# Patient Record
Sex: MALE | Race: OTHER | HISPANIC OR LATINO
[De-identification: names, ages, dates, MRNs, and addresses within clinical notes are randomized per-mention and may not be internally consistent; named-entity substitution may affect disease eponyms.]

---

## 2022-01-01 ENCOUNTER — TRANSCRIPTION ENCOUNTER (OUTPATIENT)
Age: 77
End: 2022-01-01

## 2022-01-01 ENCOUNTER — INPATIENT (INPATIENT)
Facility: HOSPITAL | Age: 77
LOS: 28 days | End: 2022-02-10
Attending: INTERNAL MEDICINE | Admitting: INTERNAL MEDICINE
Payer: COMMERCIAL

## 2022-01-01 VITALS — HEART RATE: 97 BPM | OXYGEN SATURATION: 100 %

## 2022-01-01 VITALS
OXYGEN SATURATION: 100 % | RESPIRATION RATE: 22 BRPM | DIASTOLIC BLOOD PRESSURE: 55 MMHG | HEART RATE: 132 BPM | SYSTOLIC BLOOD PRESSURE: 104 MMHG

## 2022-01-01 DIAGNOSIS — I48.91 UNSPECIFIED ATRIAL FIBRILLATION: ICD-10-CM

## 2022-01-01 DIAGNOSIS — I70.222 ATHEROSCLEROSIS OF NATIVE ARTERIES OF EXTREMITIES WITH REST PAIN, LEFT LEG: ICD-10-CM

## 2022-01-01 DIAGNOSIS — F03.90 UNSPECIFIED DEMENTIA, UNSPECIFIED SEVERITY, WITHOUT BEHAVIORAL DISTURBANCE, PSYCHOTIC DISTURBANCE, MOOD DISTURBANCE, AND ANXIETY: ICD-10-CM

## 2022-01-01 DIAGNOSIS — I61.9 NONTRAUMATIC INTRACEREBRAL HEMORRHAGE, UNSPECIFIED: ICD-10-CM

## 2022-01-01 DIAGNOSIS — A41.9 SEPSIS, UNSPECIFIED ORGANISM: ICD-10-CM

## 2022-01-01 DIAGNOSIS — M62.82 RHABDOMYOLYSIS: ICD-10-CM

## 2022-01-01 DIAGNOSIS — L89.90 PRESSURE ULCER OF UNSPECIFIED SITE, UNSPECIFIED STAGE: ICD-10-CM

## 2022-01-01 DIAGNOSIS — Z00.00 ENCOUNTER FOR GENERAL ADULT MEDICAL EXAMINATION WITHOUT ABNORMAL FINDINGS: ICD-10-CM

## 2022-01-01 DIAGNOSIS — E86.0 DEHYDRATION: ICD-10-CM

## 2022-01-01 DIAGNOSIS — R41.82 ALTERED MENTAL STATUS, UNSPECIFIED: ICD-10-CM

## 2022-01-01 DIAGNOSIS — I10 ESSENTIAL (PRIMARY) HYPERTENSION: ICD-10-CM

## 2022-01-01 DIAGNOSIS — N17.9 ACUTE KIDNEY FAILURE, UNSPECIFIED: ICD-10-CM

## 2022-01-01 DIAGNOSIS — K92.2 GASTROINTESTINAL HEMORRHAGE, UNSPECIFIED: ICD-10-CM

## 2022-01-01 DIAGNOSIS — R09.02 HYPOXEMIA: ICD-10-CM

## 2022-01-01 DIAGNOSIS — E11.9 TYPE 2 DIABETES MELLITUS WITHOUT COMPLICATIONS: ICD-10-CM

## 2022-01-01 LAB
-  AMIKACIN: SIGNIFICANT CHANGE UP
-  AMOXICILLIN/CLAVULANIC ACID: SIGNIFICANT CHANGE UP
-  AMPICILLIN/SULBACTAM: SIGNIFICANT CHANGE UP
-  AMPICILLIN: SIGNIFICANT CHANGE UP
-  AZTREONAM: SIGNIFICANT CHANGE UP
-  CEFAZOLIN: SIGNIFICANT CHANGE UP
-  CEFEPIME: SIGNIFICANT CHANGE UP
-  CEFOXITIN: SIGNIFICANT CHANGE UP
-  CEFTAZIDIME: SIGNIFICANT CHANGE UP
-  CEFTAZIDIME: SIGNIFICANT CHANGE UP
-  CEFTRIAXONE: SIGNIFICANT CHANGE UP
-  CIPROFLOXACIN: SIGNIFICANT CHANGE UP
-  ERTAPENEM: SIGNIFICANT CHANGE UP
-  GENTAMICIN: SIGNIFICANT CHANGE UP
-  IMIPENEM: SIGNIFICANT CHANGE UP
-  LEVOFLOXACIN: SIGNIFICANT CHANGE UP
-  MEROPENEM: SIGNIFICANT CHANGE UP
-  PIPERACILLIN/TAZOBACTAM: SIGNIFICANT CHANGE UP
-  TOBRAMYCIN: SIGNIFICANT CHANGE UP
-  TRIMETHOPRIM/SULFAMETHOXAZOLE: SIGNIFICANT CHANGE UP
A1C WITH ESTIMATED AVERAGE GLUCOSE RESULT: 6.1 % — HIGH (ref 4–5.6)
ALBUMIN SERPL ELPH-MCNC: 1.9 G/DL — LOW (ref 3.3–5)
ALBUMIN SERPL ELPH-MCNC: 2 G/DL — LOW (ref 3.3–5)
ALBUMIN SERPL ELPH-MCNC: 2.1 G/DL — LOW (ref 3.3–5)
ALBUMIN SERPL ELPH-MCNC: 2.2 G/DL — LOW (ref 3.3–5)
ALBUMIN SERPL ELPH-MCNC: 2.4 G/DL — LOW (ref 3.3–5)
ALBUMIN SERPL ELPH-MCNC: 3 G/DL — LOW (ref 3.3–5)
ALBUMIN SERPL ELPH-MCNC: 3.3 G/DL — SIGNIFICANT CHANGE UP (ref 3.3–5)
ALBUMIN SERPL ELPH-MCNC: 3.8 G/DL — SIGNIFICANT CHANGE UP (ref 3.3–5)
ALBUMIN SERPL ELPH-MCNC: 4.5 G/DL — SIGNIFICANT CHANGE UP (ref 3.3–5)
ALP SERPL-CCNC: 123 U/L — HIGH (ref 40–120)
ALP SERPL-CCNC: 139 U/L — HIGH (ref 40–120)
ALP SERPL-CCNC: 146 U/L — HIGH (ref 40–120)
ALP SERPL-CCNC: 224 U/L — HIGH (ref 40–120)
ALP SERPL-CCNC: 75 U/L — SIGNIFICANT CHANGE UP (ref 40–120)
ALP SERPL-CCNC: 77 U/L — SIGNIFICANT CHANGE UP (ref 40–120)
ALP SERPL-CCNC: 79 U/L — SIGNIFICANT CHANGE UP (ref 40–120)
ALP SERPL-CCNC: 82 U/L — SIGNIFICANT CHANGE UP (ref 40–120)
ALP SERPL-CCNC: 82 U/L — SIGNIFICANT CHANGE UP (ref 40–120)
ALP SERPL-CCNC: 86 U/L — SIGNIFICANT CHANGE UP (ref 40–120)
ALP SERPL-CCNC: 89 U/L — SIGNIFICANT CHANGE UP (ref 40–120)
ALP SERPL-CCNC: 98 U/L — SIGNIFICANT CHANGE UP (ref 40–120)
ALP SERPL-CCNC: 98 U/L — SIGNIFICANT CHANGE UP (ref 40–120)
ALT FLD-CCNC: 174 U/L — HIGH (ref 4–41)
ALT FLD-CCNC: 30 U/L — SIGNIFICANT CHANGE UP (ref 4–41)
ALT FLD-CCNC: 32 U/L — SIGNIFICANT CHANGE UP (ref 4–41)
ALT FLD-CCNC: 35 U/L — SIGNIFICANT CHANGE UP (ref 4–41)
ALT FLD-CCNC: 35 U/L — SIGNIFICANT CHANGE UP (ref 4–41)
ALT FLD-CCNC: 36 U/L — SIGNIFICANT CHANGE UP (ref 4–41)
ALT FLD-CCNC: 39 U/L — SIGNIFICANT CHANGE UP (ref 4–41)
ALT FLD-CCNC: 41 U/L — SIGNIFICANT CHANGE UP (ref 4–41)
ALT FLD-CCNC: 44 U/L — HIGH (ref 4–41)
ALT FLD-CCNC: 44 U/L — HIGH (ref 4–41)
ALT FLD-CCNC: 65 U/L — HIGH (ref 4–41)
ALT FLD-CCNC: 72 U/L — HIGH (ref 4–41)
ALT FLD-CCNC: 75 U/L — HIGH (ref 4–41)
ANION GAP SERPL CALC-SCNC: 10 MMOL/L — SIGNIFICANT CHANGE UP (ref 7–14)
ANION GAP SERPL CALC-SCNC: 11 MMOL/L — SIGNIFICANT CHANGE UP (ref 7–14)
ANION GAP SERPL CALC-SCNC: 12 MMOL/L — SIGNIFICANT CHANGE UP (ref 7–14)
ANION GAP SERPL CALC-SCNC: 13 MMOL/L — SIGNIFICANT CHANGE UP (ref 7–14)
ANION GAP SERPL CALC-SCNC: 14 MMOL/L — SIGNIFICANT CHANGE UP (ref 7–14)
ANION GAP SERPL CALC-SCNC: 19 MMOL/L — HIGH (ref 7–14)
ANION GAP SERPL CALC-SCNC: 5 MMOL/L — LOW (ref 7–14)
ANION GAP SERPL CALC-SCNC: 6 MMOL/L — LOW (ref 7–14)
ANION GAP SERPL CALC-SCNC: 7 MMOL/L — SIGNIFICANT CHANGE UP (ref 7–14)
ANION GAP SERPL CALC-SCNC: 8 MMOL/L — SIGNIFICANT CHANGE UP (ref 7–14)
ANION GAP SERPL CALC-SCNC: 9 MMOL/L — SIGNIFICANT CHANGE UP (ref 7–14)
APPEARANCE UR: ABNORMAL
APPEARANCE UR: ABNORMAL
APPEARANCE UR: CLEAR — SIGNIFICANT CHANGE UP
APTT BLD: 22.8 SEC — LOW (ref 27–36.3)
APTT BLD: 25.9 SEC — LOW (ref 27–36.3)
APTT BLD: 26.9 SEC — LOW (ref 27–36.3)
APTT BLD: 27 SEC — SIGNIFICANT CHANGE UP (ref 27–36.3)
APTT BLD: 27.8 SEC — SIGNIFICANT CHANGE UP (ref 27–36.3)
APTT BLD: 27.9 SEC — SIGNIFICANT CHANGE UP (ref 27–36.3)
APTT BLD: 28.3 SEC — SIGNIFICANT CHANGE UP (ref 27–36.3)
APTT BLD: 29 SEC — SIGNIFICANT CHANGE UP (ref 27–36.3)
APTT BLD: 31.3 SEC — SIGNIFICANT CHANGE UP (ref 27–36.3)
APTT BLD: 31.8 SEC — SIGNIFICANT CHANGE UP (ref 27–36.3)
AST SERPL-CCNC: 215 U/L — HIGH (ref 4–40)
AST SERPL-CCNC: 35 U/L — SIGNIFICANT CHANGE UP (ref 4–40)
AST SERPL-CCNC: 35 U/L — SIGNIFICANT CHANGE UP (ref 4–40)
AST SERPL-CCNC: 45 U/L — HIGH (ref 4–40)
AST SERPL-CCNC: 47 U/L — HIGH (ref 4–40)
AST SERPL-CCNC: 48 U/L — HIGH (ref 4–40)
AST SERPL-CCNC: 50 U/L — HIGH (ref 4–40)
AST SERPL-CCNC: 65 U/L — HIGH (ref 4–40)
AST SERPL-CCNC: 66 U/L — HIGH (ref 4–40)
AST SERPL-CCNC: 67 U/L — HIGH (ref 4–40)
AST SERPL-CCNC: 72 U/L — HIGH (ref 4–40)
AST SERPL-CCNC: 73 U/L — HIGH (ref 4–40)
AST SERPL-CCNC: 73 U/L — HIGH (ref 4–40)
BACTERIA # UR AUTO: ABNORMAL
BASE EXCESS BLDA CALC-SCNC: 0.9 MMOL/L — SIGNIFICANT CHANGE UP (ref -2–3)
BASE EXCESS BLDA CALC-SCNC: 3 MMOL/L — SIGNIFICANT CHANGE UP (ref -2–3)
BASE EXCESS BLDV CALC-SCNC: 1.3 MMOL/L — SIGNIFICANT CHANGE UP (ref -2–3)
BASE EXCESS BLDV CALC-SCNC: 1.4 MMOL/L — SIGNIFICANT CHANGE UP (ref -2–3)
BASOPHILS # BLD AUTO: 0.03 K/UL — SIGNIFICANT CHANGE UP (ref 0–0.2)
BASOPHILS # BLD AUTO: 0.05 K/UL — SIGNIFICANT CHANGE UP (ref 0–0.2)
BASOPHILS # BLD AUTO: 0.06 K/UL — SIGNIFICANT CHANGE UP (ref 0–0.2)
BASOPHILS # BLD AUTO: 0.06 K/UL — SIGNIFICANT CHANGE UP (ref 0–0.2)
BASOPHILS NFR BLD AUTO: 0.2 % — SIGNIFICANT CHANGE UP (ref 0–2)
BASOPHILS NFR BLD AUTO: 0.2 % — SIGNIFICANT CHANGE UP (ref 0–2)
BASOPHILS NFR BLD AUTO: 0.3 % — SIGNIFICANT CHANGE UP (ref 0–2)
BASOPHILS NFR BLD AUTO: 0.3 % — SIGNIFICANT CHANGE UP (ref 0–2)
BILIRUB DIRECT SERPL-MCNC: 0.2 MG/DL — SIGNIFICANT CHANGE UP (ref 0–0.3)
BILIRUB DIRECT SERPL-MCNC: <0.2 MG/DL — SIGNIFICANT CHANGE UP (ref 0–0.3)
BILIRUB DIRECT SERPL-MCNC: <0.2 MG/DL — SIGNIFICANT CHANGE UP (ref 0–0.3)
BILIRUB INDIRECT FLD-MCNC: 0.2 MG/DL — SIGNIFICANT CHANGE UP (ref 0–1)
BILIRUB INDIRECT FLD-MCNC: >0.1 MG/DL — SIGNIFICANT CHANGE UP (ref 0–1)
BILIRUB INDIRECT FLD-MCNC: SIGNIFICANT CHANGE UP MG/DL (ref 0–1)
BILIRUB SERPL-MCNC: 0.2 MG/DL — SIGNIFICANT CHANGE UP (ref 0.2–1.2)
BILIRUB SERPL-MCNC: 0.3 MG/DL — SIGNIFICANT CHANGE UP (ref 0.2–1.2)
BILIRUB SERPL-MCNC: 0.4 MG/DL — SIGNIFICANT CHANGE UP (ref 0.2–1.2)
BILIRUB SERPL-MCNC: 0.9 MG/DL — SIGNIFICANT CHANGE UP (ref 0.2–1.2)
BILIRUB SERPL-MCNC: 0.9 MG/DL — SIGNIFICANT CHANGE UP (ref 0.2–1.2)
BILIRUB SERPL-MCNC: 1.1 MG/DL — SIGNIFICANT CHANGE UP (ref 0.2–1.2)
BILIRUB SERPL-MCNC: 1.3 MG/DL — HIGH (ref 0.2–1.2)
BILIRUB SERPL-MCNC: <0.2 MG/DL — SIGNIFICANT CHANGE UP (ref 0.2–1.2)
BILIRUB UR-MCNC: NEGATIVE — SIGNIFICANT CHANGE UP
BLD GP AB SCN SERPL QL: NEGATIVE — SIGNIFICANT CHANGE UP
BLOOD GAS ARTERIAL - LYTES,HGB,ICA,LACT RESULT: SIGNIFICANT CHANGE UP
BLOOD GAS ARTERIAL COMPREHENSIVE RESULT: SIGNIFICANT CHANGE UP
BLOOD GAS VENOUS COMPREHENSIVE RESULT: SIGNIFICANT CHANGE UP
BUN SERPL-MCNC: 21 MG/DL — SIGNIFICANT CHANGE UP (ref 7–23)
BUN SERPL-MCNC: 21 MG/DL — SIGNIFICANT CHANGE UP (ref 7–23)
BUN SERPL-MCNC: 22 MG/DL — SIGNIFICANT CHANGE UP (ref 7–23)
BUN SERPL-MCNC: 23 MG/DL — SIGNIFICANT CHANGE UP (ref 7–23)
BUN SERPL-MCNC: 23 MG/DL — SIGNIFICANT CHANGE UP (ref 7–23)
BUN SERPL-MCNC: 24 MG/DL — HIGH (ref 7–23)
BUN SERPL-MCNC: 24 MG/DL — HIGH (ref 7–23)
BUN SERPL-MCNC: 25 MG/DL — HIGH (ref 7–23)
BUN SERPL-MCNC: 26 MG/DL — HIGH (ref 7–23)
BUN SERPL-MCNC: 26 MG/DL — HIGH (ref 7–23)
BUN SERPL-MCNC: 27 MG/DL — HIGH (ref 7–23)
BUN SERPL-MCNC: 28 MG/DL — HIGH (ref 7–23)
BUN SERPL-MCNC: 28 MG/DL — HIGH (ref 7–23)
BUN SERPL-MCNC: 30 MG/DL — HIGH (ref 7–23)
BUN SERPL-MCNC: 31 MG/DL — HIGH (ref 7–23)
BUN SERPL-MCNC: 31 MG/DL — HIGH (ref 7–23)
BUN SERPL-MCNC: 32 MG/DL — HIGH (ref 7–23)
BUN SERPL-MCNC: 33 MG/DL — HIGH (ref 7–23)
BUN SERPL-MCNC: 34 MG/DL — HIGH (ref 7–23)
BUN SERPL-MCNC: 35 MG/DL — HIGH (ref 7–23)
BUN SERPL-MCNC: 38 MG/DL — HIGH (ref 7–23)
BUN SERPL-MCNC: 40 MG/DL — HIGH (ref 7–23)
BUN SERPL-MCNC: 43 MG/DL — HIGH (ref 7–23)
BUN SERPL-MCNC: 44 MG/DL — HIGH (ref 7–23)
BUN SERPL-MCNC: 62 MG/DL — HIGH (ref 7–23)
C DIFF BY PCR RESULT: DETECTED
C DIFF TOX GENS STL QL NAA+PROBE: SIGNIFICANT CHANGE UP
CA-I BLD-SCNC: 0.98 MMOL/L — LOW (ref 1.15–1.29)
CA-I BLD-SCNC: 1.03 MMOL/L — LOW (ref 1.15–1.29)
CALCIUM SERPL-MCNC: 10 MG/DL — SIGNIFICANT CHANGE UP (ref 8.4–10.5)
CALCIUM SERPL-MCNC: 7.1 MG/DL — LOW (ref 8.4–10.5)
CALCIUM SERPL-MCNC: 7.2 MG/DL — LOW (ref 8.4–10.5)
CALCIUM SERPL-MCNC: 7.2 MG/DL — LOW (ref 8.4–10.5)
CALCIUM SERPL-MCNC: 7.3 MG/DL — LOW (ref 8.4–10.5)
CALCIUM SERPL-MCNC: 7.4 MG/DL — LOW (ref 8.4–10.5)
CALCIUM SERPL-MCNC: 7.5 MG/DL — LOW (ref 8.4–10.5)
CALCIUM SERPL-MCNC: 7.5 MG/DL — LOW (ref 8.4–10.5)
CALCIUM SERPL-MCNC: 7.6 MG/DL — LOW (ref 8.4–10.5)
CALCIUM SERPL-MCNC: 7.8 MG/DL — LOW (ref 8.4–10.5)
CALCIUM SERPL-MCNC: 7.9 MG/DL — LOW (ref 8.4–10.5)
CALCIUM SERPL-MCNC: 8.1 MG/DL — LOW (ref 8.4–10.5)
CALCIUM SERPL-MCNC: 8.3 MG/DL — LOW (ref 8.4–10.5)
CALCIUM SERPL-MCNC: 8.6 MG/DL — SIGNIFICANT CHANGE UP (ref 8.4–10.5)
CALCIUM SERPL-MCNC: 8.6 MG/DL — SIGNIFICANT CHANGE UP (ref 8.4–10.5)
CALCIUM SERPL-MCNC: 8.7 MG/DL — SIGNIFICANT CHANGE UP (ref 8.4–10.5)
CALCIUM SERPL-MCNC: 9 MG/DL — SIGNIFICANT CHANGE UP (ref 8.4–10.5)
CHLORIDE BLDV-SCNC: 104 MMOL/L — SIGNIFICANT CHANGE UP (ref 96–108)
CHLORIDE BLDV-SCNC: 113 MMOL/L — HIGH (ref 96–108)
CHLORIDE SERPL-SCNC: 102 MMOL/L — SIGNIFICANT CHANGE UP (ref 98–107)
CHLORIDE SERPL-SCNC: 102 MMOL/L — SIGNIFICANT CHANGE UP (ref 98–107)
CHLORIDE SERPL-SCNC: 104 MMOL/L — SIGNIFICANT CHANGE UP (ref 98–107)
CHLORIDE SERPL-SCNC: 105 MMOL/L — SIGNIFICANT CHANGE UP (ref 98–107)
CHLORIDE SERPL-SCNC: 108 MMOL/L — HIGH (ref 98–107)
CHLORIDE SERPL-SCNC: 110 MMOL/L — HIGH (ref 98–107)
CHLORIDE SERPL-SCNC: 110 MMOL/L — HIGH (ref 98–107)
CHLORIDE SERPL-SCNC: 111 MMOL/L — HIGH (ref 98–107)
CHLORIDE SERPL-SCNC: 112 MMOL/L — HIGH (ref 98–107)
CHLORIDE SERPL-SCNC: 113 MMOL/L — HIGH (ref 98–107)
CHLORIDE SERPL-SCNC: 114 MMOL/L — HIGH (ref 98–107)
CHLORIDE SERPL-SCNC: 115 MMOL/L — HIGH (ref 98–107)
CHLORIDE SERPL-SCNC: 115 MMOL/L — HIGH (ref 98–107)
CHLORIDE SERPL-SCNC: 116 MMOL/L — HIGH (ref 98–107)
CHLORIDE SERPL-SCNC: 117 MMOL/L — HIGH (ref 98–107)
CHLORIDE SERPL-SCNC: 117 MMOL/L — HIGH (ref 98–107)
CHLORIDE SERPL-SCNC: 118 MMOL/L — HIGH (ref 98–107)
CHLORIDE SERPL-SCNC: 118 MMOL/L — HIGH (ref 98–107)
CHLORIDE SERPL-SCNC: 119 MMOL/L — HIGH (ref 98–107)
CHLORIDE SERPL-SCNC: 120 MMOL/L — HIGH (ref 98–107)
CK MB BLD-MCNC: 0.7 % — SIGNIFICANT CHANGE UP (ref 0–2.5)
CK MB CFR SERPL CALC: 5.4 NG/ML — SIGNIFICANT CHANGE UP
CK SERPL-CCNC: 1178 U/L — HIGH (ref 30–200)
CK SERPL-CCNC: 1320 U/L — HIGH (ref 30–200)
CK SERPL-CCNC: 829 U/L — HIGH (ref 30–200)
CO2 BLDA-SCNC: 25 MMOL/L — HIGH (ref 19–24)
CO2 BLDA-SCNC: 26 MMOL/L — HIGH (ref 19–24)
CO2 BLDV-SCNC: 27.9 MMOL/L — HIGH (ref 22–26)
CO2 BLDV-SCNC: 29.2 MMOL/L — HIGH (ref 22–26)
CO2 SERPL-SCNC: 20 MMOL/L — LOW (ref 22–31)
CO2 SERPL-SCNC: 21 MMOL/L — LOW (ref 22–31)
CO2 SERPL-SCNC: 21 MMOL/L — LOW (ref 22–31)
CO2 SERPL-SCNC: 22 MMOL/L — SIGNIFICANT CHANGE UP (ref 22–31)
CO2 SERPL-SCNC: 23 MMOL/L — SIGNIFICANT CHANGE UP (ref 22–31)
CO2 SERPL-SCNC: 24 MMOL/L — SIGNIFICANT CHANGE UP (ref 22–31)
CO2 SERPL-SCNC: 25 MMOL/L — SIGNIFICANT CHANGE UP (ref 22–31)
CO2 SERPL-SCNC: 26 MMOL/L — SIGNIFICANT CHANGE UP (ref 22–31)
CO2 SERPL-SCNC: 28 MMOL/L — SIGNIFICANT CHANGE UP (ref 22–31)
CO2 SERPL-SCNC: 30 MMOL/L — SIGNIFICANT CHANGE UP (ref 22–31)
CO2 SERPL-SCNC: 30 MMOL/L — SIGNIFICANT CHANGE UP (ref 22–31)
CO2 SERPL-SCNC: 32 MMOL/L — HIGH (ref 22–31)
COLOR SPEC: ABNORMAL
COLOR SPEC: YELLOW — SIGNIFICANT CHANGE UP
COLOR SPEC: YELLOW — SIGNIFICANT CHANGE UP
CREAT SERPL-MCNC: 0.64 MG/DL — SIGNIFICANT CHANGE UP (ref 0.5–1.3)
CREAT SERPL-MCNC: 0.67 MG/DL — SIGNIFICANT CHANGE UP (ref 0.5–1.3)
CREAT SERPL-MCNC: 0.69 MG/DL — SIGNIFICANT CHANGE UP (ref 0.5–1.3)
CREAT SERPL-MCNC: 0.73 MG/DL — SIGNIFICANT CHANGE UP (ref 0.5–1.3)
CREAT SERPL-MCNC: 0.74 MG/DL — SIGNIFICANT CHANGE UP (ref 0.5–1.3)
CREAT SERPL-MCNC: 0.74 MG/DL — SIGNIFICANT CHANGE UP (ref 0.5–1.3)
CREAT SERPL-MCNC: 0.77 MG/DL — SIGNIFICANT CHANGE UP (ref 0.5–1.3)
CREAT SERPL-MCNC: 0.77 MG/DL — SIGNIFICANT CHANGE UP (ref 0.5–1.3)
CREAT SERPL-MCNC: 0.78 MG/DL — SIGNIFICANT CHANGE UP (ref 0.5–1.3)
CREAT SERPL-MCNC: 0.79 MG/DL — SIGNIFICANT CHANGE UP (ref 0.5–1.3)
CREAT SERPL-MCNC: 0.79 MG/DL — SIGNIFICANT CHANGE UP (ref 0.5–1.3)
CREAT SERPL-MCNC: 0.8 MG/DL — SIGNIFICANT CHANGE UP (ref 0.5–1.3)
CREAT SERPL-MCNC: 0.83 MG/DL — SIGNIFICANT CHANGE UP (ref 0.5–1.3)
CREAT SERPL-MCNC: 0.83 MG/DL — SIGNIFICANT CHANGE UP (ref 0.5–1.3)
CREAT SERPL-MCNC: 0.84 MG/DL — SIGNIFICANT CHANGE UP (ref 0.5–1.3)
CREAT SERPL-MCNC: 0.89 MG/DL — SIGNIFICANT CHANGE UP (ref 0.5–1.3)
CREAT SERPL-MCNC: 0.91 MG/DL — SIGNIFICANT CHANGE UP (ref 0.5–1.3)
CREAT SERPL-MCNC: 0.94 MG/DL — SIGNIFICANT CHANGE UP (ref 0.5–1.3)
CREAT SERPL-MCNC: 0.96 MG/DL — SIGNIFICANT CHANGE UP (ref 0.5–1.3)
CREAT SERPL-MCNC: 0.96 MG/DL — SIGNIFICANT CHANGE UP (ref 0.5–1.3)
CREAT SERPL-MCNC: 1.02 MG/DL — SIGNIFICANT CHANGE UP (ref 0.5–1.3)
CREAT SERPL-MCNC: 1.02 MG/DL — SIGNIFICANT CHANGE UP (ref 0.5–1.3)
CREAT SERPL-MCNC: 1.04 MG/DL — SIGNIFICANT CHANGE UP (ref 0.5–1.3)
CREAT SERPL-MCNC: 1.05 MG/DL — SIGNIFICANT CHANGE UP (ref 0.5–1.3)
CREAT SERPL-MCNC: 1.05 MG/DL — SIGNIFICANT CHANGE UP (ref 0.5–1.3)
CREAT SERPL-MCNC: 1.09 MG/DL — SIGNIFICANT CHANGE UP (ref 0.5–1.3)
CREAT SERPL-MCNC: 1.09 MG/DL — SIGNIFICANT CHANGE UP (ref 0.5–1.3)
CREAT SERPL-MCNC: 1.15 MG/DL — SIGNIFICANT CHANGE UP (ref 0.5–1.3)
CREAT SERPL-MCNC: 1.19 MG/DL — SIGNIFICANT CHANGE UP (ref 0.5–1.3)
CREAT SERPL-MCNC: 1.19 MG/DL — SIGNIFICANT CHANGE UP (ref 0.5–1.3)
CREAT SERPL-MCNC: 1.23 MG/DL — SIGNIFICANT CHANGE UP (ref 0.5–1.3)
CREAT SERPL-MCNC: 1.34 MG/DL — HIGH (ref 0.5–1.3)
CREAT SERPL-MCNC: 1.38 MG/DL — HIGH (ref 0.5–1.3)
CULTURE RESULTS: NO GROWTH — SIGNIFICANT CHANGE UP
CULTURE RESULTS: NO GROWTH — SIGNIFICANT CHANGE UP
CULTURE RESULTS: SIGNIFICANT CHANGE UP
DIFF PNL FLD: ABNORMAL
DIGOXIN SERPL-MCNC: 0.5 NG/ML — LOW (ref 0.8–2)
DIGOXIN SERPL-MCNC: 0.7 NG/ML — LOW (ref 0.8–2)
DIGOXIN SERPL-MCNC: 0.8 NG/ML — SIGNIFICANT CHANGE UP (ref 0.8–2)
DIGOXIN SERPL-MCNC: 0.8 NG/ML — SIGNIFICANT CHANGE UP (ref 0.8–2)
DIGOXIN SERPL-MCNC: 1.6 NG/ML — SIGNIFICANT CHANGE UP (ref 0.8–2)
DIGOXIN SERPL-MCNC: 2.6 NG/ML — CRITICAL HIGH (ref 0.8–2)
E CLOAC COMP DNA BLD POS QL NAA+PROBE: SIGNIFICANT CHANGE UP
EOSINOPHIL # BLD AUTO: 0 K/UL — SIGNIFICANT CHANGE UP (ref 0–0.5)
EOSINOPHIL # BLD AUTO: 0.01 K/UL — SIGNIFICANT CHANGE UP (ref 0–0.5)
EOSINOPHIL # BLD AUTO: 0.05 K/UL — SIGNIFICANT CHANGE UP (ref 0–0.5)
EOSINOPHIL # BLD AUTO: 1.01 K/UL — HIGH (ref 0–0.5)
EOSINOPHIL NFR BLD AUTO: 0 % — SIGNIFICANT CHANGE UP (ref 0–6)
EOSINOPHIL NFR BLD AUTO: 0.1 % — SIGNIFICANT CHANGE UP (ref 0–6)
EOSINOPHIL NFR BLD AUTO: 0.2 % — SIGNIFICANT CHANGE UP (ref 0–6)
EOSINOPHIL NFR BLD AUTO: 5.1 % — SIGNIFICANT CHANGE UP (ref 0–6)
EPI CELLS # UR: 2 /HPF — SIGNIFICANT CHANGE UP (ref 0–5)
ESTIMATED AVERAGE GLUCOSE: 128 — SIGNIFICANT CHANGE UP
FUNGITELL: 174 PG/ML — HIGH
GAS PNL BLDA: SIGNIFICANT CHANGE UP
GAS PNL BLDA: SIGNIFICANT CHANGE UP
GAS PNL BLDV: 141 MMOL/L — SIGNIFICANT CHANGE UP (ref 136–145)
GAS PNL BLDV: 147 MMOL/L — HIGH (ref 136–145)
GLUCOSE BLDC GLUCOMTR-MCNC: 103 MG/DL — HIGH (ref 70–99)
GLUCOSE BLDC GLUCOMTR-MCNC: 109 MG/DL — HIGH (ref 70–99)
GLUCOSE BLDC GLUCOMTR-MCNC: 109 MG/DL — HIGH (ref 70–99)
GLUCOSE BLDC GLUCOMTR-MCNC: 111 MG/DL — HIGH (ref 70–99)
GLUCOSE BLDC GLUCOMTR-MCNC: 111 MG/DL — HIGH (ref 70–99)
GLUCOSE BLDC GLUCOMTR-MCNC: 112 MG/DL — HIGH (ref 70–99)
GLUCOSE BLDC GLUCOMTR-MCNC: 112 MG/DL — HIGH (ref 70–99)
GLUCOSE BLDC GLUCOMTR-MCNC: 115 MG/DL — HIGH (ref 70–99)
GLUCOSE BLDC GLUCOMTR-MCNC: 117 MG/DL — HIGH (ref 70–99)
GLUCOSE BLDC GLUCOMTR-MCNC: 117 MG/DL — HIGH (ref 70–99)
GLUCOSE BLDC GLUCOMTR-MCNC: 119 MG/DL — HIGH (ref 70–99)
GLUCOSE BLDC GLUCOMTR-MCNC: 120 MG/DL — HIGH (ref 70–99)
GLUCOSE BLDC GLUCOMTR-MCNC: 120 MG/DL — HIGH (ref 70–99)
GLUCOSE BLDC GLUCOMTR-MCNC: 122 MG/DL — HIGH (ref 70–99)
GLUCOSE BLDC GLUCOMTR-MCNC: 123 MG/DL — HIGH (ref 70–99)
GLUCOSE BLDC GLUCOMTR-MCNC: 123 MG/DL — HIGH (ref 70–99)
GLUCOSE BLDC GLUCOMTR-MCNC: 124 MG/DL — HIGH (ref 70–99)
GLUCOSE BLDC GLUCOMTR-MCNC: 126 MG/DL — HIGH (ref 70–99)
GLUCOSE BLDC GLUCOMTR-MCNC: 126 MG/DL — HIGH (ref 70–99)
GLUCOSE BLDC GLUCOMTR-MCNC: 127 MG/DL — HIGH (ref 70–99)
GLUCOSE BLDC GLUCOMTR-MCNC: 127 MG/DL — HIGH (ref 70–99)
GLUCOSE BLDC GLUCOMTR-MCNC: 128 MG/DL — HIGH (ref 70–99)
GLUCOSE BLDC GLUCOMTR-MCNC: 129 MG/DL — HIGH (ref 70–99)
GLUCOSE BLDC GLUCOMTR-MCNC: 130 MG/DL — HIGH (ref 70–99)
GLUCOSE BLDC GLUCOMTR-MCNC: 131 MG/DL — HIGH (ref 70–99)
GLUCOSE BLDC GLUCOMTR-MCNC: 131 MG/DL — HIGH (ref 70–99)
GLUCOSE BLDC GLUCOMTR-MCNC: 132 MG/DL — HIGH (ref 70–99)
GLUCOSE BLDC GLUCOMTR-MCNC: 133 MG/DL — HIGH (ref 70–99)
GLUCOSE BLDC GLUCOMTR-MCNC: 133 MG/DL — HIGH (ref 70–99)
GLUCOSE BLDC GLUCOMTR-MCNC: 134 MG/DL — HIGH (ref 70–99)
GLUCOSE BLDC GLUCOMTR-MCNC: 134 MG/DL — HIGH (ref 70–99)
GLUCOSE BLDC GLUCOMTR-MCNC: 135 MG/DL — HIGH (ref 70–99)
GLUCOSE BLDC GLUCOMTR-MCNC: 136 MG/DL — HIGH (ref 70–99)
GLUCOSE BLDC GLUCOMTR-MCNC: 136 MG/DL — HIGH (ref 70–99)
GLUCOSE BLDC GLUCOMTR-MCNC: 137 MG/DL — HIGH (ref 70–99)
GLUCOSE BLDC GLUCOMTR-MCNC: 138 MG/DL — HIGH (ref 70–99)
GLUCOSE BLDC GLUCOMTR-MCNC: 138 MG/DL — HIGH (ref 70–99)
GLUCOSE BLDC GLUCOMTR-MCNC: 139 MG/DL — HIGH (ref 70–99)
GLUCOSE BLDC GLUCOMTR-MCNC: 141 MG/DL — HIGH (ref 70–99)
GLUCOSE BLDC GLUCOMTR-MCNC: 142 MG/DL — HIGH (ref 70–99)
GLUCOSE BLDC GLUCOMTR-MCNC: 143 MG/DL — HIGH (ref 70–99)
GLUCOSE BLDC GLUCOMTR-MCNC: 144 MG/DL — HIGH (ref 70–99)
GLUCOSE BLDC GLUCOMTR-MCNC: 145 MG/DL — HIGH (ref 70–99)
GLUCOSE BLDC GLUCOMTR-MCNC: 146 MG/DL — HIGH (ref 70–99)
GLUCOSE BLDC GLUCOMTR-MCNC: 147 MG/DL — HIGH (ref 70–99)
GLUCOSE BLDC GLUCOMTR-MCNC: 148 MG/DL — HIGH (ref 70–99)
GLUCOSE BLDC GLUCOMTR-MCNC: 149 MG/DL — HIGH (ref 70–99)
GLUCOSE BLDC GLUCOMTR-MCNC: 150 MG/DL — HIGH (ref 70–99)
GLUCOSE BLDC GLUCOMTR-MCNC: 151 MG/DL — HIGH (ref 70–99)
GLUCOSE BLDC GLUCOMTR-MCNC: 152 MG/DL — HIGH (ref 70–99)
GLUCOSE BLDC GLUCOMTR-MCNC: 153 MG/DL — HIGH (ref 70–99)
GLUCOSE BLDC GLUCOMTR-MCNC: 154 MG/DL — HIGH (ref 70–99)
GLUCOSE BLDC GLUCOMTR-MCNC: 154 MG/DL — HIGH (ref 70–99)
GLUCOSE BLDC GLUCOMTR-MCNC: 155 MG/DL — HIGH (ref 70–99)
GLUCOSE BLDC GLUCOMTR-MCNC: 155 MG/DL — HIGH (ref 70–99)
GLUCOSE BLDC GLUCOMTR-MCNC: 156 MG/DL — HIGH (ref 70–99)
GLUCOSE BLDC GLUCOMTR-MCNC: 158 MG/DL — HIGH (ref 70–99)
GLUCOSE BLDC GLUCOMTR-MCNC: 158 MG/DL — HIGH (ref 70–99)
GLUCOSE BLDC GLUCOMTR-MCNC: 160 MG/DL — HIGH (ref 70–99)
GLUCOSE BLDC GLUCOMTR-MCNC: 160 MG/DL — HIGH (ref 70–99)
GLUCOSE BLDC GLUCOMTR-MCNC: 161 MG/DL — HIGH (ref 70–99)
GLUCOSE BLDC GLUCOMTR-MCNC: 161 MG/DL — HIGH (ref 70–99)
GLUCOSE BLDC GLUCOMTR-MCNC: 163 MG/DL — HIGH (ref 70–99)
GLUCOSE BLDC GLUCOMTR-MCNC: 164 MG/DL — HIGH (ref 70–99)
GLUCOSE BLDC GLUCOMTR-MCNC: 164 MG/DL — HIGH (ref 70–99)
GLUCOSE BLDC GLUCOMTR-MCNC: 165 MG/DL — HIGH (ref 70–99)
GLUCOSE BLDC GLUCOMTR-MCNC: 165 MG/DL — HIGH (ref 70–99)
GLUCOSE BLDC GLUCOMTR-MCNC: 166 MG/DL — HIGH (ref 70–99)
GLUCOSE BLDC GLUCOMTR-MCNC: 167 MG/DL — HIGH (ref 70–99)
GLUCOSE BLDC GLUCOMTR-MCNC: 168 MG/DL — HIGH (ref 70–99)
GLUCOSE BLDC GLUCOMTR-MCNC: 169 MG/DL — HIGH (ref 70–99)
GLUCOSE BLDC GLUCOMTR-MCNC: 169 MG/DL — HIGH (ref 70–99)
GLUCOSE BLDC GLUCOMTR-MCNC: 171 MG/DL — HIGH (ref 70–99)
GLUCOSE BLDC GLUCOMTR-MCNC: 172 MG/DL — HIGH (ref 70–99)
GLUCOSE BLDC GLUCOMTR-MCNC: 175 MG/DL — HIGH (ref 70–99)
GLUCOSE BLDC GLUCOMTR-MCNC: 176 MG/DL — HIGH (ref 70–99)
GLUCOSE BLDC GLUCOMTR-MCNC: 181 MG/DL — HIGH (ref 70–99)
GLUCOSE BLDC GLUCOMTR-MCNC: 181 MG/DL — HIGH (ref 70–99)
GLUCOSE BLDC GLUCOMTR-MCNC: 182 MG/DL — HIGH (ref 70–99)
GLUCOSE BLDC GLUCOMTR-MCNC: 184 MG/DL — HIGH (ref 70–99)
GLUCOSE BLDC GLUCOMTR-MCNC: 184 MG/DL — HIGH (ref 70–99)
GLUCOSE BLDC GLUCOMTR-MCNC: 185 MG/DL — HIGH (ref 70–99)
GLUCOSE BLDC GLUCOMTR-MCNC: 187 MG/DL — HIGH (ref 70–99)
GLUCOSE BLDC GLUCOMTR-MCNC: 188 MG/DL — HIGH (ref 70–99)
GLUCOSE BLDC GLUCOMTR-MCNC: 189 MG/DL — HIGH (ref 70–99)
GLUCOSE BLDC GLUCOMTR-MCNC: 189 MG/DL — HIGH (ref 70–99)
GLUCOSE BLDC GLUCOMTR-MCNC: 192 MG/DL — HIGH (ref 70–99)
GLUCOSE BLDC GLUCOMTR-MCNC: 193 MG/DL — HIGH (ref 70–99)
GLUCOSE BLDC GLUCOMTR-MCNC: 196 MG/DL — HIGH (ref 70–99)
GLUCOSE BLDC GLUCOMTR-MCNC: 197 MG/DL — HIGH (ref 70–99)
GLUCOSE BLDC GLUCOMTR-MCNC: 197 MG/DL — HIGH (ref 70–99)
GLUCOSE BLDC GLUCOMTR-MCNC: 199 MG/DL — HIGH (ref 70–99)
GLUCOSE BLDC GLUCOMTR-MCNC: 208 MG/DL — HIGH (ref 70–99)
GLUCOSE BLDC GLUCOMTR-MCNC: 211 MG/DL — HIGH (ref 70–99)
GLUCOSE BLDC GLUCOMTR-MCNC: 212 MG/DL — HIGH (ref 70–99)
GLUCOSE BLDC GLUCOMTR-MCNC: 215 MG/DL — HIGH (ref 70–99)
GLUCOSE BLDC GLUCOMTR-MCNC: 215 MG/DL — HIGH (ref 70–99)
GLUCOSE BLDC GLUCOMTR-MCNC: 216 MG/DL — HIGH (ref 70–99)
GLUCOSE BLDC GLUCOMTR-MCNC: 219 MG/DL — HIGH (ref 70–99)
GLUCOSE BLDC GLUCOMTR-MCNC: 223 MG/DL — HIGH (ref 70–99)
GLUCOSE BLDC GLUCOMTR-MCNC: 228 MG/DL — HIGH (ref 70–99)
GLUCOSE BLDC GLUCOMTR-MCNC: 244 MG/DL — HIGH (ref 70–99)
GLUCOSE BLDC GLUCOMTR-MCNC: 260 MG/DL — HIGH (ref 70–99)
GLUCOSE BLDC GLUCOMTR-MCNC: 265 MG/DL — HIGH (ref 70–99)
GLUCOSE BLDC GLUCOMTR-MCNC: 67 MG/DL — LOW (ref 70–99)
GLUCOSE BLDC GLUCOMTR-MCNC: 73 MG/DL — SIGNIFICANT CHANGE UP (ref 70–99)
GLUCOSE BLDC GLUCOMTR-MCNC: 83 MG/DL — SIGNIFICANT CHANGE UP (ref 70–99)
GLUCOSE BLDC GLUCOMTR-MCNC: 83 MG/DL — SIGNIFICANT CHANGE UP (ref 70–99)
GLUCOSE BLDC GLUCOMTR-MCNC: 87 MG/DL — SIGNIFICANT CHANGE UP (ref 70–99)
GLUCOSE BLDC GLUCOMTR-MCNC: 88 MG/DL — SIGNIFICANT CHANGE UP (ref 70–99)
GLUCOSE BLDC GLUCOMTR-MCNC: 90 MG/DL — SIGNIFICANT CHANGE UP (ref 70–99)
GLUCOSE BLDC GLUCOMTR-MCNC: 93 MG/DL — SIGNIFICANT CHANGE UP (ref 70–99)
GLUCOSE BLDC GLUCOMTR-MCNC: 95 MG/DL — SIGNIFICANT CHANGE UP (ref 70–99)
GLUCOSE BLDV-MCNC: 185 MG/DL — HIGH (ref 70–99)
GLUCOSE BLDV-MCNC: 240 MG/DL — HIGH (ref 70–99)
GLUCOSE SERPL-MCNC: 107 MG/DL — HIGH (ref 70–99)
GLUCOSE SERPL-MCNC: 111 MG/DL — HIGH (ref 70–99)
GLUCOSE SERPL-MCNC: 119 MG/DL — HIGH (ref 70–99)
GLUCOSE SERPL-MCNC: 134 MG/DL — HIGH (ref 70–99)
GLUCOSE SERPL-MCNC: 136 MG/DL — HIGH (ref 70–99)
GLUCOSE SERPL-MCNC: 138 MG/DL — HIGH (ref 70–99)
GLUCOSE SERPL-MCNC: 141 MG/DL — HIGH (ref 70–99)
GLUCOSE SERPL-MCNC: 144 MG/DL — HIGH (ref 70–99)
GLUCOSE SERPL-MCNC: 149 MG/DL — HIGH (ref 70–99)
GLUCOSE SERPL-MCNC: 152 MG/DL — HIGH (ref 70–99)
GLUCOSE SERPL-MCNC: 153 MG/DL — HIGH (ref 70–99)
GLUCOSE SERPL-MCNC: 159 MG/DL — HIGH (ref 70–99)
GLUCOSE SERPL-MCNC: 160 MG/DL — HIGH (ref 70–99)
GLUCOSE SERPL-MCNC: 162 MG/DL — HIGH (ref 70–99)
GLUCOSE SERPL-MCNC: 163 MG/DL — HIGH (ref 70–99)
GLUCOSE SERPL-MCNC: 165 MG/DL — HIGH (ref 70–99)
GLUCOSE SERPL-MCNC: 169 MG/DL — HIGH (ref 70–99)
GLUCOSE SERPL-MCNC: 175 MG/DL — HIGH (ref 70–99)
GLUCOSE SERPL-MCNC: 178 MG/DL — HIGH (ref 70–99)
GLUCOSE SERPL-MCNC: 179 MG/DL — HIGH (ref 70–99)
GLUCOSE SERPL-MCNC: 180 MG/DL — HIGH (ref 70–99)
GLUCOSE SERPL-MCNC: 181 MG/DL — HIGH (ref 70–99)
GLUCOSE SERPL-MCNC: 190 MG/DL — HIGH (ref 70–99)
GLUCOSE SERPL-MCNC: 190 MG/DL — HIGH (ref 70–99)
GLUCOSE SERPL-MCNC: 192 MG/DL — HIGH (ref 70–99)
GLUCOSE SERPL-MCNC: 194 MG/DL — HIGH (ref 70–99)
GLUCOSE SERPL-MCNC: 197 MG/DL — HIGH (ref 70–99)
GLUCOSE SERPL-MCNC: 207 MG/DL — HIGH (ref 70–99)
GLUCOSE SERPL-MCNC: 212 MG/DL — HIGH (ref 70–99)
GLUCOSE SERPL-MCNC: 231 MG/DL — HIGH (ref 70–99)
GLUCOSE SERPL-MCNC: 233 MG/DL — HIGH (ref 70–99)
GLUCOSE SERPL-MCNC: 235 MG/DL — HIGH (ref 70–99)
GLUCOSE SERPL-MCNC: 235 MG/DL — HIGH (ref 70–99)
GLUCOSE SERPL-MCNC: 236 MG/DL — HIGH (ref 70–99)
GLUCOSE SERPL-MCNC: 98 MG/DL — SIGNIFICANT CHANGE UP (ref 70–99)
GLUCOSE UR QL: NEGATIVE — SIGNIFICANT CHANGE UP
GRAM STN FLD: SIGNIFICANT CHANGE UP
H PYLORI AB SER-ACNC: <5 UNITS — SIGNIFICANT CHANGE UP
HCO3 BLDA-SCNC: 24 MMOL/L — SIGNIFICANT CHANGE UP (ref 21–28)
HCO3 BLDA-SCNC: 26 MMOL/L — SIGNIFICANT CHANGE UP (ref 21–28)
HCO3 BLDV-SCNC: 27 MMOL/L — SIGNIFICANT CHANGE UP (ref 22–29)
HCO3 BLDV-SCNC: 28 MMOL/L — SIGNIFICANT CHANGE UP (ref 22–29)
HCT VFR BLD CALC: 22.1 % — LOW (ref 39–50)
HCT VFR BLD CALC: 22.1 % — LOW (ref 39–50)
HCT VFR BLD CALC: 22.2 % — LOW (ref 39–50)
HCT VFR BLD CALC: 22.6 % — LOW (ref 39–50)
HCT VFR BLD CALC: 22.7 % — LOW (ref 39–50)
HCT VFR BLD CALC: 23 % — LOW (ref 39–50)
HCT VFR BLD CALC: 23.3 % — LOW (ref 39–50)
HCT VFR BLD CALC: 23.3 % — LOW (ref 39–50)
HCT VFR BLD CALC: 23.6 % — LOW (ref 39–50)
HCT VFR BLD CALC: 23.6 % — LOW (ref 39–50)
HCT VFR BLD CALC: 23.7 % — LOW (ref 39–50)
HCT VFR BLD CALC: 23.8 % — LOW (ref 39–50)
HCT VFR BLD CALC: 24.1 % — LOW (ref 39–50)
HCT VFR BLD CALC: 24.3 % — LOW (ref 39–50)
HCT VFR BLD CALC: 24.5 % — LOW (ref 39–50)
HCT VFR BLD CALC: 25 % — LOW (ref 39–50)
HCT VFR BLD CALC: 25.2 % — LOW (ref 39–50)
HCT VFR BLD CALC: 25.6 % — LOW (ref 39–50)
HCT VFR BLD CALC: 27.6 % — LOW (ref 39–50)
HCT VFR BLD CALC: 31.2 % — LOW (ref 39–50)
HCT VFR BLD CALC: 32.5 % — LOW (ref 39–50)
HCT VFR BLD CALC: 33.5 % — LOW (ref 39–50)
HCT VFR BLD CALC: 34 % — LOW (ref 39–50)
HCT VFR BLD CALC: 34.4 % — LOW (ref 39–50)
HCT VFR BLD CALC: 34.5 % — LOW (ref 39–50)
HCT VFR BLD CALC: 39 % — SIGNIFICANT CHANGE UP (ref 39–50)
HCT VFR BLD CALC: 40.1 % — SIGNIFICANT CHANGE UP (ref 39–50)
HCT VFR BLD CALC: 43.3 % — SIGNIFICANT CHANGE UP (ref 39–50)
HCT VFR BLD CALC: 44.1 % — SIGNIFICANT CHANGE UP (ref 39–50)
HCT VFR BLD CALC: 46.6 % — SIGNIFICANT CHANGE UP (ref 39–50)
HCT VFR BLD CALC: 46.8 % — SIGNIFICANT CHANGE UP (ref 39–50)
HCT VFR BLD CALC: 48.9 % — SIGNIFICANT CHANGE UP (ref 39–50)
HCT VFR BLD CALC: 51.3 % — HIGH (ref 39–50)
HCT VFR BLD CALC: 52.3 % — HIGH (ref 39–50)
HCT VFR BLD CALC: 53 % — HIGH (ref 39–50)
HCT VFR BLD CALC: 54.3 % — HIGH (ref 39–50)
HCT VFR BLD CALC: 55.4 % — HIGH (ref 39–50)
HCT VFR BLD CALC: 61.6 % — CRITICAL HIGH (ref 39–50)
HCT VFR BLDA CALC: 51 % — SIGNIFICANT CHANGE UP (ref 39–51)
HCT VFR BLDA CALC: 62 % — CRITICAL HIGH (ref 39–51)
HCV AB S/CO SERPL IA: 0.07 S/CO — SIGNIFICANT CHANGE UP (ref 0–0.99)
HCV AB SERPL-IMP: SIGNIFICANT CHANGE UP
HGB BLD CALC-MCNC: 16.9 G/DL — SIGNIFICANT CHANGE UP (ref 13–17)
HGB BLD CALC-MCNC: 20.5 G/DL — CRITICAL HIGH (ref 13–17)
HGB BLD-MCNC: 10.7 G/DL — LOW (ref 13–17)
HGB BLD-MCNC: 10.8 G/DL — LOW (ref 13–17)
HGB BLD-MCNC: 10.8 G/DL — LOW (ref 13–17)
HGB BLD-MCNC: 11 G/DL — LOW (ref 13–17)
HGB BLD-MCNC: 11.2 G/DL — LOW (ref 13–17)
HGB BLD-MCNC: 12.4 G/DL — LOW (ref 13–17)
HGB BLD-MCNC: 12.6 G/DL — LOW (ref 13–17)
HGB BLD-MCNC: 13.8 G/DL — SIGNIFICANT CHANGE UP (ref 13–17)
HGB BLD-MCNC: 13.9 G/DL — SIGNIFICANT CHANGE UP (ref 13–17)
HGB BLD-MCNC: 14.1 G/DL — SIGNIFICANT CHANGE UP (ref 13–17)
HGB BLD-MCNC: 14.5 G/DL — SIGNIFICANT CHANGE UP (ref 13–17)
HGB BLD-MCNC: 15.8 G/DL — SIGNIFICANT CHANGE UP (ref 13–17)
HGB BLD-MCNC: 16.3 G/DL — SIGNIFICANT CHANGE UP (ref 13–17)
HGB BLD-MCNC: 16.4 G/DL — SIGNIFICANT CHANGE UP (ref 13–17)
HGB BLD-MCNC: 17.2 G/DL — HIGH (ref 13–17)
HGB BLD-MCNC: 17.4 G/DL — HIGH (ref 13–17)
HGB BLD-MCNC: 17.6 G/DL — HIGH (ref 13–17)
HGB BLD-MCNC: 20.3 G/DL — CRITICAL HIGH (ref 13–17)
HGB BLD-MCNC: 6.6 G/DL — CRITICAL LOW (ref 13–17)
HGB BLD-MCNC: 6.7 G/DL — CRITICAL LOW (ref 13–17)
HGB BLD-MCNC: 7.1 G/DL — LOW (ref 13–17)
HGB BLD-MCNC: 7.2 G/DL — LOW (ref 13–17)
HGB BLD-MCNC: 7.3 G/DL — LOW (ref 13–17)
HGB BLD-MCNC: 7.4 G/DL — LOW (ref 13–17)
HGB BLD-MCNC: 7.6 G/DL — LOW (ref 13–17)
HGB BLD-MCNC: 7.6 G/DL — LOW (ref 13–17)
HGB BLD-MCNC: 7.7 G/DL — LOW (ref 13–17)
HGB BLD-MCNC: 7.8 G/DL — LOW (ref 13–17)
HGB BLD-MCNC: 8.1 G/DL — LOW (ref 13–17)
HGB BLD-MCNC: 8.5 G/DL — LOW (ref 13–17)
HGB BLD-MCNC: 9.8 G/DL — LOW (ref 13–17)
HYALINE CASTS # UR AUTO: 1 /LPF — SIGNIFICANT CHANGE UP (ref 0–7)
IANC: 15.47 K/UL — HIGH (ref 1.5–8.5)
IANC: 16.04 K/UL — HIGH (ref 1.5–8.5)
IANC: 22.29 K/UL — HIGH (ref 1.5–8.5)
IANC: 25.37 K/UL — HIGH (ref 1.5–8.5)
IMM GRANULOCYTES NFR BLD AUTO: 0.3 % — SIGNIFICANT CHANGE UP (ref 0–1.5)
IMM GRANULOCYTES NFR BLD AUTO: 0.5 % — SIGNIFICANT CHANGE UP (ref 0–1.5)
IMM GRANULOCYTES NFR BLD AUTO: 0.9 % — SIGNIFICANT CHANGE UP (ref 0–1.5)
IMM GRANULOCYTES NFR BLD AUTO: 1.1 % — SIGNIFICANT CHANGE UP (ref 0–1.5)
INR BLD: 1.1 RATIO — SIGNIFICANT CHANGE UP (ref 0.88–1.16)
INR BLD: 1.15 RATIO — SIGNIFICANT CHANGE UP (ref 0.88–1.16)
INR BLD: 1.17 RATIO — HIGH (ref 0.88–1.16)
INR BLD: 1.17 RATIO — HIGH (ref 0.88–1.16)
INR BLD: 1.2 RATIO — HIGH (ref 0.88–1.16)
INR BLD: 1.25 RATIO — HIGH (ref 0.88–1.16)
INR BLD: 1.26 RATIO — HIGH (ref 0.88–1.16)
INR BLD: 1.57 RATIO — HIGH (ref 0.88–1.16)
KETONES UR-MCNC: ABNORMAL
KETONES UR-MCNC: NEGATIVE — SIGNIFICANT CHANGE UP
KETONES UR-MCNC: NEGATIVE — SIGNIFICANT CHANGE UP
LACTATE BLDV-MCNC: 3.3 MMOL/L — HIGH (ref 0.5–2)
LACTATE BLDV-MCNC: 4 MMOL/L — CRITICAL HIGH (ref 0.5–2)
LEUKOCYTE ESTERASE UR-ACNC: ABNORMAL
LEUKOCYTE ESTERASE UR-ACNC: ABNORMAL
LEUKOCYTE ESTERASE UR-ACNC: NEGATIVE — SIGNIFICANT CHANGE UP
LYMPHOCYTES # BLD AUTO: 0.53 K/UL — LOW (ref 1–3.3)
LYMPHOCYTES # BLD AUTO: 0.75 K/UL — LOW (ref 1–3.3)
LYMPHOCYTES # BLD AUTO: 0.81 K/UL — LOW (ref 1–3.3)
LYMPHOCYTES # BLD AUTO: 1.07 K/UL — SIGNIFICANT CHANGE UP (ref 1–3.3)
LYMPHOCYTES # BLD AUTO: 2.9 % — LOW (ref 13–44)
LYMPHOCYTES # BLD AUTO: 3.1 % — LOW (ref 13–44)
LYMPHOCYTES # BLD AUTO: 3.1 % — LOW (ref 13–44)
LYMPHOCYTES # BLD AUTO: 5.4 % — LOW (ref 13–44)
MAGNESIUM SERPL-MCNC: 2.1 MG/DL — SIGNIFICANT CHANGE UP (ref 1.6–2.6)
MAGNESIUM SERPL-MCNC: 2.2 MG/DL — SIGNIFICANT CHANGE UP (ref 1.6–2.6)
MAGNESIUM SERPL-MCNC: 2.3 MG/DL — SIGNIFICANT CHANGE UP (ref 1.6–2.6)
MAGNESIUM SERPL-MCNC: 2.4 MG/DL — SIGNIFICANT CHANGE UP (ref 1.6–2.6)
MAGNESIUM SERPL-MCNC: 2.5 MG/DL — SIGNIFICANT CHANGE UP (ref 1.6–2.6)
MAGNESIUM SERPL-MCNC: 2.6 MG/DL — SIGNIFICANT CHANGE UP (ref 1.6–2.6)
MAGNESIUM SERPL-MCNC: 2.7 MG/DL — HIGH (ref 1.6–2.6)
MAGNESIUM SERPL-MCNC: 2.8 MG/DL — HIGH (ref 1.6–2.6)
MAGNESIUM SERPL-MCNC: 2.8 MG/DL — HIGH (ref 1.6–2.6)
MCHC RBC-ENTMCNC: 28.5 PG — SIGNIFICANT CHANGE UP (ref 27–34)
MCHC RBC-ENTMCNC: 28.6 PG — SIGNIFICANT CHANGE UP (ref 27–34)
MCHC RBC-ENTMCNC: 28.7 PG — SIGNIFICANT CHANGE UP (ref 27–34)
MCHC RBC-ENTMCNC: 28.9 PG — SIGNIFICANT CHANGE UP (ref 27–34)
MCHC RBC-ENTMCNC: 29 PG — SIGNIFICANT CHANGE UP (ref 27–34)
MCHC RBC-ENTMCNC: 29 PG — SIGNIFICANT CHANGE UP (ref 27–34)
MCHC RBC-ENTMCNC: 29.1 PG — SIGNIFICANT CHANGE UP (ref 27–34)
MCHC RBC-ENTMCNC: 29.2 PG — SIGNIFICANT CHANGE UP (ref 27–34)
MCHC RBC-ENTMCNC: 29.3 PG — SIGNIFICANT CHANGE UP (ref 27–34)
MCHC RBC-ENTMCNC: 29.4 PG — SIGNIFICANT CHANGE UP (ref 27–34)
MCHC RBC-ENTMCNC: 29.5 PG — SIGNIFICANT CHANGE UP (ref 27–34)
MCHC RBC-ENTMCNC: 29.6 GM/DL — LOW (ref 32–36)
MCHC RBC-ENTMCNC: 29.6 PG — SIGNIFICANT CHANGE UP (ref 27–34)
MCHC RBC-ENTMCNC: 29.7 PG — SIGNIFICANT CHANGE UP (ref 27–34)
MCHC RBC-ENTMCNC: 29.7 PG — SIGNIFICANT CHANGE UP (ref 27–34)
MCHC RBC-ENTMCNC: 29.8 GM/DL — LOW (ref 32–36)
MCHC RBC-ENTMCNC: 29.8 PG — SIGNIFICANT CHANGE UP (ref 27–34)
MCHC RBC-ENTMCNC: 29.9 GM/DL — LOW (ref 32–36)
MCHC RBC-ENTMCNC: 29.9 PG — SIGNIFICANT CHANGE UP (ref 27–34)
MCHC RBC-ENTMCNC: 30 GM/DL — LOW (ref 32–36)
MCHC RBC-ENTMCNC: 30 PG — SIGNIFICANT CHANGE UP (ref 27–34)
MCHC RBC-ENTMCNC: 30.2 GM/DL — LOW (ref 32–36)
MCHC RBC-ENTMCNC: 30.3 GM/DL — LOW (ref 32–36)
MCHC RBC-ENTMCNC: 30.5 GM/DL — LOW (ref 32–36)
MCHC RBC-ENTMCNC: 30.5 GM/DL — LOW (ref 32–36)
MCHC RBC-ENTMCNC: 30.6 GM/DL — LOW (ref 32–36)
MCHC RBC-ENTMCNC: 30.8 GM/DL — LOW (ref 32–36)
MCHC RBC-ENTMCNC: 30.9 GM/DL — LOW (ref 32–36)
MCHC RBC-ENTMCNC: 31 GM/DL — LOW (ref 32–36)
MCHC RBC-ENTMCNC: 31 GM/DL — LOW (ref 32–36)
MCHC RBC-ENTMCNC: 31.1 GM/DL — LOW (ref 32–36)
MCHC RBC-ENTMCNC: 31.2 GM/DL — LOW (ref 32–36)
MCHC RBC-ENTMCNC: 31.4 GM/DL — LOW (ref 32–36)
MCHC RBC-ENTMCNC: 31.8 GM/DL — LOW (ref 32–36)
MCHC RBC-ENTMCNC: 31.8 GM/DL — LOW (ref 32–36)
MCHC RBC-ENTMCNC: 31.9 GM/DL — LOW (ref 32–36)
MCHC RBC-ENTMCNC: 31.9 GM/DL — LOW (ref 32–36)
MCHC RBC-ENTMCNC: 32 GM/DL — SIGNIFICANT CHANGE UP (ref 32–36)
MCHC RBC-ENTMCNC: 32 GM/DL — SIGNIFICANT CHANGE UP (ref 32–36)
MCHC RBC-ENTMCNC: 32.1 GM/DL — SIGNIFICANT CHANGE UP (ref 32–36)
MCHC RBC-ENTMCNC: 32.1 GM/DL — SIGNIFICANT CHANGE UP (ref 32–36)
MCHC RBC-ENTMCNC: 32.3 GM/DL — SIGNIFICANT CHANGE UP (ref 32–36)
MCHC RBC-ENTMCNC: 32.4 GM/DL — SIGNIFICANT CHANGE UP (ref 32–36)
MCHC RBC-ENTMCNC: 32.5 GM/DL — SIGNIFICANT CHANGE UP (ref 32–36)
MCHC RBC-ENTMCNC: 32.5 GM/DL — SIGNIFICANT CHANGE UP (ref 32–36)
MCHC RBC-ENTMCNC: 32.6 GM/DL — SIGNIFICANT CHANGE UP (ref 32–36)
MCHC RBC-ENTMCNC: 32.8 GM/DL — SIGNIFICANT CHANGE UP (ref 32–36)
MCHC RBC-ENTMCNC: 33 GM/DL — SIGNIFICANT CHANGE UP (ref 32–36)
MCHC RBC-ENTMCNC: 33.2 GM/DL — SIGNIFICANT CHANGE UP (ref 32–36)
MCV RBC AUTO: 89.3 FL — SIGNIFICANT CHANGE UP (ref 80–100)
MCV RBC AUTO: 89.5 FL — SIGNIFICANT CHANGE UP (ref 80–100)
MCV RBC AUTO: 89.8 FL — SIGNIFICANT CHANGE UP (ref 80–100)
MCV RBC AUTO: 90.7 FL — SIGNIFICANT CHANGE UP (ref 80–100)
MCV RBC AUTO: 91.1 FL — SIGNIFICANT CHANGE UP (ref 80–100)
MCV RBC AUTO: 91.2 FL — SIGNIFICANT CHANGE UP (ref 80–100)
MCV RBC AUTO: 91.4 FL — SIGNIFICANT CHANGE UP (ref 80–100)
MCV RBC AUTO: 91.6 FL — SIGNIFICANT CHANGE UP (ref 80–100)
MCV RBC AUTO: 91.6 FL — SIGNIFICANT CHANGE UP (ref 80–100)
MCV RBC AUTO: 91.7 FL — SIGNIFICANT CHANGE UP (ref 80–100)
MCV RBC AUTO: 91.7 FL — SIGNIFICANT CHANGE UP (ref 80–100)
MCV RBC AUTO: 91.9 FL — SIGNIFICANT CHANGE UP (ref 80–100)
MCV RBC AUTO: 92.1 FL — SIGNIFICANT CHANGE UP (ref 80–100)
MCV RBC AUTO: 92.2 FL — SIGNIFICANT CHANGE UP (ref 80–100)
MCV RBC AUTO: 92.2 FL — SIGNIFICANT CHANGE UP (ref 80–100)
MCV RBC AUTO: 92.3 FL — SIGNIFICANT CHANGE UP (ref 80–100)
MCV RBC AUTO: 92.5 FL — SIGNIFICANT CHANGE UP (ref 80–100)
MCV RBC AUTO: 92.5 FL — SIGNIFICANT CHANGE UP (ref 80–100)
MCV RBC AUTO: 92.7 FL — SIGNIFICANT CHANGE UP (ref 80–100)
MCV RBC AUTO: 92.9 FL — SIGNIFICANT CHANGE UP (ref 80–100)
MCV RBC AUTO: 93.2 FL — SIGNIFICANT CHANGE UP (ref 80–100)
MCV RBC AUTO: 93.3 FL — SIGNIFICANT CHANGE UP (ref 80–100)
MCV RBC AUTO: 93.4 FL — SIGNIFICANT CHANGE UP (ref 80–100)
MCV RBC AUTO: 94.2 FL — SIGNIFICANT CHANGE UP (ref 80–100)
MCV RBC AUTO: 94.7 FL — SIGNIFICANT CHANGE UP (ref 80–100)
MCV RBC AUTO: 94.7 FL — SIGNIFICANT CHANGE UP (ref 80–100)
MCV RBC AUTO: 94.8 FL — SIGNIFICANT CHANGE UP (ref 80–100)
MCV RBC AUTO: 94.9 FL — SIGNIFICANT CHANGE UP (ref 80–100)
MCV RBC AUTO: 95 FL — SIGNIFICANT CHANGE UP (ref 80–100)
MCV RBC AUTO: 95.2 FL — SIGNIFICANT CHANGE UP (ref 80–100)
MCV RBC AUTO: 95.7 FL — SIGNIFICANT CHANGE UP (ref 80–100)
MCV RBC AUTO: 95.9 FL — SIGNIFICANT CHANGE UP (ref 80–100)
MCV RBC AUTO: 96.3 FL — SIGNIFICANT CHANGE UP (ref 80–100)
MCV RBC AUTO: 96.5 FL — SIGNIFICANT CHANGE UP (ref 80–100)
MCV RBC AUTO: 96.7 FL — SIGNIFICANT CHANGE UP (ref 80–100)
MCV RBC AUTO: 96.9 FL — SIGNIFICANT CHANGE UP (ref 80–100)
MCV RBC AUTO: 98.3 FL — SIGNIFICANT CHANGE UP (ref 80–100)
MCV RBC AUTO: 99.2 FL — SIGNIFICANT CHANGE UP (ref 80–100)
METHOD TYPE: SIGNIFICANT CHANGE UP
MONOCYTES # BLD AUTO: 0.57 K/UL — SIGNIFICANT CHANGE UP (ref 0–0.9)
MONOCYTES # BLD AUTO: 0.93 K/UL — HIGH (ref 0–0.9)
MONOCYTES # BLD AUTO: 1.27 K/UL — HIGH (ref 0–0.9)
MONOCYTES # BLD AUTO: 1.41 K/UL — HIGH (ref 0–0.9)
MONOCYTES NFR BLD AUTO: 2.4 % — SIGNIFICANT CHANGE UP (ref 2–14)
MONOCYTES NFR BLD AUTO: 3.4 % — SIGNIFICANT CHANGE UP (ref 2–14)
MONOCYTES NFR BLD AUTO: 7.2 % — SIGNIFICANT CHANGE UP (ref 2–14)
MONOCYTES NFR BLD AUTO: 7.3 % — SIGNIFICANT CHANGE UP (ref 2–14)
NEUTROPHILS # BLD AUTO: 15.47 K/UL — HIGH (ref 1.8–7.4)
NEUTROPHILS # BLD AUTO: 16.04 K/UL — HIGH (ref 1.8–7.4)
NEUTROPHILS # BLD AUTO: 22.29 K/UL — HIGH (ref 1.8–7.4)
NEUTROPHILS # BLD AUTO: 25.37 K/UL — HIGH (ref 1.8–7.4)
NEUTROPHILS NFR BLD AUTO: 81.5 % — HIGH (ref 43–77)
NEUTROPHILS NFR BLD AUTO: 89 % — HIGH (ref 43–77)
NEUTROPHILS NFR BLD AUTO: 92.4 % — HIGH (ref 43–77)
NEUTROPHILS NFR BLD AUTO: 93.1 % — HIGH (ref 43–77)
NIGHT BLUE STAIN TISS: SIGNIFICANT CHANGE UP
NIGHT BLUE STAIN TISS: SIGNIFICANT CHANGE UP
NITRITE UR-MCNC: NEGATIVE — SIGNIFICANT CHANGE UP
NRBC # BLD: 0 /100 WBCS — SIGNIFICANT CHANGE UP
NRBC # BLD: 1 /100 WBCS — SIGNIFICANT CHANGE UP
NRBC # BLD: 2 /100 WBCS — SIGNIFICANT CHANGE UP
NRBC # BLD: 3 /100 WBCS — SIGNIFICANT CHANGE UP
NRBC # FLD: 0 K/UL — SIGNIFICANT CHANGE UP
NRBC # FLD: 0.02 K/UL — HIGH
NRBC # FLD: 0.03 K/UL — HIGH
NRBC # FLD: 0.05 K/UL — HIGH
NRBC # FLD: 0.11 K/UL — HIGH
NRBC # FLD: 0.12 K/UL — HIGH
NRBC # FLD: 0.18 K/UL — HIGH
NRBC # FLD: 0.2 K/UL — HIGH
NRBC # FLD: 0.26 K/UL — HIGH
NRBC # FLD: 0.34 K/UL — HIGH
NRBC # FLD: 0.34 K/UL — HIGH
NRBC # FLD: 0.43 K/UL — HIGH
NRBC # FLD: 0.64 K/UL — HIGH
OB PNL STL: POSITIVE
ORGANISM # SPEC MICROSCOPIC CNT: SIGNIFICANT CHANGE UP
P MIRABILIS DNA BLD POS QL NAA+PROBE: SIGNIFICANT CHANGE UP
PCO2 BLDA: 32 MMHG — LOW (ref 35–48)
PCO2 BLDA: 32 MMHG — LOW (ref 35–48)
PCO2 BLDV: 43 MMHG — SIGNIFICANT CHANGE UP (ref 42–55)
PCO2 BLDV: 48 MMHG — SIGNIFICANT CHANGE UP (ref 42–55)
PH BLDA: 7.48 — HIGH (ref 7.35–7.45)
PH BLDA: 7.51 — HIGH (ref 7.35–7.45)
PH BLDV: 7.37 — SIGNIFICANT CHANGE UP (ref 7.32–7.43)
PH BLDV: 7.4 — SIGNIFICANT CHANGE UP (ref 7.32–7.43)
PH UR: 5 — SIGNIFICANT CHANGE UP (ref 5–8)
PH UR: 5 — SIGNIFICANT CHANGE UP (ref 5–8)
PH UR: 6 — SIGNIFICANT CHANGE UP (ref 5–8)
PHOSPHATE SERPL-MCNC: 1.9 MG/DL — LOW (ref 2.5–4.5)
PHOSPHATE SERPL-MCNC: 2 MG/DL — LOW (ref 2.5–4.5)
PHOSPHATE SERPL-MCNC: 2.2 MG/DL — LOW (ref 2.5–4.5)
PHOSPHATE SERPL-MCNC: 2.3 MG/DL — LOW (ref 2.5–4.5)
PHOSPHATE SERPL-MCNC: 2.4 MG/DL — LOW (ref 2.5–4.5)
PHOSPHATE SERPL-MCNC: 2.4 MG/DL — LOW (ref 2.5–4.5)
PHOSPHATE SERPL-MCNC: 2.5 MG/DL — SIGNIFICANT CHANGE UP (ref 2.5–4.5)
PHOSPHATE SERPL-MCNC: 2.5 MG/DL — SIGNIFICANT CHANGE UP (ref 2.5–4.5)
PHOSPHATE SERPL-MCNC: 2.6 MG/DL — SIGNIFICANT CHANGE UP (ref 2.5–4.5)
PHOSPHATE SERPL-MCNC: 2.7 MG/DL — SIGNIFICANT CHANGE UP (ref 2.5–4.5)
PHOSPHATE SERPL-MCNC: 2.7 MG/DL — SIGNIFICANT CHANGE UP (ref 2.5–4.5)
PHOSPHATE SERPL-MCNC: 2.9 MG/DL — SIGNIFICANT CHANGE UP (ref 2.5–4.5)
PHOSPHATE SERPL-MCNC: 3 MG/DL — SIGNIFICANT CHANGE UP (ref 2.5–4.5)
PHOSPHATE SERPL-MCNC: 3.1 MG/DL — SIGNIFICANT CHANGE UP (ref 2.5–4.5)
PHOSPHATE SERPL-MCNC: 3.1 MG/DL — SIGNIFICANT CHANGE UP (ref 2.5–4.5)
PHOSPHATE SERPL-MCNC: 3.2 MG/DL — SIGNIFICANT CHANGE UP (ref 2.5–4.5)
PHOSPHATE SERPL-MCNC: 3.3 MG/DL — SIGNIFICANT CHANGE UP (ref 2.5–4.5)
PHOSPHATE SERPL-MCNC: 3.5 MG/DL — SIGNIFICANT CHANGE UP (ref 2.5–4.5)
PHOSPHATE SERPL-MCNC: 3.7 MG/DL — SIGNIFICANT CHANGE UP (ref 2.5–4.5)
PHOSPHATE SERPL-MCNC: 3.8 MG/DL — SIGNIFICANT CHANGE UP (ref 2.5–4.5)
PHOSPHATE SERPL-MCNC: 3.9 MG/DL — SIGNIFICANT CHANGE UP (ref 2.5–4.5)
PHOSPHATE SERPL-MCNC: 4.1 MG/DL — SIGNIFICANT CHANGE UP (ref 2.5–4.5)
PHOSPHATE SERPL-MCNC: 4.6 MG/DL — HIGH (ref 2.5–4.5)
PLATELET # BLD AUTO: 194 K/UL — SIGNIFICANT CHANGE UP (ref 150–400)
PLATELET # BLD AUTO: 195 K/UL — SIGNIFICANT CHANGE UP (ref 150–400)
PLATELET # BLD AUTO: 195 K/UL — SIGNIFICANT CHANGE UP (ref 150–400)
PLATELET # BLD AUTO: 201 K/UL — SIGNIFICANT CHANGE UP (ref 150–400)
PLATELET # BLD AUTO: 204 K/UL — SIGNIFICANT CHANGE UP (ref 150–400)
PLATELET # BLD AUTO: 207 K/UL — SIGNIFICANT CHANGE UP (ref 150–400)
PLATELET # BLD AUTO: 212 K/UL — SIGNIFICANT CHANGE UP (ref 150–400)
PLATELET # BLD AUTO: 215 K/UL — SIGNIFICANT CHANGE UP (ref 150–400)
PLATELET # BLD AUTO: 215 K/UL — SIGNIFICANT CHANGE UP (ref 150–400)
PLATELET # BLD AUTO: 252 K/UL — SIGNIFICANT CHANGE UP (ref 150–400)
PLATELET # BLD AUTO: 266 K/UL — SIGNIFICANT CHANGE UP (ref 150–400)
PLATELET # BLD AUTO: 316 K/UL — SIGNIFICANT CHANGE UP (ref 150–400)
PLATELET # BLD AUTO: 363 K/UL — SIGNIFICANT CHANGE UP (ref 150–400)
PLATELET # BLD AUTO: 370 K/UL — SIGNIFICANT CHANGE UP (ref 150–400)
PLATELET # BLD AUTO: 389 K/UL — SIGNIFICANT CHANGE UP (ref 150–400)
PLATELET # BLD AUTO: 391 K/UL — SIGNIFICANT CHANGE UP (ref 150–400)
PLATELET # BLD AUTO: 394 K/UL — SIGNIFICANT CHANGE UP (ref 150–400)
PLATELET # BLD AUTO: 396 K/UL — SIGNIFICANT CHANGE UP (ref 150–400)
PLATELET # BLD AUTO: 398 K/UL — SIGNIFICANT CHANGE UP (ref 150–400)
PLATELET # BLD AUTO: 419 K/UL — HIGH (ref 150–400)
PLATELET # BLD AUTO: 445 K/UL — HIGH (ref 150–400)
PLATELET # BLD AUTO: 447 K/UL — HIGH (ref 150–400)
PLATELET # BLD AUTO: 466 K/UL — HIGH (ref 150–400)
PLATELET # BLD AUTO: 473 K/UL — HIGH (ref 150–400)
PLATELET # BLD AUTO: 479 K/UL — HIGH (ref 150–400)
PLATELET # BLD AUTO: 497 K/UL — HIGH (ref 150–400)
PLATELET # BLD AUTO: 508 K/UL — HIGH (ref 150–400)
PLATELET # BLD AUTO: 509 K/UL — HIGH (ref 150–400)
PLATELET # BLD AUTO: 512 K/UL — HIGH (ref 150–400)
PLATELET # BLD AUTO: 515 K/UL — HIGH (ref 150–400)
PLATELET # BLD AUTO: 516 K/UL — HIGH (ref 150–400)
PLATELET # BLD AUTO: 528 K/UL — HIGH (ref 150–400)
PLATELET # BLD AUTO: 540 K/UL — HIGH (ref 150–400)
PLATELET # BLD AUTO: 542 K/UL — HIGH (ref 150–400)
PLATELET # BLD AUTO: 562 K/UL — HIGH (ref 150–400)
PLATELET # BLD AUTO: 614 K/UL — HIGH (ref 150–400)
PLATELET # BLD AUTO: 619 K/UL — HIGH (ref 150–400)
PLATELET # BLD AUTO: 662 K/UL — HIGH (ref 150–400)
PO2 BLDA: 55 MMHG — LOW (ref 83–108)
PO2 BLDA: 90 MMHG — SIGNIFICANT CHANGE UP (ref 83–108)
PO2 BLDV: 24 MMHG — SIGNIFICANT CHANGE UP
PO2 BLDV: 39 MMHG — SIGNIFICANT CHANGE UP
POTASSIUM BLDV-SCNC: 4.1 MMOL/L — SIGNIFICANT CHANGE UP (ref 3.5–5.1)
POTASSIUM BLDV-SCNC: 4.2 MMOL/L — SIGNIFICANT CHANGE UP (ref 3.5–5.1)
POTASSIUM SERPL-MCNC: 3.3 MMOL/L — LOW (ref 3.5–5.3)
POTASSIUM SERPL-MCNC: 3.4 MMOL/L — LOW (ref 3.5–5.3)
POTASSIUM SERPL-MCNC: 3.6 MMOL/L — SIGNIFICANT CHANGE UP (ref 3.5–5.3)
POTASSIUM SERPL-MCNC: 3.7 MMOL/L — SIGNIFICANT CHANGE UP (ref 3.5–5.3)
POTASSIUM SERPL-MCNC: 3.7 MMOL/L — SIGNIFICANT CHANGE UP (ref 3.5–5.3)
POTASSIUM SERPL-MCNC: 3.9 MMOL/L — SIGNIFICANT CHANGE UP (ref 3.5–5.3)
POTASSIUM SERPL-MCNC: 4 MMOL/L — SIGNIFICANT CHANGE UP (ref 3.5–5.3)
POTASSIUM SERPL-MCNC: 4.1 MMOL/L — SIGNIFICANT CHANGE UP (ref 3.5–5.3)
POTASSIUM SERPL-MCNC: 4.2 MMOL/L — SIGNIFICANT CHANGE UP (ref 3.5–5.3)
POTASSIUM SERPL-MCNC: 4.3 MMOL/L — SIGNIFICANT CHANGE UP (ref 3.5–5.3)
POTASSIUM SERPL-MCNC: 4.4 MMOL/L — SIGNIFICANT CHANGE UP (ref 3.5–5.3)
POTASSIUM SERPL-MCNC: 4.5 MMOL/L — SIGNIFICANT CHANGE UP (ref 3.5–5.3)
POTASSIUM SERPL-MCNC: 4.6 MMOL/L — SIGNIFICANT CHANGE UP (ref 3.5–5.3)
POTASSIUM SERPL-MCNC: 4.7 MMOL/L — SIGNIFICANT CHANGE UP (ref 3.5–5.3)
POTASSIUM SERPL-MCNC: 5.2 MMOL/L — SIGNIFICANT CHANGE UP (ref 3.5–5.3)
POTASSIUM SERPL-SCNC: 3.3 MMOL/L — LOW (ref 3.5–5.3)
POTASSIUM SERPL-SCNC: 3.4 MMOL/L — LOW (ref 3.5–5.3)
POTASSIUM SERPL-SCNC: 3.6 MMOL/L — SIGNIFICANT CHANGE UP (ref 3.5–5.3)
POTASSIUM SERPL-SCNC: 3.7 MMOL/L — SIGNIFICANT CHANGE UP (ref 3.5–5.3)
POTASSIUM SERPL-SCNC: 3.7 MMOL/L — SIGNIFICANT CHANGE UP (ref 3.5–5.3)
POTASSIUM SERPL-SCNC: 3.9 MMOL/L — SIGNIFICANT CHANGE UP (ref 3.5–5.3)
POTASSIUM SERPL-SCNC: 4 MMOL/L — SIGNIFICANT CHANGE UP (ref 3.5–5.3)
POTASSIUM SERPL-SCNC: 4.1 MMOL/L — SIGNIFICANT CHANGE UP (ref 3.5–5.3)
POTASSIUM SERPL-SCNC: 4.2 MMOL/L — SIGNIFICANT CHANGE UP (ref 3.5–5.3)
POTASSIUM SERPL-SCNC: 4.3 MMOL/L — SIGNIFICANT CHANGE UP (ref 3.5–5.3)
POTASSIUM SERPL-SCNC: 4.4 MMOL/L — SIGNIFICANT CHANGE UP (ref 3.5–5.3)
POTASSIUM SERPL-SCNC: 4.5 MMOL/L — SIGNIFICANT CHANGE UP (ref 3.5–5.3)
POTASSIUM SERPL-SCNC: 4.6 MMOL/L — SIGNIFICANT CHANGE UP (ref 3.5–5.3)
POTASSIUM SERPL-SCNC: 4.7 MMOL/L — SIGNIFICANT CHANGE UP (ref 3.5–5.3)
POTASSIUM SERPL-SCNC: 5.2 MMOL/L — SIGNIFICANT CHANGE UP (ref 3.5–5.3)
PROCALCITONIN SERPL-MCNC: 0.4 NG/ML — HIGH (ref 0.02–0.1)
PROCALCITONIN SERPL-MCNC: 0.48 NG/ML — HIGH (ref 0.02–0.1)
PROT SERPL-MCNC: 5.1 G/DL — LOW (ref 6–8.3)
PROT SERPL-MCNC: 5.3 G/DL — LOW (ref 6–8.3)
PROT SERPL-MCNC: 5.4 G/DL — LOW (ref 6–8.3)
PROT SERPL-MCNC: 5.5 G/DL — LOW (ref 6–8.3)
PROT SERPL-MCNC: 6 G/DL — SIGNIFICANT CHANGE UP (ref 6–8.3)
PROT SERPL-MCNC: 6.1 G/DL — SIGNIFICANT CHANGE UP (ref 6–8.3)
PROT SERPL-MCNC: 6.3 G/DL — SIGNIFICANT CHANGE UP (ref 6–8.3)
PROT SERPL-MCNC: 7 G/DL — SIGNIFICANT CHANGE UP (ref 6–8.3)
PROT SERPL-MCNC: 8.4 G/DL — HIGH (ref 6–8.3)
PROT UR-MCNC: ABNORMAL
PROTHROM AB SERPL-ACNC: 12.5 SEC — SIGNIFICANT CHANGE UP (ref 10.6–13.6)
PROTHROM AB SERPL-ACNC: 13.1 SEC — SIGNIFICANT CHANGE UP (ref 10.6–13.6)
PROTHROM AB SERPL-ACNC: 13.2 SEC — SIGNIFICANT CHANGE UP (ref 10.6–13.6)
PROTHROM AB SERPL-ACNC: 13.4 SEC — SIGNIFICANT CHANGE UP (ref 10.6–13.6)
PROTHROM AB SERPL-ACNC: 13.6 SEC — SIGNIFICANT CHANGE UP (ref 10.6–13.6)
PROTHROM AB SERPL-ACNC: 13.7 SEC — HIGH (ref 10.6–13.6)
PROTHROM AB SERPL-ACNC: 13.7 SEC — HIGH (ref 10.6–13.6)
PROTHROM AB SERPL-ACNC: 14.1 SEC — HIGH (ref 10.6–13.6)
PROTHROM AB SERPL-ACNC: 14.2 SEC — HIGH (ref 10.6–13.6)
PROTHROM AB SERPL-ACNC: 17.5 SEC — HIGH (ref 10.6–13.6)
RBC # BLD: 2.28 M/UL — LOW (ref 4.2–5.8)
RBC # BLD: 2.3 M/UL — LOW (ref 4.2–5.8)
RBC # BLD: 2.41 M/UL — LOW (ref 4.2–5.8)
RBC # BLD: 2.42 M/UL — LOW (ref 4.2–5.8)
RBC # BLD: 2.43 M/UL — LOW (ref 4.2–5.8)
RBC # BLD: 2.44 M/UL — LOW (ref 4.2–5.8)
RBC # BLD: 2.45 M/UL — LOW (ref 4.2–5.8)
RBC # BLD: 2.45 M/UL — LOW (ref 4.2–5.8)
RBC # BLD: 2.46 M/UL — LOW (ref 4.2–5.8)
RBC # BLD: 2.49 M/UL — LOW (ref 4.2–5.8)
RBC # BLD: 2.52 M/UL — LOW (ref 4.2–5.8)
RBC # BLD: 2.53 M/UL — LOW (ref 4.2–5.8)
RBC # BLD: 2.54 M/UL — LOW (ref 4.2–5.8)
RBC # BLD: 2.59 M/UL — LOW (ref 4.2–5.8)
RBC # BLD: 2.6 M/UL — LOW (ref 4.2–5.8)
RBC # BLD: 2.66 M/UL — LOW (ref 4.2–5.8)
RBC # BLD: 2.69 M/UL — LOW (ref 4.2–5.8)
RBC # BLD: 2.73 M/UL — LOW (ref 4.2–5.8)
RBC # BLD: 2.91 M/UL — LOW (ref 4.2–5.8)
RBC # BLD: 3.36 M/UL — LOW (ref 4.2–5.8)
RBC # BLD: 3.63 M/UL — LOW (ref 4.2–5.8)
RBC # BLD: 3.69 M/UL — LOW (ref 4.2–5.8)
RBC # BLD: 3.71 M/UL — LOW (ref 4.2–5.8)
RBC # BLD: 3.73 M/UL — LOW (ref 4.2–5.8)
RBC # BLD: 3.73 M/UL — LOW (ref 4.2–5.8)
RBC # BLD: 4.19 M/UL — LOW (ref 4.2–5.8)
RBC # BLD: 4.23 M/UL — SIGNIFICANT CHANGE UP (ref 4.2–5.8)
RBC # BLD: 4.67 M/UL — SIGNIFICANT CHANGE UP (ref 4.2–5.8)
RBC # BLD: 4.74 M/UL — SIGNIFICANT CHANGE UP (ref 4.2–5.8)
RBC # BLD: 4.78 M/UL — SIGNIFICANT CHANGE UP (ref 4.2–5.8)
RBC # BLD: 4.94 M/UL — SIGNIFICANT CHANGE UP (ref 4.2–5.8)
RBC # BLD: 5.39 M/UL — SIGNIFICANT CHANGE UP (ref 4.2–5.8)
RBC # BLD: 5.6 M/UL — SIGNIFICANT CHANGE UP (ref 4.2–5.8)
RBC # BLD: 5.61 M/UL — SIGNIFICANT CHANGE UP (ref 4.2–5.8)
RBC # BLD: 5.82 M/UL — HIGH (ref 4.2–5.8)
RBC # BLD: 5.85 M/UL — HIGH (ref 4.2–5.8)
RBC # BLD: 6.04 M/UL — HIGH (ref 4.2–5.8)
RBC # BLD: 6.9 M/UL — HIGH (ref 4.2–5.8)
RBC # FLD: 13.5 % — SIGNIFICANT CHANGE UP (ref 10.3–14.5)
RBC # FLD: 13.7 % — SIGNIFICANT CHANGE UP (ref 10.3–14.5)
RBC # FLD: 13.8 % — SIGNIFICANT CHANGE UP (ref 10.3–14.5)
RBC # FLD: 13.9 % — SIGNIFICANT CHANGE UP (ref 10.3–14.5)
RBC # FLD: 14 % — SIGNIFICANT CHANGE UP (ref 10.3–14.5)
RBC # FLD: 14.1 % — SIGNIFICANT CHANGE UP (ref 10.3–14.5)
RBC # FLD: 14.2 % — SIGNIFICANT CHANGE UP (ref 10.3–14.5)
RBC # FLD: 14.2 % — SIGNIFICANT CHANGE UP (ref 10.3–14.5)
RBC # FLD: 14.3 % — SIGNIFICANT CHANGE UP (ref 10.3–14.5)
RBC # FLD: 14.4 % — SIGNIFICANT CHANGE UP (ref 10.3–14.5)
RBC # FLD: 14.4 % — SIGNIFICANT CHANGE UP (ref 10.3–14.5)
RBC # FLD: 14.5 % — SIGNIFICANT CHANGE UP (ref 10.3–14.5)
RBC # FLD: 14.6 % — HIGH (ref 10.3–14.5)
RBC # FLD: 14.7 % — HIGH (ref 10.3–14.5)
RBC # FLD: 14.8 % — HIGH (ref 10.3–14.5)
RBC # FLD: 14.9 % — HIGH (ref 10.3–14.5)
RBC # FLD: 15.1 % — HIGH (ref 10.3–14.5)
RBC # FLD: 15.3 % — HIGH (ref 10.3–14.5)
RBC # FLD: 15.7 % — HIGH (ref 10.3–14.5)
RBC # FLD: 15.8 % — HIGH (ref 10.3–14.5)
RBC # FLD: 15.8 % — HIGH (ref 10.3–14.5)
RBC # FLD: 15.9 % — HIGH (ref 10.3–14.5)
RBC # FLD: 15.9 % — HIGH (ref 10.3–14.5)
RBC # FLD: 16.1 % — HIGH (ref 10.3–14.5)
RBC # FLD: 16.2 % — HIGH (ref 10.3–14.5)
RBC # FLD: 17 % — HIGH (ref 10.3–14.5)
RBC # FLD: 17.2 % — HIGH (ref 10.3–14.5)
RBC # FLD: 17.5 % — HIGH (ref 10.3–14.5)
RBC # FLD: 18.1 % — HIGH (ref 10.3–14.5)
RBC CASTS # UR COMP ASSIST: 15 /HPF — HIGH (ref 0–4)
RH IG SCN BLD-IMP: POSITIVE — SIGNIFICANT CHANGE UP
SAO2 % BLDA: 89.3 % — LOW (ref 94–98)
SAO2 % BLDA: 98 % — SIGNIFICANT CHANGE UP (ref 94–98)
SAO2 % BLDV: 34.1 % — SIGNIFICANT CHANGE UP
SAO2 % BLDV: 67.6 % — SIGNIFICANT CHANGE UP
SARS-COV-2 RNA SPEC QL NAA+PROBE: DETECTED
SARS-COV-2 RNA SPEC QL NAA+PROBE: DETECTED
SARS-COV-2 RNA SPEC QL NAA+PROBE: SIGNIFICANT CHANGE UP
SODIUM SERPL-SCNC: 140 MMOL/L — SIGNIFICANT CHANGE UP (ref 135–145)
SODIUM SERPL-SCNC: 142 MMOL/L — SIGNIFICANT CHANGE UP (ref 135–145)
SODIUM SERPL-SCNC: 143 MMOL/L — SIGNIFICANT CHANGE UP (ref 135–145)
SODIUM SERPL-SCNC: 143 MMOL/L — SIGNIFICANT CHANGE UP (ref 135–145)
SODIUM SERPL-SCNC: 144 MMOL/L — SIGNIFICANT CHANGE UP (ref 135–145)
SODIUM SERPL-SCNC: 145 MMOL/L — SIGNIFICANT CHANGE UP (ref 135–145)
SODIUM SERPL-SCNC: 145 MMOL/L — SIGNIFICANT CHANGE UP (ref 135–145)
SODIUM SERPL-SCNC: 146 MMOL/L — HIGH (ref 135–145)
SODIUM SERPL-SCNC: 147 MMOL/L — HIGH (ref 135–145)
SODIUM SERPL-SCNC: 148 MMOL/L — HIGH (ref 135–145)
SODIUM SERPL-SCNC: 149 MMOL/L — HIGH (ref 135–145)
SODIUM SERPL-SCNC: 150 MMOL/L — HIGH (ref 135–145)
SODIUM SERPL-SCNC: 151 MMOL/L — HIGH (ref 135–145)
SODIUM SERPL-SCNC: 151 MMOL/L — HIGH (ref 135–145)
SODIUM SERPL-SCNC: 152 MMOL/L — HIGH (ref 135–145)
SODIUM SERPL-SCNC: 153 MMOL/L — HIGH (ref 135–145)
SODIUM SERPL-SCNC: 155 MMOL/L — HIGH (ref 135–145)
SODIUM SERPL-SCNC: 157 MMOL/L — HIGH (ref 135–145)
SP GR SPEC: 1.02 — SIGNIFICANT CHANGE UP (ref 1–1.05)
SP GR SPEC: 1.03 — SIGNIFICANT CHANGE UP (ref 1–1.05)
SP GR SPEC: 1.03 — SIGNIFICANT CHANGE UP (ref 1–1.05)
SPECIMEN SOURCE: SIGNIFICANT CHANGE UP
TROPONIN T, HIGH SENSITIVITY RESULT: 450 NG/L — CRITICAL HIGH
TROPONIN T, HIGH SENSITIVITY RESULT: 572 NG/L — CRITICAL HIGH
TROPONIN T, HIGH SENSITIVITY RESULT: 619 NG/L — CRITICAL HIGH
TROPONIN T, HIGH SENSITIVITY RESULT: 936 NG/L — CRITICAL HIGH
UROBILINOGEN FLD QL: SIGNIFICANT CHANGE UP
VANCOMYCIN TROUGH SERPL-MCNC: 7.3 UG/ML — LOW (ref 10–20)
WBC # BLD: 12.66 K/UL — HIGH (ref 3.8–10.5)
WBC # BLD: 13.35 K/UL — HIGH (ref 3.8–10.5)
WBC # BLD: 14.28 K/UL — HIGH (ref 3.8–10.5)
WBC # BLD: 14.59 K/UL — HIGH (ref 3.8–10.5)
WBC # BLD: 14.75 K/UL — HIGH (ref 3.8–10.5)
WBC # BLD: 14.89 K/UL — HIGH (ref 3.8–10.5)
WBC # BLD: 15.24 K/UL — HIGH (ref 3.8–10.5)
WBC # BLD: 15.37 K/UL — HIGH (ref 3.8–10.5)
WBC # BLD: 15.38 K/UL — HIGH (ref 3.8–10.5)
WBC # BLD: 15.41 K/UL — HIGH (ref 3.8–10.5)
WBC # BLD: 16.28 K/UL — HIGH (ref 3.8–10.5)
WBC # BLD: 16.37 K/UL — HIGH (ref 3.8–10.5)
WBC # BLD: 16.4 K/UL — HIGH (ref 3.8–10.5)
WBC # BLD: 16.52 K/UL — HIGH (ref 3.8–10.5)
WBC # BLD: 16.59 K/UL — HIGH (ref 3.8–10.5)
WBC # BLD: 17.07 K/UL — HIGH (ref 3.8–10.5)
WBC # BLD: 17.36 K/UL — HIGH (ref 3.8–10.5)
WBC # BLD: 17.37 K/UL — HIGH (ref 3.8–10.5)
WBC # BLD: 17.42 K/UL — HIGH (ref 3.8–10.5)
WBC # BLD: 18.17 K/UL — HIGH (ref 3.8–10.5)
WBC # BLD: 18.41 K/UL — HIGH (ref 3.8–10.5)
WBC # BLD: 19.67 K/UL — HIGH (ref 3.8–10.5)
WBC # BLD: 19.68 K/UL — HIGH (ref 3.8–10.5)
WBC # BLD: 20.53 K/UL — HIGH (ref 3.8–10.5)
WBC # BLD: 21.37 K/UL — HIGH (ref 3.8–10.5)
WBC # BLD: 21.46 K/UL — HIGH (ref 3.8–10.5)
WBC # BLD: 21.99 K/UL — HIGH (ref 3.8–10.5)
WBC # BLD: 22.91 K/UL — HIGH (ref 3.8–10.5)
WBC # BLD: 23.19 K/UL — HIGH (ref 3.8–10.5)
WBC # BLD: 23.24 K/UL — HIGH (ref 3.8–10.5)
WBC # BLD: 23.94 K/UL — HIGH (ref 3.8–10.5)
WBC # BLD: 26.2 K/UL — HIGH (ref 3.8–10.5)
WBC # BLD: 26.69 K/UL — HIGH (ref 3.8–10.5)
WBC # BLD: 27.47 K/UL — HIGH (ref 3.8–10.5)
WBC # BLD: 28.2 K/UL — HIGH (ref 3.8–10.5)
WBC # BLD: 28.26 K/UL — HIGH (ref 3.8–10.5)
WBC # BLD: 28.51 K/UL — HIGH (ref 3.8–10.5)
WBC # BLD: 28.82 K/UL — HIGH (ref 3.8–10.5)
WBC # FLD AUTO: 12.66 K/UL — HIGH (ref 3.8–10.5)
WBC # FLD AUTO: 13.35 K/UL — HIGH (ref 3.8–10.5)
WBC # FLD AUTO: 14.28 K/UL — HIGH (ref 3.8–10.5)
WBC # FLD AUTO: 14.59 K/UL — HIGH (ref 3.8–10.5)
WBC # FLD AUTO: 14.75 K/UL — HIGH (ref 3.8–10.5)
WBC # FLD AUTO: 14.89 K/UL — HIGH (ref 3.8–10.5)
WBC # FLD AUTO: 15.24 K/UL — HIGH (ref 3.8–10.5)
WBC # FLD AUTO: 15.37 K/UL — HIGH (ref 3.8–10.5)
WBC # FLD AUTO: 15.38 K/UL — HIGH (ref 3.8–10.5)
WBC # FLD AUTO: 15.41 K/UL — HIGH (ref 3.8–10.5)
WBC # FLD AUTO: 16.28 K/UL — HIGH (ref 3.8–10.5)
WBC # FLD AUTO: 16.37 K/UL — HIGH (ref 3.8–10.5)
WBC # FLD AUTO: 16.4 K/UL — HIGH (ref 3.8–10.5)
WBC # FLD AUTO: 16.52 K/UL — HIGH (ref 3.8–10.5)
WBC # FLD AUTO: 16.59 K/UL — HIGH (ref 3.8–10.5)
WBC # FLD AUTO: 17.07 K/UL — HIGH (ref 3.8–10.5)
WBC # FLD AUTO: 17.36 K/UL — HIGH (ref 3.8–10.5)
WBC # FLD AUTO: 17.37 K/UL — HIGH (ref 3.8–10.5)
WBC # FLD AUTO: 17.42 K/UL — HIGH (ref 3.8–10.5)
WBC # FLD AUTO: 18.17 K/UL — HIGH (ref 3.8–10.5)
WBC # FLD AUTO: 18.41 K/UL — HIGH (ref 3.8–10.5)
WBC # FLD AUTO: 19.67 K/UL — HIGH (ref 3.8–10.5)
WBC # FLD AUTO: 19.68 K/UL — HIGH (ref 3.8–10.5)
WBC # FLD AUTO: 20.53 K/UL — HIGH (ref 3.8–10.5)
WBC # FLD AUTO: 21.37 K/UL — HIGH (ref 3.8–10.5)
WBC # FLD AUTO: 21.46 K/UL — HIGH (ref 3.8–10.5)
WBC # FLD AUTO: 21.99 K/UL — HIGH (ref 3.8–10.5)
WBC # FLD AUTO: 22.91 K/UL — HIGH (ref 3.8–10.5)
WBC # FLD AUTO: 23.19 K/UL — HIGH (ref 3.8–10.5)
WBC # FLD AUTO: 23.24 K/UL — HIGH (ref 3.8–10.5)
WBC # FLD AUTO: 23.94 K/UL — HIGH (ref 3.8–10.5)
WBC # FLD AUTO: 26.2 K/UL — HIGH (ref 3.8–10.5)
WBC # FLD AUTO: 26.69 K/UL — HIGH (ref 3.8–10.5)
WBC # FLD AUTO: 27.47 K/UL — HIGH (ref 3.8–10.5)
WBC # FLD AUTO: 28.2 K/UL — HIGH (ref 3.8–10.5)
WBC # FLD AUTO: 28.26 K/UL — HIGH (ref 3.8–10.5)
WBC # FLD AUTO: 28.51 K/UL — HIGH (ref 3.8–10.5)
WBC # FLD AUTO: 28.82 K/UL — HIGH (ref 3.8–10.5)
WBC UR QL: 3 /HPF — SIGNIFICANT CHANGE UP (ref 0–5)

## 2022-01-01 PROCEDURE — 99291 CRITICAL CARE FIRST HOUR: CPT | Mod: 24

## 2022-01-01 PROCEDURE — 49905 OMENTAL FLAP INTRA-ABDOM: CPT | Mod: GC

## 2022-01-01 PROCEDURE — 99232 SBSQ HOSP IP/OBS MODERATE 35: CPT

## 2022-01-01 PROCEDURE — 71260 CT THORAX DX C+: CPT | Mod: 26

## 2022-01-01 PROCEDURE — 99291 CRITICAL CARE FIRST HOUR: CPT | Mod: 25

## 2022-01-01 PROCEDURE — 74177 CT ABD & PELVIS W/CONTRAST: CPT | Mod: 26

## 2022-01-01 PROCEDURE — 70450 CT HEAD/BRAIN W/O DYE: CPT | Mod: 26,59,77,MB

## 2022-01-01 PROCEDURE — 43840 GSTRRPHY SUTR DUOL/GSTR ULCR: CPT | Mod: GC

## 2022-01-01 PROCEDURE — 71045 X-RAY EXAM CHEST 1 VIEW: CPT | Mod: 26,76

## 2022-01-01 PROCEDURE — 70496 CT ANGIOGRAPHY HEAD: CPT | Mod: 26,MG

## 2022-01-01 PROCEDURE — 71045 X-RAY EXAM CHEST 1 VIEW: CPT | Mod: 26

## 2022-01-01 PROCEDURE — 93010 ELECTROCARDIOGRAM REPORT: CPT

## 2022-01-01 PROCEDURE — 73522 X-RAY EXAM HIPS BI 3-4 VIEWS: CPT | Mod: 26

## 2022-01-01 PROCEDURE — 99291 CRITICAL CARE FIRST HOUR: CPT | Mod: 24,25

## 2022-01-01 PROCEDURE — 99223 1ST HOSP IP/OBS HIGH 75: CPT

## 2022-01-01 PROCEDURE — 70450 CT HEAD/BRAIN W/O DYE: CPT | Mod: 26

## 2022-01-01 PROCEDURE — 71250 CT THORAX DX C-: CPT | Mod: 26,MG

## 2022-01-01 PROCEDURE — 36556 INSERT NON-TUNNEL CV CATH: CPT | Mod: GC

## 2022-01-01 PROCEDURE — 76604 US EXAM CHEST: CPT | Mod: 26

## 2022-01-01 PROCEDURE — 99291 CRITICAL CARE FIRST HOUR: CPT

## 2022-01-01 PROCEDURE — 73590 X-RAY EXAM OF LOWER LEG: CPT | Mod: 26,LT

## 2022-01-01 PROCEDURE — G1004: CPT

## 2022-01-01 PROCEDURE — 31624 DX BRONCHOSCOPE/LAVAGE: CPT

## 2022-01-01 PROCEDURE — 93306 TTE W/DOPPLER COMPLETE: CPT | Mod: 26

## 2022-01-01 PROCEDURE — 70450 CT HEAD/BRAIN W/O DYE: CPT | Mod: 26,MG

## 2022-01-01 PROCEDURE — 99233 SBSQ HOSP IP/OBS HIGH 50: CPT

## 2022-01-01 PROCEDURE — 74174 CTA ABD&PLVS W/CONTRAST: CPT | Mod: 26

## 2022-01-01 PROCEDURE — 73564 X-RAY EXAM KNEE 4 OR MORE: CPT | Mod: 26,50

## 2022-01-01 PROCEDURE — 70498 CT ANGIOGRAPHY NECK: CPT | Mod: 26,MG

## 2022-01-01 PROCEDURE — 71045 X-RAY EXAM CHEST 1 VIEW: CPT | Mod: 26,77

## 2022-01-01 PROCEDURE — 99223 1ST HOSP IP/OBS HIGH 75: CPT | Mod: GC

## 2022-01-01 PROCEDURE — 99233 SBSQ HOSP IP/OBS HIGH 50: CPT | Mod: GC

## 2022-01-01 PROCEDURE — 99222 1ST HOSP IP/OBS MODERATE 55: CPT | Mod: GC

## 2022-01-01 PROCEDURE — 73600 X-RAY EXAM OF ANKLE: CPT | Mod: 26,RT

## 2022-01-01 PROCEDURE — 99233 SBSQ HOSP IP/OBS HIGH 50: CPT | Mod: GC,57

## 2022-01-01 PROCEDURE — 74176 CT ABD & PELVIS W/O CONTRAST: CPT | Mod: 26,MG

## 2022-01-01 PROCEDURE — 76937 US GUIDE VASCULAR ACCESS: CPT | Mod: 26

## 2022-01-01 PROCEDURE — 73706 CT ANGIO LWR EXTR W/O&W/DYE: CPT | Mod: 26,LT,52,MB

## 2022-01-01 PROCEDURE — 99292 CRITICAL CARE ADDL 30 MIN: CPT | Mod: 25,24

## 2022-01-01 PROCEDURE — 31624 DX BRONCHOSCOPE/LAVAGE: CPT | Mod: GC

## 2022-01-01 PROCEDURE — 99291 CRITICAL CARE FIRST HOUR: CPT | Mod: 25,24

## 2022-01-01 PROCEDURE — 36620 INSERTION CATHETER ARTERY: CPT

## 2022-01-01 PROCEDURE — 43246 EGD PLACE GASTROSTOMY TUBE: CPT | Mod: GC

## 2022-01-01 PROCEDURE — 99291 CRITICAL CARE FIRST HOUR: CPT | Mod: 57,25

## 2022-01-01 PROCEDURE — 72125 CT NECK SPINE W/O DYE: CPT | Mod: 26,MG

## 2022-01-01 PROCEDURE — 31600 PLANNED TRACHEOSTOMY: CPT | Mod: GC

## 2022-01-01 PROCEDURE — 31624 DX BRONCHOSCOPE/LAVAGE: CPT | Mod: GC,79

## 2022-01-01 RX ORDER — NOREPINEPHRINE BITARTRATE/D5W 8 MG/250ML
0.05 PLASTIC BAG, INJECTION (ML) INTRAVENOUS
Qty: 16 | Refills: 0 | Status: DISCONTINUED | OUTPATIENT
Start: 2022-01-01 | End: 2022-01-01

## 2022-01-01 RX ORDER — HYDROMORPHONE HYDROCHLORIDE 2 MG/ML
0.5 INJECTION INTRAMUSCULAR; INTRAVENOUS; SUBCUTANEOUS EVERY 4 HOURS
Refills: 0 | Status: DISCONTINUED | OUTPATIENT
Start: 2022-01-01 | End: 2022-01-01

## 2022-01-01 RX ORDER — ACETAMINOPHEN 500 MG
1000 TABLET ORAL EVERY 6 HOURS
Refills: 0 | Status: COMPLETED | OUTPATIENT
Start: 2022-01-01 | End: 2022-01-01

## 2022-01-01 RX ORDER — MIDODRINE HYDROCHLORIDE 2.5 MG/1
10 TABLET ORAL
Refills: 0 | Status: DISCONTINUED | OUTPATIENT
Start: 2022-01-01 | End: 2022-01-01

## 2022-01-01 RX ORDER — HALOPERIDOL DECANOATE 100 MG/ML
5 INJECTION INTRAMUSCULAR EVERY 6 HOURS
Refills: 0 | Status: DISCONTINUED | OUTPATIENT
Start: 2022-01-01 | End: 2022-01-01

## 2022-01-01 RX ORDER — POTASSIUM PHOSPHATE, MONOBASIC POTASSIUM PHOSPHATE, DIBASIC 236; 224 MG/ML; MG/ML
15 INJECTION, SOLUTION INTRAVENOUS ONCE
Refills: 0 | Status: COMPLETED | OUTPATIENT
Start: 2022-01-01 | End: 2022-01-01

## 2022-01-01 RX ORDER — METOPROLOL TARTRATE 50 MG
25 TABLET ORAL
Refills: 0 | Status: DISCONTINUED | OUTPATIENT
Start: 2022-01-01 | End: 2022-01-01

## 2022-01-01 RX ORDER — HEPARIN SODIUM 5000 [USP'U]/ML
5000 INJECTION INTRAVENOUS; SUBCUTANEOUS EVERY 8 HOURS
Refills: 0 | Status: DISCONTINUED | OUTPATIENT
Start: 2022-01-01 | End: 2022-01-01

## 2022-01-01 RX ORDER — ACETAMINOPHEN 500 MG
650 TABLET ORAL EVERY 6 HOURS
Refills: 0 | Status: DISCONTINUED | OUTPATIENT
Start: 2022-01-01 | End: 2022-01-01

## 2022-01-01 RX ORDER — INSULIN GLARGINE 100 [IU]/ML
22 INJECTION, SOLUTION SUBCUTANEOUS EVERY MORNING
Refills: 0 | Status: DISCONTINUED | OUTPATIENT
Start: 2022-01-01 | End: 2022-01-01

## 2022-01-01 RX ORDER — METOPROLOL TARTRATE 50 MG
12.5 TABLET ORAL EVERY 12 HOURS
Refills: 0 | Status: DISCONTINUED | OUTPATIENT
Start: 2022-01-01 | End: 2022-01-01

## 2022-01-01 RX ORDER — POTASSIUM PHOSPHATE, MONOBASIC POTASSIUM PHOSPHATE, DIBASIC 236; 224 MG/ML; MG/ML
30 INJECTION, SOLUTION INTRAVENOUS ONCE
Refills: 0 | Status: DISCONTINUED | OUTPATIENT
Start: 2022-01-01 | End: 2022-01-01

## 2022-01-01 RX ORDER — FUROSEMIDE 40 MG
40 TABLET ORAL EVERY 8 HOURS
Refills: 0 | Status: COMPLETED | OUTPATIENT
Start: 2022-01-01 | End: 2022-01-01

## 2022-01-01 RX ORDER — SODIUM CHLORIDE 9 MG/ML
1000 INJECTION, SOLUTION INTRAVENOUS
Refills: 0 | Status: DISCONTINUED | OUTPATIENT
Start: 2022-01-01 | End: 2022-01-01

## 2022-01-01 RX ORDER — DEXTROSE 50 % IN WATER 50 %
50 SYRINGE (ML) INTRAVENOUS ONCE
Refills: 0 | Status: DISCONTINUED | OUTPATIENT
Start: 2022-01-01 | End: 2022-01-01

## 2022-01-01 RX ORDER — MIDODRINE HYDROCHLORIDE 2.5 MG/1
20 TABLET ORAL EVERY 8 HOURS
Refills: 0 | Status: DISCONTINUED | OUTPATIENT
Start: 2022-01-01 | End: 2022-01-01

## 2022-01-01 RX ORDER — VANCOMYCIN HCL 1 G
1250 VIAL (EA) INTRAVENOUS ONCE
Refills: 0 | Status: COMPLETED | OUTPATIENT
Start: 2022-01-01 | End: 2022-01-01

## 2022-01-01 RX ORDER — REMDESIVIR 5 MG/ML
INJECTION INTRAVENOUS
Refills: 0 | Status: COMPLETED | OUTPATIENT
Start: 2022-01-01 | End: 2022-01-01

## 2022-01-01 RX ORDER — VANCOMYCIN HCL 1 G
1250 VIAL (EA) INTRAVENOUS EVERY 8 HOURS
Refills: 0 | Status: DISCONTINUED | OUTPATIENT
Start: 2022-01-01 | End: 2022-01-01

## 2022-01-01 RX ORDER — SODIUM CHLORIDE 9 MG/ML
4 INJECTION INTRAMUSCULAR; INTRAVENOUS; SUBCUTANEOUS EVERY 12 HOURS
Refills: 0 | Status: COMPLETED | OUTPATIENT
Start: 2022-01-01 | End: 2022-01-01

## 2022-01-01 RX ORDER — ACETAMINOPHEN 500 MG
1000 TABLET ORAL ONCE
Refills: 0 | Status: COMPLETED | OUTPATIENT
Start: 2022-01-01 | End: 2022-01-01

## 2022-01-01 RX ORDER — METOPROLOL TARTRATE 50 MG
5 TABLET ORAL ONCE
Refills: 0 | Status: COMPLETED | OUTPATIENT
Start: 2022-01-01 | End: 2022-01-01

## 2022-01-01 RX ORDER — CALCIUM GLUCONATE 100 MG/ML
2 VIAL (ML) INTRAVENOUS
Refills: 0 | Status: COMPLETED | OUTPATIENT
Start: 2022-01-01 | End: 2022-01-01

## 2022-01-01 RX ORDER — CEFEPIME 1 G/1
INJECTION, POWDER, FOR SOLUTION INTRAMUSCULAR; INTRAVENOUS
Refills: 0 | Status: DISCONTINUED | OUTPATIENT
Start: 2022-01-01 | End: 2022-01-01

## 2022-01-01 RX ORDER — HYDROMORPHONE HYDROCHLORIDE 2 MG/ML
0.25 INJECTION INTRAMUSCULAR; INTRAVENOUS; SUBCUTANEOUS
Refills: 0 | Status: DISCONTINUED | OUTPATIENT
Start: 2022-01-01 | End: 2022-01-01

## 2022-01-01 RX ORDER — SODIUM,POTASSIUM PHOSPHATES 278-250MG
1 POWDER IN PACKET (EA) ORAL ONCE
Refills: 0 | Status: COMPLETED | OUTPATIENT
Start: 2022-01-01 | End: 2022-01-01

## 2022-01-01 RX ORDER — SUCRALFATE 1 G
1 TABLET ORAL EVERY 12 HOURS
Refills: 0 | Status: DISCONTINUED | OUTPATIENT
Start: 2022-01-01 | End: 2022-01-01

## 2022-01-01 RX ORDER — HYDROMORPHONE HYDROCHLORIDE 2 MG/ML
0.25 INJECTION INTRAMUSCULAR; INTRAVENOUS; SUBCUTANEOUS EVERY 4 HOURS
Refills: 0 | Status: DISCONTINUED | OUTPATIENT
Start: 2022-01-01 | End: 2022-01-01

## 2022-01-01 RX ORDER — HYDROMORPHONE HYDROCHLORIDE 2 MG/ML
0.5 INJECTION INTRAMUSCULAR; INTRAVENOUS; SUBCUTANEOUS ONCE
Refills: 0 | Status: DISCONTINUED | OUTPATIENT
Start: 2022-01-01 | End: 2022-01-01

## 2022-01-01 RX ORDER — PANTOPRAZOLE SODIUM 20 MG/1
8 TABLET, DELAYED RELEASE ORAL
Qty: 80 | Refills: 0 | Status: DISCONTINUED | OUTPATIENT
Start: 2022-01-01 | End: 2022-01-01

## 2022-01-01 RX ORDER — REMDESIVIR 5 MG/ML
100 INJECTION INTRAVENOUS EVERY 24 HOURS
Refills: 0 | Status: COMPLETED | OUTPATIENT
Start: 2022-01-01 | End: 2022-01-01

## 2022-01-01 RX ORDER — REMDESIVIR 5 MG/ML
200 INJECTION INTRAVENOUS EVERY 24 HOURS
Refills: 0 | Status: COMPLETED | OUTPATIENT
Start: 2022-01-01 | End: 2022-01-01

## 2022-01-01 RX ORDER — METOPROLOL TARTRATE 50 MG
5 TABLET ORAL ONCE
Refills: 0 | Status: DISCONTINUED | OUTPATIENT
Start: 2022-01-01 | End: 2022-01-01

## 2022-01-01 RX ORDER — METRONIDAZOLE 500 MG
TABLET ORAL
Refills: 0 | Status: DISCONTINUED | OUTPATIENT
Start: 2022-01-01 | End: 2022-01-01

## 2022-01-01 RX ORDER — VANCOMYCIN HCL 1 G
125 VIAL (EA) INTRAVENOUS EVERY 6 HOURS
Refills: 0 | Status: DISCONTINUED | OUTPATIENT
Start: 2022-01-01 | End: 2022-01-01

## 2022-01-01 RX ORDER — INSULIN HUMAN 100 [IU]/ML
2 INJECTION, SOLUTION SUBCUTANEOUS
Qty: 100 | Refills: 0 | Status: DISCONTINUED | OUTPATIENT
Start: 2022-01-01 | End: 2022-01-01

## 2022-01-01 RX ORDER — HALOPERIDOL DECANOATE 100 MG/ML
5 INJECTION INTRAMUSCULAR ONCE
Refills: 0 | Status: COMPLETED | OUTPATIENT
Start: 2022-01-01 | End: 2022-01-01

## 2022-01-01 RX ORDER — FENTANYL CITRATE 50 UG/ML
100 INJECTION INTRAVENOUS ONCE
Refills: 0 | Status: DISCONTINUED | OUTPATIENT
Start: 2022-01-01 | End: 2022-01-01

## 2022-01-01 RX ORDER — METOPROLOL TARTRATE 50 MG
5 TABLET ORAL EVERY 6 HOURS
Refills: 0 | Status: DISCONTINUED | OUTPATIENT
Start: 2022-01-01 | End: 2022-01-01

## 2022-01-01 RX ORDER — LANOLIN ALCOHOL/MO/W.PET/CERES
3 CREAM (GRAM) TOPICAL AT BEDTIME
Refills: 0 | Status: DISCONTINUED | OUTPATIENT
Start: 2022-01-01 | End: 2022-01-01

## 2022-01-01 RX ORDER — PIPERACILLIN AND TAZOBACTAM 4; .5 G/20ML; G/20ML
3.38 INJECTION, POWDER, LYOPHILIZED, FOR SOLUTION INTRAVENOUS ONCE
Refills: 0 | Status: COMPLETED | OUTPATIENT
Start: 2022-01-01 | End: 2022-01-01

## 2022-01-01 RX ORDER — DIGOXIN 250 MCG
125 TABLET ORAL DAILY
Refills: 0 | Status: DISCONTINUED | OUTPATIENT
Start: 2022-01-01 | End: 2022-01-01

## 2022-01-01 RX ORDER — HYDROMORPHONE HYDROCHLORIDE 2 MG/ML
0.25 INJECTION INTRAMUSCULAR; INTRAVENOUS; SUBCUTANEOUS ONCE
Refills: 0 | Status: DISCONTINUED | OUTPATIENT
Start: 2022-01-01 | End: 2022-01-01

## 2022-01-01 RX ORDER — FUROSEMIDE 40 MG
20 TABLET ORAL EVERY 12 HOURS
Refills: 0 | Status: COMPLETED | OUTPATIENT
Start: 2022-01-01 | End: 2022-01-01

## 2022-01-01 RX ORDER — METOPROLOL TARTRATE 50 MG
25 TABLET ORAL EVERY 8 HOURS
Refills: 0 | Status: DISCONTINUED | OUTPATIENT
Start: 2022-01-01 | End: 2022-01-01

## 2022-01-01 RX ORDER — DILTIAZEM HCL 120 MG
10 CAPSULE, EXT RELEASE 24 HR ORAL ONCE
Refills: 0 | Status: COMPLETED | OUTPATIENT
Start: 2022-01-01 | End: 2022-01-01

## 2022-01-01 RX ORDER — VANCOMYCIN HCL 1 G
125 VIAL (EA) INTRAVENOUS EVERY 6 HOURS
Refills: 0 | Status: COMPLETED | OUTPATIENT
Start: 2022-01-01 | End: 2022-01-01

## 2022-01-01 RX ORDER — MEROPENEM 1 G/30ML
INJECTION INTRAVENOUS
Refills: 0 | Status: DISCONTINUED | OUTPATIENT
Start: 2022-01-01 | End: 2022-01-01

## 2022-01-01 RX ORDER — DILTIAZEM HCL 120 MG
15 CAPSULE, EXT RELEASE 24 HR ORAL EVERY 8 HOURS
Refills: 0 | Status: DISCONTINUED | OUTPATIENT
Start: 2022-01-01 | End: 2022-01-01

## 2022-01-01 RX ORDER — DEXTROSE 50 % IN WATER 50 %
50 SYRINGE (ML) INTRAVENOUS
Refills: 0 | Status: DISCONTINUED | OUTPATIENT
Start: 2022-01-01 | End: 2022-01-01

## 2022-01-01 RX ORDER — PIPERACILLIN AND TAZOBACTAM 4; .5 G/20ML; G/20ML
3.38 INJECTION, POWDER, LYOPHILIZED, FOR SOLUTION INTRAVENOUS EVERY 8 HOURS
Refills: 0 | Status: DISCONTINUED | OUTPATIENT
Start: 2022-01-01 | End: 2022-01-01

## 2022-01-01 RX ORDER — POTASSIUM CHLORIDE 20 MEQ
10 PACKET (EA) ORAL
Refills: 0 | Status: COMPLETED | OUTPATIENT
Start: 2022-01-01 | End: 2022-01-01

## 2022-01-01 RX ORDER — DILTIAZEM HCL 120 MG
30 CAPSULE, EXT RELEASE 24 HR ORAL EVERY 6 HOURS
Refills: 0 | Status: DISCONTINUED | OUTPATIENT
Start: 2022-01-01 | End: 2022-01-01

## 2022-01-01 RX ORDER — MEROPENEM 1 G/30ML
1000 INJECTION INTRAVENOUS ONCE
Refills: 0 | Status: COMPLETED | OUTPATIENT
Start: 2022-01-01 | End: 2022-01-01

## 2022-01-01 RX ORDER — THIAMINE MONONITRATE (VIT B1) 100 MG
500 TABLET ORAL DAILY
Refills: 0 | Status: DISCONTINUED | OUTPATIENT
Start: 2022-01-01 | End: 2022-01-01

## 2022-01-01 RX ORDER — MEROPENEM 1 G/30ML
1000 INJECTION INTRAVENOUS EVERY 8 HOURS
Refills: 0 | Status: DISCONTINUED | OUTPATIENT
Start: 2022-01-01 | End: 2022-01-01

## 2022-01-01 RX ORDER — PANTOPRAZOLE SODIUM 20 MG/1
40 TABLET, DELAYED RELEASE ORAL EVERY 12 HOURS
Refills: 0 | Status: DISCONTINUED | OUTPATIENT
Start: 2022-01-01 | End: 2022-01-01

## 2022-01-01 RX ORDER — SODIUM CHLORIDE 9 MG/ML
1000 INJECTION INTRAMUSCULAR; INTRAVENOUS; SUBCUTANEOUS ONCE
Refills: 0 | Status: COMPLETED | OUTPATIENT
Start: 2022-01-01 | End: 2022-01-01

## 2022-01-01 RX ORDER — SODIUM CHLORIDE 9 MG/ML
500 INJECTION, SOLUTION INTRAVENOUS ONCE
Refills: 0 | Status: DISCONTINUED | OUTPATIENT
Start: 2022-01-01 | End: 2022-01-01

## 2022-01-01 RX ORDER — FUROSEMIDE 40 MG
20 TABLET ORAL ONCE
Refills: 0 | Status: COMPLETED | OUTPATIENT
Start: 2022-01-01 | End: 2022-01-01

## 2022-01-01 RX ORDER — HYDROMORPHONE HYDROCHLORIDE 2 MG/ML
1 INJECTION INTRAMUSCULAR; INTRAVENOUS; SUBCUTANEOUS ONCE
Refills: 0 | Status: DISCONTINUED | OUTPATIENT
Start: 2022-01-01 | End: 2022-01-01

## 2022-01-01 RX ORDER — DEXMEDETOMIDINE HYDROCHLORIDE IN 0.9% SODIUM CHLORIDE 4 UG/ML
0.2 INJECTION INTRAVENOUS
Qty: 400 | Refills: 0 | Status: DISCONTINUED | OUTPATIENT
Start: 2022-01-01 | End: 2022-01-01

## 2022-01-01 RX ORDER — INSULIN LISPRO 100/ML
VIAL (ML) SUBCUTANEOUS EVERY 6 HOURS
Refills: 0 | Status: DISCONTINUED | OUTPATIENT
Start: 2022-01-01 | End: 2022-01-01

## 2022-01-01 RX ORDER — CHLORHEXIDINE GLUCONATE 213 G/1000ML
15 SOLUTION TOPICAL EVERY 12 HOURS
Refills: 0 | Status: DISCONTINUED | OUTPATIENT
Start: 2022-01-01 | End: 2022-01-01

## 2022-01-01 RX ORDER — ACETYLCYSTEINE 200 MG/ML
4 VIAL (ML) MISCELLANEOUS EVERY 6 HOURS
Refills: 0 | Status: DISCONTINUED | OUTPATIENT
Start: 2022-01-01 | End: 2022-01-01

## 2022-01-01 RX ORDER — FUROSEMIDE 40 MG
20 TABLET ORAL EVERY 8 HOURS
Refills: 0 | Status: DISCONTINUED | OUTPATIENT
Start: 2022-01-01 | End: 2022-01-01

## 2022-01-01 RX ORDER — POTASSIUM CHLORIDE 20 MEQ
20 PACKET (EA) ORAL ONCE
Refills: 0 | Status: COMPLETED | OUTPATIENT
Start: 2022-01-01 | End: 2022-01-01

## 2022-01-01 RX ORDER — SODIUM CHLORIDE 9 MG/ML
1000 INJECTION INTRAMUSCULAR; INTRAVENOUS; SUBCUTANEOUS
Refills: 0 | Status: DISCONTINUED | OUTPATIENT
Start: 2022-01-01 | End: 2022-01-01

## 2022-01-01 RX ORDER — MIDAZOLAM HYDROCHLORIDE 1 MG/ML
2 INJECTION, SOLUTION INTRAMUSCULAR; INTRAVENOUS ONCE
Refills: 0 | Status: DISCONTINUED | OUTPATIENT
Start: 2022-01-01 | End: 2022-01-01

## 2022-01-01 RX ORDER — THIAMINE MONONITRATE (VIT B1) 100 MG
100 TABLET ORAL DAILY
Refills: 0 | Status: DISCONTINUED | OUTPATIENT
Start: 2022-01-01 | End: 2022-01-01

## 2022-01-01 RX ORDER — ALBUMIN HUMAN 25 %
250 VIAL (ML) INTRAVENOUS ONCE
Refills: 0 | Status: COMPLETED | OUTPATIENT
Start: 2022-01-01 | End: 2022-01-01

## 2022-01-01 RX ORDER — ALBUTEROL 90 UG/1
3 AEROSOL, METERED ORAL
Refills: 0 | Status: DISCONTINUED | OUTPATIENT
Start: 2022-01-01 | End: 2022-01-01

## 2022-01-01 RX ORDER — INSULIN GLARGINE 100 [IU]/ML
30 INJECTION, SOLUTION SUBCUTANEOUS EVERY MORNING
Refills: 0 | Status: DISCONTINUED | OUTPATIENT
Start: 2022-01-01 | End: 2022-01-01

## 2022-01-01 RX ORDER — VANCOMYCIN HCL 1 G
VIAL (EA) INTRAVENOUS
Refills: 0 | Status: DISCONTINUED | OUTPATIENT
Start: 2022-01-01 | End: 2022-01-01

## 2022-01-01 RX ORDER — QUETIAPINE FUMARATE 200 MG/1
50 TABLET, FILM COATED ORAL EVERY 12 HOURS
Refills: 0 | Status: DISCONTINUED | OUTPATIENT
Start: 2022-01-01 | End: 2022-01-01

## 2022-01-01 RX ORDER — MEROPENEM 1 G/30ML
1000 INJECTION INTRAVENOUS EVERY 8 HOURS
Refills: 0 | Status: COMPLETED | OUTPATIENT
Start: 2022-01-01 | End: 2022-01-01

## 2022-01-01 RX ORDER — VANCOMYCIN HCL 1 G
1500 VIAL (EA) INTRAVENOUS EVERY 12 HOURS
Refills: 0 | Status: DISCONTINUED | OUTPATIENT
Start: 2022-01-01 | End: 2022-01-01

## 2022-01-01 RX ORDER — PHENYLEPHRINE HYDROCHLORIDE 10 MG/ML
0.1 INJECTION INTRAVENOUS
Qty: 40 | Refills: 0 | Status: DISCONTINUED | OUTPATIENT
Start: 2022-01-01 | End: 2022-01-01

## 2022-01-01 RX ORDER — SODIUM CHLORIDE 9 MG/ML
500 INJECTION INTRAMUSCULAR; INTRAVENOUS; SUBCUTANEOUS ONCE
Refills: 0 | Status: COMPLETED | OUTPATIENT
Start: 2022-01-01 | End: 2022-01-01

## 2022-01-01 RX ORDER — FUROSEMIDE 40 MG
40 TABLET ORAL ONCE
Refills: 0 | Status: COMPLETED | OUTPATIENT
Start: 2022-01-01 | End: 2022-01-01

## 2022-01-01 RX ORDER — VANCOMYCIN HCL 1 G
1250 VIAL (EA) INTRAVENOUS ONCE
Refills: 0 | Status: DISCONTINUED | OUTPATIENT
Start: 2022-01-01 | End: 2022-01-01

## 2022-01-01 RX ORDER — ACETAMINOPHEN 500 MG
1000 TABLET ORAL EVERY 6 HOURS
Refills: 0 | Status: DISCONTINUED | OUTPATIENT
Start: 2022-01-01 | End: 2022-01-01

## 2022-01-01 RX ORDER — METOPROLOL TARTRATE 50 MG
2.5 TABLET ORAL ONCE
Refills: 0 | Status: COMPLETED | OUTPATIENT
Start: 2022-01-01 | End: 2022-01-01

## 2022-01-01 RX ORDER — DILTIAZEM HCL 120 MG
5 CAPSULE, EXT RELEASE 24 HR ORAL
Qty: 125 | Refills: 0 | Status: DISCONTINUED | OUTPATIENT
Start: 2022-01-01 | End: 2022-01-01

## 2022-01-01 RX ORDER — BUDESONIDE AND FORMOTEROL FUMARATE DIHYDRATE 160; 4.5 UG/1; UG/1
2 AEROSOL RESPIRATORY (INHALATION)
Refills: 0 | Status: DISCONTINUED | OUTPATIENT
Start: 2022-01-01 | End: 2022-01-01

## 2022-01-01 RX ORDER — PHENYLEPHRINE HYDROCHLORIDE 10 MG/ML
0.1 INJECTION INTRAVENOUS
Qty: 160 | Refills: 0 | Status: DISCONTINUED | OUTPATIENT
Start: 2022-01-01 | End: 2022-01-01

## 2022-01-01 RX ORDER — CHLORHEXIDINE GLUCONATE 213 G/1000ML
1 SOLUTION TOPICAL
Refills: 0 | Status: DISCONTINUED | OUTPATIENT
Start: 2022-01-01 | End: 2022-01-01

## 2022-01-01 RX ORDER — FLUCONAZOLE 150 MG/1
400 TABLET ORAL EVERY 24 HOURS
Refills: 0 | Status: DISCONTINUED | OUTPATIENT
Start: 2022-01-01 | End: 2022-01-01

## 2022-01-01 RX ORDER — SODIUM CHLORIDE 9 MG/ML
1000 INJECTION, SOLUTION INTRAVENOUS ONCE
Refills: 0 | Status: DISCONTINUED | OUTPATIENT
Start: 2022-01-01 | End: 2022-01-01

## 2022-01-01 RX ORDER — INSULIN HUMAN 100 [IU]/ML
5 INJECTION, SOLUTION SUBCUTANEOUS ONCE
Refills: 0 | Status: DISCONTINUED | OUTPATIENT
Start: 2022-01-01 | End: 2022-01-01

## 2022-01-01 RX ORDER — ACETAMINOPHEN 500 MG
325 TABLET ORAL ONCE
Refills: 0 | Status: COMPLETED | OUTPATIENT
Start: 2022-01-01 | End: 2022-01-01

## 2022-01-01 RX ORDER — VANCOMYCIN HCL 1 G
1250 VIAL (EA) INTRAVENOUS EVERY 12 HOURS
Refills: 0 | Status: DISCONTINUED | OUTPATIENT
Start: 2022-01-01 | End: 2022-01-01

## 2022-01-01 RX ORDER — MIDAZOLAM HYDROCHLORIDE 1 MG/ML
2 INJECTION, SOLUTION INTRAMUSCULAR; INTRAVENOUS
Refills: 0 | Status: DISCONTINUED | OUTPATIENT
Start: 2022-01-01 | End: 2022-01-01

## 2022-01-01 RX ORDER — METOPROLOL TARTRATE 50 MG
37.5 TABLET ORAL EVERY 8 HOURS
Refills: 0 | Status: DISCONTINUED | OUTPATIENT
Start: 2022-01-01 | End: 2022-01-01

## 2022-01-01 RX ORDER — AMIODARONE HYDROCHLORIDE 400 MG/1
1 TABLET ORAL
Qty: 450 | Refills: 0 | Status: DISCONTINUED | OUTPATIENT
Start: 2022-01-01 | End: 2022-01-01

## 2022-01-01 RX ORDER — QUETIAPINE FUMARATE 200 MG/1
50 TABLET, FILM COATED ORAL DAILY
Refills: 0 | Status: DISCONTINUED | OUTPATIENT
Start: 2022-01-01 | End: 2022-01-01

## 2022-01-01 RX ORDER — QUETIAPINE FUMARATE 200 MG/1
75 TABLET, FILM COATED ORAL AT BEDTIME
Refills: 0 | Status: DISCONTINUED | OUTPATIENT
Start: 2022-01-01 | End: 2022-01-01

## 2022-01-01 RX ORDER — METOPROLOL TARTRATE 50 MG
25 TABLET ORAL EVERY 12 HOURS
Refills: 0 | Status: DISCONTINUED | OUTPATIENT
Start: 2022-01-01 | End: 2022-01-01

## 2022-01-01 RX ORDER — QUETIAPINE FUMARATE 200 MG/1
25 TABLET, FILM COATED ORAL DAILY
Refills: 0 | Status: DISCONTINUED | OUTPATIENT
Start: 2022-01-01 | End: 2022-01-01

## 2022-01-01 RX ORDER — QUETIAPINE FUMARATE 200 MG/1
125 TABLET, FILM COATED ORAL AT BEDTIME
Refills: 0 | Status: DISCONTINUED | OUTPATIENT
Start: 2022-01-01 | End: 2022-01-01

## 2022-01-01 RX ORDER — FUROSEMIDE 40 MG
40 TABLET ORAL DAILY
Refills: 0 | Status: DISCONTINUED | OUTPATIENT
Start: 2022-01-01 | End: 2022-01-01

## 2022-01-01 RX ORDER — POTASSIUM CHLORIDE 20 MEQ
20 PACKET (EA) ORAL
Refills: 0 | Status: COMPLETED | OUTPATIENT
Start: 2022-01-01 | End: 2022-01-01

## 2022-01-01 RX ORDER — METOPROLOL TARTRATE 50 MG
12.5 TABLET ORAL ONCE
Refills: 0 | Status: DISCONTINUED | OUTPATIENT
Start: 2022-01-01 | End: 2022-01-01

## 2022-01-01 RX ORDER — POTASSIUM CHLORIDE 20 MEQ
40 PACKET (EA) ORAL
Refills: 0 | Status: COMPLETED | OUTPATIENT
Start: 2022-01-01 | End: 2022-01-01

## 2022-01-01 RX ORDER — INSULIN LISPRO 100/ML
VIAL (ML) SUBCUTANEOUS
Refills: 0 | Status: DISCONTINUED | OUTPATIENT
Start: 2022-01-01 | End: 2022-01-01

## 2022-01-01 RX ORDER — PROPOFOL 10 MG/ML
10 INJECTION, EMULSION INTRAVENOUS
Qty: 1000 | Refills: 0 | Status: DISCONTINUED | OUTPATIENT
Start: 2022-01-01 | End: 2022-01-01

## 2022-01-01 RX ORDER — ACETYLCYSTEINE 200 MG/ML
4 VIAL (ML) MISCELLANEOUS
Refills: 0 | Status: DISCONTINUED | OUTPATIENT
Start: 2022-01-01 | End: 2022-01-01

## 2022-01-01 RX ORDER — MIDODRINE HYDROCHLORIDE 2.5 MG/1
10 TABLET ORAL EVERY 8 HOURS
Refills: 0 | Status: DISCONTINUED | OUTPATIENT
Start: 2022-01-01 | End: 2022-01-01

## 2022-01-01 RX ORDER — METRONIDAZOLE 500 MG
500 TABLET ORAL ONCE
Refills: 0 | Status: COMPLETED | OUTPATIENT
Start: 2022-01-01 | End: 2022-01-01

## 2022-01-01 RX ORDER — CEFEPIME 1 G/1
1000 INJECTION, POWDER, FOR SOLUTION INTRAMUSCULAR; INTRAVENOUS ONCE
Refills: 0 | Status: COMPLETED | OUTPATIENT
Start: 2022-01-01 | End: 2022-01-01

## 2022-01-01 RX ORDER — METOPROLOL TARTRATE 50 MG
25 TABLET ORAL ONCE
Refills: 0 | Status: DISCONTINUED | OUTPATIENT
Start: 2022-01-01 | End: 2022-01-01

## 2022-01-01 RX ORDER — DILTIAZEM HCL 120 MG
30 CAPSULE, EXT RELEASE 24 HR ORAL DAILY
Refills: 0 | Status: DISCONTINUED | OUTPATIENT
Start: 2022-01-01 | End: 2022-01-01

## 2022-01-01 RX ORDER — DILTIAZEM HCL 120 MG
30 CAPSULE, EXT RELEASE 24 HR ORAL EVERY 12 HOURS
Refills: 0 | Status: DISCONTINUED | OUTPATIENT
Start: 2022-01-01 | End: 2022-01-01

## 2022-01-01 RX ORDER — POTASSIUM CHLORIDE 20 MEQ
20 PACKET (EA) ORAL
Refills: 0 | Status: DISCONTINUED | OUTPATIENT
Start: 2022-01-01 | End: 2022-01-01

## 2022-01-01 RX ORDER — QUETIAPINE FUMARATE 200 MG/1
25 TABLET, FILM COATED ORAL EVERY 12 HOURS
Refills: 0 | Status: DISCONTINUED | OUTPATIENT
Start: 2022-01-01 | End: 2022-01-01

## 2022-01-01 RX ORDER — LABETALOL HCL 100 MG
10 TABLET ORAL ONCE
Refills: 0 | Status: COMPLETED | OUTPATIENT
Start: 2022-01-01 | End: 2022-01-01

## 2022-01-01 RX ORDER — LIDOCAINE 4 G/100G
1 CREAM TOPICAL DAILY
Refills: 0 | Status: DISCONTINUED | OUTPATIENT
Start: 2022-01-01 | End: 2022-01-01

## 2022-01-01 RX ORDER — ALBUTEROL 90 UG/1
2 AEROSOL, METERED ORAL EVERY 6 HOURS
Refills: 0 | Status: DISCONTINUED | OUTPATIENT
Start: 2022-01-01 | End: 2022-01-01

## 2022-01-01 RX ORDER — ACETAZOLAMIDE 250 MG/1
250 TABLET ORAL EVERY 6 HOURS
Refills: 0 | Status: COMPLETED | OUTPATIENT
Start: 2022-01-01 | End: 2022-01-01

## 2022-01-01 RX ORDER — CEFEPIME 1 G/1
1000 INJECTION, POWDER, FOR SOLUTION INTRAMUSCULAR; INTRAVENOUS EVERY 8 HOURS
Refills: 0 | Status: DISCONTINUED | OUTPATIENT
Start: 2022-01-01 | End: 2022-01-01

## 2022-01-01 RX ORDER — MORPHINE SULFATE 50 MG/1
4 CAPSULE, EXTENDED RELEASE ORAL ONCE
Refills: 0 | Status: DISCONTINUED | OUTPATIENT
Start: 2022-01-01 | End: 2022-01-01

## 2022-01-01 RX ORDER — POTASSIUM PHOSPHATE, MONOBASIC POTASSIUM PHOSPHATE, DIBASIC 236; 224 MG/ML; MG/ML
30 INJECTION, SOLUTION INTRAVENOUS ONCE
Refills: 0 | Status: COMPLETED | OUTPATIENT
Start: 2022-01-01 | End: 2022-01-01

## 2022-01-01 RX ORDER — DEXTROSE 50 % IN WATER 50 %
25 SYRINGE (ML) INTRAVENOUS ONCE
Refills: 0 | Status: COMPLETED | OUTPATIENT
Start: 2022-01-01 | End: 2022-01-01

## 2022-01-01 RX ORDER — HALOPERIDOL DECANOATE 100 MG/ML
2.5 INJECTION INTRAMUSCULAR ONCE
Refills: 0 | Status: COMPLETED | OUTPATIENT
Start: 2022-01-01 | End: 2022-01-01

## 2022-01-01 RX ORDER — MIDODRINE HYDROCHLORIDE 2.5 MG/1
10 TABLET ORAL ONCE
Refills: 0 | Status: COMPLETED | OUTPATIENT
Start: 2022-01-01 | End: 2022-01-01

## 2022-01-01 RX ORDER — PSYLLIUM SEED (WITH DEXTROSE)
1 POWDER (GRAM) ORAL ONCE
Refills: 0 | Status: COMPLETED | OUTPATIENT
Start: 2022-01-01 | End: 2022-01-01

## 2022-01-01 RX ORDER — ONDANSETRON 8 MG/1
4 TABLET, FILM COATED ORAL ONCE
Refills: 0 | Status: COMPLETED | OUTPATIENT
Start: 2022-01-01 | End: 2022-01-01

## 2022-01-01 RX ORDER — INSULIN GLARGINE 100 [IU]/ML
20 INJECTION, SOLUTION SUBCUTANEOUS EVERY MORNING
Refills: 0 | Status: DISCONTINUED | OUTPATIENT
Start: 2022-01-01 | End: 2022-01-01

## 2022-01-01 RX ORDER — APIXABAN 2.5 MG/1
5 TABLET, FILM COATED ORAL EVERY 12 HOURS
Refills: 0 | Status: DISCONTINUED | OUTPATIENT
Start: 2022-01-01 | End: 2022-01-01

## 2022-01-01 RX ORDER — NALOXONE HYDROCHLORIDE 4 MG/.1ML
1 SPRAY NASAL ONCE
Refills: 0 | Status: COMPLETED | OUTPATIENT
Start: 2022-01-01 | End: 2022-01-01

## 2022-01-01 RX ORDER — METRONIDAZOLE 500 MG
500 TABLET ORAL EVERY 8 HOURS
Refills: 0 | Status: DISCONTINUED | OUTPATIENT
Start: 2022-01-01 | End: 2022-01-01

## 2022-01-01 RX ORDER — INSULIN HUMAN 100 [IU]/ML
1 INJECTION, SOLUTION SUBCUTANEOUS
Qty: 100 | Refills: 0 | Status: DISCONTINUED | OUTPATIENT
Start: 2022-01-01 | End: 2022-01-01

## 2022-01-01 RX ORDER — METOPROLOL TARTRATE 50 MG
12.5 TABLET ORAL
Refills: 0 | Status: DISCONTINUED | OUTPATIENT
Start: 2022-01-01 | End: 2022-01-01

## 2022-01-01 RX ORDER — FUROSEMIDE 40 MG
20 TABLET ORAL ONCE
Refills: 0 | Status: DISCONTINUED | OUTPATIENT
Start: 2022-01-01 | End: 2022-01-01

## 2022-01-01 RX ORDER — CALCIUM GLUCONATE 100 MG/ML
2 VIAL (ML) INTRAVENOUS ONCE
Refills: 0 | Status: COMPLETED | OUTPATIENT
Start: 2022-01-01 | End: 2022-01-01

## 2022-01-01 RX ORDER — FLUCONAZOLE 150 MG/1
TABLET ORAL
Refills: 0 | Status: DISCONTINUED | OUTPATIENT
Start: 2022-01-01 | End: 2022-01-01

## 2022-01-01 RX ORDER — DEXTROSE 50 % IN WATER 50 %
25 SYRINGE (ML) INTRAVENOUS
Refills: 0 | Status: DISCONTINUED | OUTPATIENT
Start: 2022-01-01 | End: 2022-01-01

## 2022-01-01 RX ORDER — MIDODRINE HYDROCHLORIDE 2.5 MG/1
30 TABLET ORAL EVERY 8 HOURS
Refills: 0 | Status: DISCONTINUED | OUTPATIENT
Start: 2022-01-01 | End: 2022-01-01

## 2022-01-01 RX ORDER — DEXTROSE 50 % IN WATER 50 %
15 SYRINGE (ML) INTRAVENOUS ONCE
Refills: 0 | Status: DISCONTINUED | OUTPATIENT
Start: 2022-01-01 | End: 2022-01-01

## 2022-01-01 RX ORDER — IPRATROPIUM/ALBUTEROL SULFATE 18-103MCG
3 AEROSOL WITH ADAPTER (GRAM) INHALATION EVERY 6 HOURS
Refills: 0 | Status: DISCONTINUED | OUTPATIENT
Start: 2022-01-01 | End: 2022-01-01

## 2022-01-01 RX ORDER — ACETAMINOPHEN 500 MG
325 TABLET ORAL ONCE
Refills: 0 | Status: DISCONTINUED | OUTPATIENT
Start: 2022-01-01 | End: 2022-01-01

## 2022-01-01 RX ORDER — CEFTRIAXONE 500 MG/1
1000 INJECTION, POWDER, FOR SOLUTION INTRAMUSCULAR; INTRAVENOUS EVERY 24 HOURS
Refills: 0 | Status: DISCONTINUED | OUTPATIENT
Start: 2022-01-01 | End: 2022-01-01

## 2022-01-01 RX ORDER — FUROSEMIDE 40 MG
40 TABLET ORAL ONCE
Refills: 0 | Status: DISCONTINUED | OUTPATIENT
Start: 2022-01-01 | End: 2022-01-01

## 2022-01-01 RX ORDER — MULTIVIT-MIN/FERROUS GLUCONATE 9 MG/15 ML
15 LIQUID (ML) ORAL DAILY
Refills: 0 | Status: DISCONTINUED | OUTPATIENT
Start: 2022-01-01 | End: 2022-01-01

## 2022-01-01 RX ORDER — FLUCONAZOLE 150 MG/1
400 TABLET ORAL ONCE
Refills: 0 | Status: COMPLETED | OUTPATIENT
Start: 2022-01-01 | End: 2022-01-01

## 2022-01-01 RX ORDER — DIGOXIN 250 MCG
250 TABLET ORAL EVERY 8 HOURS
Refills: 0 | Status: COMPLETED | OUTPATIENT
Start: 2022-01-01 | End: 2022-01-01

## 2022-01-01 RX ORDER — ALBUTEROL 90 UG/1
2.5 AEROSOL, METERED ORAL EVERY 6 HOURS
Refills: 0 | Status: DISCONTINUED | OUTPATIENT
Start: 2022-01-01 | End: 2022-01-01

## 2022-01-01 RX ORDER — GLUCAGON INJECTION, SOLUTION 0.5 MG/.1ML
1 INJECTION, SOLUTION SUBCUTANEOUS ONCE
Refills: 0 | Status: DISCONTINUED | OUTPATIENT
Start: 2022-01-01 | End: 2022-01-01

## 2022-01-01 RX ORDER — MAGNESIUM SULFATE 500 MG/ML
2 VIAL (ML) INJECTION ONCE
Refills: 0 | Status: COMPLETED | OUTPATIENT
Start: 2022-01-01 | End: 2022-01-01

## 2022-01-01 RX ORDER — POTASSIUM CHLORIDE 20 MEQ
10 PACKET (EA) ORAL ONCE
Refills: 0 | Status: COMPLETED | OUTPATIENT
Start: 2022-01-01 | End: 2022-01-01

## 2022-01-01 RX ORDER — THIAMINE MONONITRATE (VIT B1) 100 MG
100 TABLET ORAL ONCE
Refills: 0 | Status: DISCONTINUED | OUTPATIENT
Start: 2022-01-01 | End: 2022-01-01

## 2022-01-01 RX ORDER — ENOXAPARIN SODIUM 100 MG/ML
40 INJECTION SUBCUTANEOUS DAILY
Refills: 0 | Status: DISCONTINUED | OUTPATIENT
Start: 2022-01-01 | End: 2022-01-01

## 2022-01-01 RX ORDER — TAMSULOSIN HYDROCHLORIDE 0.4 MG/1
0.8 CAPSULE ORAL AT BEDTIME
Refills: 0 | Status: DISCONTINUED | OUTPATIENT
Start: 2022-01-01 | End: 2022-01-01

## 2022-01-01 RX ORDER — PHENYLEPHRINE HYDROCHLORIDE 10 MG/ML
0.5 INJECTION INTRAVENOUS
Qty: 160 | Refills: 0 | Status: DISCONTINUED | OUTPATIENT
Start: 2022-01-01 | End: 2022-01-01

## 2022-01-01 RX ORDER — FUROSEMIDE 40 MG
40 TABLET ORAL EVERY 8 HOURS
Refills: 0 | Status: DISCONTINUED | OUTPATIENT
Start: 2022-01-01 | End: 2022-01-01

## 2022-01-01 RX ORDER — OXYCODONE HYDROCHLORIDE 5 MG/1
5 TABLET ORAL EVERY 4 HOURS
Refills: 0 | Status: DISCONTINUED | OUTPATIENT
Start: 2022-01-01 | End: 2022-01-01

## 2022-01-01 RX ORDER — VANCOMYCIN HCL 1 G
1500 VIAL (EA) INTRAVENOUS ONCE
Refills: 0 | Status: DISCONTINUED | OUTPATIENT
Start: 2022-01-01 | End: 2022-01-01

## 2022-01-01 RX ORDER — INSULIN GLARGINE 100 [IU]/ML
24 INJECTION, SOLUTION SUBCUTANEOUS EVERY MORNING
Refills: 0 | Status: DISCONTINUED | OUTPATIENT
Start: 2022-01-01 | End: 2022-01-01

## 2022-01-01 RX ORDER — QUETIAPINE FUMARATE 200 MG/1
100 TABLET, FILM COATED ORAL AT BEDTIME
Refills: 0 | Status: DISCONTINUED | OUTPATIENT
Start: 2022-01-01 | End: 2022-01-01

## 2022-01-01 RX ADMIN — Medication 50 MILLIEQUIVALENT(S): at 02:40

## 2022-01-01 RX ADMIN — MIDODRINE HYDROCHLORIDE 10 MILLIGRAM(S): 2.5 TABLET ORAL at 23:07

## 2022-01-01 RX ADMIN — HYDROMORPHONE HYDROCHLORIDE 1 MILLIGRAM(S): 2 INJECTION INTRAMUSCULAR; INTRAVENOUS; SUBCUTANEOUS at 14:35

## 2022-01-01 RX ADMIN — HYDROMORPHONE HYDROCHLORIDE 0.5 MILLIGRAM(S): 2 INJECTION INTRAMUSCULAR; INTRAVENOUS; SUBCUTANEOUS at 01:20

## 2022-01-01 RX ADMIN — Medication 200 GRAM(S): at 02:41

## 2022-01-01 RX ADMIN — Medication 12.5 MILLIGRAM(S): at 05:50

## 2022-01-01 RX ADMIN — Medication 650 MILLIGRAM(S): at 05:10

## 2022-01-01 RX ADMIN — PANTOPRAZOLE SODIUM 40 MILLIGRAM(S): 20 TABLET, DELAYED RELEASE ORAL at 05:49

## 2022-01-01 RX ADMIN — Medication 100 MILLIGRAM(S): at 05:45

## 2022-01-01 RX ADMIN — PANTOPRAZOLE SODIUM 40 MILLIGRAM(S): 20 TABLET, DELAYED RELEASE ORAL at 17:52

## 2022-01-01 RX ADMIN — MEROPENEM 100 MILLIGRAM(S): 1 INJECTION INTRAVENOUS at 05:46

## 2022-01-01 RX ADMIN — MEROPENEM 100 MILLIGRAM(S): 1 INJECTION INTRAVENOUS at 05:33

## 2022-01-01 RX ADMIN — Medication 5 MG/HR: at 20:05

## 2022-01-01 RX ADMIN — Medication 5 MILLIGRAM(S): at 03:00

## 2022-01-01 RX ADMIN — Medication 15 MILLILITER(S): at 13:37

## 2022-01-01 RX ADMIN — Medication 4 MILLILITER(S): at 23:08

## 2022-01-01 RX ADMIN — Medication 166.67 MILLIGRAM(S): at 05:02

## 2022-01-01 RX ADMIN — Medication 650 MILLIGRAM(S): at 05:14

## 2022-01-01 RX ADMIN — CHLORHEXIDINE GLUCONATE 15 MILLILITER(S): 213 SOLUTION TOPICAL at 06:05

## 2022-01-01 RX ADMIN — Medication 125 MILLIGRAM(S): at 12:14

## 2022-01-01 RX ADMIN — LIDOCAINE 1 APPLICATION(S): 4 CREAM TOPICAL at 11:49

## 2022-01-01 RX ADMIN — HEPARIN SODIUM 5000 UNIT(S): 5000 INJECTION INTRAVENOUS; SUBCUTANEOUS at 14:41

## 2022-01-01 RX ADMIN — Medication 650 MILLIGRAM(S): at 13:13

## 2022-01-01 RX ADMIN — Medication 125 MILLIGRAM(S): at 17:20

## 2022-01-01 RX ADMIN — Medication 1000 MILLIGRAM(S): at 13:36

## 2022-01-01 RX ADMIN — PANTOPRAZOLE SODIUM 40 MILLIGRAM(S): 20 TABLET, DELAYED RELEASE ORAL at 05:13

## 2022-01-01 RX ADMIN — SODIUM CHLORIDE 500 MILLILITER(S): 9 INJECTION INTRAMUSCULAR; INTRAVENOUS; SUBCUTANEOUS at 12:21

## 2022-01-01 RX ADMIN — Medication 2: at 07:03

## 2022-01-01 RX ADMIN — Medication 2: at 05:20

## 2022-01-01 RX ADMIN — MIDODRINE HYDROCHLORIDE 10 MILLIGRAM(S): 2.5 TABLET ORAL at 21:34

## 2022-01-01 RX ADMIN — PIPERACILLIN AND TAZOBACTAM 25 GRAM(S): 4; .5 INJECTION, POWDER, LYOPHILIZED, FOR SOLUTION INTRAVENOUS at 10:07

## 2022-01-01 RX ADMIN — Medication 125 MILLIGRAM(S): at 05:24

## 2022-01-01 RX ADMIN — ALBUTEROL 2.5 MILLIGRAM(S): 90 AEROSOL, METERED ORAL at 04:56

## 2022-01-01 RX ADMIN — Medication 125 MICROGRAM(S): at 12:10

## 2022-01-01 RX ADMIN — HEPARIN SODIUM 5000 UNIT(S): 5000 INJECTION INTRAVENOUS; SUBCUTANEOUS at 11:26

## 2022-01-01 RX ADMIN — Medication 650 MILLIGRAM(S): at 18:50

## 2022-01-01 RX ADMIN — Medication 125 MICROGRAM(S): at 12:35

## 2022-01-01 RX ADMIN — APIXABAN 5 MILLIGRAM(S): 2.5 TABLET, FILM COATED ORAL at 22:04

## 2022-01-01 RX ADMIN — Medication 25 MILLIGRAM(S): at 13:05

## 2022-01-01 RX ADMIN — CHLORHEXIDINE GLUCONATE 15 MILLILITER(S): 213 SOLUTION TOPICAL at 17:20

## 2022-01-01 RX ADMIN — Medication 2: at 06:11

## 2022-01-01 RX ADMIN — HEPARIN SODIUM 5000 UNIT(S): 5000 INJECTION INTRAVENOUS; SUBCUTANEOUS at 05:15

## 2022-01-01 RX ADMIN — Medication 650 MILLIGRAM(S): at 23:11

## 2022-01-01 RX ADMIN — Medication 40 MILLIGRAM(S): at 09:41

## 2022-01-01 RX ADMIN — Medication 12.5 MILLIGRAM(S): at 17:45

## 2022-01-01 RX ADMIN — MIDODRINE HYDROCHLORIDE 10 MILLIGRAM(S): 2.5 TABLET ORAL at 05:44

## 2022-01-01 RX ADMIN — Medication 650 MILLIGRAM(S): at 12:13

## 2022-01-01 RX ADMIN — HYDROMORPHONE HYDROCHLORIDE 0.25 MILLIGRAM(S): 2 INJECTION INTRAMUSCULAR; INTRAVENOUS; SUBCUTANEOUS at 06:55

## 2022-01-01 RX ADMIN — PANTOPRAZOLE SODIUM 40 MILLIGRAM(S): 20 TABLET, DELAYED RELEASE ORAL at 17:37

## 2022-01-01 RX ADMIN — MIDODRINE HYDROCHLORIDE 20 MILLIGRAM(S): 2.5 TABLET ORAL at 05:48

## 2022-01-01 RX ADMIN — CHLORHEXIDINE GLUCONATE 15 MILLILITER(S): 213 SOLUTION TOPICAL at 06:10

## 2022-01-01 RX ADMIN — HEPARIN SODIUM 5000 UNIT(S): 5000 INJECTION INTRAVENOUS; SUBCUTANEOUS at 23:04

## 2022-01-01 RX ADMIN — Medication 2: at 23:47

## 2022-01-01 RX ADMIN — Medication 650 MILLIGRAM(S): at 11:51

## 2022-01-01 RX ADMIN — Medication 125 MILLIGRAM(S): at 06:12

## 2022-01-01 RX ADMIN — Medication 125 MILLIGRAM(S): at 05:39

## 2022-01-01 RX ADMIN — REMDESIVIR 500 MILLIGRAM(S): 5 INJECTION INTRAVENOUS at 17:16

## 2022-01-01 RX ADMIN — PANTOPRAZOLE SODIUM 40 MILLIGRAM(S): 20 TABLET, DELAYED RELEASE ORAL at 17:02

## 2022-01-01 RX ADMIN — MEROPENEM 100 MILLIGRAM(S): 1 INJECTION INTRAVENOUS at 05:02

## 2022-01-01 RX ADMIN — Medication 25 MILLIGRAM(S): at 05:14

## 2022-01-01 RX ADMIN — Medication 650 MILLIGRAM(S): at 00:49

## 2022-01-01 RX ADMIN — Medication 1000 MILLIGRAM(S): at 23:12

## 2022-01-01 RX ADMIN — Medication 1 PACKET(S): at 12:12

## 2022-01-01 RX ADMIN — INSULIN GLARGINE 30 UNIT(S): 100 INJECTION, SOLUTION SUBCUTANEOUS at 06:15

## 2022-01-01 RX ADMIN — Medication 650 MILLIGRAM(S): at 17:13

## 2022-01-01 RX ADMIN — CHLORHEXIDINE GLUCONATE 15 MILLILITER(S): 213 SOLUTION TOPICAL at 05:02

## 2022-01-01 RX ADMIN — Medication 30 MILLIGRAM(S): at 12:30

## 2022-01-01 RX ADMIN — Medication 325 MILLIGRAM(S): at 15:05

## 2022-01-01 RX ADMIN — PHENYLEPHRINE HYDROCHLORIDE 1.64 MICROGRAM(S)/KG/MIN: 10 INJECTION INTRAVENOUS at 07:28

## 2022-01-01 RX ADMIN — Medication 5 MG/HR: at 08:31

## 2022-01-01 RX ADMIN — CHLORHEXIDINE GLUCONATE 15 MILLILITER(S): 213 SOLUTION TOPICAL at 18:12

## 2022-01-01 RX ADMIN — Medication 650 MILLIGRAM(S): at 23:03

## 2022-01-01 RX ADMIN — ENOXAPARIN SODIUM 40 MILLIGRAM(S): 100 INJECTION SUBCUTANEOUS at 11:36

## 2022-01-01 RX ADMIN — INSULIN GLARGINE 20 UNIT(S): 100 INJECTION, SOLUTION SUBCUTANEOUS at 07:57

## 2022-01-01 RX ADMIN — Medication 2: at 12:10

## 2022-01-01 RX ADMIN — Medication 2: at 23:09

## 2022-01-01 RX ADMIN — Medication 650 MILLIGRAM(S): at 13:00

## 2022-01-01 RX ADMIN — Medication 100 MILLIGRAM(S): at 12:08

## 2022-01-01 RX ADMIN — Medication 15 MILLILITER(S): at 12:45

## 2022-01-01 RX ADMIN — CEFEPIME 100 MILLIGRAM(S): 1 INJECTION, POWDER, FOR SOLUTION INTRAMUSCULAR; INTRAVENOUS at 05:50

## 2022-01-01 RX ADMIN — Medication 40 MILLIGRAM(S): at 08:03

## 2022-01-01 RX ADMIN — HEPARIN SODIUM 5000 UNIT(S): 5000 INJECTION INTRAVENOUS; SUBCUTANEOUS at 21:01

## 2022-01-01 RX ADMIN — PANTOPRAZOLE SODIUM 40 MILLIGRAM(S): 20 TABLET, DELAYED RELEASE ORAL at 05:33

## 2022-01-01 RX ADMIN — DEXMEDETOMIDINE HYDROCHLORIDE IN 0.9% SODIUM CHLORIDE 4.38 MICROGRAM(S)/KG/HR: 4 INJECTION INTRAVENOUS at 20:09

## 2022-01-01 RX ADMIN — Medication 125 MICROGRAM(S): at 11:16

## 2022-01-01 RX ADMIN — Medication 25 MILLIGRAM(S): at 14:30

## 2022-01-01 RX ADMIN — Medication 650 MILLIGRAM(S): at 00:47

## 2022-01-01 RX ADMIN — Medication 100 MILLIEQUIVALENT(S): at 03:19

## 2022-01-01 RX ADMIN — PHENYLEPHRINE HYDROCHLORIDE 1.64 MICROGRAM(S)/KG/MIN: 10 INJECTION INTRAVENOUS at 08:31

## 2022-01-01 RX ADMIN — Medication 400 MILLIGRAM(S): at 00:17

## 2022-01-01 RX ADMIN — ALBUTEROL 2.5 MILLIGRAM(S): 90 AEROSOL, METERED ORAL at 16:20

## 2022-01-01 RX ADMIN — Medication 10 MILLIGRAM(S): at 01:05

## 2022-01-01 RX ADMIN — QUETIAPINE FUMARATE 125 MILLIGRAM(S): 200 TABLET, FILM COATED ORAL at 00:18

## 2022-01-01 RX ADMIN — Medication 650 MILLIGRAM(S): at 18:19

## 2022-01-01 RX ADMIN — INSULIN GLARGINE 30 UNIT(S): 100 INJECTION, SOLUTION SUBCUTANEOUS at 06:03

## 2022-01-01 RX ADMIN — HYDROMORPHONE HYDROCHLORIDE 0.5 MILLIGRAM(S): 2 INJECTION INTRAMUSCULAR; INTRAVENOUS; SUBCUTANEOUS at 18:30

## 2022-01-01 RX ADMIN — Medication 5 MG/HR: at 07:58

## 2022-01-01 RX ADMIN — Medication 100 MILLIEQUIVALENT(S): at 04:10

## 2022-01-01 RX ADMIN — Medication 265.62 MILLIGRAM(S): at 00:46

## 2022-01-01 RX ADMIN — DEXMEDETOMIDINE HYDROCHLORIDE IN 0.9% SODIUM CHLORIDE 4.38 MICROGRAM(S)/KG/HR: 4 INJECTION INTRAVENOUS at 05:39

## 2022-01-01 RX ADMIN — MIDODRINE HYDROCHLORIDE 10 MILLIGRAM(S): 2.5 TABLET ORAL at 13:32

## 2022-01-01 RX ADMIN — Medication 40 MILLIGRAM(S): at 21:18

## 2022-01-01 RX ADMIN — Medication 125 MILLIGRAM(S): at 17:38

## 2022-01-01 RX ADMIN — PANTOPRAZOLE SODIUM 40 MILLIGRAM(S): 20 TABLET, DELAYED RELEASE ORAL at 05:03

## 2022-01-01 RX ADMIN — Medication 25 MILLILITER(S): at 18:45

## 2022-01-01 RX ADMIN — Medication 265.62 MILLIGRAM(S): at 08:34

## 2022-01-01 RX ADMIN — Medication 100 MILLIGRAM(S): at 21:33

## 2022-01-01 RX ADMIN — Medication 125 MILLIGRAM(S): at 23:03

## 2022-01-01 RX ADMIN — PIPERACILLIN AND TAZOBACTAM 25 GRAM(S): 4; .5 INJECTION, POWDER, LYOPHILIZED, FOR SOLUTION INTRAVENOUS at 17:21

## 2022-01-01 RX ADMIN — Medication 125 MICROGRAM(S): at 05:21

## 2022-01-01 RX ADMIN — Medication 30 MILLIGRAM(S): at 05:32

## 2022-01-01 RX ADMIN — Medication 650 MILLIGRAM(S): at 13:28

## 2022-01-01 RX ADMIN — Medication 40 MILLIEQUIVALENT(S): at 21:35

## 2022-01-01 RX ADMIN — Medication 40 MILLIGRAM(S): at 11:56

## 2022-01-01 RX ADMIN — LIDOCAINE 1 APPLICATION(S): 4 CREAM TOPICAL at 14:47

## 2022-01-01 RX ADMIN — MEROPENEM 100 MILLIGRAM(S): 1 INJECTION INTRAVENOUS at 06:06

## 2022-01-01 RX ADMIN — CHLORHEXIDINE GLUCONATE 15 MILLILITER(S): 213 SOLUTION TOPICAL at 06:15

## 2022-01-01 RX ADMIN — Medication 20 MILLIGRAM(S): at 12:28

## 2022-01-01 RX ADMIN — Medication 125 MILLIGRAM(S): at 00:34

## 2022-01-01 RX ADMIN — Medication 650 MILLIGRAM(S): at 05:54

## 2022-01-01 RX ADMIN — MEROPENEM 100 MILLIGRAM(S): 1 INJECTION INTRAVENOUS at 23:06

## 2022-01-01 RX ADMIN — Medication 20 MILLIGRAM(S): at 16:18

## 2022-01-01 RX ADMIN — Medication 100 MILLIGRAM(S): at 12:22

## 2022-01-01 RX ADMIN — MIDODRINE HYDROCHLORIDE 30 MILLIGRAM(S): 2.5 TABLET ORAL at 12:05

## 2022-01-01 RX ADMIN — ALBUTEROL 2.5 MILLIGRAM(S): 90 AEROSOL, METERED ORAL at 22:12

## 2022-01-01 RX ADMIN — MIDODRINE HYDROCHLORIDE 10 MILLIGRAM(S): 2.5 TABLET ORAL at 16:16

## 2022-01-01 RX ADMIN — SODIUM CHLORIDE 100 MILLILITER(S): 9 INJECTION, SOLUTION INTRAVENOUS at 20:37

## 2022-01-01 RX ADMIN — Medication 125 MILLIGRAM(S): at 23:28

## 2022-01-01 RX ADMIN — Medication 650 MILLIGRAM(S): at 12:30

## 2022-01-01 RX ADMIN — Medication 100 MILLIEQUIVALENT(S): at 05:19

## 2022-01-01 RX ADMIN — MEROPENEM 100 MILLIGRAM(S): 1 INJECTION INTRAVENOUS at 23:10

## 2022-01-01 RX ADMIN — HYDROMORPHONE HYDROCHLORIDE 0.5 MILLIGRAM(S): 2 INJECTION INTRAMUSCULAR; INTRAVENOUS; SUBCUTANEOUS at 00:48

## 2022-01-01 RX ADMIN — Medication 125 MICROGRAM(S): at 11:22

## 2022-01-01 RX ADMIN — Medication 2: at 06:55

## 2022-01-01 RX ADMIN — Medication 5 MILLIGRAM(S): at 02:38

## 2022-01-01 RX ADMIN — INSULIN GLARGINE 30 UNIT(S): 100 INJECTION, SOLUTION SUBCUTANEOUS at 06:23

## 2022-01-01 RX ADMIN — Medication 50 MILLIEQUIVALENT(S): at 05:48

## 2022-01-01 RX ADMIN — QUETIAPINE FUMARATE 50 MILLIGRAM(S): 200 TABLET, FILM COATED ORAL at 17:14

## 2022-01-01 RX ADMIN — DEXMEDETOMIDINE HYDROCHLORIDE IN 0.9% SODIUM CHLORIDE 4.38 MICROGRAM(S)/KG/HR: 4 INJECTION INTRAVENOUS at 19:40

## 2022-01-01 RX ADMIN — DEXMEDETOMIDINE HYDROCHLORIDE IN 0.9% SODIUM CHLORIDE 4.38 MICROGRAM(S)/KG/HR: 4 INJECTION INTRAVENOUS at 21:33

## 2022-01-01 RX ADMIN — Medication 15 MILLILITER(S): at 12:13

## 2022-01-01 RX ADMIN — Medication 15 MILLILITER(S): at 11:57

## 2022-01-01 RX ADMIN — NALOXONE HYDROCHLORIDE 1 MILLIGRAM(S): 4 SPRAY NASAL at 01:25

## 2022-01-01 RX ADMIN — Medication 100 MILLIGRAM(S): at 12:12

## 2022-01-01 RX ADMIN — Medication 400 MILLIGRAM(S): at 05:30

## 2022-01-01 RX ADMIN — Medication 2: at 05:23

## 2022-01-01 RX ADMIN — ALBUTEROL 2.5 MILLIGRAM(S): 90 AEROSOL, METERED ORAL at 11:16

## 2022-01-01 RX ADMIN — CHLORHEXIDINE GLUCONATE 15 MILLILITER(S): 213 SOLUTION TOPICAL at 18:48

## 2022-01-01 RX ADMIN — ENOXAPARIN SODIUM 40 MILLIGRAM(S): 100 INJECTION SUBCUTANEOUS at 11:25

## 2022-01-01 RX ADMIN — Medication 100 MILLIGRAM(S): at 11:37

## 2022-01-01 RX ADMIN — Medication 650 MILLIGRAM(S): at 13:04

## 2022-01-01 RX ADMIN — CHLORHEXIDINE GLUCONATE 1 APPLICATION(S): 213 SOLUTION TOPICAL at 05:07

## 2022-01-01 RX ADMIN — Medication 30 MILLIGRAM(S): at 12:08

## 2022-01-01 RX ADMIN — Medication 4 MILLILITER(S): at 22:11

## 2022-01-01 RX ADMIN — Medication 15 MILLILITER(S): at 11:26

## 2022-01-01 RX ADMIN — MEROPENEM 100 MILLIGRAM(S): 1 INJECTION INTRAVENOUS at 21:28

## 2022-01-01 RX ADMIN — Medication 125 MICROGRAM(S): at 06:47

## 2022-01-01 RX ADMIN — Medication 650 MILLIGRAM(S): at 11:38

## 2022-01-01 RX ADMIN — Medication 650 MILLIGRAM(S): at 05:02

## 2022-01-01 RX ADMIN — Medication 2: at 00:54

## 2022-01-01 RX ADMIN — Medication 650 MILLIGRAM(S): at 15:00

## 2022-01-01 RX ADMIN — Medication 650 MILLIGRAM(S): at 17:45

## 2022-01-01 RX ADMIN — PANTOPRAZOLE SODIUM 40 MILLIGRAM(S): 20 TABLET, DELAYED RELEASE ORAL at 17:21

## 2022-01-01 RX ADMIN — CEFEPIME 100 MILLIGRAM(S): 1 INJECTION, POWDER, FOR SOLUTION INTRAMUSCULAR; INTRAVENOUS at 11:45

## 2022-01-01 RX ADMIN — Medication 5 MG/HR: at 08:13

## 2022-01-01 RX ADMIN — Medication 650 MILLIGRAM(S): at 19:31

## 2022-01-01 RX ADMIN — Medication 125 MICROGRAM(S): at 13:36

## 2022-01-01 RX ADMIN — HEPARIN SODIUM 5000 UNIT(S): 5000 INJECTION INTRAVENOUS; SUBCUTANEOUS at 21:25

## 2022-01-01 RX ADMIN — Medication 1000 MILLIGRAM(S): at 01:41

## 2022-01-01 RX ADMIN — Medication 5 MILLIGRAM(S): at 11:02

## 2022-01-01 RX ADMIN — CHLORHEXIDINE GLUCONATE 15 MILLILITER(S): 213 SOLUTION TOPICAL at 05:03

## 2022-01-01 RX ADMIN — Medication 400 MILLIGRAM(S): at 17:13

## 2022-01-01 RX ADMIN — Medication 5 MILLIGRAM(S): at 01:04

## 2022-01-01 RX ADMIN — ALBUTEROL 2.5 MILLIGRAM(S): 90 AEROSOL, METERED ORAL at 04:16

## 2022-01-01 RX ADMIN — HYDROMORPHONE HYDROCHLORIDE 0.5 MILLIGRAM(S): 2 INJECTION INTRAMUSCULAR; INTRAVENOUS; SUBCUTANEOUS at 00:00

## 2022-01-01 RX ADMIN — Medication 2.5 MILLIGRAM(S): at 10:18

## 2022-01-01 RX ADMIN — CHLORHEXIDINE GLUCONATE 1 APPLICATION(S): 213 SOLUTION TOPICAL at 05:53

## 2022-01-01 RX ADMIN — Medication 400 MILLIGRAM(S): at 12:30

## 2022-01-01 RX ADMIN — Medication 30 MILLIGRAM(S): at 23:36

## 2022-01-01 RX ADMIN — CHLORHEXIDINE GLUCONATE 15 MILLILITER(S): 213 SOLUTION TOPICAL at 17:28

## 2022-01-01 RX ADMIN — Medication 400 MILLIGRAM(S): at 05:06

## 2022-01-01 RX ADMIN — HEPARIN SODIUM 5000 UNIT(S): 5000 INJECTION INTRAVENOUS; SUBCUTANEOUS at 06:09

## 2022-01-01 RX ADMIN — Medication 15 MILLILITER(S): at 12:36

## 2022-01-01 RX ADMIN — Medication 125 MICROGRAM(S): at 14:43

## 2022-01-01 RX ADMIN — HYDROMORPHONE HYDROCHLORIDE 0.25 MILLIGRAM(S): 2 INJECTION INTRAMUSCULAR; INTRAVENOUS; SUBCUTANEOUS at 17:00

## 2022-01-01 RX ADMIN — HEPARIN SODIUM 5000 UNIT(S): 5000 INJECTION INTRAVENOUS; SUBCUTANEOUS at 21:33

## 2022-01-01 RX ADMIN — Medication 650 MILLIGRAM(S): at 17:14

## 2022-01-01 RX ADMIN — Medication 200 GRAM(S): at 03:19

## 2022-01-01 RX ADMIN — PANTOPRAZOLE SODIUM 40 MILLIGRAM(S): 20 TABLET, DELAYED RELEASE ORAL at 18:39

## 2022-01-01 RX ADMIN — Medication 2: at 17:26

## 2022-01-01 RX ADMIN — MEROPENEM 100 MILLIGRAM(S): 1 INJECTION INTRAVENOUS at 06:11

## 2022-01-01 RX ADMIN — Medication 650 MILLIGRAM(S): at 00:17

## 2022-01-01 RX ADMIN — Medication 5 MG/HR: at 19:09

## 2022-01-01 RX ADMIN — PANTOPRAZOLE SODIUM 40 MILLIGRAM(S): 20 TABLET, DELAYED RELEASE ORAL at 17:20

## 2022-01-01 RX ADMIN — HEPARIN SODIUM 5000 UNIT(S): 5000 INJECTION INTRAVENOUS; SUBCUTANEOUS at 14:36

## 2022-01-01 RX ADMIN — HEPARIN SODIUM 5000 UNIT(S): 5000 INJECTION INTRAVENOUS; SUBCUTANEOUS at 05:43

## 2022-01-01 RX ADMIN — Medication 15 MILLILITER(S): at 11:15

## 2022-01-01 RX ADMIN — Medication 100 MILLIGRAM(S): at 12:35

## 2022-01-01 RX ADMIN — MIDODRINE HYDROCHLORIDE 10 MILLIGRAM(S): 2.5 TABLET ORAL at 22:07

## 2022-01-01 RX ADMIN — Medication 100 MILLIGRAM(S): at 12:30

## 2022-01-01 RX ADMIN — Medication 15 MILLILITER(S): at 12:14

## 2022-01-01 RX ADMIN — CEFEPIME 100 MILLIGRAM(S): 1 INJECTION, POWDER, FOR SOLUTION INTRAMUSCULAR; INTRAVENOUS at 13:57

## 2022-01-01 RX ADMIN — ALBUTEROL 2.5 MILLIGRAM(S): 90 AEROSOL, METERED ORAL at 15:41

## 2022-01-01 RX ADMIN — INSULIN GLARGINE 22 UNIT(S): 100 INJECTION, SOLUTION SUBCUTANEOUS at 08:09

## 2022-01-01 RX ADMIN — Medication 650 MILLIGRAM(S): at 18:26

## 2022-01-01 RX ADMIN — Medication 12.5 MILLIGRAM(S): at 18:42

## 2022-01-01 RX ADMIN — PHENYLEPHRINE HYDROCHLORIDE 8.2 MICROGRAM(S)/KG/MIN: 10 INJECTION INTRAVENOUS at 16:30

## 2022-01-01 RX ADMIN — MIDODRINE HYDROCHLORIDE 10 MILLIGRAM(S): 2.5 TABLET ORAL at 20:18

## 2022-01-01 RX ADMIN — PANTOPRAZOLE SODIUM 40 MILLIGRAM(S): 20 TABLET, DELAYED RELEASE ORAL at 17:13

## 2022-01-01 RX ADMIN — CHLORHEXIDINE GLUCONATE 15 MILLILITER(S): 213 SOLUTION TOPICAL at 17:14

## 2022-01-01 RX ADMIN — Medication 20 MILLIGRAM(S): at 11:07

## 2022-01-01 RX ADMIN — Medication 400 MILLIGRAM(S): at 23:52

## 2022-01-01 RX ADMIN — Medication 125 MILLIGRAM(S): at 05:32

## 2022-01-01 RX ADMIN — Medication 125 MILLIGRAM(S): at 17:14

## 2022-01-01 RX ADMIN — Medication 40 MILLIGRAM(S): at 21:20

## 2022-01-01 RX ADMIN — Medication 400 MILLIGRAM(S): at 01:14

## 2022-01-01 RX ADMIN — FLUCONAZOLE 100 MILLIGRAM(S): 150 TABLET ORAL at 17:52

## 2022-01-01 RX ADMIN — Medication 12.5 MILLIGRAM(S): at 05:24

## 2022-01-01 RX ADMIN — CHLORHEXIDINE GLUCONATE 1 APPLICATION(S): 213 SOLUTION TOPICAL at 06:50

## 2022-01-01 RX ADMIN — PIPERACILLIN AND TAZOBACTAM 25 GRAM(S): 4; .5 INJECTION, POWDER, LYOPHILIZED, FOR SOLUTION INTRAVENOUS at 17:27

## 2022-01-01 RX ADMIN — Medication 650 MILLIGRAM(S): at 12:43

## 2022-01-01 RX ADMIN — FENTANYL CITRATE 100 MICROGRAM(S): 50 INJECTION INTRAVENOUS at 14:12

## 2022-01-01 RX ADMIN — HEPARIN SODIUM 5000 UNIT(S): 5000 INJECTION INTRAVENOUS; SUBCUTANEOUS at 05:28

## 2022-01-01 RX ADMIN — Medication 125 MILLIGRAM(S): at 00:17

## 2022-01-01 RX ADMIN — Medication 125 MICROGRAM(S): at 11:21

## 2022-01-01 RX ADMIN — Medication 100 MILLIEQUIVALENT(S): at 05:05

## 2022-01-01 RX ADMIN — PANTOPRAZOLE SODIUM 40 MILLIGRAM(S): 20 TABLET, DELAYED RELEASE ORAL at 17:29

## 2022-01-01 RX ADMIN — Medication 125 MILLIGRAM(S): at 05:03

## 2022-01-01 RX ADMIN — CHLORHEXIDINE GLUCONATE 15 MILLILITER(S): 213 SOLUTION TOPICAL at 06:09

## 2022-01-01 RX ADMIN — Medication 0: at 01:40

## 2022-01-01 RX ADMIN — Medication 650 MILLIGRAM(S): at 01:43

## 2022-01-01 RX ADMIN — Medication 125 MILLIGRAM(S): at 11:51

## 2022-01-01 RX ADMIN — Medication 650 MILLIGRAM(S): at 05:32

## 2022-01-01 RX ADMIN — Medication 166.67 MILLIGRAM(S): at 20:08

## 2022-01-01 RX ADMIN — Medication 15 MILLIGRAM(S): at 14:21

## 2022-01-01 RX ADMIN — Medication 650 MILLIGRAM(S): at 07:12

## 2022-01-01 RX ADMIN — Medication 5 MG/HR: at 23:02

## 2022-01-01 RX ADMIN — Medication 650 MILLIGRAM(S): at 05:38

## 2022-01-01 RX ADMIN — Medication 25 MILLIGRAM(S): at 22:07

## 2022-01-01 RX ADMIN — Medication 125 MILLIGRAM(S): at 23:35

## 2022-01-01 RX ADMIN — Medication 100 MILLIEQUIVALENT(S): at 03:34

## 2022-01-01 RX ADMIN — Medication 1000 MILLIGRAM(S): at 17:18

## 2022-01-01 RX ADMIN — Medication 650 MILLIGRAM(S): at 01:04

## 2022-01-01 RX ADMIN — Medication 650 MILLIGRAM(S): at 12:25

## 2022-01-01 RX ADMIN — Medication 2: at 00:48

## 2022-01-01 RX ADMIN — Medication 10 MILLIGRAM(S): at 19:33

## 2022-01-01 RX ADMIN — HEPARIN SODIUM 5000 UNIT(S): 5000 INJECTION INTRAVENOUS; SUBCUTANEOUS at 14:30

## 2022-01-01 RX ADMIN — Medication 125 MICROGRAM(S): at 12:15

## 2022-01-01 RX ADMIN — MIDODRINE HYDROCHLORIDE 10 MILLIGRAM(S): 2.5 TABLET ORAL at 17:15

## 2022-01-01 RX ADMIN — PIPERACILLIN AND TAZOBACTAM 25 GRAM(S): 4; .5 INJECTION, POWDER, LYOPHILIZED, FOR SOLUTION INTRAVENOUS at 18:40

## 2022-01-01 RX ADMIN — Medication 100 MILLIGRAM(S): at 13:32

## 2022-01-01 RX ADMIN — Medication 15 MILLILITER(S): at 12:21

## 2022-01-01 RX ADMIN — ALBUTEROL 2.5 MILLIGRAM(S): 90 AEROSOL, METERED ORAL at 16:16

## 2022-01-01 RX ADMIN — Medication 25 MILLIGRAM(S): at 22:02

## 2022-01-01 RX ADMIN — Medication 125 MILLIGRAM(S): at 06:48

## 2022-01-01 RX ADMIN — INSULIN GLARGINE 22 UNIT(S): 100 INJECTION, SOLUTION SUBCUTANEOUS at 08:35

## 2022-01-01 RX ADMIN — PANTOPRAZOLE SODIUM 40 MILLIGRAM(S): 20 TABLET, DELAYED RELEASE ORAL at 06:12

## 2022-01-01 RX ADMIN — MIDODRINE HYDROCHLORIDE 30 MILLIGRAM(S): 2.5 TABLET ORAL at 21:15

## 2022-01-01 RX ADMIN — HEPARIN SODIUM 5000 UNIT(S): 5000 INJECTION INTRAVENOUS; SUBCUTANEOUS at 05:13

## 2022-01-01 RX ADMIN — HEPARIN SODIUM 5000 UNIT(S): 5000 INJECTION INTRAVENOUS; SUBCUTANEOUS at 22:42

## 2022-01-01 RX ADMIN — Medication 650 MILLIGRAM(S): at 18:44

## 2022-01-01 RX ADMIN — SODIUM CHLORIDE 100 MILLILITER(S): 9 INJECTION, SOLUTION INTRAVENOUS at 09:23

## 2022-01-01 RX ADMIN — PANTOPRAZOLE SODIUM 40 MILLIGRAM(S): 20 TABLET, DELAYED RELEASE ORAL at 17:18

## 2022-01-01 RX ADMIN — CHLORHEXIDINE GLUCONATE 1 APPLICATION(S): 213 SOLUTION TOPICAL at 06:00

## 2022-01-01 RX ADMIN — HEPARIN SODIUM 5000 UNIT(S): 5000 INJECTION INTRAVENOUS; SUBCUTANEOUS at 13:24

## 2022-01-01 RX ADMIN — Medication 15 MILLILITER(S): at 14:36

## 2022-01-01 RX ADMIN — Medication 5 MILLIGRAM(S): at 17:45

## 2022-01-01 RX ADMIN — HYDROMORPHONE HYDROCHLORIDE 0.5 MILLIGRAM(S): 2 INJECTION INTRAMUSCULAR; INTRAVENOUS; SUBCUTANEOUS at 18:37

## 2022-01-01 RX ADMIN — MEROPENEM 100 MILLIGRAM(S): 1 INJECTION INTRAVENOUS at 21:33

## 2022-01-01 RX ADMIN — Medication 125 MILLIGRAM(S): at 17:53

## 2022-01-01 RX ADMIN — Medication 30 MILLIGRAM(S): at 00:54

## 2022-01-01 RX ADMIN — Medication 100 MILLIGRAM(S): at 21:28

## 2022-01-01 RX ADMIN — Medication 10 MILLIGRAM(S): at 19:51

## 2022-01-01 RX ADMIN — Medication 2.5 MILLIGRAM(S): at 19:17

## 2022-01-01 RX ADMIN — Medication 1000 MILLIGRAM(S): at 02:06

## 2022-01-01 RX ADMIN — Medication 5 MILLIGRAM(S): at 21:48

## 2022-01-01 RX ADMIN — Medication 650 MILLIGRAM(S): at 12:03

## 2022-01-01 RX ADMIN — Medication 650 MILLIGRAM(S): at 17:20

## 2022-01-01 RX ADMIN — INSULIN GLARGINE 30 UNIT(S): 100 INJECTION, SOLUTION SUBCUTANEOUS at 06:12

## 2022-01-01 RX ADMIN — Medication 15 MILLILITER(S): at 11:39

## 2022-01-01 RX ADMIN — Medication 4: at 06:03

## 2022-01-01 RX ADMIN — SODIUM CHLORIDE 1000 MILLILITER(S): 9 INJECTION INTRAMUSCULAR; INTRAVENOUS; SUBCUTANEOUS at 20:09

## 2022-01-01 RX ADMIN — Medication 50 MILLIEQUIVALENT(S): at 03:37

## 2022-01-01 RX ADMIN — DEXMEDETOMIDINE HYDROCHLORIDE IN 0.9% SODIUM CHLORIDE 4.38 MICROGRAM(S)/KG/HR: 4 INJECTION INTRAVENOUS at 07:30

## 2022-01-01 RX ADMIN — CHLORHEXIDINE GLUCONATE 1 APPLICATION(S): 213 SOLUTION TOPICAL at 05:21

## 2022-01-01 RX ADMIN — Medication 125 MILLIGRAM(S): at 07:01

## 2022-01-01 RX ADMIN — CHLORHEXIDINE GLUCONATE 15 MILLILITER(S): 213 SOLUTION TOPICAL at 17:02

## 2022-01-01 RX ADMIN — MEROPENEM 100 MILLIGRAM(S): 1 INJECTION INTRAVENOUS at 20:19

## 2022-01-01 RX ADMIN — Medication 400 MILLIGRAM(S): at 17:20

## 2022-01-01 RX ADMIN — HYDROMORPHONE HYDROCHLORIDE 0.5 MILLIGRAM(S): 2 INJECTION INTRAMUSCULAR; INTRAVENOUS; SUBCUTANEOUS at 20:39

## 2022-01-01 RX ADMIN — Medication 125 MILLIGRAM(S): at 05:13

## 2022-01-01 RX ADMIN — Medication 125 MILLIGRAM(S): at 12:42

## 2022-01-01 RX ADMIN — Medication 650 MILLIGRAM(S): at 11:21

## 2022-01-01 RX ADMIN — REMDESIVIR 500 MILLIGRAM(S): 5 INJECTION INTRAVENOUS at 15:33

## 2022-01-01 RX ADMIN — PANTOPRAZOLE SODIUM 40 MILLIGRAM(S): 20 TABLET, DELAYED RELEASE ORAL at 17:44

## 2022-01-01 RX ADMIN — Medication 650 MILLIGRAM(S): at 05:43

## 2022-01-01 RX ADMIN — Medication 650 MILLIGRAM(S): at 12:45

## 2022-01-01 RX ADMIN — HEPARIN SODIUM 5000 UNIT(S): 5000 INJECTION INTRAVENOUS; SUBCUTANEOUS at 05:09

## 2022-01-01 RX ADMIN — Medication 125 MILLIGRAM(S): at 18:10

## 2022-01-01 RX ADMIN — Medication 2: at 11:23

## 2022-01-01 RX ADMIN — HEPARIN SODIUM 5000 UNIT(S): 5000 INJECTION INTRAVENOUS; SUBCUTANEOUS at 14:03

## 2022-01-01 RX ADMIN — HYDROMORPHONE HYDROCHLORIDE 0.5 MILLIGRAM(S): 2 INJECTION INTRAMUSCULAR; INTRAVENOUS; SUBCUTANEOUS at 09:23

## 2022-01-01 RX ADMIN — Medication 100 MILLIEQUIVALENT(S): at 06:02

## 2022-01-01 RX ADMIN — Medication 650 MILLIGRAM(S): at 06:08

## 2022-01-01 RX ADMIN — PHENYLEPHRINE HYDROCHLORIDE 1.64 MICROGRAM(S)/KG/MIN: 10 INJECTION INTRAVENOUS at 07:27

## 2022-01-01 RX ADMIN — Medication 40 MILLIGRAM(S): at 05:49

## 2022-01-01 RX ADMIN — CHLORHEXIDINE GLUCONATE 15 MILLILITER(S): 213 SOLUTION TOPICAL at 17:38

## 2022-01-01 RX ADMIN — QUETIAPINE FUMARATE 75 MILLIGRAM(S): 200 TABLET, FILM COATED ORAL at 20:17

## 2022-01-01 RX ADMIN — Medication 125 MICROGRAM(S): at 11:27

## 2022-01-01 RX ADMIN — CHLORHEXIDINE GLUCONATE 15 MILLILITER(S): 213 SOLUTION TOPICAL at 18:10

## 2022-01-01 RX ADMIN — HEPARIN SODIUM 5000 UNIT(S): 5000 INJECTION INTRAVENOUS; SUBCUTANEOUS at 21:35

## 2022-01-01 RX ADMIN — Medication 4 MILLILITER(S): at 04:56

## 2022-01-01 RX ADMIN — Medication 5 MILLIGRAM(S): at 19:08

## 2022-01-01 RX ADMIN — CEFTRIAXONE 100 MILLIGRAM(S): 500 INJECTION, POWDER, FOR SOLUTION INTRAMUSCULAR; INTRAVENOUS at 15:32

## 2022-01-01 RX ADMIN — PANTOPRAZOLE SODIUM 40 MILLIGRAM(S): 20 TABLET, DELAYED RELEASE ORAL at 05:48

## 2022-01-01 RX ADMIN — Medication 100 MILLIEQUIVALENT(S): at 04:29

## 2022-01-01 RX ADMIN — PHENYLEPHRINE HYDROCHLORIDE 1.64 MICROGRAM(S)/KG/MIN: 10 INJECTION INTRAVENOUS at 20:09

## 2022-01-01 RX ADMIN — Medication 650 MILLIGRAM(S): at 05:44

## 2022-01-01 RX ADMIN — Medication 5 MILLIGRAM(S): at 23:03

## 2022-01-01 RX ADMIN — CEFEPIME 100 MILLIGRAM(S): 1 INJECTION, POWDER, FOR SOLUTION INTRAMUSCULAR; INTRAVENOUS at 06:33

## 2022-01-01 RX ADMIN — HEPARIN SODIUM 5000 UNIT(S): 5000 INJECTION INTRAVENOUS; SUBCUTANEOUS at 21:28

## 2022-01-01 RX ADMIN — Medication 1000 MILLIGRAM(S): at 06:28

## 2022-01-01 RX ADMIN — Medication 12.5 MILLIGRAM(S): at 18:18

## 2022-01-01 RX ADMIN — PHENYLEPHRINE HYDROCHLORIDE 8.2 MICROGRAM(S)/KG/MIN: 10 INJECTION INTRAVENOUS at 19:37

## 2022-01-01 RX ADMIN — MIDODRINE HYDROCHLORIDE 10 MILLIGRAM(S): 2.5 TABLET ORAL at 20:17

## 2022-01-01 RX ADMIN — PANTOPRAZOLE SODIUM 10 MG/HR: 20 TABLET, DELAYED RELEASE ORAL at 11:46

## 2022-01-01 RX ADMIN — Medication 650 MILLIGRAM(S): at 11:56

## 2022-01-01 RX ADMIN — Medication 2: at 00:23

## 2022-01-01 RX ADMIN — CHLORHEXIDINE GLUCONATE 15 MILLILITER(S): 213 SOLUTION TOPICAL at 05:23

## 2022-01-01 RX ADMIN — Medication 125 MILLIGRAM(S): at 17:45

## 2022-01-01 RX ADMIN — Medication 125 MILLIGRAM(S): at 06:16

## 2022-01-01 RX ADMIN — Medication 650 MILLIGRAM(S): at 14:21

## 2022-01-01 RX ADMIN — Medication 650 MILLIGRAM(S): at 18:10

## 2022-01-01 RX ADMIN — Medication 4 MILLILITER(S): at 11:15

## 2022-01-01 RX ADMIN — Medication 400 MILLIGRAM(S): at 17:26

## 2022-01-01 RX ADMIN — HEPARIN SODIUM 5000 UNIT(S): 5000 INJECTION INTRAVENOUS; SUBCUTANEOUS at 13:38

## 2022-01-01 RX ADMIN — Medication 650 MILLIGRAM(S): at 05:57

## 2022-01-01 RX ADMIN — HEPARIN SODIUM 5000 UNIT(S): 5000 INJECTION INTRAVENOUS; SUBCUTANEOUS at 21:32

## 2022-01-01 RX ADMIN — Medication 650 MILLIGRAM(S): at 17:15

## 2022-01-01 RX ADMIN — ENOXAPARIN SODIUM 40 MILLIGRAM(S): 100 INJECTION SUBCUTANEOUS at 12:09

## 2022-01-01 RX ADMIN — Medication 20 MILLIGRAM(S): at 21:46

## 2022-01-01 RX ADMIN — Medication 5 MILLIGRAM(S): at 00:17

## 2022-01-01 RX ADMIN — PANTOPRAZOLE SODIUM 40 MILLIGRAM(S): 20 TABLET, DELAYED RELEASE ORAL at 05:32

## 2022-01-01 RX ADMIN — CHLORHEXIDINE GLUCONATE 1 APPLICATION(S): 213 SOLUTION TOPICAL at 06:10

## 2022-01-01 RX ADMIN — Medication 5 MG/HR: at 07:46

## 2022-01-01 RX ADMIN — HEPARIN SODIUM 5000 UNIT(S): 5000 INJECTION INTRAVENOUS; SUBCUTANEOUS at 14:28

## 2022-01-01 RX ADMIN — CHLORHEXIDINE GLUCONATE 1 APPLICATION(S): 213 SOLUTION TOPICAL at 06:29

## 2022-01-01 RX ADMIN — Medication 650 MILLIGRAM(S): at 06:17

## 2022-01-01 RX ADMIN — Medication 650 MILLIGRAM(S): at 17:32

## 2022-01-01 RX ADMIN — Medication 30 MILLIGRAM(S): at 16:32

## 2022-01-01 RX ADMIN — Medication 400 MILLIGRAM(S): at 12:44

## 2022-01-01 RX ADMIN — DEXMEDETOMIDINE HYDROCHLORIDE IN 0.9% SODIUM CHLORIDE 4.38 MICROGRAM(S)/KG/HR: 4 INJECTION INTRAVENOUS at 07:58

## 2022-01-01 RX ADMIN — PANTOPRAZOLE SODIUM 40 MILLIGRAM(S): 20 TABLET, DELAYED RELEASE ORAL at 17:26

## 2022-01-01 RX ADMIN — CHLORHEXIDINE GLUCONATE 15 MILLILITER(S): 213 SOLUTION TOPICAL at 17:25

## 2022-01-01 RX ADMIN — ALBUTEROL 2.5 MILLIGRAM(S): 90 AEROSOL, METERED ORAL at 22:53

## 2022-01-01 RX ADMIN — PHENYLEPHRINE HYDROCHLORIDE 1.64 MICROGRAM(S)/KG/MIN: 10 INJECTION INTRAVENOUS at 08:04

## 2022-01-01 RX ADMIN — Medication 650 MILLIGRAM(S): at 06:11

## 2022-01-01 RX ADMIN — Medication 400 MILLIGRAM(S): at 20:50

## 2022-01-01 RX ADMIN — Medication 1000 MILLIGRAM(S): at 12:19

## 2022-01-01 RX ADMIN — PANTOPRAZOLE SODIUM 40 MILLIGRAM(S): 20 TABLET, DELAYED RELEASE ORAL at 18:23

## 2022-01-01 RX ADMIN — PANTOPRAZOLE SODIUM 40 MILLIGRAM(S): 20 TABLET, DELAYED RELEASE ORAL at 18:09

## 2022-01-01 RX ADMIN — Medication 15 MILLILITER(S): at 13:17

## 2022-01-01 RX ADMIN — Medication 5 MILLIGRAM(S): at 21:00

## 2022-01-01 RX ADMIN — SODIUM CHLORIDE 100 MILLILITER(S): 9 INJECTION, SOLUTION INTRAVENOUS at 05:02

## 2022-01-01 RX ADMIN — Medication 2: at 11:42

## 2022-01-01 RX ADMIN — Medication 400 MILLIGRAM(S): at 17:03

## 2022-01-01 RX ADMIN — Medication 650 MILLIGRAM(S): at 12:21

## 2022-01-01 RX ADMIN — CHLORHEXIDINE GLUCONATE 1 APPLICATION(S): 213 SOLUTION TOPICAL at 06:28

## 2022-01-01 RX ADMIN — MEROPENEM 100 MILLIGRAM(S): 1 INJECTION INTRAVENOUS at 13:46

## 2022-01-01 RX ADMIN — Medication 5 MILLIGRAM(S): at 00:00

## 2022-01-01 RX ADMIN — Medication 650 MILLIGRAM(S): at 12:09

## 2022-01-01 RX ADMIN — Medication 325 MILLIGRAM(S): at 15:30

## 2022-01-01 RX ADMIN — Medication 5 MILLIGRAM(S): at 11:38

## 2022-01-01 RX ADMIN — Medication 100 MILLIGRAM(S): at 11:06

## 2022-01-01 RX ADMIN — ENOXAPARIN SODIUM 40 MILLIGRAM(S): 100 INJECTION SUBCUTANEOUS at 11:15

## 2022-01-01 RX ADMIN — Medication 125 MICROGRAM(S): at 12:45

## 2022-01-01 RX ADMIN — MIDODRINE HYDROCHLORIDE 30 MILLIGRAM(S): 2.5 TABLET ORAL at 20:41

## 2022-01-01 RX ADMIN — Medication 650 MILLIGRAM(S): at 05:33

## 2022-01-01 RX ADMIN — Medication 100 MILLIGRAM(S): at 21:22

## 2022-01-01 RX ADMIN — Medication 40 MILLIEQUIVALENT(S): at 00:32

## 2022-01-01 RX ADMIN — Medication 650 MILLIGRAM(S): at 01:16

## 2022-01-01 RX ADMIN — CHLORHEXIDINE GLUCONATE 1 APPLICATION(S): 213 SOLUTION TOPICAL at 07:06

## 2022-01-01 RX ADMIN — Medication 650 MILLIGRAM(S): at 23:18

## 2022-01-01 RX ADMIN — Medication 650 MILLIGRAM(S): at 13:25

## 2022-01-01 RX ADMIN — POTASSIUM PHOSPHATE, MONOBASIC POTASSIUM PHOSPHATE, DIBASIC 62.5 MILLIMOLE(S): 236; 224 INJECTION, SOLUTION INTRAVENOUS at 07:57

## 2022-01-01 RX ADMIN — CHLORHEXIDINE GLUCONATE 15 MILLILITER(S): 213 SOLUTION TOPICAL at 05:21

## 2022-01-01 RX ADMIN — Medication 100 MILLIGRAM(S): at 05:19

## 2022-01-01 RX ADMIN — Medication 5 MG/HR: at 08:43

## 2022-01-01 RX ADMIN — Medication 650 MILLIGRAM(S): at 13:15

## 2022-01-01 RX ADMIN — PANTOPRAZOLE SODIUM 40 MILLIGRAM(S): 20 TABLET, DELAYED RELEASE ORAL at 05:21

## 2022-01-01 RX ADMIN — Medication 30 MILLIGRAM(S): at 14:27

## 2022-01-01 RX ADMIN — DEXMEDETOMIDINE HYDROCHLORIDE IN 0.9% SODIUM CHLORIDE 4.38 MICROGRAM(S)/KG/HR: 4 INJECTION INTRAVENOUS at 01:43

## 2022-01-01 RX ADMIN — HYDROMORPHONE HYDROCHLORIDE 0.25 MILLIGRAM(S): 2 INJECTION INTRAMUSCULAR; INTRAVENOUS; SUBCUTANEOUS at 00:30

## 2022-01-01 RX ADMIN — Medication 650 MILLIGRAM(S): at 12:11

## 2022-01-01 RX ADMIN — CEFEPIME 100 MILLIGRAM(S): 1 INJECTION, POWDER, FOR SOLUTION INTRAMUSCULAR; INTRAVENOUS at 14:20

## 2022-01-01 RX ADMIN — Medication 5 MILLIGRAM(S): at 05:06

## 2022-01-01 RX ADMIN — MIDODRINE HYDROCHLORIDE 30 MILLIGRAM(S): 2.5 TABLET ORAL at 05:39

## 2022-01-01 RX ADMIN — DEXMEDETOMIDINE HYDROCHLORIDE IN 0.9% SODIUM CHLORIDE 4.38 MICROGRAM(S)/KG/HR: 4 INJECTION INTRAVENOUS at 01:26

## 2022-01-01 RX ADMIN — PANTOPRAZOLE SODIUM 40 MILLIGRAM(S): 20 TABLET, DELAYED RELEASE ORAL at 05:24

## 2022-01-01 RX ADMIN — MIDODRINE HYDROCHLORIDE 20 MILLIGRAM(S): 2.5 TABLET ORAL at 05:14

## 2022-01-01 RX ADMIN — CEFEPIME 100 MILLIGRAM(S): 1 INJECTION, POWDER, FOR SOLUTION INTRAMUSCULAR; INTRAVENOUS at 21:21

## 2022-01-01 RX ADMIN — CHLORHEXIDINE GLUCONATE 1 APPLICATION(S): 213 SOLUTION TOPICAL at 05:24

## 2022-01-01 RX ADMIN — Medication 5 MILLIGRAM(S): at 11:30

## 2022-01-01 RX ADMIN — Medication 5 MILLIGRAM(S): at 16:18

## 2022-01-01 RX ADMIN — QUETIAPINE FUMARATE 100 MILLIGRAM(S): 200 TABLET, FILM COATED ORAL at 23:29

## 2022-01-01 RX ADMIN — Medication 100 MILLIGRAM(S): at 05:09

## 2022-01-01 RX ADMIN — Medication 650 MILLIGRAM(S): at 18:11

## 2022-01-01 RX ADMIN — Medication 2: at 23:29

## 2022-01-01 RX ADMIN — MIDODRINE HYDROCHLORIDE 10 MILLIGRAM(S): 2.5 TABLET ORAL at 20:03

## 2022-01-01 RX ADMIN — INSULIN GLARGINE 24 UNIT(S): 100 INJECTION, SOLUTION SUBCUTANEOUS at 08:47

## 2022-01-01 RX ADMIN — Medication 650 MILLIGRAM(S): at 18:07

## 2022-01-01 RX ADMIN — CEFEPIME 100 MILLIGRAM(S): 1 INJECTION, POWDER, FOR SOLUTION INTRAMUSCULAR; INTRAVENOUS at 05:14

## 2022-01-01 RX ADMIN — Medication 12.5 MILLIGRAM(S): at 05:33

## 2022-01-01 RX ADMIN — Medication 5 MILLIGRAM(S): at 23:12

## 2022-01-01 RX ADMIN — ENOXAPARIN SODIUM 40 MILLIGRAM(S): 100 INJECTION SUBCUTANEOUS at 12:21

## 2022-01-01 RX ADMIN — Medication 15 MILLILITER(S): at 12:03

## 2022-01-01 RX ADMIN — HYDROMORPHONE HYDROCHLORIDE 0.5 MILLIGRAM(S): 2 INJECTION INTRAMUSCULAR; INTRAVENOUS; SUBCUTANEOUS at 00:34

## 2022-01-01 RX ADMIN — PANTOPRAZOLE SODIUM 40 MILLIGRAM(S): 20 TABLET, DELAYED RELEASE ORAL at 17:15

## 2022-01-01 RX ADMIN — Medication 250 MICROGRAM(S): at 00:09

## 2022-01-01 RX ADMIN — SODIUM CHLORIDE 125 MILLILITER(S): 9 INJECTION INTRAMUSCULAR; INTRAVENOUS; SUBCUTANEOUS at 13:11

## 2022-01-01 RX ADMIN — CHLORHEXIDINE GLUCONATE 1 APPLICATION(S): 213 SOLUTION TOPICAL at 05:49

## 2022-01-01 RX ADMIN — Medication 650 MILLIGRAM(S): at 11:40

## 2022-01-01 RX ADMIN — Medication 650 MILLIGRAM(S): at 00:32

## 2022-01-01 RX ADMIN — HYDROMORPHONE HYDROCHLORIDE 0.25 MILLIGRAM(S): 2 INJECTION INTRAMUSCULAR; INTRAVENOUS; SUBCUTANEOUS at 22:23

## 2022-01-01 RX ADMIN — Medication 125 MILLIGRAM(S): at 05:20

## 2022-01-01 RX ADMIN — Medication 5 MILLIGRAM(S): at 13:11

## 2022-01-01 RX ADMIN — PANTOPRAZOLE SODIUM 40 MILLIGRAM(S): 20 TABLET, DELAYED RELEASE ORAL at 05:19

## 2022-01-01 RX ADMIN — Medication 650 MILLIGRAM(S): at 00:44

## 2022-01-01 RX ADMIN — Medication 125 MILLIGRAM(S): at 05:45

## 2022-01-01 RX ADMIN — MEROPENEM 100 MILLIGRAM(S): 1 INJECTION INTRAVENOUS at 13:55

## 2022-01-01 RX ADMIN — Medication 125 MILLIGRAM(S): at 18:39

## 2022-01-01 RX ADMIN — Medication 100 MILLIGRAM(S): at 11:21

## 2022-01-01 RX ADMIN — INSULIN GLARGINE 20 UNIT(S): 100 INJECTION, SOLUTION SUBCUTANEOUS at 08:42

## 2022-01-01 RX ADMIN — Medication 650 MILLIGRAM(S): at 05:29

## 2022-01-01 RX ADMIN — Medication 15 MILLILITER(S): at 11:50

## 2022-01-01 RX ADMIN — MIDODRINE HYDROCHLORIDE 10 MILLIGRAM(S): 2.5 TABLET ORAL at 05:32

## 2022-01-01 RX ADMIN — PANTOPRAZOLE SODIUM 40 MILLIGRAM(S): 20 TABLET, DELAYED RELEASE ORAL at 17:45

## 2022-01-01 RX ADMIN — PIPERACILLIN AND TAZOBACTAM 25 GRAM(S): 4; .5 INJECTION, POWDER, LYOPHILIZED, FOR SOLUTION INTRAVENOUS at 02:18

## 2022-01-01 RX ADMIN — Medication 125 MILLIGRAM(S): at 12:20

## 2022-01-01 RX ADMIN — MEROPENEM 100 MILLIGRAM(S): 1 INJECTION INTRAVENOUS at 05:20

## 2022-01-01 RX ADMIN — Medication 125 MICROGRAM(S): at 11:05

## 2022-01-01 RX ADMIN — Medication 650 MILLIGRAM(S): at 17:53

## 2022-01-01 RX ADMIN — CHLORHEXIDINE GLUCONATE 15 MILLILITER(S): 213 SOLUTION TOPICAL at 05:55

## 2022-01-01 RX ADMIN — HYDROMORPHONE HYDROCHLORIDE 0.5 MILLIGRAM(S): 2 INJECTION INTRAMUSCULAR; INTRAVENOUS; SUBCUTANEOUS at 15:30

## 2022-01-01 RX ADMIN — PANTOPRAZOLE SODIUM 40 MILLIGRAM(S): 20 TABLET, DELAYED RELEASE ORAL at 17:00

## 2022-01-01 RX ADMIN — HYDROMORPHONE HYDROCHLORIDE 0.5 MILLIGRAM(S): 2 INJECTION INTRAMUSCULAR; INTRAVENOUS; SUBCUTANEOUS at 20:33

## 2022-01-01 RX ADMIN — HEPARIN SODIUM 5000 UNIT(S): 5000 INJECTION INTRAVENOUS; SUBCUTANEOUS at 21:56

## 2022-01-01 RX ADMIN — Medication 125 MILLIGRAM(S): at 18:17

## 2022-01-01 RX ADMIN — SODIUM CHLORIDE 500 MILLILITER(S): 9 INJECTION INTRAMUSCULAR; INTRAVENOUS; SUBCUTANEOUS at 11:59

## 2022-01-01 RX ADMIN — PANTOPRAZOLE SODIUM 40 MILLIGRAM(S): 20 TABLET, DELAYED RELEASE ORAL at 17:14

## 2022-01-01 RX ADMIN — Medication 100 MILLIEQUIVALENT(S): at 06:24

## 2022-01-01 RX ADMIN — Medication 650 MILLIGRAM(S): at 18:24

## 2022-01-01 RX ADMIN — HYDROMORPHONE HYDROCHLORIDE 0.5 MILLIGRAM(S): 2 INJECTION INTRAMUSCULAR; INTRAVENOUS; SUBCUTANEOUS at 00:18

## 2022-01-01 RX ADMIN — Medication 30 MILLIGRAM(S): at 23:13

## 2022-01-01 RX ADMIN — POTASSIUM PHOSPHATE, MONOBASIC POTASSIUM PHOSPHATE, DIBASIC 62.5 MILLIMOLE(S): 236; 224 INJECTION, SOLUTION INTRAVENOUS at 05:29

## 2022-01-01 RX ADMIN — Medication 650 MILLIGRAM(S): at 23:20

## 2022-01-01 RX ADMIN — SODIUM CHLORIDE 30 MILLILITER(S): 9 INJECTION, SOLUTION INTRAVENOUS at 07:50

## 2022-01-01 RX ADMIN — MEROPENEM 100 MILLIGRAM(S): 1 INJECTION INTRAVENOUS at 05:48

## 2022-01-01 RX ADMIN — Medication 0: at 06:28

## 2022-01-01 RX ADMIN — PANTOPRAZOLE SODIUM 40 MILLIGRAM(S): 20 TABLET, DELAYED RELEASE ORAL at 05:44

## 2022-01-01 RX ADMIN — Medication 2: at 05:32

## 2022-01-01 RX ADMIN — Medication 125 MILLIGRAM(S): at 18:40

## 2022-01-01 RX ADMIN — INSULIN GLARGINE 20 UNIT(S): 100 INJECTION, SOLUTION SUBCUTANEOUS at 08:10

## 2022-01-01 RX ADMIN — HYDROMORPHONE HYDROCHLORIDE 0.25 MILLIGRAM(S): 2 INJECTION INTRAMUSCULAR; INTRAVENOUS; SUBCUTANEOUS at 21:50

## 2022-01-01 RX ADMIN — HEPARIN SODIUM 5000 UNIT(S): 5000 INJECTION INTRAVENOUS; SUBCUTANEOUS at 05:06

## 2022-01-01 RX ADMIN — ALBUTEROL 2 PUFF(S): 90 AEROSOL, METERED ORAL at 13:38

## 2022-01-01 RX ADMIN — PIPERACILLIN AND TAZOBACTAM 200 GRAM(S): 4; .5 INJECTION, POWDER, LYOPHILIZED, FOR SOLUTION INTRAVENOUS at 11:02

## 2022-01-01 RX ADMIN — Medication 400 MILLIGRAM(S): at 03:15

## 2022-01-01 RX ADMIN — Medication 5 MILLIGRAM(S): at 22:31

## 2022-01-01 RX ADMIN — FLUCONAZOLE 100 MILLIGRAM(S): 150 TABLET ORAL at 20:36

## 2022-01-01 RX ADMIN — Medication 100 MILLIGRAM(S): at 14:20

## 2022-01-01 RX ADMIN — Medication 1000 MILLIGRAM(S): at 14:00

## 2022-01-01 RX ADMIN — Medication 650 MILLIGRAM(S): at 06:43

## 2022-01-01 RX ADMIN — ACETAZOLAMIDE 250 MILLIGRAM(S): 250 TABLET ORAL at 05:45

## 2022-01-01 RX ADMIN — FLUCONAZOLE 100 MILLIGRAM(S): 150 TABLET ORAL at 17:19

## 2022-01-01 RX ADMIN — Medication 650 MILLIGRAM(S): at 00:02

## 2022-01-01 RX ADMIN — Medication 650 MILLIGRAM(S): at 06:10

## 2022-01-01 RX ADMIN — PANTOPRAZOLE SODIUM 40 MILLIGRAM(S): 20 TABLET, DELAYED RELEASE ORAL at 18:41

## 2022-01-01 RX ADMIN — DEXMEDETOMIDINE HYDROCHLORIDE IN 0.9% SODIUM CHLORIDE 4.38 MICROGRAM(S)/KG/HR: 4 INJECTION INTRAVENOUS at 08:43

## 2022-01-01 RX ADMIN — Medication 166.67 MILLIGRAM(S): at 17:00

## 2022-01-01 RX ADMIN — DEXMEDETOMIDINE HYDROCHLORIDE IN 0.9% SODIUM CHLORIDE 4.38 MICROGRAM(S)/KG/HR: 4 INJECTION INTRAVENOUS at 15:15

## 2022-01-01 RX ADMIN — Medication 400 MILLIGRAM(S): at 17:51

## 2022-01-01 RX ADMIN — HYDROMORPHONE HYDROCHLORIDE 0.5 MILLIGRAM(S): 2 INJECTION INTRAMUSCULAR; INTRAVENOUS; SUBCUTANEOUS at 03:30

## 2022-01-01 RX ADMIN — Medication 2: at 06:23

## 2022-01-01 RX ADMIN — HEPARIN SODIUM 5000 UNIT(S): 5000 INJECTION INTRAVENOUS; SUBCUTANEOUS at 00:17

## 2022-01-01 RX ADMIN — PIPERACILLIN AND TAZOBACTAM 25 GRAM(S): 4; .5 INJECTION, POWDER, LYOPHILIZED, FOR SOLUTION INTRAVENOUS at 10:17

## 2022-01-01 RX ADMIN — MEROPENEM 100 MILLIGRAM(S): 1 INJECTION INTRAVENOUS at 14:37

## 2022-01-01 RX ADMIN — Medication 50 MILLIEQUIVALENT(S): at 02:10

## 2022-01-01 RX ADMIN — Medication 125 MICROGRAM(S): at 05:14

## 2022-01-01 RX ADMIN — Medication 650 MILLIGRAM(S): at 23:48

## 2022-01-01 RX ADMIN — Medication 4 MILLILITER(S): at 11:24

## 2022-01-01 RX ADMIN — Medication 650 MILLIGRAM(S): at 18:39

## 2022-01-01 RX ADMIN — Medication 125 MICROGRAM(S): at 12:12

## 2022-01-01 RX ADMIN — HYDROMORPHONE HYDROCHLORIDE 0.25 MILLIGRAM(S): 2 INJECTION INTRAMUSCULAR; INTRAVENOUS; SUBCUTANEOUS at 15:04

## 2022-01-01 RX ADMIN — CHLORHEXIDINE GLUCONATE 1 APPLICATION(S): 213 SOLUTION TOPICAL at 06:15

## 2022-01-01 RX ADMIN — Medication 2: at 23:57

## 2022-01-01 RX ADMIN — Medication 100 MILLIGRAM(S): at 13:35

## 2022-01-01 RX ADMIN — Medication 125 MILLIGRAM(S): at 11:58

## 2022-01-01 RX ADMIN — DEXMEDETOMIDINE HYDROCHLORIDE IN 0.9% SODIUM CHLORIDE 4.38 MICROGRAM(S)/KG/HR: 4 INJECTION INTRAVENOUS at 03:10

## 2022-01-01 RX ADMIN — APIXABAN 5 MILLIGRAM(S): 2.5 TABLET, FILM COATED ORAL at 06:48

## 2022-01-01 RX ADMIN — DEXMEDETOMIDINE HYDROCHLORIDE IN 0.9% SODIUM CHLORIDE 4.38 MICROGRAM(S)/KG/HR: 4 INJECTION INTRAVENOUS at 08:04

## 2022-01-01 RX ADMIN — Medication 1000 MILLIGRAM(S): at 05:02

## 2022-01-01 RX ADMIN — MIDODRINE HYDROCHLORIDE 10 MILLIGRAM(S): 2.5 TABLET ORAL at 06:05

## 2022-01-01 RX ADMIN — MIDODRINE HYDROCHLORIDE 10 MILLIGRAM(S): 2.5 TABLET ORAL at 21:30

## 2022-01-01 RX ADMIN — Medication 650 MILLIGRAM(S): at 01:00

## 2022-01-01 RX ADMIN — Medication 6: at 11:24

## 2022-01-01 RX ADMIN — Medication 2: at 00:27

## 2022-01-01 RX ADMIN — Medication 5 MILLIGRAM(S): at 22:41

## 2022-01-01 RX ADMIN — Medication 125 MICROGRAM(S): at 11:06

## 2022-01-01 RX ADMIN — CHLORHEXIDINE GLUCONATE 15 MILLILITER(S): 213 SOLUTION TOPICAL at 06:47

## 2022-01-01 RX ADMIN — CHLORHEXIDINE GLUCONATE 15 MILLILITER(S): 213 SOLUTION TOPICAL at 05:47

## 2022-01-01 RX ADMIN — MIDODRINE HYDROCHLORIDE 10 MILLIGRAM(S): 2.5 TABLET ORAL at 13:46

## 2022-01-01 RX ADMIN — HEPARIN SODIUM 5000 UNIT(S): 5000 INJECTION INTRAVENOUS; SUBCUTANEOUS at 13:32

## 2022-01-01 RX ADMIN — Medication 125 MILLIGRAM(S): at 11:56

## 2022-01-01 RX ADMIN — CHLORHEXIDINE GLUCONATE 15 MILLILITER(S): 213 SOLUTION TOPICAL at 17:45

## 2022-01-01 RX ADMIN — INSULIN GLARGINE 30 UNIT(S): 100 INJECTION, SOLUTION SUBCUTANEOUS at 06:56

## 2022-01-01 RX ADMIN — Medication 400 MILLIGRAM(S): at 05:13

## 2022-01-01 RX ADMIN — MIDODRINE HYDROCHLORIDE 10 MILLIGRAM(S): 2.5 TABLET ORAL at 13:24

## 2022-01-01 RX ADMIN — REMDESIVIR 500 MILLIGRAM(S): 5 INJECTION INTRAVENOUS at 16:32

## 2022-01-01 RX ADMIN — Medication 650 MILLIGRAM(S): at 23:36

## 2022-01-01 RX ADMIN — PIPERACILLIN AND TAZOBACTAM 25 GRAM(S): 4; .5 INJECTION, POWDER, LYOPHILIZED, FOR SOLUTION INTRAVENOUS at 09:52

## 2022-01-01 RX ADMIN — Medication 125 MILLIGRAM(S): at 23:47

## 2022-01-01 RX ADMIN — Medication 125 MILLIGRAM(S): at 00:00

## 2022-01-01 RX ADMIN — DEXMEDETOMIDINE HYDROCHLORIDE IN 0.9% SODIUM CHLORIDE 4.38 MICROGRAM(S)/KG/HR: 4 INJECTION INTRAVENOUS at 19:51

## 2022-01-01 RX ADMIN — Medication 125 MILLIGRAM(S): at 12:13

## 2022-01-01 RX ADMIN — MIDAZOLAM HYDROCHLORIDE 2 MILLIGRAM(S): 1 INJECTION, SOLUTION INTRAMUSCULAR; INTRAVENOUS at 14:00

## 2022-01-01 RX ADMIN — CHLORHEXIDINE GLUCONATE 1 APPLICATION(S): 213 SOLUTION TOPICAL at 05:32

## 2022-01-01 RX ADMIN — Medication 650 MILLIGRAM(S): at 00:28

## 2022-01-01 RX ADMIN — Medication 100 MILLIGRAM(S): at 06:11

## 2022-01-01 RX ADMIN — QUETIAPINE FUMARATE 50 MILLIGRAM(S): 200 TABLET, FILM COATED ORAL at 06:09

## 2022-01-01 RX ADMIN — Medication 4 MILLILITER(S): at 16:15

## 2022-01-01 RX ADMIN — MEROPENEM 100 MILLIGRAM(S): 1 INJECTION INTRAVENOUS at 21:47

## 2022-01-01 RX ADMIN — MEROPENEM 100 MILLIGRAM(S): 1 INJECTION INTRAVENOUS at 06:15

## 2022-01-01 RX ADMIN — Medication 125 MILLIGRAM(S): at 05:10

## 2022-01-01 RX ADMIN — CHLORHEXIDINE GLUCONATE 15 MILLILITER(S): 213 SOLUTION TOPICAL at 07:23

## 2022-01-01 RX ADMIN — Medication 650 MILLIGRAM(S): at 12:00

## 2022-01-01 RX ADMIN — Medication 100 MILLIGRAM(S): at 13:55

## 2022-01-01 RX ADMIN — MIDODRINE HYDROCHLORIDE 10 MILLIGRAM(S): 2.5 TABLET ORAL at 18:20

## 2022-01-01 RX ADMIN — HYDROMORPHONE HYDROCHLORIDE 0.5 MILLIGRAM(S): 2 INJECTION INTRAMUSCULAR; INTRAVENOUS; SUBCUTANEOUS at 23:47

## 2022-01-01 RX ADMIN — Medication 650 MILLIGRAM(S): at 11:48

## 2022-01-01 RX ADMIN — DEXMEDETOMIDINE HYDROCHLORIDE IN 0.9% SODIUM CHLORIDE 4.38 MICROGRAM(S)/KG/HR: 4 INJECTION INTRAVENOUS at 23:47

## 2022-01-01 RX ADMIN — Medication 400 MILLIGRAM(S): at 23:04

## 2022-01-01 RX ADMIN — Medication 125 MILLIGRAM(S): at 01:01

## 2022-01-01 RX ADMIN — HYDROMORPHONE HYDROCHLORIDE 0.25 MILLIGRAM(S): 2 INJECTION INTRAMUSCULAR; INTRAVENOUS; SUBCUTANEOUS at 12:00

## 2022-01-01 RX ADMIN — MEROPENEM 100 MILLIGRAM(S): 1 INJECTION INTRAVENOUS at 13:32

## 2022-01-01 RX ADMIN — Medication 125 MILLIGRAM(S): at 12:45

## 2022-01-01 RX ADMIN — CHLORHEXIDINE GLUCONATE 15 MILLILITER(S): 213 SOLUTION TOPICAL at 06:12

## 2022-01-01 RX ADMIN — Medication 100 MILLIGRAM(S): at 14:27

## 2022-01-01 RX ADMIN — INSULIN GLARGINE 30 UNIT(S): 100 INJECTION, SOLUTION SUBCUTANEOUS at 06:59

## 2022-01-01 RX ADMIN — INSULIN GLARGINE 30 UNIT(S): 100 INJECTION, SOLUTION SUBCUTANEOUS at 05:49

## 2022-01-01 RX ADMIN — Medication 40 MILLIGRAM(S): at 13:25

## 2022-01-01 RX ADMIN — Medication 5 MILLIGRAM(S): at 10:17

## 2022-01-01 RX ADMIN — Medication 650 MILLIGRAM(S): at 05:07

## 2022-01-01 RX ADMIN — CHLORHEXIDINE GLUCONATE 15 MILLILITER(S): 213 SOLUTION TOPICAL at 17:23

## 2022-01-01 RX ADMIN — Medication 15 MILLILITER(S): at 13:35

## 2022-01-01 RX ADMIN — Medication 50 MILLIEQUIVALENT(S): at 04:14

## 2022-01-01 RX ADMIN — Medication 5 MG/HR: at 07:27

## 2022-01-01 RX ADMIN — Medication 650 MILLIGRAM(S): at 18:45

## 2022-01-01 RX ADMIN — Medication 1000 MILLIGRAM(S): at 03:30

## 2022-01-01 RX ADMIN — HYDROMORPHONE HYDROCHLORIDE 0.25 MILLIGRAM(S): 2 INJECTION INTRAMUSCULAR; INTRAVENOUS; SUBCUTANEOUS at 18:37

## 2022-01-01 RX ADMIN — SODIUM CHLORIDE 100 MILLILITER(S): 9 INJECTION, SOLUTION INTRAVENOUS at 19:36

## 2022-01-01 RX ADMIN — PANTOPRAZOLE SODIUM 40 MILLIGRAM(S): 20 TABLET, DELAYED RELEASE ORAL at 05:22

## 2022-01-01 RX ADMIN — CHLORHEXIDINE GLUCONATE 1 APPLICATION(S): 213 SOLUTION TOPICAL at 05:10

## 2022-01-01 RX ADMIN — Medication 5 MG/HR: at 14:52

## 2022-01-01 RX ADMIN — MEROPENEM 100 MILLIGRAM(S): 1 INJECTION INTRAVENOUS at 22:01

## 2022-01-01 RX ADMIN — PANTOPRAZOLE SODIUM 40 MILLIGRAM(S): 20 TABLET, DELAYED RELEASE ORAL at 06:09

## 2022-01-01 RX ADMIN — MIDODRINE HYDROCHLORIDE 10 MILLIGRAM(S): 2.5 TABLET ORAL at 14:57

## 2022-01-01 RX ADMIN — Medication 2.5 MILLIGRAM(S): at 21:45

## 2022-01-01 RX ADMIN — HYDROMORPHONE HYDROCHLORIDE 0.25 MILLIGRAM(S): 2 INJECTION INTRAMUSCULAR; INTRAVENOUS; SUBCUTANEOUS at 17:15

## 2022-01-01 RX ADMIN — Medication 1000 MILLIGRAM(S): at 05:49

## 2022-01-01 RX ADMIN — DEXMEDETOMIDINE HYDROCHLORIDE IN 0.9% SODIUM CHLORIDE 4.38 MICROGRAM(S)/KG/HR: 4 INJECTION INTRAVENOUS at 09:41

## 2022-01-01 RX ADMIN — Medication 650 MILLIGRAM(S): at 17:29

## 2022-01-01 RX ADMIN — MIDODRINE HYDROCHLORIDE 10 MILLIGRAM(S): 2.5 TABLET ORAL at 05:09

## 2022-01-01 RX ADMIN — CEFEPIME 100 MILLIGRAM(S): 1 INJECTION, POWDER, FOR SOLUTION INTRAMUSCULAR; INTRAVENOUS at 21:26

## 2022-01-01 RX ADMIN — MEROPENEM 100 MILLIGRAM(S): 1 INJECTION INTRAVENOUS at 22:18

## 2022-01-01 RX ADMIN — Medication 100 MILLIGRAM(S): at 05:33

## 2022-01-01 RX ADMIN — Medication 2: at 05:05

## 2022-01-01 RX ADMIN — PANTOPRAZOLE SODIUM 40 MILLIGRAM(S): 20 TABLET, DELAYED RELEASE ORAL at 18:17

## 2022-01-01 RX ADMIN — SODIUM CHLORIDE 1000 MILLILITER(S): 9 INJECTION INTRAMUSCULAR; INTRAVENOUS; SUBCUTANEOUS at 17:22

## 2022-01-01 RX ADMIN — Medication 50 MILLIEQUIVALENT(S): at 04:02

## 2022-01-01 RX ADMIN — HEPARIN SODIUM 5000 UNIT(S): 5000 INJECTION INTRAVENOUS; SUBCUTANEOUS at 05:03

## 2022-01-01 RX ADMIN — HEPARIN SODIUM 5000 UNIT(S): 5000 INJECTION INTRAVENOUS; SUBCUTANEOUS at 05:20

## 2022-01-01 RX ADMIN — Medication 20 MILLIGRAM(S): at 10:01

## 2022-01-01 RX ADMIN — Medication 25 MILLIGRAM(S): at 17:25

## 2022-01-01 RX ADMIN — PHENYLEPHRINE HYDROCHLORIDE 1.64 MICROGRAM(S)/KG/MIN: 10 INJECTION INTRAVENOUS at 20:05

## 2022-01-01 RX ADMIN — HYDROMORPHONE HYDROCHLORIDE 0.5 MILLIGRAM(S): 2 INJECTION INTRAMUSCULAR; INTRAVENOUS; SUBCUTANEOUS at 16:50

## 2022-01-01 RX ADMIN — POTASSIUM PHOSPHATE, MONOBASIC POTASSIUM PHOSPHATE, DIBASIC 62.5 MILLIMOLE(S): 236; 224 INJECTION, SOLUTION INTRAVENOUS at 05:05

## 2022-01-01 RX ADMIN — CHLORHEXIDINE GLUCONATE 1 APPLICATION(S): 213 SOLUTION TOPICAL at 06:13

## 2022-01-01 RX ADMIN — MIDODRINE HYDROCHLORIDE 10 MILLIGRAM(S): 2.5 TABLET ORAL at 06:47

## 2022-01-01 RX ADMIN — QUETIAPINE FUMARATE 25 MILLIGRAM(S): 200 TABLET, FILM COATED ORAL at 11:50

## 2022-01-01 RX ADMIN — Medication 125 MILLIGRAM(S): at 23:11

## 2022-01-01 RX ADMIN — Medication 25 MILLIGRAM(S): at 17:29

## 2022-01-01 RX ADMIN — PHENYLEPHRINE HYDROCHLORIDE 1.64 MICROGRAM(S)/KG/MIN: 10 INJECTION INTRAVENOUS at 09:41

## 2022-01-01 RX ADMIN — SODIUM CHLORIDE 30 MILLILITER(S): 9 INJECTION, SOLUTION INTRAVENOUS at 08:03

## 2022-01-01 RX ADMIN — Medication 125 MILLIGRAM(S): at 17:28

## 2022-01-01 RX ADMIN — CHLORHEXIDINE GLUCONATE 1 APPLICATION(S): 213 SOLUTION TOPICAL at 05:31

## 2022-01-01 RX ADMIN — CHLORHEXIDINE GLUCONATE 15 MILLILITER(S): 213 SOLUTION TOPICAL at 18:18

## 2022-01-01 RX ADMIN — HYDROMORPHONE HYDROCHLORIDE 0.5 MILLIGRAM(S): 2 INJECTION INTRAMUSCULAR; INTRAVENOUS; SUBCUTANEOUS at 04:16

## 2022-01-01 RX ADMIN — ACETAZOLAMIDE 250 MILLIGRAM(S): 250 TABLET ORAL at 11:21

## 2022-01-01 RX ADMIN — Medication 650 MILLIGRAM(S): at 05:30

## 2022-01-01 RX ADMIN — QUETIAPINE FUMARATE 50 MILLIGRAM(S): 200 TABLET, FILM COATED ORAL at 17:38

## 2022-01-01 RX ADMIN — HYDROMORPHONE HYDROCHLORIDE 0.5 MILLIGRAM(S): 2 INJECTION INTRAMUSCULAR; INTRAVENOUS; SUBCUTANEOUS at 20:41

## 2022-01-01 RX ADMIN — MIDODRINE HYDROCHLORIDE 10 MILLIGRAM(S): 2.5 TABLET ORAL at 05:21

## 2022-01-01 RX ADMIN — MEROPENEM 100 MILLIGRAM(S): 1 INJECTION INTRAVENOUS at 21:24

## 2022-01-01 RX ADMIN — PANTOPRAZOLE SODIUM 40 MILLIGRAM(S): 20 TABLET, DELAYED RELEASE ORAL at 17:25

## 2022-01-01 RX ADMIN — CHLORHEXIDINE GLUCONATE 15 MILLILITER(S): 213 SOLUTION TOPICAL at 17:00

## 2022-01-01 RX ADMIN — Medication 5 MILLIGRAM(S): at 22:42

## 2022-01-01 RX ADMIN — MEROPENEM 100 MILLIGRAM(S): 1 INJECTION INTRAVENOUS at 05:09

## 2022-01-01 RX ADMIN — Medication 125 MILLIGRAM(S): at 11:26

## 2022-01-01 RX ADMIN — MIDODRINE HYDROCHLORIDE 10 MILLIGRAM(S): 2.5 TABLET ORAL at 11:25

## 2022-01-01 RX ADMIN — HYDROMORPHONE HYDROCHLORIDE 0.5 MILLIGRAM(S): 2 INJECTION INTRAMUSCULAR; INTRAVENOUS; SUBCUTANEOUS at 01:15

## 2022-01-01 RX ADMIN — Medication 2: at 17:46

## 2022-01-01 RX ADMIN — Medication 125 MILLIGRAM(S): at 05:11

## 2022-01-01 RX ADMIN — HEPARIN SODIUM 5000 UNIT(S): 5000 INJECTION INTRAVENOUS; SUBCUTANEOUS at 06:10

## 2022-01-01 RX ADMIN — MIDODRINE HYDROCHLORIDE 10 MILLIGRAM(S): 2.5 TABLET ORAL at 13:39

## 2022-01-01 RX ADMIN — Medication 125 MILLIGRAM(S): at 00:54

## 2022-01-01 RX ADMIN — Medication 5 MG/HR: at 12:45

## 2022-01-01 RX ADMIN — PROPOFOL 5.25 MICROGRAM(S)/KG/MIN: 10 INJECTION, EMULSION INTRAVENOUS at 22:05

## 2022-01-01 RX ADMIN — FLUCONAZOLE 100 MILLIGRAM(S): 150 TABLET ORAL at 17:22

## 2022-01-01 RX ADMIN — Medication 125 MICROGRAM(S): at 11:56

## 2022-01-01 RX ADMIN — Medication 650 MILLIGRAM(S): at 00:48

## 2022-01-01 RX ADMIN — Medication 100 MILLIGRAM(S): at 12:45

## 2022-01-01 RX ADMIN — PHENYLEPHRINE HYDROCHLORIDE 1.64 MICROGRAM(S)/KG/MIN: 10 INJECTION INTRAVENOUS at 19:58

## 2022-01-01 RX ADMIN — HYDROMORPHONE HYDROCHLORIDE 1 MILLIGRAM(S): 2 INJECTION INTRAMUSCULAR; INTRAVENOUS; SUBCUTANEOUS at 14:20

## 2022-01-01 RX ADMIN — MEROPENEM 100 MILLIGRAM(S): 1 INJECTION INTRAVENOUS at 05:18

## 2022-01-01 RX ADMIN — Medication 15 MILLILITER(S): at 11:04

## 2022-01-01 RX ADMIN — CHLORHEXIDINE GLUCONATE 1 APPLICATION(S): 213 SOLUTION TOPICAL at 05:38

## 2022-01-01 RX ADMIN — MIDODRINE HYDROCHLORIDE 20 MILLIGRAM(S): 2.5 TABLET ORAL at 22:11

## 2022-01-01 RX ADMIN — HYDROMORPHONE HYDROCHLORIDE 0.25 MILLIGRAM(S): 2 INJECTION INTRAMUSCULAR; INTRAVENOUS; SUBCUTANEOUS at 16:16

## 2022-01-01 RX ADMIN — SODIUM CHLORIDE 4 MILLILITER(S): 9 INJECTION INTRAMUSCULAR; INTRAVENOUS; SUBCUTANEOUS at 19:35

## 2022-01-01 RX ADMIN — Medication 20 MILLIGRAM(S): at 13:35

## 2022-01-01 RX ADMIN — CHLORHEXIDINE GLUCONATE 15 MILLILITER(S): 213 SOLUTION TOPICAL at 18:39

## 2022-01-01 RX ADMIN — Medication 5 MG/HR: at 08:11

## 2022-01-01 RX ADMIN — Medication 125 MILLIGRAM(S): at 05:33

## 2022-01-01 RX ADMIN — Medication 650 MILLIGRAM(S): at 19:12

## 2022-01-01 RX ADMIN — PANTOPRAZOLE SODIUM 40 MILLIGRAM(S): 20 TABLET, DELAYED RELEASE ORAL at 06:47

## 2022-01-01 RX ADMIN — CEFEPIME 100 MILLIGRAM(S): 1 INJECTION, POWDER, FOR SOLUTION INTRAMUSCULAR; INTRAVENOUS at 22:27

## 2022-01-01 RX ADMIN — PANTOPRAZOLE SODIUM 40 MILLIGRAM(S): 20 TABLET, DELAYED RELEASE ORAL at 05:07

## 2022-01-01 RX ADMIN — HEPARIN SODIUM 5000 UNIT(S): 5000 INJECTION INTRAVENOUS; SUBCUTANEOUS at 05:47

## 2022-01-01 RX ADMIN — DEXMEDETOMIDINE HYDROCHLORIDE IN 0.9% SODIUM CHLORIDE 4.38 MICROGRAM(S)/KG/HR: 4 INJECTION INTRAVENOUS at 19:38

## 2022-01-01 RX ADMIN — Medication 4: at 23:24

## 2022-01-01 RX ADMIN — Medication 100 MILLIGRAM(S): at 14:36

## 2022-01-01 RX ADMIN — ENOXAPARIN SODIUM 40 MILLIGRAM(S): 100 INJECTION SUBCUTANEOUS at 12:30

## 2022-01-01 RX ADMIN — DEXMEDETOMIDINE HYDROCHLORIDE IN 0.9% SODIUM CHLORIDE 4.38 MICROGRAM(S)/KG/HR: 4 INJECTION INTRAVENOUS at 15:01

## 2022-01-01 RX ADMIN — CEFEPIME 100 MILLIGRAM(S): 1 INJECTION, POWDER, FOR SOLUTION INTRAMUSCULAR; INTRAVENOUS at 13:04

## 2022-01-01 RX ADMIN — HEPARIN SODIUM 5000 UNIT(S): 5000 INJECTION INTRAVENOUS; SUBCUTANEOUS at 13:05

## 2022-01-01 RX ADMIN — CHLORHEXIDINE GLUCONATE 15 MILLILITER(S): 213 SOLUTION TOPICAL at 17:15

## 2022-01-01 RX ADMIN — PANTOPRAZOLE SODIUM 40 MILLIGRAM(S): 20 TABLET, DELAYED RELEASE ORAL at 05:20

## 2022-01-01 RX ADMIN — SODIUM CHLORIDE 100 MILLILITER(S): 9 INJECTION, SOLUTION INTRAVENOUS at 08:13

## 2022-01-01 RX ADMIN — Medication 650 MILLIGRAM(S): at 12:14

## 2022-01-01 RX ADMIN — CHLORHEXIDINE GLUCONATE 15 MILLILITER(S): 213 SOLUTION TOPICAL at 05:14

## 2022-01-01 RX ADMIN — SODIUM CHLORIDE 125 MILLILITER(S): 9 INJECTION INTRAMUSCULAR; INTRAVENOUS; SUBCUTANEOUS at 22:29

## 2022-01-01 RX ADMIN — Medication 25 MILLIGRAM(S): at 14:36

## 2022-01-01 RX ADMIN — Medication 125 MILLIGRAM(S): at 00:32

## 2022-01-01 RX ADMIN — ENOXAPARIN SODIUM 40 MILLIGRAM(S): 100 INJECTION SUBCUTANEOUS at 12:04

## 2022-01-01 RX ADMIN — PANTOPRAZOLE SODIUM 40 MILLIGRAM(S): 20 TABLET, DELAYED RELEASE ORAL at 05:28

## 2022-01-01 RX ADMIN — Medication 650 MILLIGRAM(S): at 23:43

## 2022-01-01 RX ADMIN — Medication 100 MILLIGRAM(S): at 21:01

## 2022-01-01 RX ADMIN — PIPERACILLIN AND TAZOBACTAM 25 GRAM(S): 4; .5 INJECTION, POWDER, LYOPHILIZED, FOR SOLUTION INTRAVENOUS at 02:34

## 2022-01-01 RX ADMIN — CHLORHEXIDINE GLUCONATE 15 MILLILITER(S): 213 SOLUTION TOPICAL at 17:18

## 2022-01-01 RX ADMIN — Medication 200 GRAM(S): at 02:01

## 2022-01-01 RX ADMIN — Medication 5 MILLIGRAM(S): at 17:27

## 2022-01-01 RX ADMIN — Medication 650 MILLIGRAM(S): at 12:42

## 2022-01-01 RX ADMIN — Medication 30 MILLIGRAM(S): at 17:21

## 2022-01-01 RX ADMIN — Medication 15 MILLILITER(S): at 13:55

## 2022-01-01 RX ADMIN — PANTOPRAZOLE SODIUM 40 MILLIGRAM(S): 20 TABLET, DELAYED RELEASE ORAL at 05:39

## 2022-01-01 RX ADMIN — Medication 650 MILLIGRAM(S): at 06:15

## 2022-01-01 RX ADMIN — MEROPENEM 100 MILLIGRAM(S): 1 INJECTION INTRAVENOUS at 14:24

## 2022-01-01 RX ADMIN — Medication 5 MILLIGRAM(S): at 03:41

## 2022-01-01 RX ADMIN — HYDROMORPHONE HYDROCHLORIDE 0.5 MILLIGRAM(S): 2 INJECTION INTRAMUSCULAR; INTRAVENOUS; SUBCUTANEOUS at 02:30

## 2022-01-01 RX ADMIN — MIDAZOLAM HYDROCHLORIDE 2 MILLIGRAM(S): 1 INJECTION, SOLUTION INTRAMUSCULAR; INTRAVENOUS at 12:00

## 2022-01-01 RX ADMIN — Medication 12.5 MILLIGRAM(S): at 19:00

## 2022-01-01 RX ADMIN — DEXMEDETOMIDINE HYDROCHLORIDE IN 0.9% SODIUM CHLORIDE 4.38 MICROGRAM(S)/KG/HR: 4 INJECTION INTRAVENOUS at 23:15

## 2022-01-01 RX ADMIN — MIDODRINE HYDROCHLORIDE 30 MILLIGRAM(S): 2.5 TABLET ORAL at 05:25

## 2022-01-01 RX ADMIN — CEFEPIME 100 MILLIGRAM(S): 1 INJECTION, POWDER, FOR SOLUTION INTRAMUSCULAR; INTRAVENOUS at 05:15

## 2022-01-01 RX ADMIN — CHLORHEXIDINE GLUCONATE 15 MILLILITER(S): 213 SOLUTION TOPICAL at 05:32

## 2022-01-01 RX ADMIN — CHLORHEXIDINE GLUCONATE 1 APPLICATION(S): 213 SOLUTION TOPICAL at 05:45

## 2022-01-01 RX ADMIN — CHLORHEXIDINE GLUCONATE 15 MILLILITER(S): 213 SOLUTION TOPICAL at 05:11

## 2022-01-01 RX ADMIN — FLUCONAZOLE 100 MILLIGRAM(S): 150 TABLET ORAL at 17:46

## 2022-01-01 RX ADMIN — Medication 2: at 05:29

## 2022-01-01 RX ADMIN — Medication 2: at 06:15

## 2022-01-01 RX ADMIN — CHLORHEXIDINE GLUCONATE 1 APPLICATION(S): 213 SOLUTION TOPICAL at 07:49

## 2022-01-01 RX ADMIN — Medication 400 MILLIGRAM(S): at 00:05

## 2022-01-01 RX ADMIN — Medication 5 MILLIGRAM(S): at 15:50

## 2022-01-01 RX ADMIN — HYDROMORPHONE HYDROCHLORIDE 0.5 MILLIGRAM(S): 2 INJECTION INTRAMUSCULAR; INTRAVENOUS; SUBCUTANEOUS at 08:27

## 2022-01-01 RX ADMIN — HYDROMORPHONE HYDROCHLORIDE 0.25 MILLIGRAM(S): 2 INJECTION INTRAMUSCULAR; INTRAVENOUS; SUBCUTANEOUS at 00:45

## 2022-01-01 RX ADMIN — Medication 125 MILLIGRAM(S): at 18:20

## 2022-01-01 RX ADMIN — Medication 650 MILLIGRAM(S): at 11:20

## 2022-01-01 RX ADMIN — SODIUM CHLORIDE 30 MILLILITER(S): 9 INJECTION, SOLUTION INTRAVENOUS at 02:27

## 2022-01-01 RX ADMIN — Medication 5 MILLIGRAM(S): at 21:52

## 2022-01-01 RX ADMIN — Medication 100 MILLIGRAM(S): at 23:06

## 2022-01-01 RX ADMIN — Medication 5 MILLIGRAM(S): at 02:51

## 2022-01-01 RX ADMIN — HEPARIN SODIUM 5000 UNIT(S): 5000 INJECTION INTRAVENOUS; SUBCUTANEOUS at 16:59

## 2022-01-01 RX ADMIN — CHLORHEXIDINE GLUCONATE 15 MILLILITER(S): 213 SOLUTION TOPICAL at 05:43

## 2022-01-01 RX ADMIN — ALBUTEROL 2.5 MILLIGRAM(S): 90 AEROSOL, METERED ORAL at 23:08

## 2022-01-01 RX ADMIN — Medication 650 MILLIGRAM(S): at 17:25

## 2022-01-01 RX ADMIN — Medication 650 MILLIGRAM(S): at 23:23

## 2022-01-01 RX ADMIN — MIDODRINE HYDROCHLORIDE 10 MILLIGRAM(S): 2.5 TABLET ORAL at 06:10

## 2022-01-01 RX ADMIN — FLUCONAZOLE 100 MILLIGRAM(S): 150 TABLET ORAL at 17:20

## 2022-01-01 RX ADMIN — HYDROMORPHONE HYDROCHLORIDE 0.5 MILLIGRAM(S): 2 INJECTION INTRAMUSCULAR; INTRAVENOUS; SUBCUTANEOUS at 02:15

## 2022-01-01 RX ADMIN — POTASSIUM PHOSPHATE, MONOBASIC POTASSIUM PHOSPHATE, DIBASIC 62.5 MILLIMOLE(S): 236; 224 INJECTION, SOLUTION INTRAVENOUS at 04:03

## 2022-01-01 RX ADMIN — PANTOPRAZOLE SODIUM 40 MILLIGRAM(S): 20 TABLET, DELAYED RELEASE ORAL at 17:12

## 2022-01-01 RX ADMIN — Medication 25 MILLIGRAM(S): at 13:24

## 2022-01-01 RX ADMIN — Medication 650 MILLIGRAM(S): at 18:36

## 2022-01-01 RX ADMIN — Medication 100 MILLIEQUIVALENT(S): at 05:31

## 2022-01-01 RX ADMIN — Medication 4: at 11:04

## 2022-01-01 RX ADMIN — Medication 125 MICROGRAM(S): at 06:10

## 2022-01-01 RX ADMIN — SODIUM CHLORIDE 75 MILLILITER(S): 9 INJECTION, SOLUTION INTRAVENOUS at 08:11

## 2022-01-01 RX ADMIN — Medication 10 MILLIGRAM(S): at 17:04

## 2022-01-01 RX ADMIN — Medication 125 MILLIGRAM(S): at 11:40

## 2022-01-01 RX ADMIN — Medication 20 MILLIGRAM(S): at 20:04

## 2022-01-01 RX ADMIN — HYDROMORPHONE HYDROCHLORIDE 0.5 MILLIGRAM(S): 2 INJECTION INTRAMUSCULAR; INTRAVENOUS; SUBCUTANEOUS at 16:32

## 2022-01-01 RX ADMIN — Medication 650 MILLIGRAM(S): at 05:19

## 2022-01-01 RX ADMIN — HEPARIN SODIUM 5000 UNIT(S): 5000 INJECTION INTRAVENOUS; SUBCUTANEOUS at 21:14

## 2022-01-01 RX ADMIN — Medication 650 MILLIGRAM(S): at 18:17

## 2022-01-01 RX ADMIN — Medication 2: at 00:49

## 2022-01-01 RX ADMIN — SODIUM CHLORIDE 75 MILLILITER(S): 9 INJECTION, SOLUTION INTRAVENOUS at 20:04

## 2022-01-01 RX ADMIN — Medication 40 MILLIGRAM(S): at 03:00

## 2022-01-01 RX ADMIN — QUETIAPINE FUMARATE 50 MILLIGRAM(S): 200 TABLET, FILM COATED ORAL at 11:58

## 2022-01-01 RX ADMIN — Medication 300 MILLIGRAM(S): at 07:25

## 2022-01-01 RX ADMIN — Medication 125 MICROGRAM(S): at 14:25

## 2022-01-01 RX ADMIN — Medication 40 MILLIGRAM(S): at 18:20

## 2022-01-01 RX ADMIN — HYDROMORPHONE HYDROCHLORIDE 0.5 MILLIGRAM(S): 2 INJECTION INTRAMUSCULAR; INTRAVENOUS; SUBCUTANEOUS at 19:00

## 2022-01-01 RX ADMIN — MEROPENEM 100 MILLIGRAM(S): 1 INJECTION INTRAVENOUS at 14:01

## 2022-01-01 RX ADMIN — HYDROMORPHONE HYDROCHLORIDE 0.5 MILLIGRAM(S): 2 INJECTION INTRAMUSCULAR; INTRAVENOUS; SUBCUTANEOUS at 21:33

## 2022-01-01 RX ADMIN — Medication 30 MILLIGRAM(S): at 23:11

## 2022-01-01 RX ADMIN — Medication 125 MILLIGRAM(S): at 13:37

## 2022-01-01 RX ADMIN — HALOPERIDOL DECANOATE 5 MILLIGRAM(S): 100 INJECTION INTRAMUSCULAR at 14:50

## 2022-01-01 RX ADMIN — PANTOPRAZOLE SODIUM 40 MILLIGRAM(S): 20 TABLET, DELAYED RELEASE ORAL at 05:09

## 2022-01-01 RX ADMIN — HYDROMORPHONE HYDROCHLORIDE 0.5 MILLIGRAM(S): 2 INJECTION INTRAMUSCULAR; INTRAVENOUS; SUBCUTANEOUS at 04:31

## 2022-01-01 RX ADMIN — Medication 100 MILLIEQUIVALENT(S): at 05:43

## 2022-01-01 RX ADMIN — Medication 200 GRAM(S): at 03:03

## 2022-01-01 RX ADMIN — Medication 15 MILLILITER(S): at 12:08

## 2022-01-01 RX ADMIN — ALBUTEROL 2.5 MILLIGRAM(S): 90 AEROSOL, METERED ORAL at 11:21

## 2022-01-01 RX ADMIN — DEXMEDETOMIDINE HYDROCHLORIDE IN 0.9% SODIUM CHLORIDE 4.38 MICROGRAM(S)/KG/HR: 4 INJECTION INTRAVENOUS at 21:55

## 2022-01-01 RX ADMIN — Medication 125 MILLIGRAM(S): at 17:25

## 2022-01-01 RX ADMIN — DEXMEDETOMIDINE HYDROCHLORIDE IN 0.9% SODIUM CHLORIDE 4.38 MICROGRAM(S)/KG/HR: 4 INJECTION INTRAVENOUS at 19:59

## 2022-01-01 RX ADMIN — QUETIAPINE FUMARATE 50 MILLIGRAM(S): 200 TABLET, FILM COATED ORAL at 05:24

## 2022-01-01 RX ADMIN — Medication 12.5 MILLIGRAM(S): at 17:15

## 2022-01-01 RX ADMIN — ENOXAPARIN SODIUM 40 MILLIGRAM(S): 100 INJECTION SUBCUTANEOUS at 13:36

## 2022-01-01 RX ADMIN — DEXMEDETOMIDINE HYDROCHLORIDE IN 0.9% SODIUM CHLORIDE 4.38 MICROGRAM(S)/KG/HR: 4 INJECTION INTRAVENOUS at 12:14

## 2022-01-01 RX ADMIN — Medication 5 MILLIGRAM(S): at 12:54

## 2022-01-01 RX ADMIN — Medication 125 MILLIGRAM(S): at 18:11

## 2022-01-01 RX ADMIN — Medication 650 MILLIGRAM(S): at 13:05

## 2022-01-01 RX ADMIN — CHLORHEXIDINE GLUCONATE 15 MILLILITER(S): 213 SOLUTION TOPICAL at 05:49

## 2022-01-01 RX ADMIN — Medication 2: at 17:55

## 2022-01-01 RX ADMIN — Medication 125 MILLIGRAM(S): at 17:44

## 2022-01-01 RX ADMIN — Medication 12.5 MILLIGRAM(S): at 20:17

## 2022-01-01 RX ADMIN — Medication 15 MILLILITER(S): at 13:08

## 2022-01-01 RX ADMIN — Medication 12.5 MILLIGRAM(S): at 23:23

## 2022-01-01 RX ADMIN — Medication 40 MILLIGRAM(S): at 17:20

## 2022-01-01 RX ADMIN — MIDODRINE HYDROCHLORIDE 30 MILLIGRAM(S): 2.5 TABLET ORAL at 13:17

## 2022-01-01 RX ADMIN — HEPARIN SODIUM 5000 UNIT(S): 5000 INJECTION INTRAVENOUS; SUBCUTANEOUS at 05:33

## 2022-01-01 RX ADMIN — HYDROMORPHONE HYDROCHLORIDE 0.5 MILLIGRAM(S): 2 INJECTION INTRAMUSCULAR; INTRAVENOUS; SUBCUTANEOUS at 14:37

## 2022-01-01 RX ADMIN — INSULIN GLARGINE 30 UNIT(S): 100 INJECTION, SOLUTION SUBCUTANEOUS at 08:06

## 2022-01-01 RX ADMIN — Medication 125 MILLIGRAM(S): at 12:11

## 2022-01-01 RX ADMIN — Medication 125 MILLIGRAM(S): at 05:14

## 2022-01-01 RX ADMIN — Medication 1000 MILLIGRAM(S): at 00:23

## 2022-01-01 RX ADMIN — Medication 125 MILLIGRAM(S): at 01:04

## 2022-01-01 RX ADMIN — Medication 10 MILLIGRAM(S): at 16:40

## 2022-01-01 RX ADMIN — Medication 100 MILLIGRAM(S): at 05:14

## 2022-01-01 RX ADMIN — CHLORHEXIDINE GLUCONATE 15 MILLILITER(S): 213 SOLUTION TOPICAL at 18:41

## 2022-01-01 RX ADMIN — Medication 4 MILLILITER(S): at 04:17

## 2022-01-01 RX ADMIN — Medication 10 MILLIGRAM(S): at 06:59

## 2022-01-01 RX ADMIN — Medication 5 MILLIGRAM(S): at 04:27

## 2022-01-01 RX ADMIN — Medication 40 MILLIGRAM(S): at 05:13

## 2022-01-01 RX ADMIN — HEPARIN SODIUM 5000 UNIT(S): 5000 INJECTION INTRAVENOUS; SUBCUTANEOUS at 13:01

## 2022-01-01 RX ADMIN — INSULIN GLARGINE 22 UNIT(S): 100 INJECTION, SOLUTION SUBCUTANEOUS at 08:17

## 2022-01-01 RX ADMIN — Medication 200 GRAM(S): at 11:37

## 2022-01-01 RX ADMIN — QUETIAPINE FUMARATE 50 MILLIGRAM(S): 200 TABLET, FILM COATED ORAL at 12:42

## 2022-01-01 RX ADMIN — Medication 650 MILLIGRAM(S): at 18:37

## 2022-01-01 RX ADMIN — INSULIN GLARGINE 20 UNIT(S): 100 INJECTION, SOLUTION SUBCUTANEOUS at 08:30

## 2022-01-01 RX ADMIN — Medication 20 MILLIGRAM(S): at 17:16

## 2022-01-01 RX ADMIN — Medication 650 MILLIGRAM(S): at 17:38

## 2022-01-01 RX ADMIN — Medication 265.62 MILLIGRAM(S): at 14:46

## 2022-01-01 RX ADMIN — FENTANYL CITRATE 100 MICROGRAM(S): 50 INJECTION INTRAVENOUS at 15:00

## 2022-01-01 RX ADMIN — Medication 125 MILLIGRAM(S): at 12:04

## 2022-01-01 RX ADMIN — Medication 10 MILLIGRAM(S): at 11:55

## 2022-01-01 RX ADMIN — Medication 12.5 MILLIGRAM(S): at 18:40

## 2022-01-01 RX ADMIN — Medication 100 MILLIGRAM(S): at 20:55

## 2022-01-01 RX ADMIN — HALOPERIDOL DECANOATE 2.5 MILLIGRAM(S): 100 INJECTION INTRAMUSCULAR at 05:51

## 2022-01-01 RX ADMIN — ACETAZOLAMIDE 250 MILLIGRAM(S): 250 TABLET ORAL at 00:02

## 2022-01-01 RX ADMIN — Medication 2: at 06:04

## 2022-01-01 RX ADMIN — HYDROMORPHONE HYDROCHLORIDE 0.5 MILLIGRAM(S): 2 INJECTION INTRAMUSCULAR; INTRAVENOUS; SUBCUTANEOUS at 01:05

## 2022-01-01 RX ADMIN — MEROPENEM 100 MILLIGRAM(S): 1 INJECTION INTRAVENOUS at 21:56

## 2022-01-01 RX ADMIN — Medication 2: at 13:01

## 2022-01-01 RX ADMIN — Medication 30 MILLIGRAM(S): at 23:12

## 2022-01-01 RX ADMIN — HEPARIN SODIUM 5000 UNIT(S): 5000 INJECTION INTRAVENOUS; SUBCUTANEOUS at 22:13

## 2022-01-01 RX ADMIN — HEPARIN SODIUM 5000 UNIT(S): 5000 INJECTION INTRAVENOUS; SUBCUTANEOUS at 05:23

## 2022-01-01 RX ADMIN — HYDROMORPHONE HYDROCHLORIDE 0.25 MILLIGRAM(S): 2 INJECTION INTRAMUSCULAR; INTRAVENOUS; SUBCUTANEOUS at 20:43

## 2022-01-01 RX ADMIN — HYDROMORPHONE HYDROCHLORIDE 0.25 MILLIGRAM(S): 2 INJECTION INTRAMUSCULAR; INTRAVENOUS; SUBCUTANEOUS at 08:15

## 2022-01-01 RX ADMIN — INSULIN GLARGINE 20 UNIT(S): 100 INJECTION, SOLUTION SUBCUTANEOUS at 08:31

## 2022-01-01 RX ADMIN — CHLORHEXIDINE GLUCONATE 15 MILLILITER(S): 213 SOLUTION TOPICAL at 17:13

## 2022-01-01 RX ADMIN — HYDROMORPHONE HYDROCHLORIDE 0.5 MILLIGRAM(S): 2 INJECTION INTRAMUSCULAR; INTRAVENOUS; SUBCUTANEOUS at 03:00

## 2022-01-01 RX ADMIN — Medication 5 MG/HR: at 23:14

## 2022-01-01 RX ADMIN — Medication 125 MICROGRAM(S): at 11:26

## 2022-01-01 RX ADMIN — PIPERACILLIN AND TAZOBACTAM 25 GRAM(S): 4; .5 INJECTION, POWDER, LYOPHILIZED, FOR SOLUTION INTRAVENOUS at 02:44

## 2022-01-01 RX ADMIN — PHENYLEPHRINE HYDROCHLORIDE 1.64 MICROGRAM(S)/KG/MIN: 10 INJECTION INTRAVENOUS at 23:14

## 2022-01-01 RX ADMIN — FENTANYL CITRATE 100 MICROGRAM(S): 50 INJECTION INTRAVENOUS at 14:27

## 2022-01-01 RX ADMIN — Medication 650 MILLIGRAM(S): at 00:19

## 2022-01-01 RX ADMIN — Medication 12.5 MILLIGRAM(S): at 05:10

## 2022-01-01 RX ADMIN — Medication 4 MILLILITER(S): at 15:42

## 2022-01-01 RX ADMIN — PANTOPRAZOLE SODIUM 40 MILLIGRAM(S): 20 TABLET, DELAYED RELEASE ORAL at 18:19

## 2022-01-01 RX ADMIN — INSULIN GLARGINE 20 UNIT(S): 100 INJECTION, SOLUTION SUBCUTANEOUS at 07:24

## 2022-01-01 RX ADMIN — Medication 650 MILLIGRAM(S): at 11:06

## 2022-01-01 RX ADMIN — MIDODRINE HYDROCHLORIDE 10 MILLIGRAM(S): 2.5 TABLET ORAL at 14:41

## 2022-01-01 RX ADMIN — MEROPENEM 100 MILLIGRAM(S): 1 INJECTION INTRAVENOUS at 14:57

## 2022-01-01 RX ADMIN — Medication 265.62 MILLIGRAM(S): at 07:09

## 2022-01-01 RX ADMIN — Medication 650 MILLIGRAM(S): at 01:11

## 2022-01-01 RX ADMIN — Medication 125 MILLIGRAM(S): at 11:21

## 2022-01-01 RX ADMIN — Medication 5 MILLIGRAM(S): at 15:44

## 2022-01-01 RX ADMIN — Medication 125 MILLIGRAM(S): at 23:13

## 2022-01-01 RX ADMIN — POTASSIUM PHOSPHATE, MONOBASIC POTASSIUM PHOSPHATE, DIBASIC 83.33 MILLIMOLE(S): 236; 224 INJECTION, SOLUTION INTRAVENOUS at 06:38

## 2022-01-01 RX ADMIN — Medication 650 MILLIGRAM(S): at 23:27

## 2022-01-01 RX ADMIN — Medication 2.5 MILLIGRAM(S): at 17:22

## 2022-01-01 RX ADMIN — Medication 650 MILLIGRAM(S): at 18:12

## 2022-01-01 RX ADMIN — Medication 400 MILLIGRAM(S): at 11:56

## 2022-01-01 RX ADMIN — Medication 650 MILLIGRAM(S): at 11:57

## 2022-01-01 RX ADMIN — Medication 125 MILLIGRAM(S): at 11:04

## 2022-01-01 RX ADMIN — Medication 2: at 17:21

## 2022-01-01 RX ADMIN — Medication 650 MILLIGRAM(S): at 12:51

## 2022-01-01 RX ADMIN — Medication 10 MILLIGRAM(S): at 00:45

## 2022-01-01 RX ADMIN — Medication 400 MILLIGRAM(S): at 05:14

## 2022-01-01 RX ADMIN — Medication 1 PACKET(S): at 17:45

## 2022-01-01 RX ADMIN — HYDROMORPHONE HYDROCHLORIDE 0.25 MILLIGRAM(S): 2 INJECTION INTRAMUSCULAR; INTRAVENOUS; SUBCUTANEOUS at 06:40

## 2022-01-01 RX ADMIN — Medication 650 MILLIGRAM(S): at 11:05

## 2022-01-01 RX ADMIN — INSULIN GLARGINE 22 UNIT(S): 100 INJECTION, SOLUTION SUBCUTANEOUS at 09:41

## 2022-01-01 RX ADMIN — Medication 650 MILLIGRAM(S): at 18:57

## 2022-01-01 RX ADMIN — Medication 37.5 MILLIGRAM(S): at 14:52

## 2022-01-01 RX ADMIN — HEPARIN SODIUM 5000 UNIT(S): 5000 INJECTION INTRAVENOUS; SUBCUTANEOUS at 14:21

## 2022-01-01 RX ADMIN — Medication 12.5 MILLIGRAM(S): at 05:03

## 2022-01-01 RX ADMIN — Medication 650 MILLIGRAM(S): at 17:12

## 2022-01-01 RX ADMIN — REMDESIVIR 500 MILLIGRAM(S): 5 INJECTION INTRAVENOUS at 16:27

## 2022-01-01 RX ADMIN — Medication 125 MICROGRAM(S): at 12:21

## 2022-01-01 RX ADMIN — Medication 650 MILLIGRAM(S): at 18:49

## 2022-01-01 RX ADMIN — PIPERACILLIN AND TAZOBACTAM 25 GRAM(S): 4; .5 INJECTION, POWDER, LYOPHILIZED, FOR SOLUTION INTRAVENOUS at 09:58

## 2022-01-01 RX ADMIN — PANTOPRAZOLE SODIUM 40 MILLIGRAM(S): 20 TABLET, DELAYED RELEASE ORAL at 05:15

## 2022-01-01 RX ADMIN — PIPERACILLIN AND TAZOBACTAM 200 GRAM(S): 4; .5 INJECTION, POWDER, LYOPHILIZED, FOR SOLUTION INTRAVENOUS at 10:03

## 2022-01-01 RX ADMIN — MIDODRINE HYDROCHLORIDE 10 MILLIGRAM(S): 2.5 TABLET ORAL at 21:28

## 2022-01-01 RX ADMIN — Medication 1000 MILLIGRAM(S): at 00:12

## 2022-01-01 RX ADMIN — Medication 265.62 MILLIGRAM(S): at 02:15

## 2022-01-01 RX ADMIN — PANTOPRAZOLE SODIUM 40 MILLIGRAM(S): 20 TABLET, DELAYED RELEASE ORAL at 17:53

## 2022-01-01 RX ADMIN — Medication 650 MILLIGRAM(S): at 12:35

## 2022-01-01 RX ADMIN — Medication 100 MILLIEQUIVALENT(S): at 04:27

## 2022-01-01 RX ADMIN — MIDODRINE HYDROCHLORIDE 10 MILLIGRAM(S): 2.5 TABLET ORAL at 05:23

## 2022-01-01 RX ADMIN — CEFEPIME 100 MILLIGRAM(S): 1 INJECTION, POWDER, FOR SOLUTION INTRAMUSCULAR; INTRAVENOUS at 21:53

## 2022-01-01 RX ADMIN — POTASSIUM PHOSPHATE, MONOBASIC POTASSIUM PHOSPHATE, DIBASIC 62.5 MILLIMOLE(S): 236; 224 INJECTION, SOLUTION INTRAVENOUS at 03:59

## 2022-01-01 RX ADMIN — MIDODRINE HYDROCHLORIDE 20 MILLIGRAM(S): 2.5 TABLET ORAL at 13:05

## 2022-01-01 RX ADMIN — APIXABAN 5 MILLIGRAM(S): 2.5 TABLET, FILM COATED ORAL at 18:11

## 2022-01-01 RX ADMIN — SODIUM CHLORIDE 100 MILLILITER(S): 9 INJECTION INTRAMUSCULAR; INTRAVENOUS; SUBCUTANEOUS at 22:08

## 2022-01-01 RX ADMIN — DEXMEDETOMIDINE HYDROCHLORIDE IN 0.9% SODIUM CHLORIDE 4.38 MICROGRAM(S)/KG/HR: 4 INJECTION INTRAVENOUS at 07:51

## 2022-01-01 RX ADMIN — HYDROMORPHONE HYDROCHLORIDE 0.25 MILLIGRAM(S): 2 INJECTION INTRAMUSCULAR; INTRAVENOUS; SUBCUTANEOUS at 21:35

## 2022-01-01 RX ADMIN — HEPARIN SODIUM 5000 UNIT(S): 5000 INJECTION INTRAVENOUS; SUBCUTANEOUS at 05:39

## 2022-01-01 RX ADMIN — INSULIN GLARGINE 30 UNIT(S): 100 INJECTION, SOLUTION SUBCUTANEOUS at 08:17

## 2022-01-01 RX ADMIN — Medication 20 MILLIGRAM(S): at 21:56

## 2022-01-01 RX ADMIN — Medication 100 MILLIGRAM(S): at 13:38

## 2022-01-01 RX ADMIN — Medication 650 MILLIGRAM(S): at 05:47

## 2022-01-01 RX ADMIN — INSULIN GLARGINE 30 UNIT(S): 100 INJECTION, SOLUTION SUBCUTANEOUS at 05:03

## 2022-01-01 RX ADMIN — PHENYLEPHRINE HYDROCHLORIDE 1.64 MICROGRAM(S)/KG/MIN: 10 INJECTION INTRAVENOUS at 17:10

## 2022-01-01 RX ADMIN — DEXMEDETOMIDINE HYDROCHLORIDE IN 0.9% SODIUM CHLORIDE 4.38 MICROGRAM(S)/KG/HR: 4 INJECTION INTRAVENOUS at 08:54

## 2022-01-01 RX ADMIN — HEPARIN SODIUM 5000 UNIT(S): 5000 INJECTION INTRAVENOUS; SUBCUTANEOUS at 23:07

## 2022-01-01 RX ADMIN — CHLORHEXIDINE GLUCONATE 15 MILLILITER(S): 213 SOLUTION TOPICAL at 06:06

## 2022-01-01 RX ADMIN — Medication 125 MILLIGRAM(S): at 17:03

## 2022-01-01 RX ADMIN — HYDROMORPHONE HYDROCHLORIDE 0.25 MILLIGRAM(S): 2 INJECTION INTRAMUSCULAR; INTRAVENOUS; SUBCUTANEOUS at 11:25

## 2022-01-01 RX ADMIN — Medication 265.62 MILLIGRAM(S): at 14:29

## 2022-01-01 RX ADMIN — DEXMEDETOMIDINE HYDROCHLORIDE IN 0.9% SODIUM CHLORIDE 4.38 MICROGRAM(S)/KG/HR: 4 INJECTION INTRAVENOUS at 00:02

## 2022-01-01 RX ADMIN — PHENYLEPHRINE HYDROCHLORIDE 1.64 MICROGRAM(S)/KG/MIN: 10 INJECTION INTRAVENOUS at 19:39

## 2022-01-01 RX ADMIN — Medication 5 MG/HR: at 19:40

## 2022-01-01 RX ADMIN — PANTOPRAZOLE SODIUM 40 MILLIGRAM(S): 20 TABLET, DELAYED RELEASE ORAL at 05:14

## 2022-01-01 RX ADMIN — Medication 2: at 17:31

## 2022-01-01 RX ADMIN — Medication 400 MILLIGRAM(S): at 11:27

## 2022-01-01 RX ADMIN — Medication 125 MICROGRAM(S): at 11:44

## 2022-01-01 RX ADMIN — ALBUTEROL 2.5 MILLIGRAM(S): 90 AEROSOL, METERED ORAL at 04:59

## 2022-01-01 RX ADMIN — Medication 265.62 MILLIGRAM(S): at 18:37

## 2022-01-01 RX ADMIN — Medication 40 MILLIGRAM(S): at 05:33

## 2022-01-01 RX ADMIN — Medication 5 MILLIGRAM(S): at 02:48

## 2022-01-01 RX ADMIN — Medication 25 MILLIGRAM(S): at 22:12

## 2022-01-01 RX ADMIN — HEPARIN SODIUM 5000 UNIT(S): 5000 INJECTION INTRAVENOUS; SUBCUTANEOUS at 21:21

## 2022-01-01 RX ADMIN — APIXABAN 5 MILLIGRAM(S): 2.5 TABLET, FILM COATED ORAL at 06:10

## 2022-01-01 RX ADMIN — DEXMEDETOMIDINE HYDROCHLORIDE IN 0.9% SODIUM CHLORIDE 4.38 MICROGRAM(S)/KG/HR: 4 INJECTION INTRAVENOUS at 08:30

## 2022-01-01 RX ADMIN — Medication 650 MILLIGRAM(S): at 18:23

## 2022-01-01 RX ADMIN — INSULIN HUMAN 4 UNIT(S)/HR: 100 INJECTION, SOLUTION SUBCUTANEOUS at 01:42

## 2022-01-01 RX ADMIN — MIDODRINE HYDROCHLORIDE 30 MILLIGRAM(S): 2.5 TABLET ORAL at 12:30

## 2022-01-01 RX ADMIN — Medication 125 MILLIGRAM(S): at 17:13

## 2022-01-01 RX ADMIN — CHLORHEXIDINE GLUCONATE 15 MILLILITER(S): 213 SOLUTION TOPICAL at 17:46

## 2022-01-01 RX ADMIN — APIXABAN 5 MILLIGRAM(S): 2.5 TABLET, FILM COATED ORAL at 17:14

## 2022-01-01 RX ADMIN — Medication 5 MG/HR: at 19:37

## 2022-01-01 RX ADMIN — Medication 40 MILLIGRAM(S): at 21:13

## 2022-01-01 RX ADMIN — MIDODRINE HYDROCHLORIDE 30 MILLIGRAM(S): 2.5 TABLET ORAL at 21:33

## 2022-01-01 RX ADMIN — Medication 166.67 MILLIGRAM(S): at 10:51

## 2022-01-01 RX ADMIN — MEROPENEM 100 MILLIGRAM(S): 1 INJECTION INTRAVENOUS at 20:03

## 2022-01-01 RX ADMIN — PANTOPRAZOLE SODIUM 40 MILLIGRAM(S): 20 TABLET, DELAYED RELEASE ORAL at 18:11

## 2022-01-01 RX ADMIN — PHENYLEPHRINE HYDROCHLORIDE 1.64 MICROGRAM(S)/KG/MIN: 10 INJECTION INTRAVENOUS at 19:44

## 2022-01-01 RX ADMIN — Medication 100 MILLIEQUIVALENT(S): at 03:28

## 2022-01-01 RX ADMIN — Medication 4 MILLILITER(S): at 22:52

## 2022-01-01 RX ADMIN — Medication 40 MILLIGRAM(S): at 05:20

## 2022-01-01 RX ADMIN — Medication 1000 MILLIGRAM(S): at 01:58

## 2022-01-01 RX ADMIN — Medication 1000 MILLIGRAM(S): at 07:07

## 2022-01-01 RX ADMIN — Medication 2: at 17:49

## 2022-01-01 RX ADMIN — Medication 125 MICROGRAM(S): at 11:40

## 2022-01-01 RX ADMIN — Medication 5 MILLIGRAM(S): at 21:56

## 2022-01-01 RX ADMIN — INSULIN HUMAN 2 UNIT(S)/HR: 100 INJECTION, SOLUTION SUBCUTANEOUS at 21:53

## 2022-01-01 RX ADMIN — Medication 100 MILLIGRAM(S): at 11:16

## 2022-01-01 RX ADMIN — Medication 1000 MILLIGRAM(S): at 17:00

## 2022-01-01 RX ADMIN — Medication 650 MILLIGRAM(S): at 18:04

## 2022-01-01 RX ADMIN — INSULIN GLARGINE 30 UNIT(S): 100 INJECTION, SOLUTION SUBCUTANEOUS at 07:00

## 2022-01-01 RX ADMIN — Medication 650 MILLIGRAM(S): at 23:13

## 2022-01-01 RX ADMIN — Medication 30 MILLIGRAM(S): at 05:02

## 2022-01-01 RX ADMIN — Medication 125 MILLIGRAM(S): at 00:44

## 2022-01-01 RX ADMIN — DEXMEDETOMIDINE HYDROCHLORIDE IN 0.9% SODIUM CHLORIDE 4.38 MICROGRAM(S)/KG/HR: 4 INJECTION INTRAVENOUS at 17:46

## 2022-01-01 RX ADMIN — SODIUM CHLORIDE 100 MILLILITER(S): 9 INJECTION, SOLUTION INTRAVENOUS at 19:09

## 2022-01-01 RX ADMIN — Medication 37.5 MILLIGRAM(S): at 09:44

## 2022-01-01 RX ADMIN — Medication 25 MILLIGRAM(S): at 05:21

## 2022-01-01 RX ADMIN — HYDROMORPHONE HYDROCHLORIDE 0.25 MILLIGRAM(S): 2 INJECTION INTRAMUSCULAR; INTRAVENOUS; SUBCUTANEOUS at 15:46

## 2022-01-01 RX ADMIN — REMDESIVIR 500 MILLIGRAM(S): 5 INJECTION INTRAVENOUS at 17:19

## 2022-01-01 RX ADMIN — CHLORHEXIDINE GLUCONATE 15 MILLILITER(S): 213 SOLUTION TOPICAL at 05:38

## 2022-01-01 RX ADMIN — Medication 5 MILLIGRAM(S): at 17:13

## 2022-01-01 RX ADMIN — INSULIN GLARGINE 30 UNIT(S): 100 INJECTION, SOLUTION SUBCUTANEOUS at 06:10

## 2022-01-01 RX ADMIN — Medication 1000 MILLIGRAM(S): at 11:15

## 2022-01-01 RX ADMIN — MEROPENEM 100 MILLIGRAM(S): 1 INJECTION INTRAVENOUS at 14:27

## 2022-01-01 RX ADMIN — CHLORHEXIDINE GLUCONATE 15 MILLILITER(S): 213 SOLUTION TOPICAL at 17:51

## 2022-01-01 RX ADMIN — Medication 100 MILLIGRAM(S): at 21:53

## 2022-01-01 RX ADMIN — ACETAZOLAMIDE 250 MILLIGRAM(S): 250 TABLET ORAL at 17:51

## 2022-01-01 RX ADMIN — CHLORHEXIDINE GLUCONATE 1 APPLICATION(S): 213 SOLUTION TOPICAL at 11:57

## 2022-01-01 RX ADMIN — Medication 15 MILLILITER(S): at 12:12

## 2022-01-01 RX ADMIN — QUETIAPINE FUMARATE 50 MILLIGRAM(S): 200 TABLET, FILM COATED ORAL at 06:25

## 2022-01-01 RX ADMIN — MEROPENEM 100 MILLIGRAM(S): 1 INJECTION INTRAVENOUS at 14:52

## 2022-01-01 RX ADMIN — Medication 2: at 00:32

## 2022-01-01 RX ADMIN — PIPERACILLIN AND TAZOBACTAM 25 GRAM(S): 4; .5 INJECTION, POWDER, LYOPHILIZED, FOR SOLUTION INTRAVENOUS at 03:19

## 2022-01-01 RX ADMIN — PHENYLEPHRINE HYDROCHLORIDE 1.64 MICROGRAM(S)/KG/MIN: 10 INJECTION INTRAVENOUS at 19:38

## 2022-01-01 RX ADMIN — Medication 15 MILLILITER(S): at 12:42

## 2022-01-01 RX ADMIN — Medication 125 MILLILITER(S): at 12:21

## 2022-01-01 RX ADMIN — Medication 650 MILLIGRAM(S): at 00:00

## 2022-01-01 RX ADMIN — MEROPENEM 100 MILLIGRAM(S): 1 INJECTION INTRAVENOUS at 13:38

## 2022-01-01 RX ADMIN — MIDODRINE HYDROCHLORIDE 30 MILLIGRAM(S): 2.5 TABLET ORAL at 04:27

## 2022-01-01 RX ADMIN — CHLORHEXIDINE GLUCONATE 15 MILLILITER(S): 213 SOLUTION TOPICAL at 05:15

## 2022-01-01 RX ADMIN — Medication 125 MILLIGRAM(S): at 00:19

## 2022-01-01 RX ADMIN — Medication 100 MILLIGRAM(S): at 12:14

## 2022-01-01 RX ADMIN — PHENYLEPHRINE HYDROCHLORIDE 1.64 MICROGRAM(S)/KG/MIN: 10 INJECTION INTRAVENOUS at 19:09

## 2022-01-01 RX ADMIN — Medication 650 MILLIGRAM(S): at 23:47

## 2022-01-01 RX ADMIN — Medication 5 MILLIGRAM(S): at 11:57

## 2022-01-01 RX ADMIN — CHLORHEXIDINE GLUCONATE 1 APPLICATION(S): 213 SOLUTION TOPICAL at 06:05

## 2022-01-01 RX ADMIN — Medication 2.5 MILLIGRAM(S): at 19:05

## 2022-01-01 RX ADMIN — QUETIAPINE FUMARATE 50 MILLIGRAM(S): 200 TABLET, FILM COATED ORAL at 17:16

## 2022-01-01 RX ADMIN — HYDROMORPHONE HYDROCHLORIDE 0.25 MILLIGRAM(S): 2 INJECTION INTRAMUSCULAR; INTRAVENOUS; SUBCUTANEOUS at 08:30

## 2022-01-01 RX ADMIN — PHENYLEPHRINE HYDROCHLORIDE 3.28 MICROGRAM(S)/KG/MIN: 10 INJECTION INTRAVENOUS at 03:09

## 2022-01-01 RX ADMIN — Medication 100 MILLIGRAM(S): at 11:26

## 2022-01-01 RX ADMIN — CHLORHEXIDINE GLUCONATE 15 MILLILITER(S): 213 SOLUTION TOPICAL at 05:07

## 2022-01-01 RX ADMIN — SODIUM CHLORIDE 1000 MILLILITER(S): 9 INJECTION INTRAMUSCULAR; INTRAVENOUS; SUBCUTANEOUS at 18:38

## 2022-01-01 RX ADMIN — Medication 125 MILLIGRAM(S): at 23:23

## 2022-01-01 RX ADMIN — Medication 25 GRAM(S): at 06:46

## 2022-01-01 RX ADMIN — CHLORHEXIDINE GLUCONATE 1 APPLICATION(S): 213 SOLUTION TOPICAL at 05:02

## 2022-01-01 RX ADMIN — HEPARIN SODIUM 5000 UNIT(S): 5000 INJECTION INTRAVENOUS; SUBCUTANEOUS at 14:43

## 2022-01-01 RX ADMIN — Medication 125 MILLIGRAM(S): at 12:08

## 2022-01-01 RX ADMIN — HYDROMORPHONE HYDROCHLORIDE 0.5 MILLIGRAM(S): 2 INJECTION INTRAMUSCULAR; INTRAVENOUS; SUBCUTANEOUS at 14:17

## 2022-01-01 RX ADMIN — CHLORHEXIDINE GLUCONATE 15 MILLILITER(S): 213 SOLUTION TOPICAL at 18:19

## 2022-01-01 RX ADMIN — HEPARIN SODIUM 5000 UNIT(S): 5000 INJECTION INTRAVENOUS; SUBCUTANEOUS at 21:20

## 2022-01-01 RX ADMIN — MIDODRINE HYDROCHLORIDE 10 MILLIGRAM(S): 2.5 TABLET ORAL at 14:28

## 2022-01-01 RX ADMIN — Medication 650 MILLIGRAM(S): at 00:08

## 2022-01-01 RX ADMIN — MIDODRINE HYDROCHLORIDE 10 MILLIGRAM(S): 2.5 TABLET ORAL at 06:11

## 2022-01-01 RX ADMIN — Medication 650 MILLIGRAM(S): at 05:22

## 2022-01-01 RX ADMIN — Medication 400 MILLIGRAM(S): at 05:42

## 2022-01-01 RX ADMIN — DEXMEDETOMIDINE HYDROCHLORIDE IN 0.9% SODIUM CHLORIDE 4.38 MICROGRAM(S)/KG/HR: 4 INJECTION INTRAVENOUS at 18:39

## 2022-01-01 RX ADMIN — Medication 650 MILLIGRAM(S): at 01:01

## 2022-01-01 RX ADMIN — PIPERACILLIN AND TAZOBACTAM 25 GRAM(S): 4; .5 INJECTION, POWDER, LYOPHILIZED, FOR SOLUTION INTRAVENOUS at 17:14

## 2022-01-01 RX ADMIN — AMIODARONE HYDROCHLORIDE 33.3 MG/MIN: 400 TABLET ORAL at 07:58

## 2022-01-01 RX ADMIN — Medication 250 MICROGRAM(S): at 16:05

## 2022-01-01 RX ADMIN — SODIUM CHLORIDE 100 MILLILITER(S): 9 INJECTION, SOLUTION INTRAVENOUS at 07:46

## 2022-01-01 RX ADMIN — Medication 125 MILLILITER(S): at 09:40

## 2022-01-01 RX ADMIN — Medication 4 MILLILITER(S): at 19:35

## 2022-01-01 RX ADMIN — Medication 125 MICROGRAM(S): at 12:28

## 2022-01-01 RX ADMIN — Medication 100 MILLIGRAM(S): at 05:16

## 2022-01-01 RX ADMIN — Medication 20 MILLIGRAM(S): at 22:49

## 2022-01-01 RX ADMIN — PHENYLEPHRINE HYDROCHLORIDE 1.64 MICROGRAM(S)/KG/MIN: 10 INJECTION INTRAVENOUS at 07:58

## 2022-01-01 RX ADMIN — INSULIN GLARGINE 20 UNIT(S): 100 INJECTION, SOLUTION SUBCUTANEOUS at 08:12

## 2022-01-01 RX ADMIN — Medication 400 MILLIGRAM(S): at 14:43

## 2022-01-01 RX ADMIN — DEXMEDETOMIDINE HYDROCHLORIDE IN 0.9% SODIUM CHLORIDE 4.38 MICROGRAM(S)/KG/HR: 4 INJECTION INTRAVENOUS at 07:27

## 2022-01-01 RX ADMIN — Medication 2: at 05:14

## 2022-01-01 RX ADMIN — FENTANYL CITRATE 100 MICROGRAM(S): 50 INJECTION INTRAVENOUS at 13:46

## 2022-01-01 RX ADMIN — INSULIN GLARGINE 20 UNIT(S): 100 INJECTION, SOLUTION SUBCUTANEOUS at 07:28

## 2022-01-12 NOTE — ED ADULT TRIAGE NOTE - CHIEF COMPLAINT QUOTE
Notification to room #20 for AMS after being found in his bathroom. PT down for possibly 3 days. PT presents with multiple pressure ulcers. B/S of 174 in the field.

## 2022-01-12 NOTE — CONSULT NOTE ADULT - ASSESSMENT
77 y/o male hx DM HTN presents to ER for fall found to be in rapid afib and found to have elevated troponin    Plan:  - New afib with RVR. No hx of antiarrythmic use or AC use. S/p 5mg IVP Lopressor in the ER, can start Lopressor PO 12.5MG BID and uptitrate as needed. Unable to initiate anticoagulation on this patient considering acute hemorrhage of right basal ganglia  - Troponin of 936. EKG rapid afib with RVR with some ST depression noted in anterior leads. Patient denies any chest pain. Troponin most likely elevated in the setting of rhabdo. Doubt ACS, however would not be able to treat given acute hemorrhage of right basal ganglia  - Keep K > 4 and Mg > 2  - Admit to telemetry floor  - ECHO   - Attending attestation to follow    - Case discussed with Malvin Anaya MD   77 y/o male hx DM HTN presents to ER for fall found to be in rapid afib and found to have elevated troponin    Plan:  - New afib with RVR. No hx of antiarrythmic use or AC use. S/p 5mg IVP Lopressor in the ER, can start Lopressor PO 12.5MG BID and uptitrate as needed. Unable to initiate anticoagulation on this patient considering acute hemorrhage of right basal ganglia  - Troponin of 936. EKG rapid afib with RVR with some ST depression noted in anterior leads. Patient denies any chest pain. Troponin most likely elevated in the setting of rhabdo. Doubt ACS, however would not be able to treat given acute hemorrhage of right basal ganglia. Not necessary to continue to trend troponins   - Keep K > 4 and Mg > 2  - Admit to telemetry floor  - ECHO   - Attending attestation to follow    - Case discussed with Malvin Anaya MD

## 2022-01-12 NOTE — CONSULT NOTE ADULT - SUBJECTIVE AND OBJECTIVE BOX
Patient is a 75yo RH gentleman who presents to the facility on the evening of 22 after being found down in the bathroom s/p fall. Patient harbors a past medical history significant for DM II and HTN. Last known normal of 22. As per daughter at bedside and is aiding in history - states she last talked with patient 22 in the evening and has been unable to get in touch with patient since. Today she called EMS who had to break into to house to find the patient floor in the bathroom wedged between bathtub and sink on the floor. Patient slipped and fell and was unable to get up since the evening of  and possesses has multiple bruises and ulceration/blisters on pressure points which were touching floor and sink. Daughter denies any antiplatelet or anticoagulant use on the behalf of the patient prior to hospital arrival. Daughter describes patient to be living independently, managing his own finances, ambulating without the need of a cane or a walker. She does disclose that there have been concerns of some mild cognitive impairment. NIHSS of 16. CT Head on arrival demonstrating: Acute hemorrhage right basal ganglia 1.8 x 2.2 x 3.6 cm. ICH Score of 0, ICH volume of 8.9ml (cm^3). Notable labs of initial Troponin of 936, CK of 902, WBC of 19.68. Rapid Atrial Fibrillation noted on telemetry, EKG with st depressions in anterior lat and inferior leads. Neurology consulted for acute intracranial hemorrhage.        (Stroke only)  NIHSS: 16  pre-MRS: 0  ICH:     PAST MEDICAL & SURGICAL HISTORY:  DM (diabetes mellitus)    HTN (hypertension)    MEDICATIONS  (STANDING):  metoprolol tartrate Injectable 5 milliGRAM(s) IV Push Once    MEDICATIONS  (PRN):    ALLERGIES/INTOLERANCES:  Allergies  No Known Allergies    Intolerances    VITALS & EXAMINATION:  Vital Signs Last 24 Hrs  T(C): 37.1 (2022 22:01), Max: 37.1 (2022 22:01)  T(F): 98.7 (2022 22:01), Max: 98.7 (2022 22:01)  HR: 156 (2022 22:01) (132 - 156)  BP: 111/63 (2022 22:01) (104/55 - 147/81)  BP(mean): --  RR: 19 (2022 22:01) (19 - 28)  SpO2: 100% (2022 22:01) (100% - 100%)    General:  Constitutional: Male, appears stated age  Head: Normocephalic & atraumatic.  Extremities: No cyanosis, clubbing, or edema. Distal LLE cold to touch in comparison to RLE.   Skin: Multiple sites of bruising and discoloration in extremities    Neurological (>12):  MS: Awake, alert, oriented to person, place, situation, but not to month or year. Normal affect. Follows all commands.    Language: Speech is severely dysarthric fluent with good repetition & comprehension (able to name objects: glove, pen)    CNs: PERRLA (R = 2mm, L = 2mm). VFF. EOMI no nystagmus, . L lower facial palsy, full eye closure strength b/l. Hearing grossly normal (rubbing fingers) b/l. Gag reflex deferred. Head turning & shoulder shrug intact b/l. Tongue midline, normal movements, no atrophy.      Motor: Normal muscle bulk & diminished tone in LUE and LLE. No noticeable tremor or seizure.                 Deltoid	Biceps	Triceps	Wrist	   R	4	5	4	4	5 	  L	0	0	0	0	1    	H-Flex	H-Ext	D-Flex	P-Flex  R	5	5	4	4 	   L	1	2	1	1	     Sensation: Diminished sensation to LT/PP/Temp in LUE and LLE    Cortical: Extinction on DSS (neglect): Extinguishes L side    Reflexes:              Biceps(C5)       BR(C6)         Plantar Resp  R	2	          2	                  Down   L	2	          2	                  Up    Coordination: No dysmetria to FTN in RUE    LABORATORY:  CBC                       20.3   19.68 )-----------( 316      ( 2022 16:51 )             61.6     Chem 01-12    146<H>  |  102  |  62<H>  ----------------------------<  180<H>  4.5   |  25  |  1.19    Ca    10.0      2022 16:52    TPro  8.4<H>  /  Alb  4.5  /  TBili  1.1  /  DBili  x   /  AST  35  /  ALT  36  /  AlkPhos  98  01-12    LFTs LIVER FUNCTIONS - ( 2022 16:52 )  Alb: 4.5 g/dL / Pro: 8.4 g/dL / ALK PHOS: 98 U/L / ALT: 36 U/L / AST: 35 U/L / GGT: x           Coagulopathy PT/INR - ( 2022 16:51 )   PT: 12.5 sec;   INR: 1.10 ratio         PTT - ( 2022 16:51 )  PTT:27.0 sec  Lipid Panel   A1c   Cardiac enzymes CARDIAC MARKERS ( 2022 16:52 )  x     / x     / 902 U/L / x     / x          U/A Urinalysis Basic - ( 2022 18:01 )    Color: Yellow / Appearance: Clear / S.029 / pH: x  Gluc: x / Ketone: Small  / Bili: Negative / Urobili: <2 mg/dL   Blood: x / Protein: 300 mg/dL / Nitrite: Negative   Leuk Esterase: Negative / RBC: 15 /HPF / WBC 3 /HPF   Sq Epi: x / Non Sq Epi: 2 /HPF / Bacteria: Occasional      STUDIES & IMAGING:  Studies (EKG, EEG, EMG, etc):     Radiology (XR, CT, MR, U/S, TTE/TANA):    CT Head No Cont (22 @ 18:51)     FINDINGS:  Ventricles and sulci: Parenchymal volume loss is present which is   commensurate with patient age.  Intra-axial: There are hemispheric white matter areas of low attenuation   which are nonspecific but likely related to sequelae of microvascular   disease.  Acute hemorrhage right basal ganglia 1.8 x 2.2 x 3.6 cm.  Extra-axial: There is no extra-axial collection.  Visualized sinuses: No air-fluid levels are identified. Clear.  Visualized mastoids:  Clear.  Calvarium: Unremarkable.  Miscellaneous:  Streak artifact left posterior scalp obscures some   visualization.    Impression: See below    ===========================================================================  ===========      CT CERVICAL SPINE:    TECHNIQUE:  Axial images were obtained through the cervical spine using   multislice helical technique.  Reformatted coronal and sagittal images   were performed.    COMPARISON EXAMINATION:  None available at this time.    FINDINGS:  On the sagittal reformations, there is no prevertebral soft tissue   swelling. There is no splaying of the spinous processes.  On the coronal reformations, occipital condyles are normal. Lateral   masses of C1 align normally with C2.  On the axial images, no lucent fracture line is identified.    Multilevel degenerative osteoarthritis is present. Findings include   marginal osteophytes, uncovertebral spurring, and facet joint space   compartment narrowing with subchondral sclerosis and hypertrophic   osteophytes at multiple levels. There is multilevel degenerative disc   disease. Findings include loss of normal disc space height and endplate   sclerosis.    Miscellaneous:  None.    IMPRESSIONS:    Head CT: Acute hemorrhage right basal ganglia 1.8 x 2.2 x 3.6 cm.   consider hypertensive hemorrhage rather than traumatic etiology.    C-spine CT:  No acute fracture.

## 2022-01-12 NOTE — ED PROVIDER NOTE - CARE PLAN
1 Principal Discharge DX:	Altered mental status  Secondary Diagnosis:	Uncontrolled atrial fibrillation  Secondary Diagnosis:	AMARJIT (acute kidney injury)  Secondary Diagnosis:	Polycythemia  Secondary Diagnosis:	Intracerebral hemorrhage  Secondary Diagnosis:	Elevated troponin

## 2022-01-12 NOTE — ED PROVIDER NOTE - OBJECTIVE STATEMENT
77 y/o male hx DM HTN presents to ER for fall. As per daughter who is aiding in history - states last talked with patient 1/9/22 in the evening and has been unable to get in touch with patient since. Today called EMS and had to break into to house to find patient. Found patient on floor in bathroom wedged between bathub and sink on the floor. Pt. states slipped and fell and was unable to get up since sunday evening. c/o back pain joint pain and chills. has multiple bruises and ulceration/blisters on pressure points which were touching floor and sink. Pt. A&O x 2 currently. Unable to obtain more of a history at this time.

## 2022-01-12 NOTE — CONSULT NOTE ADULT - SUBJECTIVE AND OBJECTIVE BOX
CHIEF COMPLAINT: fall    HISTORY OF PRESENT ILLNESS:  75 y/o male hx DM HTN presents to ER for fall. As per daughter who is aiding in history - states last talked with patient 1/9/22 in the evening and has been unable to get in touch with patient since. Today called EMS and had to break into to house to find patient. Found patient on floor in bathroom wedged between bathub and sink on the floor. Pt. states slipped and fell and was unable to get up since sunday evening. c/o back pain joint pain and chills. has multiple bruises and ulceration/blisters on pressure points which were touching floor and sink. The patient denies any chest pain, palpitations, sob, dizziness. Denies any hx of afib in the past or use of anticoagulants.       Allergies: No Known Allergies      	    MEDICATIONS:  metoprolol tartrate Injectable 5 milliGRAM(s) IV Push Once                  PAST MEDICAL & SURGICAL HISTORY:  DM (diabetes mellitus)    HTN (hypertension)        FAMILY HISTORY:      SOCIAL HISTORY:    [x] Non-smoker  [ ] Smoker  [ ] Alcohol  [ ] Denies Alcohol      REVIEW OF SYSTEMS:  CONSTITUTIONAL: + chills, no fevers   EYES/ENT: No visual changes; No vertigo or throat pain  NECK: No JVD  RESPIRATORY:  No cough, wheezing, chills or hemoptysis, SOB  CARDIOVASCULAR: No chest pain, palpitations, passing out, dizziness, or leg swelling  GASTROINTESTINAL: No abdominal or epigastric pain. No nausea/vomiting/melena  Genitourinary: No dysuria, frequency or hematuria  NEUROLOGICAL: No numbness or weakness  SKIN: + bruising  MSK; + pain      PHYSICAL EXAM:  T(C): 36.7 (01-12-22 @ 20:15), Max: 36.7 (01-12-22 @ 20:15)  HR: 150 (01-12-22 @ 20:15) (132 - 150)  BP: 116/59 (01-12-22 @ 20:15) (104/55 - 147/81)  RR: 20 (01-12-22 @ 20:15) (20 - 28)  SpO2: 100% (01-12-22 @ 20:15) (100% - 100%)    I&O's Summary        Appearance: 75 y/o male laying in bed, ill appearing 		  Cardiovascular: irregular rtate and rhythm, No JVD, No murmurs, No edema  Respiratory: Lungs clear to auscultation	  Psychiatry: A & O x 2, Mood & affect appropriate  Gastrointestinal:  Soft, Non-tender, + BS	  Skin: No rashes, No ecchymoses, No cyanosis. bl knees: hemorrahgic blisters w/ escar to medial right knee and  lateral left.	  Neurologic: Non-focal  Extremities: Warm and well perfused. 2+ pulses bilaterally        LABS:	 	    CBC Full  -  ( 12 Jan 2022 16:51 )  WBC Count : 19.68 K/uL  Hemoglobin : 20.3 g/dL  Hematocrit : 61.6 %  Platelet Count - Automated : 316 K/uL  Mean Cell Volume : 89.3 fL  Mean Cell Hemoglobin : 29.4 pg  Mean Cell Hemoglobin Concentration : 33.0 gm/dL  Auto Neutrophil # : 16.04 K/uL  Auto Lymphocyte # : 1.07 K/uL  Auto Monocyte # : 1.41 K/uL  Auto Eosinophil # : 1.01 K/uL  Auto Basophil # : 0.05 K/uL  Auto Neutrophil % : 81.5 %  Auto Lymphocyte % : 5.4 %  Auto Monocyte % : 7.2 %  Auto Eosinophil % : 5.1 %  Auto Basophil % : 0.3 %    01-12    146<H>  |  102  |  62<H>  ----------------------------<  180<H>  4.5   |  25  |  1.19    Ca    10.0      12 Jan 2022 16:52    TPro  8.4<H>  /  Alb  4.5  /  TBili  1.1  /  DBili  x   /  AST  35  /  ALT  36  /  AlkPhos  98  01-12          CARDIAC MARKERS:  936----            Creatine Kinase, Serum: 902 U/L (01-12-22 @ 16:52)    CT Head:  < from: CT Head No Cont (01.12.22 @ 18:51) >    IMPRESSIONS:    Head CT: Acute hemorrhage right basal ganglia 1.8 x 2.2 x 3.6 cm.   consider hypertensive hemorrhage rather than traumatic etiology.    C-spine CT:  No acute fracture.    < end of copied text >      TELEMETRY: rapid afib with rvr 	    ECG: rapid afib with rvr @ 148 bpm. Some ST depressions noted in the anterior leads	    PREVIOUS DIAGNOSTIC TESTING:    No previous diagnostic testing

## 2022-01-12 NOTE — ED ADULT NURSE REASSESSMENT NOTE - NS ED NURSE REASSESS COMMENT FT1
received report from day shift RN Reina. received Pt in room 20. pt A&OX3, pt appears lethargic, sleeping, daughter at bedside. vitals as noted. HR in the 150's. MD Wyman aware, as per MD no further interventions needed at this time. received pt on 3L nasal cannula, tolerating well at this time, no respiratory distress noted, sating 100%. pt with rash in between b/l knees. denies any complaints at this time. pt medicated as per orders. safety maintained. awaiting further plan. will reassess and monitor.

## 2022-01-12 NOTE — ED PROVIDER NOTE - PROGRESS NOTE DETAILS
Vince, PGY-2  Received signout on patient with recent call from radiology c/f basal ganglia hemorrhage. Neurosurgery consulted and saw patient at bedside. Recommending CTA head/neck and repeat CT head in 4hours. Troponin noted to be 902. Will hold AC in the setting of brain bleed and repeat troponin. Pending CPK Vince, PGY-2  Received signout on patient with recent call from radiology c/f basal ganglia hemorrhage. Neurosurgery consulted and saw patient at bedside. Recommending CTA head/neck and repeat CT head in 4hours. Troponin noted to be 936. Will hold AC in the setting of brain bleed and repeat troponin. Pending CPK Vince, PGY-2  EKG just performed showing ST depression in V2-V6 with troponin at 936. Cardiology consulted and david see patient at bedside Vince, PGY-2  Neurology consulted as per neurosurgery in setting of possible hypertensive hemorrhage Vince, PGY-2  EKG just performed showing ST depression in V2-V6 with troponin at 936. Cardiology consulted and will see patient at bedside Vince, PGY-2  Patient back in Afib with RVR. Cardiology aware. Will trial 5mg of lopressor and reassess Vince, PGY-2  Patient still in afib with RVR with rates in 140s. Will trial another 5mg of lopressor Vicne, PGY-2  Patient in normal sinus rhythm on monitor. EKG to be performed and documented Vince, PGY-2  Neurology and Nurse informed me of LLE cool to touch with no palpable pulse. Assessed patient with no palpable pulse, cool to touch, unable to obtain pulse with doppler. Vascular paged Vince, PGY-2  Vascular aware and to see patient. Requesting [facility] duplex at this time Vince, PGY-2  Ultrasound called and unable to perform arterial study overnight. Ordered a CT of lower extremity and called radiology and patient within 24 hr contrast load Vince, PGY-2  CTA lower extremity performed. Vascular aware. Called radiology for expedited read Vince, PGY-2  Vascular aware and to see patient. Requesting VA duplex at this time.     Repeat 4hr CT head with no interval change Vince, PGY-2  Radiology called with pre-naidu read of no flow distal to mid thigh femoral artery. Vascular made aware Vince, PGY-2  Vascular reports that there is no acute intervention at this time as patient's limb is unsalvageable at this point and will require amputation.

## 2022-01-12 NOTE — ED PROVIDER NOTE - ATTENDING CONTRIBUTION TO CARE
CRITICAL CARE TIME WAS NECESSARY TO TREAT OR PREVENT LIFE THREATENING DETERIORATION FROM THE FOLLOWING CONDITIONS:  [ X ] Heart Failure and/or Circulatory Collapse and or MI/NSTEMI  [  ] Sepsis [  ] Respiratory failure  [ X ]CNS Failure or Compromise    [  ]Dehydration [ X]Renal Failure [  ]Hepatic Failure Sepsis [  ]Endocrine Crisis  [ X ]Metabolic Crisis  [  ]Toxidrome   [  ]Trauma    CRITICAL CARE TIME WAS SPENT BY ME IN THE FOLLOWING ACTIVITIES:  [X  ] Performance of the History and Physical Examination [X  ] Review of Old Charts [   ] IV Access and or Obtaining Necessary Diagnostic Tests   [X  ] Ordering and Performing Treatments and Interventions:   Specifically:  [  ] Ventilator Management [  ] Vascular Access Procedures [  ] NGT Placement  [  ] Transcutaneous Pacing  [ X ] Establishment of Goals of Care with the Patient or Their Designated Representative  [X  ] Developing and Evaluating Treatment Plan with Consultants and/or the Primary Provider  [  ] Serial Reevaluation of the Patients Condition and Response to Therapeutic Measures   Specifically: [  ] Interpretation of Cardiac and Respiratory Parameters  [X  ]Ordering and Interpretating  Diagnostic Studies  Comments:    The patient is a 76y Male who has a past medical and surgery history of HTN DM with altered MS after a fall days ago wedged in space in BR as described unable to move w/o access to food/fluids meds    Vital Signs Last 24 Hrs  HR: 132 BP: 104/55 RR: 22 SpO2: 100% (12 Jan 2022 16:26) (100% - 100%)  PE: as described; my additions and exceptions are noted in the chart    DATA:  EKG: UCAF@148; st depressions in anterior lat and inferior leads   LAB: Pending at time of evaluation    IMPRESSION/RISK:  Dx=multiple wounds from stasis being fixed on floor for long period of time  Differential includes but not limited to conditions listed in order of most possible first:   Consideration include  Plan  sodium chloride 0.9% Bolus 1000 milliLiter(s) IV Bolus will repeat prn and as per clinical response   "pan scan"  labs/trop/cpk/ekg/vbg   plain films of extremities  once BP increased and able to tolerate will perform rate control no ac or   further workup diagnostics dependent on results and response CRITICAL CARE TIME of 70 minutes WAS NECESSARY TO TREAT OR PREVENT LIFE THREATENING DETERIORATION FROM THE FOLLOWING CONDITIONS:  [ X ] Heart Failure and/or Circulatory Collapse and or MI/NSTEMI  [  ] Sepsis [  ] Respiratory failure  [ X ]CNS Failure or Compromise    [  ]Dehydration [ X]Renal Failure [  ]Hepatic Failure Sepsis [  ]Endocrine Crisis  [ X ]Metabolic Crisis  [  ]Toxidrome   [  ]Trauma    CRITICAL CARE TIME WAS SPENT BY ME IN THE FOLLOWING ACTIVITIES:  [X  ] Performance of the History and Physical Examination [X  ] Review of Old Charts [   ] IV Access and or Obtaining Necessary Diagnostic Tests   [X  ] Ordering and Performing Treatments and Interventions:   Specifically:  [  ] Ventilator Management [  ] Vascular Access Procedures [  ] NGT Placement  [  ] Transcutaneous Pacing  [ X ] Establishment of Goals of Care with the Patient or Their Designated Representative  [X  ] Developing and Evaluating Treatment Plan with Consultants and/or the Primary Provider  [  ] Serial Reevaluation of the Patients Condition and Response to Therapeutic Measures   Specifically: [  ] Interpretation of Cardiac and Respiratory Parameters  [X  ]Ordering and Interpretating  Diagnostic Studies  Comments:    The patient is a 76y Male who has a past medical and surgery history of HTN DM with altered MS after a fall days ago wedged in space in BR as described unable to move w/o access to food/fluids meds    Vital Signs Last 24 Hrs  HR: 132 BP: 104/55 RR: 22 SpO2: 100% (12 Jan 2022 16:26) (100% - 100%)  PE: as described; my additions and exceptions are noted in the chart    DATA:  EKG: UCAF@148; st depressions in anterior lat and inferior leads   LAB: Pending at time of evaluation    IMPRESSION/RISK:  Dx=multiple wounds from stasis being fixed on floor for long period of time  Differential includes but not limited to conditions listed in order of most possible first:   Consideration include  Plan  sodium chloride 0.9% Bolus 1000 milliLiter(s) IV Bolus will repeat prn and as per clinical response   "pan scan"  labs/trop/cpk/ekg/vbg   plain films of extremities  once BP increased and able to tolerate will perform rate control no ac or   further workup diagnostics dependent on results and response

## 2022-01-12 NOTE — CONSULT NOTE ADULT - ASSESSMENT
76y M with R. basal ganglia heme.    Recc:  - rpt cth 4-6h for stability  - CTA h/n   - Neurology consult    d/w attending  76y M with R. basal ganglia heme.    Recc:  - rpt cth 4-6h for stability  - CTA h/n   - Neurology consult    addendum at 12:30 am 1/13/22  interval CTH done appears stable, neurology following.  Recc:  rpt CTH in 24h  q4 neuro checks/ q4 vitals    d/w attending

## 2022-01-12 NOTE — ED PROVIDER NOTE - CLINICAL SUMMARY MEDICAL DECISION MAKING FREE TEXT BOX
75 y/o male s/p slip and fall - stuck on ground >72 hours c/o pain and chills  -concern for rhabo, severe dehydration, r/o fractures/injury  labs ck trop ekg  -ct head cspine ct chest abd  -xr hip pelvis xr knees bl

## 2022-01-12 NOTE — ED PROVIDER NOTE - ST/T WAVE
ST depression v2-v6 Improvement in ST depression in V2-V3, with slight depressions (improved) in V4-V6

## 2022-01-12 NOTE — ED PROVIDER NOTE - SECONDARY DIAGNOSIS.
Intracerebral hemorrhage Elevated troponin Uncontrolled atrial fibrillation AMARJIT (acute kidney injury) Polycythemia
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
## 2022-01-12 NOTE — ED PROVIDER NOTE - NS ED ATTENDING STATEMENT MOD
I have personally provided the amount of critical care time documented below concurrently with the resident/fellow.  This time excludes time spent on separate procedures and time spent teaching. I have reviewed the resident’s / fellow’s documentation and I agree with the history, exam, and assessment and plan of care. Attending with

## 2022-01-12 NOTE — CONSULT NOTE ADULT - SUBJECTIVE AND OBJECTIVE BOX
NEUROSURGERY CONSULT    HPI: 76y Male 75 y/o male hx DM HTN presents to ER for fall. As per daughter who is aiding in history - states last talked with patient 1/9/22 in the evening and has been unable to get in touch with patient since. Today called EMS and had to break into to house to find patient. Found patient on floor in bathroom wedged between bathub and sink on the floor. Pt. states slipped and fell and was unable to get up since sunday evening. c/o back pain joint pain and chills. has multiple bruises and ulceration/blisters on pressure points which were touching floor and sink. Not on any anticoagulation.    RADIOLOGY: IMPRESSIONS:    Head CT: Acute hemorrhage right basal ganglia 1.8 x 2.2 x 3.6 cm.   consider hypertensive hemorrhage rather than traumatic etiology.    C-spine CT:  No acute fracture.      MEDS:      PHYSICAL EXAM: awake, A&Ox2, fc  perrl  R. UE, R. LE antigravity  L.UE 0/5 L. LE withdraws  L. elbow bruise, L. LE knee swelling and bruise    Vital Signs Last 24 Hrs  T(C): 36.7 (12 Jan 2022 20:15), Max: 36.7 (12 Jan 2022 20:15)  T(F): 98 (12 Jan 2022 20:15), Max: 98 (12 Jan 2022 20:15)  HR: 150 (12 Jan 2022 20:15) (132 - 150)  BP: 116/59 (12 Jan 2022 20:15) (104/55 - 147/81)  BP(mean): --  RR: 20 (12 Jan 2022 20:15) (20 - 28)  SpO2: 100% (12 Jan 2022 20:15) (100% - 100%)    LABS:                        20.3   19.68 )-----------( 316      ( 12 Jan 2022 16:51 )             61.6     01-12    146<H>  |  102  |  62<H>  ----------------------------<  180<H>  4.5   |  25  |  1.19    Ca    10.0      12 Jan 2022 16:52    TPro  8.4<H>  /  Alb  4.5  /  TBili  1.1  /  DBili  x   /  AST  35  /  ALT  36  /  AlkPhos  98  01-12    PT/INR - ( 12 Jan 2022 16:51 )   PT: 12.5 sec;   INR: 1.10 ratio         PTT - ( 12 Jan 2022 16:51 )  PTT:27.0 sec

## 2022-01-13 NOTE — H&P ADULT - PROBLEM SELECTOR PLAN 8
DVT Prophylaxis: no AC, with active/ stable intracerebral hemorrhage  Diet: Pending dysphagia screen  Dispo:  GOC: Pending discussion between patient and Daughter. Daughter does not know if he would want intubation/ resucutation. Full code at this time

## 2022-01-13 NOTE — H&P ADULT - NSHPPHYSICALEXAM_GEN_ALL_CORE
PHYSICAL EXAM:  GENERAL: ill appearing lying supine  HEENT: NC/AT, EOMI, PERRL  NERVOUS SYSTEM:  A&Ox2, LT sided motor defecits Upper and Lowr Ext, touch and sensation intact throughout, Lt sided facial droop, LT sided apraxia  NECK: Stiff, No JVD,   CHEST/LUNG: CTAB, no increased WOB  HEART: RRR, no m/r/g  ABDOMEN: + lower abdominal erythema band like, soft, NT, ND, BS+  EXTREMITIES:  , no clubbing, no edema  NERVOUS SYSTEM:  A&Ox3, no focal deficits  MSK: FROM all 4 extremities, full and equal strength  SKIN: No rashes or lesions PHYSICAL EXAM:  GENERAL: ill appearing lying supine  HEENT: NC/AT, EOMI, PERRL  NERVOUS SYSTEM:  A&Ox1, LT sided motor hemiplegia Upper and Lowr Ext, touch and sensation intact throughout, Lt sided facial droop; dysarthria, CNVII-XII intact, LT sided apraxia  NECK: Stiff, No JVD,   CHEST/LUNG: CTAB, no increased WOB  HEART: RRR, no m/r/g  ABDOMEN: + lower abdominal erythema band like, soft, NT, ND, BS+  EXTREMITIES: no lt dorsalis pedis pulse, faint 1+ Rt dorsalis pedis , no clubbing, no edema  NERVOUS SYSTEM:  A&Ox3, no focal deficits  MSK: FROM all 4 extremities, full and equal strength  SKIN: bilateral knee pressure ulcers medially
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
## 2022-01-13 NOTE — H&P ADULT - PROBLEM SELECTOR PLAN 2
Pt found tachy to 150's, Afibb w RVR intermittent, most likely 2/2 hypovolemia;   responsive to Lopressor 5 IV pushes  No known cardiac history  Pt cannot tolerate Antiarrythmics i/s/o no AC w/t his intercerebral hemorrhage  ELevated Troponins most likely i/s/o Rhabo  Plan:  - IV Lopressor 5mg if in Afibb with HR> 130-140's sustained  - addl 500cc bolus  - Echo Stat  - EP following, reccs appreciated  - Cardiology following reccs appreciated  - Metoprolol 12.5 BID twice daily  - TSH

## 2022-01-13 NOTE — CONSULT NOTE ADULT - SUBJECTIVE AND OBJECTIVE BOX
VASCULAR SURGERY CONSULT NOTE    Patient is a 76y old  Male who presents with a chief complaint of altered mental status    HPI:  76 year old male with hx of HTN, DM brought in by daughter for altered mental status after found down on floor. Patient AAOx1-2, history obtained from pt and chart review. Patient fell 4 days ago, unable to get up after. Family unable to reach pt today, EMS called and broke into house. Pt was found down on bathroom floor, wedged between bathtub and sink. Patient complains of back, LE pain x started after fall 4 days ago.     In ED, patient was found to have acute R basal ganglia hemorrhage likely HTN > traumatic. CT N shows mild to mod R and mild L ICA stenosis. Repeat CTH stable. CT C/A/P neg for traumatic pathology. Pt was also found  to have cooler RLE than LLE    10-points review of system performed with pertinent negative and postive findings documented in the HPI     PAST MEDICAL & SURGICAL HISTORY:  DM (diabetes mellitus)    HTN (hypertension)    FAMILY HISTORY:  : Family history not pertinent as reviewed with the patient and family    SOCIAL HISTORY: No pertinent social history    MEDICATIONS  (STANDING):    MEDICATIONS  (PRN):    Allergies    No Known Allergies    Intolerances    Vital Signs Last 24 Hrs  T(C): 36.6 (2022 05:15), Max: 37.1 (2022 22:01)  T(F): 97.8 (2022 05:15), Max: 98.7 (2022 22:01)  HR: 98 (2022 05:15) (82 - 156)  BP: 145/76 (2022 05:15) (104/55 - 147/81)  BP(mean): --  RR: 18 (2022 05:15) (16 - 28)  SpO2: 99% (2022 05:15) (99% - 100%)  Daily     Daily     Exam:  General: resting in bed, NAD  Resp: nonlabored breathing  Vascular:  Palpable femoral b/l  RLE DP/PT signals; foot warm, good cap refill; motor sensory grossly intact  LLE foot cold to touch from shin down; no DP/PT signal; absent motor or sensory from knee down  Neuro: AAOx1-2                        20.3   19.68 )-----------( 316      ( 2022 16:51 )             61.6         146<H>  |  102  |  62<H>  ----------------------------<  180<H>  4.5   |  25  |  1.19    Ca    10.0      2022 16:52    TPro  8.4<H>  /  Alb  4.5  /  TBili  1.1  /  DBili  x   /  AST  35  /  ALT  36  /  AlkPhos  98      PT/INR - ( 2022 16:51 )   PT: 12.5 sec;   INR: 1.10 ratio    PTT - ( 2022 16:51 )  PTT:27.0 sec  Urinalysis Basic - ( 2022 18:01 )    Color: Yellow / Appearance: Clear / S.029 / pH: x  Gluc: x / Ketone: Small  / Bili: Negative / Urobili: <2 mg/dL   Blood: x / Protein: 300 mg/dL / Nitrite: Negative   Leuk Esterase: Negative / RBC: 15 /HPF / WBC 3 /HPF   Sq Epi: x / Non Sq Epi: 2 /HPF / Bacteria: Occasional    IMAGING STUDIES:  < from: CT Head No Cont (22 @ 18:51) >  IMPRESSIONS:    Head CT: Acute hemorrhage right basal ganglia 1.8 x 2.2 x 3.6 cm.   consider hypertensive hemorrhage rather than traumatic etiology.    C-spine CT:  No acute fracture.    < end of copied text >  < from: CT Chest No Cont (22 @ 18:53) >  IMPRESSION:  No acute intrathoracic or intra-abdominal pathology.    < end of copied text >  < from: CT Angio Neck w/ IV Cont (22 @ 21:18) >  CT angiography neck: No major vessel occlusion or hemodynamically   significant stenosis by NASCET criteria. Mild to moderate right and mild   left internal carotid artery stenosis at the origins. No vascular   dissection.    CT angiography brain: No major vessel occlusion or proximal stenosis. No   evidence of aneurysm or other vascular malformation. No evidence of   active extravasation into the right basal ganglia intraparenchymal   hemorrhage.    Postcontrast CT head: Unchanged right basal ganglia intraparenchymal   hemorrhage with associated mass effect. No abnormal enhancement.    < end of copied text >  < from: CT Angio Lower Extremity w/ IV Cont, Left (22 @ 03:37) >    IMPRESSION:    Age-indeterminate occlusion with no significant flow or distal   reconstitution at the left mid/distal femoral artery, popliteal artery   and leg arteries.    Nonspecific stranding/edema in bilateral lower extremity soft tissue.   Recommend clinical correlation to assess cellulitis.    < end of copied text >      < from: CT Angio Lower Extremity w/ IV Cont, Left (22 @ 03:37) >    IMPRESSION:    Age-indeterminate occlusion with no significant flow or distal   reconstitution at the left mid/distal femoral artery, popliteal artery   and leg arteries.    Nonspecific stranding/edema in bilateral lower extremity soft tissue.   Recommend clinical correlation to assess cellulitis.    < end of copied text >
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
## 2022-01-13 NOTE — ED ADULT NURSE REASSESSMENT NOTE - NS ED NURSE REASSESS COMMENT FT1
report given to JAVONU2 RN_ NAD. A&Ox2 Respirations even and unlabored with equal chest rise bilaterally. NSR on cardiac monitor. VSS. will continue to monitor.

## 2022-01-13 NOTE — H&P ADULT - PROBLEM SELECTOR PLAN 5
2/2 Fall and immobility 4 days, and dehydration  ; Trops elevated  Plan:   - s/p 3 1LR boluses   - trending CK in the am,   - Maintenance fluids pending Echo  - continue to monitor
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
## 2022-01-13 NOTE — H&P ADULT - NSHPREVIEWOFSYSTEMS_GEN_ALL_CORE
REVIEW OF SYSTEMS: unable to obtain full reliable ROS, as pt is poor historian    CONSTITUTIONAL: No +weakness, +thirst, fevers or chills  EYES/ENT: No visual changes;  No vertigo or throat pain   NECK: pain or +stiffness  GASTROINTESTINAL: No abdominal or epigastric pain. No nausea, vomiting, or hematemesis; No diarrhea or constipation. No melena or hematochezia.  NEUROLOGICAL: No numbness or weakness

## 2022-01-13 NOTE — ED ADULT NURSE REASSESSMENT NOTE - SENSATION PROPRIOCEPTION LOWER EXTREMITY RIGHT
Notified the pt  
----- Message from Jia An sent at 5/15/2017  3:23 PM CDT -----  Please call patient with blood work results.       Labs done 5/4/17 - results sent to the portal   Results are normal  There is no lab work indicating any inflammatory problems  Labs are normal  Per dr beltran   
intact
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
## 2022-01-13 NOTE — H&P ADULT - ATTENDING COMMENTS
Patient seen and examined. Case discussed with the medical team on rounds. I agree with the findings and the plan above.      75 yo gentlemen, found on the floor of his bathroom, presumably was down on the floor for 3 days, EMS broke down the front door to get in. Found here with multiple concerning findings including intracranial hemorrhage, LLE limb ischemia, gram negative bacteremia, Afib w/RVR, AMARJIT, rhabdo and elevated trops. As per family, patient recently demonstrating signs of dementia.    Continue multiple disciplinary approach, follow up w/vasc, neuro, neurosurgery, ID cardiology, EP- follow up all recommendations.   #Intracranial hemorrhage- CT surveillance as per neuro/neurosurgery, maintain sodium above 145  #LLE limb ischemia- continue to follow along with vascular, no surgery at this time  #Sepsis 2/2 gram negative bacteremia- Continue antibiotics, ID consulted  #Afib w/RVR- etiology possibly mutlfactorial possibly 2/2 hypovolemia and or sepsis. Lopressor as needed, no AC (patient with ICH), follow up with EP  #AMARJIT/rhado- in the setting of hypovolemia and 3 days of not moving on the bathroom floor  #Trops- cards consulted, will continue to follow up with further recs    Continue the rest of the work up and management as stated above.

## 2022-01-13 NOTE — H&P ADULT - NSHPLABSRESULTS_GEN_ALL_CORE
20.3   19.68 )-----------( 316      ( 2022 16:51 )             61.6       01-    146<H>  |  102  |  62<H>  ----------------------------<  180<H>  4.5   |  25  |  1.19    Ca    10.0      2022 16:52    TPro  8.4<H>  /  Alb  4.5  /  TBili  1.1  /  DBili  x   /  AST  35  /  ALT  36  /  AlkPhos  98  -              Urinalysis Basic - ( 2022 18:01 )    Color: Yellow / Appearance: Clear / S.029 / pH: x  Gluc: x / Ketone: Small  / Bili: Negative / Urobili: <2 mg/dL   Blood: x / Protein: 300 mg/dL / Nitrite: Negative   Leuk Esterase: Negative / RBC: 15 /HPF / WBC 3 /HPF   Sq Epi: x / Non Sq Epi: 2 /HPF / Bacteria: Occasional        PT/INR - ( 2022 16:51 )   PT: 12.5 sec;   INR: 1.10 ratio         PTT - ( 2022 16:51 )  PTT:27.0 sec    Lactate Trend      CARDIAC MARKERS ( 2022 19:17 )  x     / x     / x     / x     / 13.6 ng/mL  CARDIAC MARKERS ( 2022 16:52 )  x     / x     / 902 U/L / x     / x            CAPILLARY BLOOD GLUCOSE      POCT Blood Glucose.: 154 mg/dL (2022 05:06)        Culture Results:   Growth in aerobic and anaerobic bottles: Gram Negative Rods  ***Blood Panel PCR results on this specimen are available  approximately 3 hours after the Gram stain result.***  Gram stain, PCR, and/or culture results may not always  correspond due todifference in methodologies.  ************************************************************  This PCR assay was performed by multiplex PCR. This  Assay tests for 66 bacterial and resistance gene targets.  Please refer to the [facility] testdirectory  at [email or web address] for details. ( @ 19:03)    CT angio LE  IMPRESSION:    Age-indeterminate occlusion with no significant flow or distal reconstitution at the left mid/distal femoral artery, popliteal artery and leg arteries.    Nonspecific stranding/edema in bilateral lower extremity soft tissue. Recommend clinical correlation to assess cellulitis.    These findings were discussed with Dr. YUMI MUSTAFA [phone] at 2022 3:30 AM by Dr. Burnett with read back confirmation.    TELEMETRY: rapid afib with rvr 	    ECG: rapid afib with rvr @ 148 bpm. Some ST depressions noted in the anterior leads	    Head CT: Acute hemorrhage right basal ganglia 1.8 x 2.2 x 3.6 cm.   consider hypertensive hemorrhage rather than traumatic etiology.    C-spine CT:  No acute fracture.      EXAM: 2 views of the bilateral ankles; 2 views of the bilateral tibia fibula.    COMPARISON: X-ray bilateral knees from 2022    FINDINGS:  No acute fracture or dislocation.  Calcaneal enthesophytes.  Preserved joint spaces.  Redemonstration of a metallic density in the right posterior medial soft tissues. Vascular calcifications.    IMPRESSION:  No acute fracture or dislocation.

## 2022-01-13 NOTE — H&P ADULT - PROBLEM SELECTOR PLAN 3
Pulseless, vascular flow defect Lt distal femoral artery on CT angio  Per Vascular, pt's Lt limb is unsalvageable; no urgent surgical intervention at this time though will likely need amputation  Plan:   - Low pressure bed  - f/u Vascular reccs for final plan  - continue to monitor

## 2022-01-13 NOTE — CONSULT NOTE ADULT - SUBJECTIVE AND OBJECTIVE BOX
Source: patient and Chart    HPI:  This is a 76 year old man with a pmhx of T2DM, and HTN who presented to [facility] after a fall 4 days ago.    Patient is a poor historian. According to the EMR notes, patient daughter last spoke to her father on 2022 in the evening. She attempted to called her father and when she was unable to get in contact with him called EMS who broke into the house and found the manuelito on the floor wedge between the bathtub and sink. According to the patient, he slipped and and fell and was unable to get up since 2022. Patient denied diaphoresis, HA, lightheadedness, dizziness, changes in visions, chest pain, palpitations, sob, adnominal pain, loss of urination and bowel movement, numbness and tingling prior and after falling. Patient admitted to chills, and back pain after the fall. Patient denied any recent fever, cold like symptoms  (sore throat cough, conjunctivitis runny nose) and n/v/d. He denied having a hx of syncope, presyncope, hematuria, melena, hematochezia, and hx of afib.    ED course was significant for T=97.2F, HR 132BPM, /55mmHg, RR 22spo2 100% on 3L NC. Labs were significant for WBC 19.68, Hgb 20.3, HCT 61.6, Plt 316, Na 146, K 4.6, BUN 62, sCr 1.19, LFT wln, HStroponin 936-->619,  and COVID-19 PCR negative. EKG revaled afib with RVR  with some ST depression noted in anterior leads. Patient was given lopressor 5mg IV. Neurosurgical/ neurology were consulted. Head CT showed Acute hemorrhage right basal ganglia 1.8 x 2.2 x 3.6 cm and C-spine CT revealed no acute fracture. They recommended serial CTH, MRI brain and US of the LE due to disparity in temperature. Cardiology was consulted to elevated troponin which they deemed was 2/2 rhabdomyolysis and recommended holding anticoagulation given hemorrhage  which neurology agreed with. Vascular was consulted acute limb ischemia given that his RLE were cooler than LLE and deemed no acute vascular surgical intervention at this time. EP was consulted to new onset Afib.     PAST MEDICAL & SURGICAL HISTORY:  DM (diabetes mellitus)  HTN (hypertension)    MEDICATIONS  (STANDING):  piperacillin/tazobactam IVPB. 3.375 Gram(s) IV Intermittent once  piperacillin/tazobactam IVPB.. 3.375 Gram(s) IV Intermittent every 8 hours  MEDICATIONS  (PRN):  acetaminophen     Tablet .. 650 milliGRAM(s) Oral every 6 hours PRN Temp greater or equal to 38C (100.4F), Mild Pain (1 - 3)  melatonin 3 milliGRAM(s) Oral at bedtime PRN Insomnia    FAMILY HISTORY:  Mother hx of dementia; T2DM    SOCIAL HISTORY:  LIVING SITUATION: Lives alone walker with a cane  CIGARETTES: Denied  ALCOHOL: denied   ILLICIT DRUG USES: denied    REVIEW OF SYSTEMS:  CONSTITUTIONAL: No fever, weight loss, shakes, or fatigue, Admitted to chills  RESPIRATORY: No cough, wheezing, hemoptysis, or shortness of breath  CARDIOVASCULAR: per HPI  GASTROINTESTINAL: No abdominal  or epigastric pain, nausea, vomiting, hematemesis, diarrhea, constipation, melena or bright red blood.  GENITOURINARY: No dysuria, nocturia, hematuria, or urinary incontinence  NEUROLOGICAL: No headaches, memory loss, slurred speech, limb weakness, loss of strength, numbness, or tremors  MUSCULOSKELETAL: No joint pain or swelling, muscle, or extremity pain, Admitted to back pain        Vital Signs Last 24 Hrs  T(C): 36.6 (2022 05:15), Max: 37.1 (2022 22:01)  T(F): 97.8 (2022 05:15), Max: 98.7 (2022 22:01)  HR: 98 (2022 05:15) (82 - 156)  BP: 145/76 (2022 05:15) (104/55 - 147/81)  BP(mean): --  RR: 18 (2022 05:15) (16 - 28)  SpO2: 99% (2022 05:15) (99% - 100%)    PHYSICAL EXAM:  GENERAL: Lying in bed, with a left sided facial droop, NAD  HEART: S1S2 irregularly irregular  PULMONARY:CTABL, normal respiratory effort.  ABDOMEN: Bowel sounds present, soft, NDNT  EXTREMITIES:  Warm, well -perfused, no pedal edema, distal pulses present  NEUROLOGICAL:AOx    INTERPRETATION OF TELEMETRY:    ECG:    I&O's Detail      LABS:                        20.3   19.68 )-----------( 316      ( 2022 16:51 )             61.6         146<H>  |  102  |  62<H>  ----------------------------<  180<H>  4.5   |  25  |  1.19    Ca    10.0      2022 16:52    TPro  8.4<H>  /  Alb  4.5  /  TBili  1.1  /  DBili  x   /  AST  35  /  ALT  36  /  AlkPhos  98      CARDIAC MARKERS ( 2022 19:17 )  x     / x     / x     / x     / 13.6 ng/mL  CARDIAC MARKERS ( 2022 16:52 )  x     / x     / 902 U/L / x     / x          PT/INR - ( 2022 16:51 )   PT: 12.5 sec;   INR: 1.10 ratio         PTT - ( 2022 16:51 )  PTT:27.0 sec  Urinalysis Basic - ( 2022 18:01 )    Color: Yellow / Appearance: Clear / S.029 / pH: x  Gluc: x / Ketone: Small  / Bili: Negative / Urobili: <2 mg/dL   Blood: x / Protein: 300 mg/dL / Nitrite: Negative   Leuk Esterase: Negative / RBC: 15 /HPF / WBC 3 /HPF   Sq Epi: x / Non Sq Epi: 2 /HPF / Bacteria: Occasional      BNP  I&O's Detail    Daily     Daily     RADIOLOGY & ADDITIONAL STUDIES:  IMAGING STUDIES:  < from: CT Head No Cont (22 @ 18:51) >  IMPRESSIONS:    Head CT: Acute hemorrhage right basal ganglia 1.8 x 2.2 x 3.6 cm.   consider hypertensive hemorrhage rather than traumatic etiology.    C-spine CT:  No acute fracture.    < end of copied text >  < from: CT Chest No Cont (22 @ 18:53) >  IMPRESSION:  No acute intrathoracic or intra-abdominal pathology.    < end of copied text >  < from: CT Angio Neck w/ IV Cont (22 @ 21:18) >  CT angiography neck: No major vessel occlusion or hemodynamically   significant stenosis by NASCET criteria. Mild to moderate right and mild   left internal carotid artery stenosis at the origins. No vascular   dissection.    CT angiography brain: No major vessel occlusion or proximal stenosis. No   evidence of aneurysm or other vascular malformation. No evidence of   active extravasation into the right basal ganglia intraparenchymal   hemorrhage.    Postcontrast CT head: Unchanged right basal ganglia intraparenchymal   hemorrhage with associated mass effect. No abnormal enhancement.    < end of copied text >  < from: CT Angio Lower Extremity w/ IV Cont, Left (22 @ 03:37) >    IMPRESSION:    Age-indeterminate occlusion with no significant flow or distal   reconstitution at the left mid/distal femoral artery, popliteal artery   and leg arteries.    Nonspecific stranding/edema in bilateral lower extremity soft tissue.   Recommend clinical correlation to assess cellulitis.    < end of copied text >      < from: CT Angio Lower Extremity w/ IV Cont, Left (22 @ 03:37) >    IMPRESSION:    Age-indeterminate occlusion with no significant flow or distal   reconstitution at the left mid/distal femoral artery, popliteal artery   and leg arteries.    Nonspecific stranding/edema in bilateral lower extremity soft tissue.   Recommend clinical correlation to assess cellulitis.    < end of copied text >      ASSESSMENT:        RECOMMENDATIONS:  - Continuous telemetric monitoring  - Monitor electrolytes and replete K to 4 and Mg to 2  - Obtain TTE  - Will consider rhythm control strategy with TANA/DCCV if patient can be on uninterpreted anticoagulation    - Start metoprolol 12.5mg po BID and uptitrate as tolerated, hold for SBP <90mmHg and HR <45  - Continue care per primary team    Patient to be staffed with attending. Please await attending addendum   Source: patient and Chart    HPI:  This is a 76 year old man with a pmhx of T2DM, and HTN who presented to [facility] after a fall 4 days ago.    Patient is a poor historian and AOx1 (person). Patient became very irritable when asked question. Most of the information was obtain through chart review. According to the EMR notes, patient daughter last spoke to her father on 2022 in the evening. She attempted to called her father and when she was unable to get in contact with him called EMS who broke into the house and found the manuelito on the floor wedge between the bathtub and sink. According to the patient, he slipped and and fell and was unable to get up since 2022. Patient denied diaphoresis, HA, lightheadedness, dizziness, changes in visions, chest pain, palpitations, sob, adnominal pain, loss of urination and bowel movement, numbness and tingling prior and after falling. Patient admitted to chills, and back pain after the fall. Patient denied any recent fever, cold like symptoms  (sore throat cough, conjunctivitis runny nose) and n/v/d. He denied having a hx of syncope, presyncope, hematuria, melena, hematochezia, and hx of afib.    ED course was significant for T=97.2F, HR 132BPM, /55mmHg, RR 22spo2 100% on 3L NC. Labs were significant for WBC 19.68, Hgb 20.3, HCT 61.6, Plt 316, Na 146, K 4.6, BUN 62, sCr 1.19, LFT wln, HStroponin 936-->619,  and COVID-19 PCR negative. EKG revealed afib with RVR  with some ST depression noted in anterior leads. Patient was given lopressor 5mg IV. Neurosurgical/ neurology were consulted. Head CT showed Acute hemorrhage right basal ganglia 1.8 x 2.2 x 3.6 cm and C-spine CT revealed no acute fracture. They recommended serial CTH, MRI brain and US of the LE due to disparity in temperature. Cardiology was consulted to elevated troponin which they deemed was 2/2 rhabdomyolysis and recommended holding anticoagulation given hemorrhage  which neurology agreed with. Vascular was consulted acute limb ischemia given that his RLE were cooler than LLE and deemed no acute vascular surgical intervention at this time. EP was consulted to new onset Afib.     PAST MEDICAL & SURGICAL HISTORY:  DM (diabetes mellitus)  HTN (hypertension)    MEDICATIONS  (STANDING):  piperacillin/tazobactam IVPB. 3.375 Gram(s) IV Intermittent once  piperacillin/tazobactam IVPB.. 3.375 Gram(s) IV Intermittent every 8 hours  MEDICATIONS  (PRN):  acetaminophen     Tablet .. 650 milliGRAM(s) Oral every 6 hours PRN Temp greater or equal to 38C (100.4F), Mild Pain (1 - 3)  melatonin 3 milliGRAM(s) Oral at bedtime PRN Insomnia    FAMILY HISTORY:  Mother hx of dementia; T2DM    SOCIAL HISTORY:  LIVING SITUATION: Lives alone walker with a cane  CIGARETTES: Denied  ALCOHOL: denied   ILLICIT DRUG USES: denied    REVIEW OF SYSTEMS:  CONSTITUTIONAL: No fever, weight loss, shakes, or fatigue, Admitted to chills  RESPIRATORY: No cough, wheezing, hemoptysis, or shortness of breath  CARDIOVASCULAR: per HPI  GASTROINTESTINAL: No abdominal  or epigastric pain, nausea, vomiting, hematemesis, diarrhea, constipation, melena or bright red blood.  GENITOURINARY: No dysuria, nocturia, hematuria, or urinary incontinence  NEUROLOGICAL: No headaches, memory loss, slurred speech, limb weakness, loss of strength, numbness, or tremors  MUSCULOSKELETAL: No joint pain or swelling, muscle, or extremity pain, Admitted to back pain        Vital Signs Last 24 Hrs  T(C): 36.6 (2022 05:15), Max: 37.1 (2022 22:01)  T(F): 97.8 (2022 05:15), Max: 98.7 (2022 22:01)  HR: 98 (2022 05:15) (82 - 156)  BP: 145/76 (2022 05:15) (104/55 - 147/81)  BP(mean): --  RR: 18 (2022 05:15) (16 - 28)  SpO2: 99% (2022 05:15) (99% - 100%)    PHYSICAL EXAM:  GENERAL: Lying in bed, with a left sided facial droop, NAD  HEART: [id number]  PULMONARY:CTABL, normal respiratory effort.  EXTREMITIES:  Warm, well -perfused, ecchymosis on the knees, RLE cooler than LLE, distal pulses present  NEUROLOGICAL:AOx    INTERPRETATION OF TELEMETRY: Afib with RVR and converted to SR with PAC at 2022 at 23:06    ECG: Afib with RVR  with ST depression noted in anterior leads    I&O's Detail      LABS:                        20.3   19.68 )-----------( 316      ( 2022 16:51 )             61.6         146<H>  |  102  |  62<H>  ----------------------------<  180<H>  4.5   |  25  |  1.19    Ca    10.0      2022 16:52    TPro  8.4<H>  /  Alb  4.5  /  TBili  1.1  /  DBili  x   /  AST  35  /  ALT  36  /  AlkPhos  98  12    CARDIAC MARKERS ( 2022 19:17 )  x     / x     / x     / x     / 13.6 ng/mL  CARDIAC MARKERS ( 2022 16:52 )  x     / x     / 902 U/L / x     / x          PT/INR - ( 2022 16:51 )   PT: 12.5 sec;   INR: 1.10 ratio         PTT - ( 2022 16:51 )  PTT:27.0 sec  Urinalysis Basic - ( 2022 18:01 )    Color: Yellow / Appearance: Clear / S.029 / pH: x  Gluc: x / Ketone: Small  / Bili: Negative / Urobili: <2 mg/dL   Blood: x / Protein: 300 mg/dL / Nitrite: Negative   Leuk Esterase: Negative / RBC: 15 /HPF / WBC 3 /HPF   Sq Epi: x / Non Sq Epi: 2 /HPF / Bacteria: Occasional      BNP  I&O's Detail    Daily     Daily     RADIOLOGY & ADDITIONAL STUDIES:  IMAGING STUDIES:  < from: CT Head No Cont (22 @ 18:51) >  IMPRESSIONS:    Head CT: Acute hemorrhage right basal ganglia 1.8 x 2.2 x 3.6 cm.   consider hypertensive hemorrhage rather than traumatic etiology.    C-spine CT:  No acute fracture.    < end of copied text >  < from: CT Chest No Cont (22 @ 18:53) >  IMPRESSION:  No acute intrathoracic or intra-abdominal pathology.    < end of copied text >  < from: CT Angio Neck w/ IV Cont (22 @ 21:18) >  CT angiography neck: No major vessel occlusion or hemodynamically   significant stenosis by NASCET criteria. Mild to moderate right and mild   left internal carotid artery stenosis at the origins. No vascular   dissection.    CT angiography brain: No major vessel occlusion or proximal stenosis. No   evidence of aneurysm or other vascular malformation. No evidence of   active extravasation into the right basal ganglia intraparenchymal   hemorrhage.    Postcontrast CT head: Unchanged right basal ganglia intraparenchymal   hemorrhage with associated mass effect. No abnormal enhancement.    < end of copied text >  < from: CT Angio Lower Extremity w/ IV Cont, Left (22 @ 03:37) >    IMPRESSION:    Age-indeterminate occlusion with no significant flow or distal   reconstitution at the left mid/distal femoral artery, popliteal artery   and leg arteries.    Nonspecific stranding/edema in bilateral lower extremity soft tissue.   Recommend clinical correlation to assess cellulitis.    < end of copied text >      < from: CT Angio Lower Extremity w/ IV Cont, Left (22 @ 03:37) >    IMPRESSION:    Age-indeterminate occlusion with no significant flow or distal   reconstitution at the left mid/distal femoral artery, popliteal artery   and leg arteries.    Nonspecific stranding/edema in bilateral lower extremity soft tissue.   Recommend clinical correlation to assess cellulitis.    < end of copied text >      ASSESSMENT:        RECOMMENDATIONS:  - Continuous telemetric monitoring  - Monitor electrolytes and replete K to 4 and Mg to 2  - Obtain TTE  - Start DOAC when cleared by neurology for thromboembolic ppx  - Start metoprolol 12.5mg po BID, hold for SBP <90mmHg and HR <45  - Continue care per primary team    Patient to be staffed with attending. Please await attending addendum   Source: patient and Chart    HPI:  This is a 76 year old man with a pmhx of T2DM, and HTN who presented to [facility] after a fall 4 days ago.    Patient is a poor historian and AOx1 (person). Patient became very irritable when asked questions. Most of the information was obtain through chart review. According to the EMR notes, patient daughter last spoke to her father on 2022 in the evening. She attempted to called her father and when she was unable to get in contact with him called therefore called EMS who broke into the patient's house and found the patient on the floor wedge between the bathtub and sink. According to the patient, he slipped and and fell and was unable to get up since 2022. Patient denied diaphoresis, HA, lightheadedness, dizziness, changes in visions, chest pain, palpitations, sob, adnominal pain, loss of urination and bowel movement, numbness and tingling prior and after falling. Patient admitted to chills, and back pain after the fall. Patient denied any recent fever, cold like symptoms  (sore throat cough, conjunctivitis runny nose) and n/v/d. He denied having a hx of syncope, presyncope, hematuria, melena, hematochezia, and hx of afib.    ED course was significant for T=97.2F, HR 132BPM, /55mmHg, RR 22spo2 100% on 3L NC. Labs were significant for WBC 19.68, Hgb 20.3, HCT 61.6, Plt 316, Na 146, K 4.6, BUN 62, sCr 1.19, LFT wln, HStroponin 936-->619,  and COVID-19 PCR negative. EKG revealed afib with RVR  with some ST depression noted in anterior leads. Patient was given lopressor 5mg IV. Neurosurgical/ neurology were consulted. Head CT showed acute hemorrhage right basal ganglia 1.8 x 2.2 x 3.6 cm and C-spine CT revealed no acute fracture. They recommended serial CTH, MRI brain and US of the LE due to disparity in temperature. Cardiology was consulted to elevated troponin which they deemed was 2/2 rhabdomyolysis and recommended holding anticoagulation given hemorrhage  which neurology agreed with. Vascular was consulted acute limb ischemia given that his RLE were cooler than LLE and deemed no acute vascular surgical intervention at this time. EP was consulted to new onset Afib. On telemetry, patient was in SR and appeared to have converted on 2022 at 23:06.    PAST MEDICAL & SURGICAL HISTORY:  DM (diabetes mellitus)  HTN (hypertension)    MEDICATIONS  (STANDING):  piperacillin/tazobactam IVPB. 3.375 Gram(s) IV Intermittent once  piperacillin/tazobactam IVPB.. 3.375 Gram(s) IV Intermittent every 8 hours  MEDICATIONS  (PRN):  acetaminophen     Tablet .. 650 milliGRAM(s) Oral every 6 hours PRN Temp greater or equal to 38C (100.4F), Mild Pain (1 - 3)  melatonin 3 milliGRAM(s) Oral at bedtime PRN Insomnia    FAMILY HISTORY:  Mother hx of dementia; T2DM    SOCIAL HISTORY:  LIVING SITUATION: Lives alone walker with a cane  CIGARETTES: Denied  ALCOHOL: denied   ILLICIT DRUG USES: denied    REVIEW OF SYSTEMS:  CONSTITUTIONAL: No fever, weight loss, shakes, or fatigue, Admitted to chills  RESPIRATORY: No cough, wheezing, hemoptysis, or shortness of breath  CARDIOVASCULAR: per HPI  GASTROINTESTINAL: No abdominal  or epigastric pain, nausea, vomiting, hematemesis, diarrhea, constipation, melena or bright red blood.  GENITOURINARY: No dysuria, nocturia, hematuria, or urinary incontinence  NEUROLOGICAL: No headaches, memory loss, slurred speech, limb weakness, loss of strength, numbness, or tremors  MUSCULOSKELETAL: No joint pain or swelling, muscle, or extremity pain, Admitted to back pain        Vital Signs Last 24 Hrs  T(C): 36.6 (2022 05:15), Max: 37.1 (2022 22:01)  T(F): 97.8 (2022 05:15), Max: 98.7 (2022 22:01)  HR: 98 (2022 05:15) (82 - 156)  BP: 145/76 (2022 05:15) (104/55 - 147/81)  BP(mean): --  RR: 18 (2022 05:15) (16 - 28)  SpO2: 99% (2022 05:15) (99% - 100%)    PHYSICAL EXAM:  GENERAL: Lying in bed, with a left sided facial droop, NAD  HEART: [id number]  PULMONARY:CTABL, normal respiratory effort.  EXTREMITIES:  Warm, well -perfused, ecchymosis on the knees, RLE cooler than LLE, distal pulses present  NEUROLOGICAL:AOx    INTERPRETATION OF TELEMETRY: Afib with RVR and converted to SR with PAC at 2022 at 23:06    ECG: Afib with RVR  with ST depression noted in anterior leads    I&O's Detail      LABS:                        20.3   19.68 )-----------( 316      ( 2022 16:51 )             61.6         146<H>  |  102  |  62<H>  ----------------------------<  180<H>  4.5   |  25  |  1.19    Ca    10.0      2022 16:52    TPro  8.4<H>  /  Alb  4.5  /  TBili  1.1  /  DBili  x   /  AST  35  /  ALT  36  /  AlkPhos  98      CARDIAC MARKERS ( 2022 19:17 )  x     / x     / x     / x     / 13.6 ng/mL  CARDIAC MARKERS ( 2022 16:52 )  x     / x     / 902 U/L / x     / x          PT/INR - ( 2022 16:51 )   PT: 12.5 sec;   INR: 1.10 ratio         PTT - ( 2022 16:51 )  PTT:27.0 sec  Urinalysis Basic - ( 2022 18:01 )    Color: Yellow / Appearance: Clear / S.029 / pH: x  Gluc: x / Ketone: Small  / Bili: Negative / Urobili: <2 mg/dL   Blood: x / Protein: 300 mg/dL / Nitrite: Negative   Leuk Esterase: Negative / RBC: 15 /HPF / WBC 3 /HPF   Sq Epi: x / Non Sq Epi: 2 /HPF / Bacteria: Occasional      BNP  I&O's Detail    Daily     Daily     RADIOLOGY & ADDITIONAL STUDIES:  IMAGING STUDIES:  < from: CT Head No Cont (22 @ 18:51) >  IMPRESSIONS:    Head CT: Acute hemorrhage right basal ganglia 1.8 x 2.2 x 3.6 cm.   consider hypertensive hemorrhage rather than traumatic etiology.    C-spine CT:  No acute fracture.    < end of copied text >  < from: CT Chest No Cont (22 @ 18:53) >  IMPRESSION:  No acute intrathoracic or intra-abdominal pathology.    < end of copied text >  < from: CT Angio Neck w/ IV Cont (22 @ 21:18) >  CT angiography neck: No major vessel occlusion or hemodynamically   significant stenosis by NASCET criteria. Mild to moderate right and mild   left internal carotid artery stenosis at the origins. No vascular   dissection.    CT angiography brain: No major vessel occlusion or proximal stenosis. No   evidence of aneurysm or other vascular malformation. No evidence of   active extravasation into the right basal ganglia intraparenchymal   hemorrhage.    Postcontrast CT head: Unchanged right basal ganglia intraparenchymal   hemorrhage with associated mass effect. No abnormal enhancement.    < end of copied text >  < from: CT Angio Lower Extremity w/ IV Cont, Left (22 @ 03:37) >    IMPRESSION:    Age-indeterminate occlusion with no significant flow or distal   reconstitution at the left mid/distal femoral artery, popliteal artery   and leg arteries.    Nonspecific stranding/edema in bilateral lower extremity soft tissue.   Recommend clinical correlation to assess cellulitis.    < end of copied text >      < from: CT Angio Lower Extremity w/ IV Cont, Left (22 @ 03:37) >    IMPRESSION:    Age-indeterminate occlusion with no significant flow or distal   reconstitution at the left mid/distal femoral artery, popliteal artery   and leg arteries.    Nonspecific stranding/edema in bilateral lower extremity soft tissue.   Recommend clinical correlation to assess cellulitis.    < end of copied text >     Source: patient and Chart    HPI:  This is a 76 year old man with a pmhx of T2DM, and HTN who presented to [facility] after a fall 4 days ago.    Patient is a poor historian and AOx1 (person). Patient became very irritable when asked questions. Most of the information was obtain through chart review. According to the EMR notes, patient's daughter last spoke to her father on 2022 in the evening. She attempted to called her father and when she was unable to get in contact with him, she called EMS who broke into the patient's house and found the patient on the floor wedge between the bathtub and sink. According to the patient, he slipped and fell and was unable to get up since 2022. Patient denied diaphoresis, HA, lightheadedness, dizziness, changes in visions, chest pain, palpitations, sob, adnominal pain, loss of urination and bowel movement, numbness and tingling prior and after falling. Patient admitted to chills, and back pain after the fall. Patient denied any recent fever, cold like symptoms  (sore throat cough, conjunctivitis runny nose) and n/v/d. He denied having a hx of syncope, presyncope, hematuria, melena, hematochezia, and hx of afib.    ED course was significant for T=97.2F, HR 132BPM, /55mmHg, RR 22spo2 100% on 3L NC. Labs were significant for WBC 19.68, Hgb 20.3, HCT 61.6, Plt 316, Na 146, K 4.6, BUN 62, sCr 1.19, LFT wln, HStroponin 936-->619,  and COVID-19 PCR negative. EKG revealed afib with RVR  with some ST depression noted in anterior leads. Patient was given lopressor 5mg IV. Neurosurgical/ neurology were consulted. Head CT showed acute hemorrhage right basal ganglia 1.8 x 2.2 x 3.6 cm and C-spine CT revealed no acute fracture. They recommended serial CTH, MRI brain and US of the LE due to disparity in temperature. Cardiology was consulted for elevated troponin which they deemed was 2/2 rhabdomyolysis and recommended holding anticoagulation given hemorrhage  which neurology agreed with. Vascular was consulted acute limb ischemia given that his RLE were cooler than LLE and deemed no acute vascular surgical intervention at this time. EP was consulted to new onset Afib. On telemetry, patient was in SR and appeared to have converted on 2022 at 23:06.    PAST MEDICAL & SURGICAL HISTORY:  DM (diabetes mellitus)  HTN (hypertension)    MEDICATIONS  (STANDING):  piperacillin/tazobactam IVPB. 3.375 Gram(s) IV Intermittent once  piperacillin/tazobactam IVPB.. 3.375 Gram(s) IV Intermittent every 8 hours  MEDICATIONS  (PRN):  acetaminophen     Tablet .. 650 milliGRAM(s) Oral every 6 hours PRN Temp greater or equal to 38C (100.4F), Mild Pain (1 - 3)  melatonin 3 milliGRAM(s) Oral at bedtime PRN Insomnia    FAMILY HISTORY:  Mother hx of dementia; T2DM    SOCIAL HISTORY:  LIVING SITUATION: Lives alone walker with a cane  CIGARETTES: Denied  ALCOHOL: denied   ILLICIT DRUG USES: denied    REVIEW OF SYSTEMS:  CONSTITUTIONAL: No fever, weight loss, shakes, or fatigue, Admitted to chills  RESPIRATORY: No cough, wheezing, hemoptysis, or shortness of breath  CARDIOVASCULAR: per HPI  GASTROINTESTINAL: No abdominal  or epigastric pain, nausea, vomiting, hematemesis, diarrhea, constipation, melena or bright red blood.  GENITOURINARY: No dysuria, nocturia, hematuria, or urinary incontinence  NEUROLOGICAL: No headaches, memory loss, slurred speech, limb weakness, loss of strength, numbness, or tremors  MUSCULOSKELETAL: No joint pain or swelling, muscle, or extremity pain, Admitted to back pain        Vital Signs Last 24 Hrs  T(C): 36.6 (2022 05:15), Max: 37.1 (2022 22:01)  T(F): 97.8 (2022 05:15), Max: 98.7 (2022 22:01)  HR: 98 (2022 05:15) (82 - 156)  BP: 145/76 (2022 05:15) (104/55 - 147/81)  BP(mean): --  RR: 18 (2022 05:15) (16 - 28)  SpO2: 99% (2022 05:15) (99% - 100%)    PHYSICAL EXAM:  GENERAL: Lying in bed, with a left sided facial droop, NAD  HEART: [id number]  PULMONARY:CTABL, normal respiratory effort.  EXTREMITIES:  Warm, well -perfused, ecchymosis on the knees, RLE cooler than LLE, distal pulses present  NEUROLOGICAL:AOx    INTERPRETATION OF TELEMETRY: Afib with RVR and converted to SR with PAC at 2022 at 23:06    ECG: Afib with RVR  with ST depression noted in anterior leads    I&O's Detail      LABS:                        20.3   19.68 )-----------( 316      ( 2022 16:51 )             61.6         146<H>  |  102  |  62<H>  ----------------------------<  180<H>  4.5   |  25  |  1.19    Ca    10.0      2022 16:52    TPro  8.4<H>  /  Alb  4.5  /  TBili  1.1  /  DBili  x   /  AST  35  /  ALT  36  /  AlkPhos  98      CARDIAC MARKERS ( 2022 19:17 )  x     / x     / x     / x     / 13.6 ng/mL  CARDIAC MARKERS ( 2022 16:52 )  x     / x     / 902 U/L / x     / x          PT/INR - ( 2022 16:51 )   PT: 12.5 sec;   INR: 1.10 ratio         PTT - ( 2022 16:51 )  PTT:27.0 sec  Urinalysis Basic - ( 2022 18:01 )    Color: Yellow / Appearance: Clear / S.029 / pH: x  Gluc: x / Ketone: Small  / Bili: Negative / Urobili: <2 mg/dL   Blood: x / Protein: 300 mg/dL / Nitrite: Negative   Leuk Esterase: Negative / RBC: 15 /HPF / WBC 3 /HPF   Sq Epi: x / Non Sq Epi: 2 /HPF / Bacteria: Occasional      BNP  I&O's Detail    Daily     Daily     RADIOLOGY & ADDITIONAL STUDIES:  IMAGING STUDIES:  < from: CT Head No Cont (22 @ 18:51) >  IMPRESSIONS:    Head CT: Acute hemorrhage right basal ganglia 1.8 x 2.2 x 3.6 cm.   consider hypertensive hemorrhage rather than traumatic etiology.    C-spine CT:  No acute fracture.    < end of copied text >  < from: CT Chest No Cont (22 @ 18:53) >  IMPRESSION:  No acute intrathoracic or intra-abdominal pathology.    < end of copied text >  < from: CT Angio Neck w/ IV Cont (22 @ 21:18) >  CT angiography neck: No major vessel occlusion or hemodynamically   significant stenosis by NASCET criteria. Mild to moderate right and mild   left internal carotid artery stenosis at the origins. No vascular   dissection.    CT angiography brain: No major vessel occlusion or proximal stenosis. No   evidence of aneurysm or other vascular malformation. No evidence of   active extravasation into the right basal ganglia intraparenchymal   hemorrhage.    Postcontrast CT head: Unchanged right basal ganglia intraparenchymal   hemorrhage with associated mass effect. No abnormal enhancement.    < end of copied text >  < from: CT Angio Lower Extremity w/ IV Cont, Left (22 @ 03:37) >    IMPRESSION:    Age-indeterminate occlusion with no significant flow or distal   reconstitution at the left mid/distal femoral artery, popliteal artery   and leg arteries.    Nonspecific stranding/edema in bilateral lower extremity soft tissue.   Recommend clinical correlation to assess cellulitis.    < end of copied text >      < from: CT Angio Lower Extremity w/ IV Cont, Left (22 @ 03:37) >    IMPRESSION:    Age-indeterminate occlusion with no significant flow or distal   reconstitution at the left mid/distal femoral artery, popliteal artery   and leg arteries.    Nonspecific stranding/edema in bilateral lower extremity soft tissue.   Recommend clinical correlation to assess cellulitis.    < end of copied text >     Source: patient and Chart    HPI:  This is a 76 year old man with a pmhx of T2DM, and HTN who presented to [facility] after a fall 4 days ago.    Patient is a poor historian and AOx1 (person). Patient became very irritable when asked questions. Most of the information was obtain through chart review. According to the EMR notes, patient's daughter last spoke to her father on 2022 in the evening. She attempted to called her father and when she was unable to get in contact with him, she called EMS who broke into the patient's house and found the patient on the floor wedge between the bathtub and sink. According to the patient, he slipped and fell and was unable to get up since 2022. Patient denied diaphoresis, HA, lightheadedness, dizziness, changes in visions, chest pain, palpitations, sob, adnominal pain, loss of urination and bowel movement, numbness and tingling prior and after falling. Patient admitted to chills, and back pain after the fall. Patient denied any recent fever, cold like symptoms  (sore throat cough, conjunctivitis runny nose) and n/v/d. He denied having a hx of syncope, presyncope, hematuria, melena, hematochezia, and hx of afib.    ED course was significant for T=97.2F, HR 132BPM, /55mmHg, RR 22spo2 100% on 3L NC. Labs were significant for WBC 19.68, Hgb 20.3, HCT 61.6, Plt 316, Na 146, K 4.6, BUN 62, sCr 1.19, LFT wln, HStroponin 936-->619,  and COVID-19 PCR negative. EKG revealed afib with RVR  with some ST depression noted in anterior leads. Patient was given lopressor 5mg IV. Neurosurgical/ neurology were consulted. Head CT showed acute hemorrhage right basal ganglia 1.8 x 2.2 x 3.6 cm and C-spine CT revealed no acute fracture. They recommended serial CTH, MRI brain and US of the LE due to disparity in temperature. Cardiology was consulted for elevated troponin which they deemed was 2/2 rhabdomyolysis and recommended holding anticoagulation given hemorrhage  which neurology agreed with. Vascular was consulted acute limb ischemia given that his RLE were cooler than LLE and deemed no acute vascular surgical intervention at this time. EP was consulted to new onset Afib. On telemetry, patient was in SR and appeared to have converted on 2022 at 23:06.    PAST MEDICAL & SURGICAL HISTORY:  DM (diabetes mellitus)  HTN (hypertension)    MEDICATIONS  (STANDING):  piperacillin/tazobactam IVPB. 3.375 Gram(s) IV Intermittent once  piperacillin/tazobactam IVPB.. 3.375 Gram(s) IV Intermittent every 8 hours  MEDICATIONS  (PRN):  acetaminophen     Tablet .. 650 milliGRAM(s) Oral every 6 hours PRN Temp greater or equal to 38C (100.4F), Mild Pain (1 - 3)  melatonin 3 milliGRAM(s) Oral at bedtime PRN Insomnia    FAMILY HISTORY:  Mother hx of dementia; T2DM    SOCIAL HISTORY:  LIVING SITUATION: Lives alone walker with a cane  CIGARETTES: former smoker  ALCOHOL: denied   ILLICIT DRUG USES: denied    REVIEW OF SYSTEMS:  CONSTITUTIONAL: No fever, weight loss, shakes, or fatigue, Admitted to chills  RESPIRATORY: No cough, wheezing, hemoptysis, or shortness of breath  CARDIOVASCULAR: per HPI  GASTROINTESTINAL: No abdominal  or epigastric pain, nausea, vomiting, hematemesis, diarrhea, constipation, melena or bright red blood.  GENITOURINARY: No dysuria, nocturia, hematuria, or urinary incontinence  NEUROLOGICAL: No headaches, memory loss, slurred speech, limb weakness, loss of strength, numbness, or tremors  MUSCULOSKELETAL: No joint pain or swelling, muscle, or extremity pain, Admitted to back pain        Vital Signs Last 24 Hrs  T(C): 36.6 (2022 05:15), Max: 37.1 (2022 22:01)  T(F): 97.8 (2022 05:15), Max: 98.7 (2022 22:01)  HR: 98 (2022 05:15) (82 - 156)  BP: 145/76 (2022 05:15) (104/55 - 147/81)  BP(mean): --  RR: 18 (2022 05:15) (16 - 28)  SpO2: 99% (2022 05:15) (99% - 100%)    PHYSICAL EXAM:  GENERAL: Lying in bed, with a left sided facial droop, NAD  HEART: [id number]  PULMONARY:CTABL, normal respiratory effort.  EXTREMITIES:  Warm, well -perfused, ecchymosis on the knees, RLE cooler than LLE, distal pulses present  NEUROLOGICAL:AOx1    INTERPRETATION OF TELEMETRY: Afib with RVR and converted to SR with PAC at 2022 at 23:06    ECG: Afib with RVR  with ST depression noted in anterior leads    I&O's Detail      LABS:                        20.3   19.68 )-----------( 316      ( 2022 16:51 )             61.6         146<H>  |  102  |  62<H>  ----------------------------<  180<H>  4.5   |  25  |  1.19    Ca    10.0      2022 16:52    TPro  8.4<H>  /  Alb  4.5  /  TBili  1.1  /  DBili  x   /  AST  35  /  ALT  36  /  AlkPhos  98  12    CARDIAC MARKERS ( 2022 19:17 )  x     / x     / x     / x     / 13.6 ng/mL  CARDIAC MARKERS ( 2022 16:52 )  x     / x     / 902 U/L / x     / x          PT/INR - ( 2022 16:51 )   PT: 12.5 sec;   INR: 1.10 ratio         PTT - ( 2022 16:51 )  PTT:27.0 sec  Urinalysis Basic - ( 2022 18:01 )    Color: Yellow / Appearance: Clear / S.029 / pH: x  Gluc: x / Ketone: Small  / Bili: Negative / Urobili: <2 mg/dL   Blood: x / Protein: 300 mg/dL / Nitrite: Negative   Leuk Esterase: Negative / RBC: 15 /HPF / WBC 3 /HPF   Sq Epi: x / Non Sq Epi: 2 /HPF / Bacteria: Occasional      BNP  I&O's Detail    Daily     Daily     RADIOLOGY & ADDITIONAL STUDIES:  IMAGING STUDIES:  < from: CT Head No Cont (22 @ 18:51) >  IMPRESSIONS:    Head CT: Acute hemorrhage right basal ganglia 1.8 x 2.2 x 3.6 cm.   consider hypertensive hemorrhage rather than traumatic etiology.    C-spine CT:  No acute fracture.    < end of copied text >  < from: CT Chest No Cont (22 @ 18:53) >  IMPRESSION:  No acute intrathoracic or intra-abdominal pathology.    < end of copied text >  < from: CT Angio Neck w/ IV Cont (22 @ 21:18) >  CT angiography neck: No major vessel occlusion or hemodynamically   significant stenosis by NASCET criteria. Mild to moderate right and mild   left internal carotid artery stenosis at the origins. No vascular   dissection.    CT angiography brain: No major vessel occlusion or proximal stenosis. No   evidence of aneurysm or other vascular malformation. No evidence of   active extravasation into the right basal ganglia intraparenchymal   hemorrhage.    Postcontrast CT head: Unchanged right basal ganglia intraparenchymal   hemorrhage with associated mass effect. No abnormal enhancement.    < end of copied text >  < from: CT Angio Lower Extremity w/ IV Cont, Left (22 @ 03:37) >    IMPRESSION:    Age-indeterminate occlusion with no significant flow or distal   reconstitution at the left mid/distal femoral artery, popliteal artery   and leg arteries.    Nonspecific stranding/edema in bilateral lower extremity soft tissue.   Recommend clinical correlation to assess cellulitis.    < end of copied text >      < from: CT Angio Lower Extremity w/ IV Cont, Left (22 @ 03:37) >    IMPRESSION:    Age-indeterminate occlusion with no significant flow or distal   reconstitution at the left mid/distal femoral artery, popliteal artery   and leg arteries.    Nonspecific stranding/edema in bilateral lower extremity soft tissue.   Recommend clinical correlation to assess cellulitis.    < end of copied text >

## 2022-01-13 NOTE — CONSULT NOTE ADULT - ASSESSMENT
76M w/ hx HTN, DM found down after fall 4 days ago, found to have acute R basal ganglia hemorrhage likely HTN and Celso 3 acute limb ischemia on LLE without flow from L mid femoral, pop and leg arteries    Recommendation:  - Patient with Celso 3 acute limb ischemia- LLE unsalvageable, will await amputation/ demarcation  - No acute vascular surgical intervention at this time  - Seen and examined with vascular surgery fellow    [id number]  C team surgery

## 2022-01-13 NOTE — ED ADULT NURSE REASSESSMENT NOTE - NS ED NURSE REASSESS COMMENT FT1
assumed care at this time from ROSA DESHPANDE. patient A&Ox3. patient daughter at bedside. patient C/O decreased sensation to left foot. left foot noted to be cool and pale. MD niles cage. VSS. will continue to monitor. assumed care at this time from ROSA DESHPANDE. patient A&Ox3. patient daughter at bedside. patient C/O decreased sensation to left foot. left foot noted to be cool and pale. unable to palpate pulse. MD niles cage. VSS. will continue to monitor.
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
## 2022-01-13 NOTE — H&P ADULT - PROBLEM SELECTOR PLAN 4
Pt is AAO1, w/t sweating, chills  Sepsis possibly 2/2 LE cellulitis? though uncommonly w/ gram negative anaerobes/ aerobes  BCx +E. cloaca and proteus   s/p Zosyn in the ED  31L fluid boluses  Plan:   - f/u sensitivities  - ID consult  - start Cefepime 1g Q12  - trend wbc, curve

## 2022-01-13 NOTE — PATIENT PROFILE ADULT - FALL HARM RISK - HARM RISK INTERVENTIONS

## 2022-01-13 NOTE — CONSULT NOTE ADULT - ASSESSMENT
This is a 76 year old man with a pmhx of T2DM, and HTN who presented to [facility] after a fall 4 days ago and found to have new onset Afib and acute hemorrhage right basal ganglia on CT head. Patient is now in SR.     Plan:  - Continuous telemetric monitoring  - Monitor electrolytes and replete K to 4 and Mg to 2  - Obtain TTE  - Anticoagulation on hold given acute hemorrhage right basal ganglia, Start DOAC when cleared by neurology for thromboembolic ppx  - Start metoprolol 12.5mg po BID, hold for SBP <90mmHg and HR <45  - Continue care per primary team    Mario Huerta PA-C  Patient to be staffed with attending. Please await attending addendum   This is a 76 year old man with a pmhx of T2DM, and HTN who presented to [facility] after a fall 4 days ago and found to have new onset Afib and acute hemorrhage right basal ganglia on CT head. Patient is now in SR.     Plan:  - Continuous telemetric monitoring  - Monitor electrolytes and replete K to 4 and Mg to 2  - Obtain TTE  - Anticoagulation on hold given acute hemorrhage right basal ganglia, Start DOAC when cleared by neurology for thromboembolic ppx  - Start metoprolol 12.5mg po BID and up titrated as tolerated, hold for SBP <90mmHg and HR <45  - Continue care per primary team    Mario Huerta PA-C  Patient to be staffed with attending. Please await attending addendum

## 2022-01-13 NOTE — H&P ADULT - PROBLEM SELECTOR PLAN 9
AA01; Pt was previously AA03 prior to presentation, living independently, with hx of forgetfulness, short term memory loss, agititation w/ difficulty following commands  Daughter says pt has previously been pres Donepezil but has not been compliant  Plan:   - IV thiamine  - folate  - f/u Neuro reccs

## 2022-01-14 NOTE — PROGRESS NOTE ADULT - PROBLEM SELECTOR PLAN 4
Pt is AAO1, w/t sweating, chills  Sepsis possibly 2/2 LE cellulitis? though uncommonly w/ gram negative anaerobes/ aerobes  BCx +E. cloaca and proteus   s/p Zosyn in the ED  31L fluid boluses  Plan:   - f/u sensitivities  - ID consult  - start Cefepime 1g Q12  - trend wbc, curve Pt is AAO1, w/t sweating, chills  Sepsis possibly 2/2 LE cellulitis? though uncommonly w/ gram negative anaerobes/ aerobes  BCx +E. cloaca and proteus   s/p Zosyn in the ED  31L fluid boluses  Plan:   - f/u sensitivities  - ID consult  - Cefepime 1g Q8  - trend wbc, curve

## 2022-01-14 NOTE — PROGRESS NOTE ADULT - PROBLEM SELECTOR PLAN 5
2/2 Fall and immobility 4 days, and dehydration  ; Trops elevated  Plan:   - s/p 3 1LR boluses   - trending CK in the am,   - Maintenance fluids pending Echo  - continue to monitor 2/2 Fall and immobility 4 days, and dehydration  ; Trops elevated  Plan:   - s/p 3 1LR boluses   - trending CK in the am,   - Maintenance fluids NS 125cc's/hr
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
## 2022-01-14 NOTE — PROVIDER CONTACT NOTE (OTHER) - ASSESSMENT
patient asymptomatic, /68, no complaints of chest pain
patient asymptomatic, denies chest pain
Pt's left lower extremity/ foot is pale, dusky, cool to touch with diminished pulses upon palpation and dopplers. Vital signs stable. Asked Team 2 Diego Argueta to come assess pt.
patient asymptomatic, denies chest pain

## 2022-01-14 NOTE — PROVIDER CONTACT NOTE (CHANGE IN STATUS NOTIFICATION) - ASSESSMENT
Pt's heart rate sustaining in the 180's with no response from Team 2. Pt asymptomatic and denies chest pain, SOB or generalized pain. No response from Team 2, Team 2 Diego Argueta for more than 20 mins, Rapid response called as patients HR was sustaining in 180's. Vital signs stable. Pt's heart rate sustaining in the 180's with no response from Team 2. Pt asymptomatic and denies chest pain, SOB or generalized pain. No response from Team 2, Team 2 Diego Argueta for more than 20 mins, Rapid response called as patients HR was sustaining in 180's. Other Vital signs stable.

## 2022-01-14 NOTE — PROVIDER CONTACT NOTE (CRITICAL VALUE NOTIFICATION) - TEST AND RESULT REPORTED:
positive blood cultures in both anaerobic & aerobic
Blood Culture Positive for Preliminary Growth in Aneobic Bottle for Gram -ve Rods
Gram negative rods in both aerobic and anaerobic culture samples

## 2022-01-14 NOTE — PROVIDER CONTACT NOTE (CHANGE IN STATUS NOTIFICATION) - SITUATION
Pt has a change in mental status.
Pt's heart rate sustaining in the 180's with no response from Team 2.

## 2022-01-14 NOTE — PROGRESS NOTE ADULT - ASSESSMENT
This is a 76 year old man with a pmhx of T2DM, and HTN who presented to [facility] after a fall 4 days ago and found to have new onset Afib and acute hemorrhage right basal ganglia on CT head. Currently on Amiodarone infusion and Lopressor for rate control. Patient is now in SR.     Plan:  - Continuous telemetric monitoring  - Monitor electrolytes and replete K to 4 and Mg to 2  - Obtain TTE  - Anticoagulation on hold given acute hemorrhage right basal ganglia, Start DOAC when cleared by neurology for thromboembolic ppx  - Continue BB and up titrated as tolerated, hold parameter for SBP <90mmHg and HR <45  - Continue Amiodarone infusion   - Continue care per primary team/ Neurology team

## 2022-01-14 NOTE — PROVIDER CONTACT NOTE (CRITICAL VALUE NOTIFICATION) - SITUATION
Gram negative rods in both aerobic and anaerobic culture samples
positive blood cultures in both anaerobic & aerobic
75 y/o M DM, HTN, Dementia,
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
## 2022-01-14 NOTE — PROGRESS NOTE ADULT - PROBLEM SELECTOR PLAN 8
DVT Prophylaxis: no AC, with active/ stable intracerebral hemorrhage  Diet: Pending dysphagia screen  Dispo:  GOC: Pending discussion between patient and Daughter. Daughter does not know if he would want intubation/ resucutation. Full code at this time DVT Prophylaxis: no AC, with active/ stable intracerebral hemorrhage  Diet: Pending dysphagia screen  Dispo:  GOC: Pending discussion between patient and Daughter. Daughter does not know if he would want intubation/ resucutation. Full code at this time.
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
## 2022-01-14 NOTE — PROGRESS NOTE ADULT - PROBLEM SELECTOR PLAN 2
Pt found tachy to 150's, Afibb w RVR intermittent, most likely 2/2 hypovolemia;   responsive to Lopressor 5 IV pushes  No known cardiac history  Pt cannot tolerate Antiarrythmics i/s/o no AC w/t his intercerebral hemorrhage  ELevated Troponins most likely i/s/o Rhabo  Plan:  - IV Lopressor 5mg if in Afibb with HR> 130-140's sustained  - addl 500cc bolus  - Echo Stat  - EP following, reccs appreciated  - Cardiology following reccs appreciated  - Metoprolol 12.5 BID twice daily  - TSH 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  CT head 2x yesterday and once this am with a stable hemorrhage  Plan:   - MRI unable to complete due to hx metal fragments  - Neuro checks q4, dysphagia precautions   - Control BP<160/80  - f/u Neurosurgery reccs  - f/u Neuro reccs  - S/SW  - dysphagia precuations
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
## 2022-01-14 NOTE — RAPID RESPONSE TEAM SUMMARY - NSOTHERINTERVENTIONSRRT_GEN_ALL_CORE
Recommendations:  - f/u RRT labs  - c/w amiodarone load  - c/w IVF boluses for now, but may benefit from maintenance IVF given unclear PO status in setting of stroke and suspected rhabdomyolysis   - obtain TTE and repeat CTH  - f/u with Neurology and Cards/EP when would be appropriate for starting AC Recommendations:  - f/u RRT labs  - c/w amiodarone load  - c/w IVF boluses for now, but may benefit from maintenance IVF given unclear PO status in setting of stroke and suspected rhabdomyolysis   - obtain TTE and repeat CTH  - f/u with Neurology and Cards/EP when would be appropriate for starting AC  - obtain S+S eval, maintain aspiration and fall precautions
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
## 2022-01-14 NOTE — PROGRESS NOTE ADULT - SUBJECTIVE AND OBJECTIVE BOX
Kianna Johnson MD   PGY-1, Internal Medicine  Pager: [phone] ([facility]) / [phone] ([facility])      Follow Up:  bacteremia    Interval History/ROS:  Leukocytosis downtrending to 14 this AM  Hypernatremia uptrending to 152    Went to bedside to assess patient. Found to be [ ]      Allergies  No Known Allergies        ANTIMICROBIALS:  cefepime   IVPB 1000 every 8 hours      OTHER MEDS:  MEDICATIONS  (STANDING):  acetaminophen     Tablet .. 650 every 6 hours PRN  aMIOdarone Infusion 1 <Continuous>  melatonin 3 at bedtime PRN  metoprolol tartrate 12.5 two times a day      Vital Signs Last 24 Hrs  T(C): 36.6 (2022 06:19), Max: 37.7 (2022 02:48)  T(F): 97.8 (2022 06:19), Max: 99.9 (2022 02:48)  HR: 75 (2022 06:19) (72 - 194)  BP: 143/64 (2022 06:19) (110/68 - 156/80)  BP(mean): --  RR: 18 (2022 06:19) (18 - 18)  SpO2: 97% (2022 06:19) (96% - 99%)    PHYSICAL EXAM:  Constitutional: non-toxic, no distress  HEAD/EYES: anicteric, no conjunctival injection  ENT:  supple, no thrush  Cardiovascular:   normal S1, S2, no murmur, no edema  Respiratory:  clear BS bilaterally, no wheezes, no rales  GI:  soft, non-tender, normal bowel sounds  :  no lilly, no CVA tenderness  Musculoskeletal:  no synovitis, normal ROM  Neurologic: awake and alert, normal strength, no focal findings  Skin:  abrasions of R medial knee, L lateral knee, and L elbow; erythematous streaks on L axilla, LLQ, and R hallux  Heme/Onc: no lymphadenopathy   Psychiatric:  awake, alert, appropriate mood                                15.8   14.28 )-----------( 207      ( 2022 04:34 )             48.9       01-14    152<H>  |  114<H>  |  33<H>  ----------------------------<  160<H>  4.5   |  26  |  0.96    Ca    8.7      2022 04:34  Phos  2.3     -  Mg     2.70         TPro  7.0  /  Alb  3.8  /  TBili  1.3<H>  /  DBili  x   /  AST  35  /  ALT  35  /  AlkPhos  79  -      Urinalysis Basic - ( 2022 18:01 )    Color: Yellow / Appearance: Clear / S.029 / pH: x  Gluc: x / Ketone: Small  / Bili: Negative / Urobili: <2 mg/dL   Blood: x / Protein: 300 mg/dL / Nitrite: Negative   Leuk Esterase: Negative / RBC: 15 /HPF / WBC 3 /HPF   Sq Epi: x / Non Sq Epi: 2 /HPF / Bacteria: Occasional        MICROBIOLOGY:  v    Culture - Blood (collected 2022 12:36)  Source: .Blood Blood-Peripheral  Gram Stain (2022 03:23):    Growth in anaerobic bottle: Gram Negative Rods  Preliminary Report (2022 03:23):    Growth in anaerobic bottle: Gram Negative Rods    Culture - Urine (collected 2022 20:01)  Source: Clean Catch Clean Catch (Midstream)  Final Report (2022 18:33):    No growth    Culture - Blood (collected 2022 19:11)  Source: .Blood Blood-Peripheral  Gram Stain (2022 08:58):    Growth in aerobic and anaerobic bottles: Gram Negative Rods  Preliminary Report (2022 08:59):    Growth in aerobic and anaerobic bottles: Gram Negative Rods    Culture - Blood (collected 2022 19:03)  Source: .Blood Blood-Peripheral  Gram Stain (2022 07:36):    Growth in aerobic and anaerobic bottles: Gram Negative Rods  Preliminary Report (2022 07:36):    Growth in aerobic and anaerobic bottles: Gram Negative Rods    ***Blood Panel PCR results on this specimen are available    approximately 3 hours after the Gram stain result.***    Gram stain, PCR, and/or culture results may not always    correspond due todifference in methodologies.    ************************************************************    This PCR assay was performed by multiplex PCR. This    Assay tests for 66 bacterial and resistance gene targets.    Please refer to the [facility] testdirectory    at [email or web address] for details.  Organism: Blood Culture PCR (2022 09:32)  Organism: Blood Culture PCR (2022 09:32)      -  Enterobacter cloacae complex: Detec      -  Proteus mirabilis: Detec      Method Type: PCR                    RADIOLOGY:  XR of pelvis, hips, lower extremities showed no fractures  CT chest, abdomen/pelvis showed no acute pathology  CT head showed acute hemorrhage in R basal ganglia Kianna Johnson MD   PGY-1, Internal Medicine  Pager: [phone] ([facility]) / [phone] ([facility])      Follow Up:  bacteremia    Interval History/ROS:  Leukocytosis downtrending to 14 this AM  Hypernatremia uptrending to 152    Went to bedside to assess patient. Found to be in no acute distress. Mentating better, more alert, and more interactive compared to yesterday's exam. Noticeable flattening of L nasolabial fold and slurred speech. Failed bedside swallow during interview. He denied any fevers or chills. He has pain in his legs when he is moved in bed, but no pain at rest. Moving RUE spontaneously but not the LUE. Some shaking in the L hand, which patient attributed to being nervous. Patient states he is unable to move the LUE.      Allergies  No Known Allergies        ANTIMICROBIALS:  cefepime   IVPB 1000 every 8 hours      OTHER MEDS:  MEDICATIONS  (STANDING):  acetaminophen     Tablet .. 650 every 6 hours PRN  aMIOdarone Infusion 1 <Continuous>  melatonin 3 at bedtime PRN  metoprolol tartrate 12.5 two times a day      Vital Signs Last 24 Hrs  T(C): 36.6 (2022 06:19), Max: 37.7 (2022 02:48)  T(F): 97.8 (2022 06:19), Max: 99.9 (2022 02:48)  HR: 75 (2022 06:19) (72 - 194)  BP: 143/64 (2022 06:19) (110/68 - 156/80)  BP(mean): --  RR: 18 (2022 06:19) (18 - 18)  SpO2: 97% (2022 06:19) (96% - 99%)    PHYSICAL EXAM:  Constitutional: non-toxic, no distress  HEAD/EYES: anicteric, no conjunctival injection  ENT:  supple, no thrush  Cardiovascular:   normal S1, S2, no murmur, no edema  Respiratory:  clear BS bilaterally, no wheezes, no rales  GI:  soft, non-tender, normal bowel sounds  :  no lilly, no CVA tenderness  Musculoskeletal:  no synovitis, normal ROM  Neurologic: flattening of L nasolabial fold, slurred speech, no active movement on LUE, intermittent shaking of L hand; full active ROM in RUE, EOM intact  Skin:  abrasions of R medial knee, L lateral knee, and L elbow; erythematous streaks on L axilla, LLQ, and R hallux  Heme/Onc: no lymphadenopathy   Psychiatric:  awake, alert, appropriate mood                                15.8   14.28 )-----------( 207      ( 2022 04:34 )             48.9       01-14    152<H>  |  114<H>  |  33<H>  ----------------------------<  160<H>  4.5   |  26  |  0.96    Ca    8.7      2022 04:34  Phos  2.3       Mg     2.70         TPro  7.0  /  Alb  3.8  /  TBili  1.3<H>  /  DBili  x   /  AST  35  /  ALT  35  /  AlkPhos  79        Urinalysis Basic - ( 2022 18:01 )    Color: Yellow / Appearance: Clear / S.029 / pH: x  Gluc: x / Ketone: Small  / Bili: Negative / Urobili: <2 mg/dL   Blood: x / Protein: 300 mg/dL / Nitrite: Negative   Leuk Esterase: Negative / RBC: 15 /HPF / WBC 3 /HPF   Sq Epi: x / Non Sq Epi: 2 /HPF / Bacteria: Occasional        MICROBIOLOGY:  v    Culture - Blood (collected 2022 12:36)  Source: .Blood Blood-Peripheral  Gram Stain (2022 03:23):    Growth in anaerobic bottle: Gram Negative Rods  Preliminary Report (2022 03:23):    Growth in anaerobic bottle: Gram Negative Rods    Culture - Urine (collected 2022 20:01)  Source: Clean Catch Clean Catch (Midstream)  Final Report (2022 18:33):    No growth    Culture - Blood (collected 2022 19:11)  Source: .Blood Blood-Peripheral  Gram Stain (2022 08:58):    Growth in aerobic and anaerobic bottles: Gram Negative Rods  Preliminary Report (2022 08:59):    Growth in aerobic and anaerobic bottles: Gram Negative Rods    Culture - Blood (collected 2022 19:03)  Source: .Blood Blood-Peripheral  Gram Stain (2022 07:36):    Growth in aerobic and anaerobic bottles: Gram Negative Rods  Preliminary Report (2022 07:36):    Growth in aerobic and anaerobic bottles: Gram Negative Rods    ***Blood Panel PCR results on this specimen are available    approximately 3 hours after the Gram stain result.***    Gram stain, PCR, and/or culture results may not always    correspond due todifference in methodologies.    ************************************************************    This PCR assay was performed by multiplex PCR. This    Assay tests for 66 bacterial and resistance gene targets.    Please refer to the [facility] testdirectory    at [email or web address] for details.  Organism: Blood Culture PCR (2022 09:32)  Organism: Blood Culture PCR (2022 09:32)      -  Enterobacter cloacae complex: Detec      -  Proteus mirabilis: Detec      Method Type: PCR                    RADIOLOGY:  XR of pelvis, hips, lower extremities showed no fractures  CT chest, abdomen/pelvis showed no acute pathology  CT head showed acute hemorrhage in R basal ganglia Kianna Johnson MD   PGY-1, Internal Medicine  Pager: [phone] ([facility]) / [phone] ([facility])      Follow Up:  bacteremia    Interval History/ROS:  Leukocytosis downtrending to 14 this AM  Hypernatremia uptrending to 152    Went to bedside to assess patient. Found to be in no acute distress. Mentating better, more alert, and more interactive compared to yesterday's exam. Noticeable flattening of L nasolabial fold and slurred speech. Failed bedside swallow during interview. He denied any fevers or chills. He has pain in his legs when he is moved in bed, but no pain at rest. Moving RUE spontaneously but not the LUE. Some shaking in the L hand, which patient attributed to being nervous. Patient states he is unable to move the LUE.      Allergies  No Known Allergies        ANTIMICROBIALS:  cefepime   IVPB 1000 every 8 hours      OTHER MEDS:  MEDICATIONS  (STANDING):  acetaminophen     Tablet .. 650 every 6 hours PRN  aMIOdarone Infusion 1 <Continuous>  melatonin 3 at bedtime PRN  metoprolol tartrate 12.5 two times a day      Vital Signs Last 24 Hrs  T(C): 36.6 (2022 06:19), Max: 37.7 (2022 02:48)  T(F): 97.8 (2022 06:19), Max: 99.9 (2022 02:48)  HR: 75 (2022 06:19) (72 - 194)  BP: 143/64 (2022 06:19) (110/68 - 156/80)  BP(mean): --  RR: 18 (2022 06:19) (18 - 18)  SpO2: 97% (2022 06:19) (96% - 99%)    PHYSICAL EXAM:  Constitutional: non-toxic, no distress  HEAD/EYES: anicteric, no conjunctival injection  ENT:  supple, no thrush  Cardiovascular:   normal S1, S2, no murmur, no edema  Respiratory:  clear BS bilaterally, no wheezes, no rales  GI:  soft, non-tender, normal bowel sounds  :  no lilly, no CVA tenderness  Musculoskeletal:  no synovitis, normal ROM  Neurologic: flattening of L nasolabial fold, slurred speech, no active movement of LUE, intermittent shaking of L hand; full active ROM in RUE, EOM intact  Skin:  abrasions of R medial knee, L lateral knee, and L elbow; erythematous streaks on L axilla, LLQ, and R hallux  Heme/Onc: no lymphadenopathy   Psychiatric:  awake, alert, appropriate mood                                15.8   14.28 )-----------( 207      ( 2022 04:34 )             48.9       01-14    152<H>  |  114<H>  |  33<H>  ----------------------------<  160<H>  4.5   |  26  |  0.96    Ca    8.7      2022 04:34  Phos  2.3       Mg     2.70         TPro  7.0  /  Alb  3.8  /  TBili  1.3<H>  /  DBili  x   /  AST  35  /  ALT  35  /  AlkPhos  79        Urinalysis Basic - ( 2022 18:01 )    Color: Yellow / Appearance: Clear / S.029 / pH: x  Gluc: x / Ketone: Small  / Bili: Negative / Urobili: <2 mg/dL   Blood: x / Protein: 300 mg/dL / Nitrite: Negative   Leuk Esterase: Negative / RBC: 15 /HPF / WBC 3 /HPF   Sq Epi: x / Non Sq Epi: 2 /HPF / Bacteria: Occasional        MICROBIOLOGY:  v    Culture - Blood (collected 2022 12:36)  Source: .Blood Blood-Peripheral  Gram Stain (2022 03:23):    Growth in anaerobic bottle: Gram Negative Rods  Preliminary Report (2022 03:23):    Growth in anaerobic bottle: Gram Negative Rods    Culture - Urine (collected 2022 20:01)  Source: Clean Catch Clean Catch (Midstream)  Final Report (2022 18:33):    No growth    Culture - Blood (collected 2022 19:11)  Source: .Blood Blood-Peripheral  Gram Stain (2022 08:58):    Growth in aerobic and anaerobic bottles: Gram Negative Rods  Preliminary Report (2022 08:59):    Growth in aerobic and anaerobic bottles: Gram Negative Rods    Culture - Blood (collected 2022 19:03)  Source: .Blood Blood-Peripheral  Gram Stain (2022 07:36):    Growth in aerobic and anaerobic bottles: Gram Negative Rods  Preliminary Report (2022 07:36):    Growth in aerobic and anaerobic bottles: Gram Negative Rods    ***Blood Panel PCR results on this specimen are available    approximately 3 hours after the Gram stain result.***    Gram stain, PCR, and/or culture results may not always    correspond due todifference in methodologies.    ************************************************************    This PCR assay was performed by multiplex PCR. This    Assay tests for 66 bacterial and resistance gene targets.    Please refer to the [facility] Labs testdirectory    at [email or web address] for details.  Organism: Blood Culture PCR (2022 09:32)  Organism: Blood Culture PCR (2022 09:32)      -  Enterobacter cloacae complex: Detec      -  Proteus mirabilis: Detec      Method Type: PCR                    RADIOLOGY:  XR of pelvis, hips, lower extremities showed no fractures  CT chest, abdomen/pelvis showed no acute pathology  CT head showed acute hemorrhage in R basal ganglia Kianna Johnson MD   PGY-1, Internal Medicine  Pager: [phone] ([facility]) / [phone] ([facility])      Follow Up:  bacteremia    Interval History/ROS:  Leukocytosis downtrending to 14 this AM  Hypernatremia uptrending to 152    Went to bedside to assess patient. Found to be in no acute distress. Mentating better, more alert, and more interactive compared to yesterday's exam. Noticeable flattening of L nasolabial fold and slurred speech. Failed bedside swallow during interview. He denied any fevers or chills. He has pain in his legs when he is moved in bed, but no pain at rest. Moving RUE spontaneously but not the LUE. Some shaking in the L hand, which patient attributed to being nervous. Patient states he is unable to move the LUE.      Allergies  No Known Allergies        ANTIMICROBIALS:  cefepime   IVPB 1000 every 8 hours      OTHER MEDS:  MEDICATIONS  (STANDING):  acetaminophen     Tablet .. 650 every 6 hours PRN  aMIOdarone Infusion 1 <Continuous>  melatonin 3 at bedtime PRN  metoprolol tartrate 12.5 two times a day      Vital Signs Last 24 Hrs  T(C): 36.6 (2022 06:19), Max: 37.7 (2022 02:48)  T(F): 97.8 (2022 06:19), Max: 99.9 (2022 02:48)  HR: 75 (2022 06:19) (72 - 194)  BP: 143/64 (2022 06:19) (110/68 - 156/80)  BP(mean): --  RR: 18 (2022 06:19) (18 - 18)  SpO2: 97% (2022 06:19) (96% - 99%)    PHYSICAL EXAM:  Constitutional: non-toxic, no distress  HEAD/EYES: anicteric, no conjunctival injection  Cardiovascular:   regular rate   Respiratory:  grossly clear BS bilaterally, no wheezes, no rales  GI:  soft, non-tender, normal bowel sounds  :  no lilly, no CVA tenderness  Musculoskeletal:  no synovitis, normal ROM  Neurologic: flattening of L nasolabial fold, slurred speech, no active movement of LUE, intermittent shaking of L hand; full active ROM in RUE, EOM intact  Skin:  eschars to both knees; erythematous streaks on L axilla, LLQ, and R hallux  Heme/Onc: no lymphadenopathy   Psychiatric:  awake, alert, appropriate mood                                15.8   14.28 )-----------( 207      ( 2022 04:34 )             48.9       01-14    152<H>  |  114<H>  |  33<H>  ----------------------------<  160<H>  4.5   |  26  |  0.96    Ca    8.7      2022 04:34  Phos  2.3     -  Mg     2.70         TPro  7.0  /  Alb  3.8  /  TBili  1.3<H>  /  DBili  x   /  AST  35  /  ALT  35  /  AlkPhos  79  -13      Urinalysis Basic - ( 2022 18:01 )    Color: Yellow / Appearance: Clear / S.029 / pH: x  Gluc: x / Ketone: Small  / Bili: Negative / Urobili: <2 mg/dL   Blood: x / Protein: 300 mg/dL / Nitrite: Negative   Leuk Esterase: Negative / RBC: 15 /HPF / WBC 3 /HPF   Sq Epi: x / Non Sq Epi: 2 /HPF / Bacteria: Occasional        MICROBIOLOGY:    Culture - Blood (collected 2022 12:36)  Source: .Blood Blood-Peripheral  Gram Stain (2022 03:23):    Growth in anaerobic bottle: Gram Negative Rods  Preliminary Report (2022 03:23):    Growth in anaerobic bottle: Gram Negative Rods    Culture - Urine (collected 2022 20:01)  Source: Clean Catch Clean Catch (Midstream)  Final Report (2022 18:33):    No growth    Culture - Blood (collected 2022 19:11)  Source: .Blood Blood-Peripheral  Gram Stain (2022 08:58):    Growth in aerobic and anaerobic bottles: Gram Negative Rods  Preliminary Report (2022 08:59):    Growth in aerobic and anaerobic bottles: Gram Negative Rods    Culture - Blood (collected 2022 19:03)  Source: .Blood Blood-Peripheral  Gram Stain (2022 07:36):    Growth in aerobic and anaerobic bottles: Gram Negative Rods  Preliminary Report (2022 07:36):    Growth in aerobic and anaerobic bottles: Gram Negative Rods    ***Blood Panel PCR results on this specimen are available    approximately 3 hours after the Gram stain result.***    Gram stain, PCR, and/or culture results may not always    correspond due todifference in methodologies.    ************************************************************    This PCR assay was performed by multiplex PCR. This    Assay tests for 66 bacterial and resistance gene targets.    Please refer to the [facility] Labs testdirectory    at [email or web address] for details.  Organism: Blood Culture PCR (2022 09:32)  Organism: Blood Culture PCR (2022 09:32)      -  Enterobacter cloacae complex: Detec      -  Proteus mirabilis: Detec      Method Type: PCR        RADIOLOGY:  XR of pelvis, hips, lower extremities showed no fractures  CT chest, abdomen/pelvis showed no acute pathology  CT head showed acute hemorrhage in R basal ganglia

## 2022-01-14 NOTE — PROGRESS NOTE ADULT - PROBLEM SELECTOR PLAN 3
Pulseless, vascular flow defect Lt distal femoral artery on CT angio  Per Vascular, pt's Lt limb is unsalvageable; no urgent surgical intervention at this time though will likely need amputation  Plan:   - Low pressure bed  - f/u Vascular reccs for final plan  - continue to monitor Pulseless, vascular flow defect Lt distal femoral artery on CT angio  Per Vascular, pt's Lt limb is unsalvageable; no urgent surgical intervention at this time though will likely need amputation  Plan:   - Low pressure bed  - f/u Vascular reccs for final plan  - continue to monitor  *Daughter would like to be present to discuss overall prognosis on pt's LT LE pulseless limb, pending amputation
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
## 2022-01-14 NOTE — PROGRESS NOTE ADULT - ATTENDING COMMENTS
I don't know where his polymicrobial gram negative bacteremia came from.   After falling and being on the floor for 4 days a lot of sources are possible but none apparent.   On Cefepime.   f/u sensitivities   Repeat cultures from 1/13 are still positive but his WBC is responding and he's nontoxic.   f/u cultures from this morning.   If still bacteremic may need to consider tagged WBC nuclear scan.   No vegetations on echo today.     Edy Nixon MD   Infectious Disease   Pager [phone]   After 5PM and on weekends please page fellow on call or call [phone]
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
## 2022-01-14 NOTE — PROVIDER CONTACT NOTE (CHANGE IN STATUS NOTIFICATION) - ACTION/TREATMENT ORDERED:
Team 2 Diego Argueta made aware. Rapid Response Called.
Team 2 Diego Argueta made aware. No further interventions ordered.

## 2022-01-14 NOTE — SWALLOW BEDSIDE ASSESSMENT ADULT - ADDITIONAL RECOMMENDATIONS
1. Reconsult this department as patient medically optimizes for PO tolerance/possible diet advancement 2. This department to follow up as schedule permits.
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
## 2022-01-14 NOTE — PROVIDER CONTACT NOTE (CRITICAL VALUE NOTIFICATION) - ASSESSMENT
Patient is A&Ox2, confused to place and situation. Experienced left sided weakness and facial droop.
Pt remains at baseline A&O x 1-2

## 2022-01-14 NOTE — CHART NOTE - NSCHARTNOTEFT_GEN_A_CORE
Case and imaging reviewed. Repeat CTH shows unchanged intraparenchymal hemorrhage in the right basal ganglia. No neurosurgical intervention at this time. Neurosurgery signing off. Reconsult PRN. Pt can follow up outpatient with Dr. Rhoades. Case d/w attending.
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
## 2022-01-14 NOTE — PROGRESS NOTE ADULT - ATTENDING COMMENTS
76 year old man with a pmhx of T2DM, and HTN who presented to [facility] after a fall 4 days ago and found to have new onset Afib and acute hemorrhage right basal ganglia on CT head. Currently on Amiodarone infusion and Lopressor for rate control. Patient is now in SR. Cont rate control and amio. AC on hold. Will follow.

## 2022-01-14 NOTE — PROVIDER CONTACT NOTE (CHANGE IN STATUS NOTIFICATION) - ASSESSMENT
Pt has a change in mental status. Pt is A&Ox1 upon beginning of shift. Pt was able to state name and  at the beginning of shift but is not unable to state name or . Pt is awake, alert, and responsive to stimuli. Pt's neuro assessment has not changed.

## 2022-01-14 NOTE — SWALLOW BEDSIDE ASSESSMENT ADULT - SWALLOW EVAL: DIAGNOSIS
1. Patient demonstrated functional oral stage for mildly thick and thin liquids marked by adequate bolus collection, transfer and posterior transport. 2. Patient demonstrated a mild-moderate oral dysphagia for puree and moderately thick liquids marked by a reduced oral aperture with reduced ability to strip/retrieve off teaspoon requiring SLP to pour bolus into oral cavity with patient exhibiting adequate containment, transfer and posterior transport. 3. Patient demonstrated a functional pharyngeal phase for puree, moderately thick and mildly thick marked by a present pharyngeal swallow trigger with hyolaryngeal elevation noted upon digital palpation without evidence of airway penetration/aspiration. 4. Patient demonstrated a severe pharyngeal dysphagia for thin liquids marked by a delayed pharyngeal swallow trigger with hyolaryngeal elevation noted upon digital palpation with immediate coughing, suggestive of airway penetration/aspiration.
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
## 2022-01-14 NOTE — PROGRESS NOTE ADULT - SUBJECTIVE AND OBJECTIVE BOX
Sakina Parker MD  PGY1 Internal Medicine  [id number]    DATE OF SERVICE: 22 @ 07:01    Patient is a 76y old  Male who presents with a chief complaint of AMS (2022 13:30)      SUBJECTIVE / OVERNIGHT EVENTS:  Pt went into Afibb sustaining in the 160's to 170's.   Overnight RRT treated with Lopressor, then started on Amiodarone ggt. Neuro called and explained low suspicion for re- bleed.   This am, pt     MEDICATIONS  (STANDING):  acetaminophen   IVPB .. 1000 milliGRAM(s) IV Intermittent once  aMIOdarone Infusion 1 mG/Min (33.3 mL/Hr) IV Continuous <Continuous>  cefepime   IVPB 1000 milliGRAM(s) IV Intermittent every 8 hours  lactated ringers Bolus 1000 milliLiter(s) IV Bolus once  metoprolol tartrate 12.5 milliGRAM(s) Oral two times a day  metoprolol tartrate Injectable 5 milliGRAM(s) IV Push once  metoprolol tartrate Injectable 5 milliGRAM(s) IV Push once  metoprolol tartrate Injectable 5 milliGRAM(s) IV Push once  thiamine Injectable 100 milliGRAM(s) IV Push once    MEDICATIONS  (PRN):  acetaminophen     Tablet .. 650 milliGRAM(s) Oral every 6 hours PRN Temp greater or equal to 38C (100.4F), Mild Pain (1 - 3)  melatonin 3 milliGRAM(s) Oral at bedtime PRN Insomnia      Vital Signs Last 24 Hrs  T(C): 36.6 (2022 06:19), Max: 37.7 (2022 02:48)  T(F): 97.8 (2022 06:19), Max: 99.9 (2022 02:48)  HR: 75 (2022 06:19) (72 - 194)  BP: 143/64 (2022 06:19) (110/68 - 156/80)  BP(mean): --  RR: 18 (2022 06:19) (18 - 18)  SpO2: 97% (2022 06:19) (96% - 99%)  CAPILLARY BLOOD GLUCOSE      POCT Blood Glucose.: 142 mg/dL (2022 02:46)  POCT Blood Glucose.: 146 mg/dL (2022 00:16)  POCT Blood Glucose.: 149 mg/dL (2022 16:43)  POCT Blood Glucose.: 161 mg/dL (2022 12:34)  POCT Blood Glucose.: 150 mg/dL (2022 09:11)    I&O's Summary    PHYSICAL EXAM:  GENERAL: ill appearing lying supine  HEENT: NC/AT, EOMI, PERRL  NERVOUS SYSTEM:  A&Ox1, LT sided motor hemiplegia Upper and Lowr Ext, touch and sensation intact throughout, Lt sided facial droop; dysarthria, CNVII-XII intact, LT sided apraxia  NECK: Stiff, No JVD,   CHEST/LUNG: CTAB, no increased WOB  HEART: RRR, no m/r/g  ABDOMEN: + lower abdominal erythema band like, soft, NT, ND, BS+  EXTREMITIES: no lt dorsalis pedis pulse, faint 1+ Rt dorsalis pedis , no clubbing, no edema  MSK: FROM all 4 extremities, full and equal strength  SKIN: bilateral knee pressure ulcers medially    LABS:                        15.8   14.28 )-----------( 207      ( 2022 04:34 )             48.9     01-14    152<H>  |  114<H>  |  33<H>  ----------------------------<  160<H>  4.5   |  26  |  0.96    Ca    8.7      2022 04:34  Phos  2.3     01-13  Mg     2.70     01-13    TPro  7.0  /  Alb  3.8  /  TBili  1.3<H>  /  DBili  x   /  AST  35  /  ALT  35  /  AlkPhos  79  01-13    PT/INR - ( 2022 04:34 )   PT: 13.6 sec;   INR: 1.20 ratio         PTT - ( 2022 04:34 )  PTT:22.8 sec  CARDIAC MARKERS ( 2022 04:34 )  x     / x     / 1320 U/L / x     / x      CARDIAC MARKERS ( 2022 19:17 )  x     / x     / x     / x     / 13.6 ng/mL  CARDIAC MARKERS ( 2022 16:52 )  x     / x     / 902 U/L / x     / x          Urinalysis Basic - ( 2022 18:01 )    Color: Yellow / Appearance: Clear / S.029 / pH: x  Gluc: x / Ketone: Small  / Bili: Negative / Urobili: <2 mg/dL   Blood: x / Protein: 300 mg/dL / Nitrite: Negative   Leuk Esterase: Negative / RBC: 15 /HPF / WBC 3 /HPF   Sq Epi: x / Non Sq Epi: 2 /HPF / Bacteria: Occasional        RADIOLOGY & ADDITIONAL TESTS:    Imaging Personally Reviewed:    Consultant(s) Notes Reviewed:      Care Discussed with Consultants/Other Providers:   Sakina Parker MD  PGY1 Internal Medicine  [id number]    DATE OF SERVICE: 22 @ 07:01    Patient is a 76y old  Male who presents with a chief complaint of AMS (2022 13:30)      SUBJECTIVE / OVERNIGHT EVENTS:  Pt went into Afibb sustaining in the 160's to 170's.   Overnight RRT treated with Lopressor, then started on Amiodarone ggt. Neuro called and explained low suspicion for re- bleed.   This am, pt is still confused, AAO1 but able to recount his episode of falling in the bathroom and being unable to get up for several days.   Pt reports pain in his knees.       MEDICATIONS  (STANDING):  acetaminophen   IVPB .. 1000 milliGRAM(s) IV Intermittent once  aMIOdarone Infusion 1 mG/Min (33.3 mL/Hr) IV Continuous <Continuous>  cefepime   IVPB 1000 milliGRAM(s) IV Intermittent every 8 hours  lactated ringers Bolus 1000 milliLiter(s) IV Bolus once  metoprolol tartrate 12.5 milliGRAM(s) Oral two times a day  metoprolol tartrate Injectable 5 milliGRAM(s) IV Push once  metoprolol tartrate Injectable 5 milliGRAM(s) IV Push once  metoprolol tartrate Injectable 5 milliGRAM(s) IV Push once  thiamine Injectable 100 milliGRAM(s) IV Push once    MEDICATIONS  (PRN):  acetaminophen     Tablet .. 650 milliGRAM(s) Oral every 6 hours PRN Temp greater or equal to 38C (100.4F), Mild Pain (1 - 3)  melatonin 3 milliGRAM(s) Oral at bedtime PRN Insomnia      Vital Signs Last 24 Hrs  T(C): 36.6 (2022 06:19), Max: 37.7 (2022 02:48)  T(F): 97.8 (2022 06:19), Max: 99.9 (2022 02:48)  HR: 75 (2022 06:19) (72 - 194)  BP: 143/64 (2022 06:19) (110/68 - 156/80)  BP(mean): --  RR: 18 (2022 06:19) (18 - 18)  SpO2: 97% (2022 06:19) (96% - 99%)  CAPILLARY BLOOD GLUCOSE      POCT Blood Glucose.: 142 mg/dL (2022 02:46)  POCT Blood Glucose.: 146 mg/dL (2022 00:16)  POCT Blood Glucose.: 149 mg/dL (2022 16:43)  POCT Blood Glucose.: 161 mg/dL (2022 12:34)  POCT Blood Glucose.: 150 mg/dL (2022 09:11)    I&O's Summary    PHYSICAL EXAM:  GENERAL: ill appearing lying supine  HEENT: NC/AT, EOMI, PERRL  NERVOUS SYSTEM:  A&Ox1, LT sided motor hemiplegia Upper and Lowr Ext, touch and sensation intact throughout, Lt sided facial droop; dysarthria, CNVII-XII intact, LT sided apraxia  NECK: Stiff, No JVD,   CHEST/LUNG: CTAB, no increased WOB  HEART: RRR, no m/r/g  ABDOMEN: + lower abdominal erythema band like, soft, NT, ND, BS+  EXTREMITIES: no lt dorsalis pedis pulse, faint 1+ Rt dorsalis pedis , no clubbing, no edema  MSK: FROM all 4 extremities, full and equal strength  SKIN: bilateral knee pressure ulcers medially    LABS:                        15.8   14.28 )-----------( 207      ( 2022 04:34 )             48.9     01-14    152<H>  |  114<H>  |  33<H>  ----------------------------<  160<H>  4.5   |  26  |  0.96    Ca    8.7      2022 04:34  Phos  2.3     01-13  Mg     2.70     01-13    TPro  7.0  /  Alb  3.8  /  TBili  1.3<H>  /  DBili  x   /  AST  35  /  ALT  35  /  AlkPhos  79  01-13    PT/INR - ( 2022 04:34 )   PT: 13.6 sec;   INR: 1.20 ratio         PTT - ( 2022 04:34 )  PTT:22.8 sec  CARDIAC MARKERS ( 2022 04:34 )  x     / x     / 1320 U/L / x     / x      CARDIAC MARKERS ( 2022 19:17 )  x     / x     / x     / x     / 13.6 ng/mL  CARDIAC MARKERS ( 2022 16:52 )  x     / x     / 902 U/L / x     / x          Urinalysis Basic - ( 2022 18:01 )    Color: Yellow / Appearance: Clear / S.029 / pH: x  Gluc: x / Ketone: Small  / Bili: Negative / Urobili: <2 mg/dL   Blood: x / Protein: 300 mg/dL / Nitrite: Negative   Leuk Esterase: Negative / RBC: 15 /HPF / WBC 3 /HPF   Sq Epi: x / Non Sq Epi: 2 /HPF / Bacteria: Occasional        RADIOLOGY & ADDITIONAL TESTS:    Imaging Personally Reviewed:  FINDINGS:   CT dated 2022 available for review.    The brain demonstrates unchanged intraparenchymal hematoma within the   RIGHT basal ganglia measuring 2.2 x 2.5 x 3.6 cm. Moderate   periventricular white matter ischemia. Mild pontine white matter   ischemia. No acute cerebral cortical infarct is seen.  No mass effect is   found in the brain. Tiny LEFT parietal scalp hematoma is noted.    The ventricles, sulci and basal cisterns appear unremarkable.    The orbits are unremarkable.  The paranasal sinuses are clear.  The nasal   cavity appears intact.  The nasopharynx is symmetric.  The central skull   base, petrous temporal bones and calvarium remain intact.      IMPRESSION: Unchanged intraparenchymal hematoma within the RIGHT basal   ganglia measuring 2.2 x 2.5 x 3.6 cm. Moderate periventricular white   matter ischemia. Mild pontine white matter ischemia.  Tiny LEFT parietal   scalp hematoma is noted.    --- End of Report ---            CARL MCKNIGHT MD; Attending Radiologist    Consultant(s) Notes Reviewed:      Care Discussed with Consultants/Other Providers:

## 2022-01-15 NOTE — DIETITIAN INITIAL EVALUATION ADULT. - PERTINENT LABORATORY DATA
(1/15) WBC 16.59 H, Hct 52.3 H, Albumin 3.3, Na 152 H, Cl 114 H, BUN 26 H, Glu 231 H AST 47 H, HbA1c 6.1% H
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
## 2022-01-15 NOTE — PROGRESS NOTE ADULT - PROBLEM SELECTOR PLAN 9
Pressures stable at this time  Plan:   - ctm
AA01; Pt was previously AA03 prior to presentation, living independently, with hx of forgetfulness, short term memory loss, agititation w/ difficulty following commands  Daughter says pt has previously been pres Donepezil but has not been compliant  Plan:   - IV thiamine  - folate  - f/u Neuro reccs
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
## 2022-01-15 NOTE — CHART NOTE - NSCHARTNOTEFT_GEN_A_CORE
Brief Update Note    CXR reviewed with questionable free air below diaphragm  Subsequent CT concerning for perforated small intestinal ulcer    Agree with emergent surgical evaluation.  Endoscopic evaluation contraindicated in setting of potential perforation  c/w PPI    Will continue to follow.  Discussed with Attending, Dr. Kelton Mcmillan.    Alexia Mclean MD  GI/Hepatology Fellow PGY-V

## 2022-01-15 NOTE — PROGRESS NOTE ADULT - PROBLEM SELECTOR PLAN 4
Pt is AAO1, w/t sweating, chills on admission  Sepsis possibly 2/2 LE cellulitis? though uncommonly w/ gram negative anaerobes/ aerobes   (Of note, BCx +E. cloaca and proteus grew on 1/12 cultures, cleared 1/14 on Cefepime (1/12- ))  Now pt given Vanc and Zosyn (1/15 for hypoxia, emesis/ possible aspiration event)  Plan:   - f/u new Blood cx, Urine Cx, CXR for RRT this am  - f/u sensitivities  - ID consult  - Cefepime 1g Q8, C/w Zosyn IV  - trend wbc, curve

## 2022-01-15 NOTE — PROGRESS NOTE ADULT - PROBLEM SELECTOR PLAN 7
2/2 Fall and immobility 4 days, and dehydration  ; Trops elevated  Plan:   - s/p 3 1LR boluses   - trending CK in the am,   - Maintenance fluids NS 125cc's/hr 2/2 Fall and immobility 4 days, and dehydration  CK 1100; Trops elevated  - s/p LR Boluses and maintenance  PLan:   - trending CK in the am,   - Maintenance fluids NS 100cc's/hr 24 hrs

## 2022-01-15 NOTE — CHART NOTE - NSCHARTNOTEFT_GEN_A_CORE
Notified by Radiology of CXR with positive free air sign. CT abd/pelvis consented and expedited urgently.  Notified by General Surgery of CT abd/ pelvis prelim read positive signs of free air, inflammation surrounding the duodenum indicating intestinal perforation 2/2 possible duoudenal ulcer.     Pt seen and examined. Pt in sinus rhythm on tele, breathing on RA  Lethargic, withdrawn  PT difficult to understand 2/2 known dysarthria, though AAO1, still with abdominal pain, and asking for water.   Abdomen hard, tender to palpation in the upper quadrants, no guarding    Plan:   - No objections to emergent Surgery  - Discussed with Surgery Resident    - joey
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
## 2022-01-15 NOTE — PROGRESS NOTE ADULT - PROBLEM SELECTOR PLAN 10
DVT Prophylaxis: no AC, with active/ stable intracerebral hemorrhage  Diet: Pending dysphagia screen  Dispo:  GOC: Pending discussion between patient and Daughter. Daughter does not know if he would want intubation/ resucutation. Full code at this time.
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
## 2022-01-15 NOTE — PROGRESS NOTE ADULT - PROBLEM SELECTOR PLAN 2
Coffee ground emesis 3X this am, abdominal pain, all possibly secondary to upper GI Bleed   Plan:   - IV Zofran Q6hrs prn ()  - GI consult  - ppi drip 80mg 10cc's/hr  - NPO, pending GI reccs  - Type and screen  - CBC's Q8  - 2 Large Bore IV's  - Transfuse if hemoglobin <7, or acute drop in hemoglobin/ htc

## 2022-01-15 NOTE — CONSULT NOTE ADULT - CONSULT REASON
IPH
Afib
Major abdominal surgery for hemodynamic monitoring
free air
leg ulcer
Intracranial Hemorrhage
Stridor
BON
Coffee ground emesis
acute limb ischemia
elevated troponin, afib with rvr
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
## 2022-01-15 NOTE — RAPID RESPONSE TEAM SUMMARY - NSOTHERINTERVENTIONSRRT_GEN_ALL_CORE
Recommend urgent GI consult for coffee grounds emesis  Cardiology/EP for management of AFib  Recommend upgrade to Zosyn for anaerobic coverage given suspected aspiration  Can get CXR to assess for any consolidation but empirically covered as above

## 2022-01-15 NOTE — BRIEF OPERATIVE NOTE - OPERATION/FINDINGS
Exploratory laparotomy, copious amount of enteric contents suctioned out upon entry into abdominal cavity. Perforation identified in 1st portion of duodenum and priscilla patch repair performed. Abdomen thoroughly irrigated and hemostasis assured.
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
## 2022-01-15 NOTE — PROGRESS NOTE ADULT - PROBLEM SELECTOR PLAN 3
~Pt found in Afibb on admission' most likely 2/2 hypovolemia w/t complicated sepsis  ~Overnight tachy to 160's-170s, Afibb w RVR not relieved by Lopressor 5mg IV 1X  ~HR improved, now rate controlled in the 100/s after IV diltiazem push in RRT 1/15  ~No known cardiac history  ~1/14 Echo: Normal Lt systolic function  ~ELevated Troponins most likely i/s/o Rhabo  Plan:  - IV Lopressor 5mg Q6; pt cannot tolerate po   - addl LRcc bolus now, then maintenance fluids 125cc's/hr NS  - EP following, reccs appreciated  - Cardiology following reccs appreciated  - Uptitrate Metoprolol 12.5 BID twice daily to 25mg BID once po status improves

## 2022-01-15 NOTE — PROGRESS NOTE ADULT - SUBJECTIVE AND OBJECTIVE BOX
Sakina Parker MD  PGY1 Internal Medicine  [id number]    DATE OF SERVICE: 01-15-22 @ 06:24    Patient is a 76y old  Male who presents with a chief complaint of Fall (14 Jan 2022 20:39)      SUBJECTIVE / OVERNIGHT EVENTS:    MEDICATIONS  (STANDING):  cefepime   IVPB 1000 milliGRAM(s) IV Intermittent every 8 hours  metoprolol tartrate 12.5 milliGRAM(s) Oral two times a day  sodium chloride 0.9%. 1000 milliLiter(s) (125 mL/Hr) IV Continuous <Continuous>  thiamine Injectable 100 milliGRAM(s) IV Push once    MEDICATIONS  (PRN):  acetaminophen     Tablet .. 650 milliGRAM(s) Oral every 6 hours PRN Temp greater or equal to 38C (100.4F), Mild Pain (1 - 3)  melatonin 3 milliGRAM(s) Oral at bedtime PRN Insomnia      Vital Signs Last 24 Hrs  T(C): 36.7 (14 Jan 2022 21:12), Max: 36.7 (14 Jan 2022 21:12)  T(F): 98 (14 Jan 2022 21:12), Max: 98 (14 Jan 2022 21:12)  HR: 91 (14 Jan 2022 21:12) (79 - 96)  BP: 157/99 (14 Jan 2022 21:12) (148/71 - 157/99)  BP(mean): --  RR: 17 (14 Jan 2022 21:12) (16 - 17)  SpO2: 97% (14 Jan 2022 21:12) (97% - 100%)  CAPILLARY BLOOD GLUCOSE      POCT Blood Glucose.: 169 mg/dL (14 Jan 2022 16:40)    I&O's Summary      PHYSICAL EXAM:  GENERAL: ill appearing lying supine  HEENT: NC/AT, EOMI, PERRL  NERVOUS SYSTEM:  A&Ox1, LT sided motor hemiplegia Upper and Lowr Ext, touch and sensation intact throughout, Lt sided facial droop; dysarthria, CNVII-XII intact, LT sided apraxia  NECK: Stiff, No JVD,   CHEST/LUNG: CTAB, no increased WOB  HEART: RRR, no m/r/g  ABDOMEN: + lower abdominal erythema band like, soft, NT, ND, BS+  EXTREMITIES: no lt dorsalis pedis pulse, faint 1+ Rt dorsalis pedis , no clubbing, no edema  MSK: FROM all 4 extremities, full and equal strength  SKIN: bilateral knee pressure ulcers medially    LABS:                        15.8   14.28 )-----------( 207      ( 14 Jan 2022 04:34 )             48.9     01-14    151<H>  |  115<H>  |  26<H>  ----------------------------<  190<H>  3.9   |  26  |  0.84    Ca    8.6      14 Jan 2022 17:27  Phos  2.3     01-13  Mg     2.70     01-13    TPro  7.0  /  Alb  3.8  /  TBili  1.3<H>  /  DBili  x   /  AST  35  /  ALT  35  /  AlkPhos  79  01-13    PT/INR - ( 14 Jan 2022 04:34 )   PT: 13.6 sec;   INR: 1.20 ratio         PTT - ( 14 Jan 2022 04:34 )  PTT:22.8 sec  CARDIAC MARKERS ( 14 Jan 2022 04:34 )  x     / x     / 1320 U/L / x     / x              RADIOLOGY & ADDITIONAL TESTS:    Imaging Personally Reviewed:    Consultant(s) Notes Reviewed:      Care Discussed with Consultants/Other Providers:   Sakina Parker MD  PGY1 Internal Medicine  [id number]    DATE OF SERVICE: 01-15-22 @ 06:24    Patient is a 76y old  Male who presents with a chief complaint of Fall (14 Jan 2022 20:39)      SUBJECTIVE / OVERNIGHT EVENTS:  ON Pt with 1 episode tachycardia, in Afibb RVR  responsive to 1X IV Lopressor. And BP as high as 170 systolic.   Pt this morning mentioned some nausea, and that generally "not feeling well", with pain in the right knee.   Asking for water. No headaches, dizzyness.     Called back to bedside this am for Coffee Ground Emesis 2x, and abdominal pain.   RRT called for Hypoxia, (desatturation below 90 requiring 4L NC), W/..t Afibb with RVR up to 180s, coffee ground emesis, and abdominal pain in the Upper Rt and Lt quadrants.   Pt maintained AA01, responsive at baseline, +Lethargic, "snoring awake. Rectal Temp 99  Pt treated with fluids, 1L LR, unrelieved by IV pushes Lopressor, then HR controlled in the 100's with IV push diltiazem 1x.   Head of bed elevated, given suction, 1 dose IV vanc and zosyn, and Rapid Response labs and care provided.   Will continue to monitor.     MEDICATIONS  (STANDING):  cefepime   IVPB 1000 milliGRAM(s) IV Intermittent every 8 hours  metoprolol tartrate 12.5 milliGRAM(s) Oral two times a day  sodium chloride 0.9%. 1000 milliLiter(s) (125 mL/Hr) IV Continuous <Continuous>  thiamine Injectable 100 milliGRAM(s) IV Push once    MEDICATIONS  (PRN):  acetaminophen     Tablet .. 650 milliGRAM(s) Oral every 6 hours PRN Temp greater or equal to 38C (100.4F), Mild Pain (1 - 3)  melatonin 3 milliGRAM(s) Oral at bedtime PRN Insomnia      Vital Signs Last 24 Hrs  T(C): 36.7 (14 Jan 2022 21:12), Max: 36.7 (14 Jan 2022 21:12)  T(F): 98 (14 Jan 2022 21:12), Max: 98 (14 Jan 2022 21:12)  HR: 91 (14 Jan 2022 21:12) (79 - 96)  BP: 157/99 (14 Jan 2022 21:12) (148/71 - 157/99)  BP(mean): --  RR: 17 (14 Jan 2022 21:12) (16 - 17)  SpO2: 97% (14 Jan 2022 21:12) (97% - 100%)  CAPILLARY BLOOD GLUCOSE      POCT Blood Glucose.: 169 mg/dL (14 Jan 2022 16:40)    I&O's Summary      PHYSICAL EXAM:  GENERAL: ill appearing lying supine; Pt maintained AA01, responsive at baseline, +Lethargic, "snoring awake.   HEENT: NC/AT, EOMI, PERRL  NERVOUS SYSTEM:  A&Ox1, LT sided motor hemiplegia Upper and Lowr Ext, touch and sensation intact throughout, Lt sided facial droop; dysarthria, CNVII-XII intact, LT sided apraxia  NECK: Stiff, No JVD,   CHEST/LUNG: CTAB, no increased WOB  HEART: RRR, no m/r/g  ABDOMEN: + lower abdominal erythema band like, soft, NT, ND, BS+  EXTREMITIES: no lt dorsalis pedis pulse, faint 1+ Rt dorsalis pedis , no clubbing, no edema  MSK: FROM all 4 extremities, full and equal strength  SKIN: bilateral knee pressure ulcers medially    LABS:                        15.8   14.28 )-----------( 207      ( 14 Jan 2022 04:34 )             48.9     01-14    151<H>  |  115<H>  |  26<H>  ----------------------------<  190<H>  3.9   |  26  |  0.84    Ca    8.6      14 Jan 2022 17:27  Phos  2.3     01-13  Mg     2.70     01-13    TPro  7.0  /  Alb  3.8  /  TBili  1.3<H>  /  DBili  x   /  AST  35  /  ALT  35  /  AlkPhos  79  01-13    PT/INR - ( 14 Jan 2022 04:34 )   PT: 13.6 sec;   INR: 1.20 ratio         PTT - ( 14 Jan 2022 04:34 )  PTT:22.8 sec  CARDIAC MARKERS ( 14 Jan 2022 04:34 )  x     / x     / 1320 U/L / x     / x          .Blood Blood-Venous  01-14 @ 08:23   No growth to date.  --  --      .Blood Blood-Peripheral  01-13 @ 12:36   Growth in anaerobic bottle: Proteus mirabilis  See previous culture  [id number]  --    Growth in anaerobic bottle: Gram Negative Rods      Clean Catch Clean Catch (Midstream)  01-12 @ 20:01   No growth  --  --      .Blood Blood-Peripheral  01-12 @ 19:11   Growth in aerobic and anaerobic bottles: Proteus mirabilis  Growth in aerobic bottle: Enterobacter cloacae complex  --    Growth in aerobic and anaerobic bottles: Gram Negative Rods      .Blood Blood-Peripheral  01-12 @ 19:03   Growth in aerobic and anaerobic bottles: Proteus mirabilis Susceptibility  to follow.  ***Blood Panel PCR results on this specimen are available  approximately 3 hours after the Gram stain result.***  Gram stain, PCR, and/or culture results may notalways  correspond due to difference in methodologies.  ************************************************************  This PCR assay was performed by multiplex PCR. This  Assay tests for 66 bacterial and resistance gene targets.  Please refer to the [facility] Labs test directory  at [email or web address] for details.  --  Blood Culture PCR          RADIOLOGY & ADDITIONAL TESTS:    Imaging Personally Reviewed:    Consultant(s) Notes Reviewed:      Care Discussed with Consultants/Other Providers:   Sakina Parker MD  PGY1 Internal Medicine  [id number]    DATE OF SERVICE: 01-15-22 @ 06:24    Patient is a 76y old  Male who presents with a chief complaint of Fall (14 Jan 2022 20:39)      SUBJECTIVE / OVERNIGHT EVENTS:  ON Pt with 1 episode tachycardia, in Afibb RVR  responsive to 1X IV Lopressor. And BP as high as 170 systolic.   Pt this morning mentioned some nausea, and that generally "not feeling well", with pain in the right knee.   Asking for water. No headaches, dizzyness.     Called back to bedside this am for Coffee Ground Emesis 2x, and abdominal pain.   RRT called for Hypoxia, (desatturation below 90 requiring 4L NC), W/..t Afibb with RVR up to 180s, coffee ground emesis, and abdominal pain in the Upper Rt and Lt quadrants.   Pt maintained AA01, responsive at baseline, +Lethargic, "snoring awake. Rectal Temp 99  Pt treated with fluids, 1L LR, unrelieved by IV pushes Lopressor, then HR controlled in the 100's with IV push diltiazem 1x.   Head of bed elevated, given suction, 1 dose IV and zosyn, and Rapid Response labs and care provided.   Will continue to monitor.     MEDICATIONS  (STANDING):  cefepime   IVPB 1000 milliGRAM(s) IV Intermittent every 8 hours  metoprolol tartrate 12.5 milliGRAM(s) Oral two times a day  sodium chloride 0.9%. 1000 milliLiter(s) (125 mL/Hr) IV Continuous <Continuous>  thiamine Injectable 100 milliGRAM(s) IV Push once    MEDICATIONS  (PRN):  acetaminophen     Tablet .. 650 milliGRAM(s) Oral every 6 hours PRN Temp greater or equal to 38C (100.4F), Mild Pain (1 - 3)  melatonin 3 milliGRAM(s) Oral at bedtime PRN Insomnia      Vital Signs Last 24 Hrs  T(C): 36.7 (14 Jan 2022 21:12), Max: 36.7 (14 Jan 2022 21:12)  T(F): 98 (14 Jan 2022 21:12), Max: 98 (14 Jan 2022 21:12)  HR: 91 (14 Jan 2022 21:12) (79 - 96)  BP: 157/99 (14 Jan 2022 21:12) (148/71 - 157/99)  BP(mean): --  RR: 17 (14 Jan 2022 21:12) (16 - 17)  SpO2: 97% (14 Jan 2022 21:12) (97% - 100%)  CAPILLARY BLOOD GLUCOSE      POCT Blood Glucose.: 169 mg/dL (14 Jan 2022 16:40)    I&O's Summary      PHYSICAL EXAM:  GENERAL: ill appearing lying supine; Pt maintained AA01, responsive at baseline, +Lethargic, "snoring awake.   HEENT: NC/AT, EOMI, PERRL  NERVOUS SYSTEM:  A&Ox1, LT sided motor hemiplegia Upper and Lowr Ext, touch and sensation intact throughout, Lt sided facial droop; dysarthria, CNVII-XII intact, LT sided apraxia  NECK: Stiff, No JVD,   CHEST/LUNG: CTAB, no increased WOB  HEART: RRR, no m/r/g  ABDOMEN: + lower abdominal erythema band like, soft, NT, ND, BS+  EXTREMITIES: no lt dorsalis pedis pulse, faint 1+ Rt dorsalis pedis , no clubbing, no edema  MSK: FROM all 4 extremities, full and equal strength  SKIN: bilateral knee pressure ulcers medially    LABS:                        15.8   14.28 )-----------( 207      ( 14 Jan 2022 04:34 )             48.9     01-14    151<H>  |  115<H>  |  26<H>  ----------------------------<  190<H>  3.9   |  26  |  0.84    Ca    8.6      14 Jan 2022 17:27  Phos  2.3     01-13  Mg     2.70     01-13    TPro  7.0  /  Alb  3.8  /  TBili  1.3<H>  /  DBili  x   /  AST  35  /  ALT  35  /  AlkPhos  79  01-13    PT/INR - ( 14 Jan 2022 04:34 )   PT: 13.6 sec;   INR: 1.20 ratio         PTT - ( 14 Jan 2022 04:34 )  PTT:22.8 sec  CARDIAC MARKERS ( 14 Jan 2022 04:34 )  x     / x     / 1320 U/L / x     / x          .Blood Blood-Venous  01-14 @ 08:23   No growth to date.  --  --      .Blood Blood-Peripheral  01-13 @ 12:36   Growth in anaerobic bottle: Proteus mirabilis  See previous culture  [id number]  --    Growth in anaerobic bottle: Gram Negative Rods      Clean Catch Clean Catch (Midstream)  01-12 @ 20:01   No growth  --  --      .Blood Blood-Peripheral  01-12 @ 19:11   Growth in aerobic and anaerobic bottles: Proteus mirabilis  Growth in aerobic bottle: Enterobacter cloacae complex  --    Growth in aerobic and anaerobic bottles: Gram Negative Rods      .Blood Blood-Peripheral  01-12 @ 19:03   Growth in aerobic and anaerobic bottles: Proteus mirabilis Susceptibility  to follow.  ***Blood Panel PCR results on this specimen are available  approximately 3 hours after the Gram stain result.***  Gram stain, PCR, and/or culture results may notalways  correspond due to difference in methodologies.  ************************************************************  This PCR assay was performed by multiplex PCR. This  Assay tests for 66 bacterial and resistance gene targets.  Please refer to the [facility] Labs test directory  at [email or web address] for details.  --  Blood Culture PCR          RADIOLOGY & ADDITIONAL TESTS:    Imaging Personally Reviewed:    Consultant(s) Notes Reviewed:      Care Discussed with Consultants/Other Providers:

## 2022-01-15 NOTE — PROGRESS NOTE ADULT - PROBLEM SELECTOR PLAN 6
Medial Knee pressure injuries bilaterally  Plan:   - wound care consult
Pulseless, vascular flow defect Lt distal femoral artery on CT angio  Per Vascular, pt's Lt limb is unsalvageable; no urgent surgical intervention at this time though will likely need amputation  Plan:   - Low pressure bed  - f/u Vascular reccs for final plan  - continue to monitor  *Daughter would like to be present to discuss overall prognosis on pt's LT LE pulseless limb, pending amputation

## 2022-01-15 NOTE — CONSULT NOTE ADULT - PROVIDER SPECIALTY LIST ADULT
ENT
Electrophysiology
Infectious Disease
Gastroenterology
Neurosurgery
Vascular Surgery
Cardiology
Neurology
Wound Care
SICU
Surgery

## 2022-01-15 NOTE — DIETITIAN INITIAL EVALUATION ADULT. - WEIGHT FOR BMI (LBS)
Visit Information Date & Time Provider Department Dept. Phone Encounter #  
 11/13/2017  2:40 PM MD Juan C Rosas [phone] [id number]  
  
 11/13/2017  2:40 PM  
OB VISIT with MD Juan C Rosas (Reganayan Ocampo) Appt Note: FOB  
 [address] [facility] [address] [place] [address] [phone]  
[address] Upcoming Health Maintenance Date Due Influenza Age 5 to Adult 8/1/2017 OB 3RD TRIMESTER TDAP 9/29/2017 PAP AKA CERVICAL CYTOLOGY 6/13/2020 Allergies as of 11/13/2017  Review Complete On: 11/13/2017 By: Jermain Gayle Severity Noted Reaction Type Reactions Bee Sting [Sting, Bee] High 09/17/2017    Anaphylaxis Current Immunizations  Never Reviewed Name Date Rho(D) Immune Globulin - IM 10/12/2017 Not reviewed this visit You Were Diagnosed With   
  
 Codes Comments Encounter for supervision of normal first pregnancy in third trimester    -  Primary ICD-10-CM: Z34.03 
ICD-9-CM: V22.0 Vitals BP Weight(growth percentile) LMP BMI OB Status Smoking Status 112/78 190 lb (86.2 kg) 04/07/2017 (Exact Date) 31.62 kg/m2 Pregnant Never Smoker BMI and BSA Data Body Mass Index Body Surface Area  
 31.62 kg/m 2 1.99 m 2 Preferred Pharmacy Pharmacy Name Phone [id number] [address] 9 [phone] Your Updated Medication List  
  
   
This list is accurate as of: 11/13/17  2:38 PM.  Always use your most recent med list.  
  
  
  
  
 azithromycin 250 mg tablet Commonly known as:  Arthea Bruch 2 tabs po today then 1 tab po daily x 4 days PNV Comb. No76-Iron,Carbonyl-FA 29 mg iron- 1 mg Tab Commonly known as:  PNV 29-1 Take 1 Tab by mouth daily. Any generic prenatal vitamin will do. prenatal vit-iron fumarate-fa 27 mg iron- 0.8 mg Tab tablet Take 1 Tab by mouth daily. To-Do List   
 11/14/2017 10:15 AM  
  Appointment with ULTRASOUND 3 [facility]M at [facility] ([phone]) \[facility] [place] [facility]\"" & [facility] SERVICES! Dear Venecia Nma: Thank you for requesting a [employer]REL[employer] account. Our records indicate that you already have an active [employer]REL[employer] account. You can access your account anytime at [email or web address] Did you know that you can access your hospital and ER discharge instructions at any time in TRELYS? You can also review all of your test results from your hospital stay or ER visit. Additional Information If you have questions, please visit the Frequently Asked Questions section of the [employer]REL[employer] website at [email or web address] Remember, TRELYS is NOT to be used for urgent needs. For medical emergencies, dial 911. Now available from your iPhone and Android! Please provide this summary of care documentation to your next provider. Your primary care clinician is listed as Tristen Laws. If you have any questions after today's visit, please call [phone]. [phone]

## 2022-01-15 NOTE — CHART NOTE - NSCHARTNOTEFT_GEN_A_CORE
Pt's daughter has been called daily with updates.   Called this afternoon 13:40pm, Nataly [phone] 2X No answer.     Sakina Parker  [id number] Pt's daughter has been called daily with updates.   Called this afternoon 13:40pm, Nataly [phone] 2X No answer.     **Update:   daughter contacted, consented to Abd/ pelvis and blood products.     Sakina Parker  [id number]

## 2022-01-15 NOTE — DIETITIAN INITIAL EVALUATION ADULT. - ADD RECOMMEND
1. Advance PO diet to clear Liquids, once/if clinically indicates;      2. If Pt to remain NPO or uanble to use GUT, suggest initiating alternative means of nutrition support;       3. Monitor labs, hydration status;       4. Re-consult nutrition if warranted; RDN remains available;

## 2022-01-15 NOTE — RAPID RESPONSE TEAM SUMMARY - NSMEDICATIONSRRT_GEN_ALL_CORE
Metoprolol tartrate 5 mg IV x 2  Diltiazem 5 mg IV x 1  LR 1000 ml x 1  Zosyn 3.375 g x 1  Vancomycin 1500 mg x 1
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
## 2022-01-16 NOTE — PROGRESS NOTE ADULT - SUBJECTIVE AND OBJECTIVE BOX
Intubated early this AM.    ICU Vital Signs Last 24 Hrs  T(C): 36.2 (16 Jan 2022 00:00), Max: 37.9 (15 Travis 2022 22:00)  T(F): 97.1 (16 Jan 2022 00:00), Max: 100.3 (15 Travis 2022 22:00)  HR: 92 (16 Jan 2022 03:09) (84 - 140)  BP: 145/80 (15 Travis 2022 18:28) (97/63 - 187/100)  BP(mean): --  ABP: 118/57 (16 Jan 2022 03:00) (118/57 - 151/72)  ABP(mean): 75 (16 Jan 2022 03:00) (75 - 96)  RR: 15 (16 Jan 2022 03:00) (14 - 26)  SpO2: 95% (16 Jan 2022 03:09) (92% - 100%)    PHYSICAL EXAMINATION:  General - intubated/sedated  Lungs - no increased WOB on vent  Abdomen - softly distended, diffusely tender to palpation    LABS:                        17.2   15.24 )-----------( 195      ( 16 Jan 2022 02:30 )             53.0   01-16    150<H>  |  118<H>  |  35<H>  ----------------------------<  175<H>  4.2   |  23  |  1.23    Ca    7.6<L>      16 Jan 2022 02:30  Phos  3.5     01-16  Mg     2.60     01-16    TPro  6.3  /  Alb  3.3  /  TBili  0.9  /  DBili  x   /  AST  47<H>  /  ALT  32  /  AlkPhos  77  01-15              Intubated early this AM.    ICU Vital Signs Last 24 Hrs  T(C): 36.2 (16 Jan 2022 00:00), Max: 37.9 (15 Travis 2022 22:00)  T(F): 97.1 (16 Jan 2022 00:00), Max: 100.3 (15 Travis 2022 22:00)  HR: 92 (16 Jan 2022 03:09) (84 - 140)  BP: 145/80 (15 Travis 2022 18:28) (97/63 - 187/100)  BP(mean): --  ABP: 118/57 (16 Jan 2022 03:00) (118/57 - 151/72)  ABP(mean): 75 (16 Jan 2022 03:00) (75 - 96)  RR: 15 (16 Jan 2022 03:00) (14 - 26)  SpO2: 95% (16 Jan 2022 03:09) (92% - 100%)    PHYSICAL EXAMINATION:  General: intubated and sedated  Resp: no increased WOB on vent  Abd: soft, mildly distended; dressing mildly saturated    LABS:                        17.2   15.24 )-----------( 195      ( 16 Jan 2022 02:30 )             53.0   01-16    150<H>  |  118<H>  |  35<H>  ----------------------------<  175<H>  4.2   |  23  |  1.23    Ca    7.6<L>      16 Jan 2022 02:30  Phos  3.5     01-16  Mg     2.60     01-16    TPro  6.3  /  Alb  3.3  /  TBili  0.9  /  DBili  x   /  AST  47<H>  /  ALT  32  /  AlkPhos  77  01-15              B Team Surgery    Overnight:  - S/p duodenal perf Franki patch POD 0   - Extubated req facetent @ 75  - Insulin drip; wean as tolerated; @ 3u -> 4u  - AAO1 pain control with dilaudid  - Metoprolol for Afib rate control  - LLE yumiko 3 only femoral signal; mottled foot  - Monitor rhabdo  - NGT c/w LIWS and protonix  - 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; L hemiplegic and dysarthric  - Started SQH monitor H&HG  - 00:30 patient desat in 80s s/p 0.5mg dilaudid; not recovering on face tent; anesthesia paged for reintubation; given 1mg Narcan  - 00:50 intubated; hypotensive SBP to 80s during intubation; given neostick 125mcg had small period of SBP in 210  - Started precedex for sedation    ICU Vital Signs Last 24 Hrs  T(C): 36.2 (16 Jan 2022 00:00), Max: 37.9 (15 Travis 2022 22:00)  T(F): 97.1 (16 Jan 2022 00:00), Max: 100.3 (15 Travis 2022 22:00)  HR: 92 (16 Jan 2022 03:09) (84 - 140)  BP: 145/80 (15 Travis 2022 18:28) (97/63 - 187/100)  BP(mean): --  ABP: 118/57 (16 Jan 2022 03:00) (118/57 - 151/72)  ABP(mean): 75 (16 Jan 2022 03:00) (75 - 96)  RR: 15 (16 Jan 2022 03:00) (14 - 26)  SpO2: 95% (16 Jan 2022 03:09) (92% - 100%)    PHYSICAL EXAMINATION:  General: intubated and sedated  Resp: no increased WOB on vent  Abd: soft, mildly distended; dressing mildly saturated    LABS:                        17.2   15.24 )-----------( 195      ( 16 Jan 2022 02:30 )             53.0   01-16    150<H>  |  118<H>  |  35<H>  ----------------------------<  175<H>  4.2   |  23  |  1.23    Ca    7.6<L>      16 Jan 2022 02:30  Phos  3.5     01-16  Mg     2.60     01-16    TPro  6.3  /  Alb  3.3  /  TBili  0.9  /  DBili  x   /  AST  47<H>  /  ALT  32  /  AlkPhos  77  01-15

## 2022-01-16 NOTE — PROGRESS NOTE ADULT - SUBJECTIVE AND OBJECTIVE BOX
Chief Complaint:  Patient is a 76y old  Male who presents with a chief complaint of Fall (2022 03:15)      Reason for consult:    Interval Events:     Hospital Medications:  acetaminophen   IVPB .. 1000 milliGRAM(s) IV Intermittent every 6 hours  acetaminophen   IVPB .. 1000 milliGRAM(s) IV Intermittent every 6 hours  cefepime   IVPB 1000 milliGRAM(s) IV Intermittent every 8 hours  chlorhexidine 0.12% Liquid 15 milliLiter(s) Oral Mucosa every 12 hours  dextrose 40% Gel 15 Gram(s) Oral once  dextrose 5%. 1000 milliLiter(s) IV Continuous <Continuous>  dextrose 5%. 1000 milliLiter(s) IV Continuous <Continuous>  dextrose 50% Injectable 25 milliLiter(s) IV Push every 15 minutes  dextrose 50% Injectable 50 milliLiter(s) IV Push every 15 minutes  diltiazem Infusion 5 mG/Hr IV Continuous <Continuous>  glucagon  Injectable 1 milliGRAM(s) IntraMuscular once  heparin   Injectable 5000 Unit(s) SubCutaneous every 8 hours  insulin glargine Injectable (LANTUS) 20 Unit(s) SubCutaneous every morning  insulin lispro (ADMELOG) corrective regimen sliding scale   SubCutaneous three times a day before meals  insulin regular Infusion 1 Unit(s)/Hr IV Continuous <Continuous>  metoprolol tartrate Injectable 5 milliGRAM(s) IV Push every 6 hours  metroNIDAZOLE  IVPB      metroNIDAZOLE  IVPB 500 milliGRAM(s) IV Intermittent every 8 hours  pantoprazole  Injectable 40 milliGRAM(s) IV Push every 12 hours  phenylephrine    Infusion 0.5 MICROgram(s)/kG/Min IV Continuous <Continuous>  sodium chloride 0.9% 1000 milliLiter(s) IV Continuous <Continuous>  sodium chloride 0.9%. 1000 milliLiter(s) IV Continuous <Continuous>      ROS:   General:  No  fevers, chills, night sweats, fatigue  Eyes:  Good vision, no reported pain  ENT:  No sore throat, pain, runny nose  CV:  No pain, palpitations  Pulm:  No dyspnea, cough  GI:  See HPI, otherwise negative  :  No  incontinence, nocturia  Muscle:  No pain, weakness  Neuro:  No memory problems  Psych:  No insomnia, mood problems, depression  Endocrine:  No polyuria, polydipsia, cold/heat intolerance  Heme:  No petechiae, ecchymosis, easy bruisability  Skin:  No rash    PHYSICAL EXAM:   Vital Signs:  Vital Signs Last 24 Hrs  T(C): 37.1 (2022 08:00), Max: 37.9 (15 Travis 2022 22:00)  T(F): 98.8 (2022 08:00), Max: 100.3 (15 Travis 2022 22:00)  HR: 142 (2022 13:15) (91 - 168)  BP: 145/80 (15 Travis 2022 18:28) (145/80 - 148/81)  BP(mean): --  RR: 20 (2022 13:15) (14 - 26)  SpO2: 99% (2022 13:15) (92% - 100%)  Daily Height in cm: 172.72 (15 Travis 2022 18:28)    Daily Weight in k.6 (2022 04:00)    GENERAL: no acute distress  NEURO: alert  HEENT: anicteric sclera, no conjunctival pallor appreciated  CHEST: no respiratory distress, no accessory muscle use  CARDIAC: regular rate, rhythm  ABDOMEN: soft, non-tender, non-distended, no rebound or guarding  EXTREMITIES: warm, well perfused, no edema  SKIN: no lesions noted    LABS: reviewed                        17.2   15.24 )-----------( 195      ( 2022 02:30 )             53.0     01-16    150<H>  |  118<H>  |  35<H>  ----------------------------<  175<H>  4.2   |  23  |  1.23    Ca    7.6<L>      2022 02:30  Phos  3.5     01-  Mg     2.60     -16    TPro  6.3  /  Alb  3.3  /  TBili  0.9  /  DBili  x   /  AST  47<H>  /  ALT  32  /  AlkPhos  77  -15    LIVER FUNCTIONS - ( 15 Travis 2022 09:11 )  Alb: 3.3 g/dL / Pro: 6.3 g/dL / ALK PHOS: 77 U/L / ALT: 32 U/L / AST: 47 U/L / GGT: x             Interval Diagnostic Studies: see sunrise for full report   Chief Complaint:  Patient is a 76y old  Male who presents with a chief complaint of Fall (2022 03:15)      Interval Events:   s/p priscilla patch for perforation of D1  Intubated    Hospital Medications:  acetaminophen   IVPB .. 1000 milliGRAM(s) IV Intermittent every 6 hours  acetaminophen   IVPB .. 1000 milliGRAM(s) IV Intermittent every 6 hours  cefepime   IVPB 1000 milliGRAM(s) IV Intermittent every 8 hours  chlorhexidine 0.12% Liquid 15 milliLiter(s) Oral Mucosa every 12 hours  dextrose 40% Gel 15 Gram(s) Oral once  dextrose 5%. 1000 milliLiter(s) IV Continuous <Continuous>  dextrose 5%. 1000 milliLiter(s) IV Continuous <Continuous>  dextrose 50% Injectable 25 milliLiter(s) IV Push every 15 minutes  dextrose 50% Injectable 50 milliLiter(s) IV Push every 15 minutes  diltiazem Infusion 5 mG/Hr IV Continuous <Continuous>  glucagon  Injectable 1 milliGRAM(s) IntraMuscular once  heparin   Injectable 5000 Unit(s) SubCutaneous every 8 hours  insulin glargine Injectable (LANTUS) 20 Unit(s) SubCutaneous every morning  insulin lispro (ADMELOG) corrective regimen sliding scale   SubCutaneous three times a day before meals  insulin regular Infusion 1 Unit(s)/Hr IV Continuous <Continuous>  metoprolol tartrate Injectable 5 milliGRAM(s) IV Push every 6 hours  metroNIDAZOLE  IVPB      metroNIDAZOLE  IVPB 500 milliGRAM(s) IV Intermittent every 8 hours  pantoprazole  Injectable 40 milliGRAM(s) IV Push every 12 hours  phenylephrine    Infusion 0.5 MICROgram(s)/kG/Min IV Continuous <Continuous>  sodium chloride 0.9% 1000 milliLiter(s) IV Continuous <Continuous>  sodium chloride 0.9%. 1000 milliLiter(s) IV Continuous <Continuous>      ROS:   Unable to obtain due to patient condition    PHYSICAL EXAM:   Vital Signs:  Vital Signs Last 24 Hrs  T(C): 37.1 (2022 08:00), Max: 37.9 (15 Travis 2022 22:00)  T(F): 98.8 (2022 08:00), Max: 100.3 (15 Travis 2022 22:00)  HR: 142 (2022 13:15) (91 - 168)  BP: 145/80 (15 Travis 2022 18:28) (145/80 - 148/81)  BP(mean): --  RR: 20 (2022 13:15) (14 - 26)  SpO2: 99% (2022 13:15) (92% - 100%)  Daily Height in cm: 172.72 (15 Travis 2022 18:28)    Daily Weight in k.6 (2022 04:00)    GENERAL: intubated  NEURO: sedated  HEENT: anicteric sclera, no conjunctival pallor appreciated  CHEST: intubated  CARDIAC: tachycardic  ABDOMEN: soft, non-distended   EXTREMITIES: warm, well perfused, no edema  SKIN: no lesions noted    LABS: reviewed                        17.2   15.24 )-----------( 195      ( 2022 02:30 )             53.0     01-16    150<H>  |  118<H>  |  35<H>  ----------------------------<  175<H>  4.2   |  23  |  1.23    Ca    7.6<L>      2022 02:30  Phos  3.5     01-16  Mg     2.60     01-16    TPro  6.3  /  Alb  3.3  /  TBili  0.9  /  DBili  x   /  AST  47<H>  /  ALT  32  /  AlkPhos  77  01-15    LIVER FUNCTIONS - ( 15 Travis 2022 09:11 )  Alb: 3.3 g/dL / Pro: 6.3 g/dL / ALK PHOS: 77 U/L / ALT: 32 U/L / AST: 47 U/L / GGT: x             Interval Diagnostic Studies: see sunrise for full report
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
## 2022-01-16 NOTE — CHART NOTE - NSCHARTNOTEFT_GEN_A_CORE
Notified by nursing patient in Afib w RVR vs SVT to 150s-160s.     Patient given metoprolol 5mg IV x 1   Diltiazem 10mg IV x 2, with improvement in HR to low 120s   12 lead EKG obtained shows Afib vs Aflutter.     Will start diltiazem gtt w phenylephrine prn for BP support     Central line to be placed given poor peripheral access.
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
## 2022-01-17 NOTE — PROGRESS NOTE ADULT - SUBJECTIVE AND OBJECTIVE BOX
GENERAL SURGERY DAILY PROGRESS NOTE:    Interval:  No acute events overnight.    Subjective:  Patient seen and examined. Reports pain is well controlled. Denies N/V.    Vital Signs Last 24 Hrs  T(C): 38.2 (2022 00:00), Max: 38.2 (2022 00:00)  T(F): 100.8 (2022 00:00), Max: 100.8 (2022 00:00)  HR: 112 (2022 00:00) (63 - 168)  BP: --  BP(mean): --  RR: 18 (2022 00:00) (14 - 22)  SpO2: 100% (2022 00:00) (94% - 100%)    PHYSICAL EXAMINATION:  General: intubated and sedated  Resp: no increased WOB on vent  Abd: soft, mildly distended; dressing mildly saturated    I&O's Detail    15 Travis 2022 07:01  -  2022 07:00  --------------------------------------------------------  IN:    Dexmedetomidine: 20 mL    Insulin: 7.5 mL    Insulin: 4 mL    Insulin: 7 mL    Insulin: 2 mL    IV PiggyBack: 400 mL    sodium chloride 0.9%: 1000 mL  Total IN: 1440.5 mL    OUT:    Indwelling Catheter - Urethral (mL): 420 mL    Nasogastric/Oral tube (mL): 150 mL  Total OUT: 570 mL    Total NET: 870.5 mL      2022 07:01  -  2022 01:25  --------------------------------------------------------  IN:    Diltiazem: 215 mL    Insulin: 3 mL    IV PiggyBack: 600 mL    Phenylephrine: 116.5 mL    sodium chloride 0.9%: 400 mL    sodium chloride 0.9% w/ Additives: 1300 mL  Total IN: 2634.5 mL    OUT:    Indwelling Catheter - Urethral (mL): 750 mL    Nasogastric/Oral tube (mL): 250 mL  Total OUT: 1000 mL    Total NET: 1634.5 mL          Daily     Daily Weight in k.6 (2022 04:00)    MEDICATIONS  (STANDING):  acetaminophen   IVPB .. 1000 milliGRAM(s) IV Intermittent every 6 hours  acetaminophen   IVPB .. 1000 milliGRAM(s) IV Intermittent every 6 hours  cefepime   IVPB 1000 milliGRAM(s) IV Intermittent every 8 hours  chlorhexidine 0.12% Liquid 15 milliLiter(s) Oral Mucosa every 12 hours  dextrose 40% Gel 15 Gram(s) Oral once  dextrose 5%. 1000 milliLiter(s) (50 mL/Hr) IV Continuous <Continuous>  dextrose 5%. 1000 milliLiter(s) (100 mL/Hr) IV Continuous <Continuous>  dextrose 50% Injectable 50 milliLiter(s) IV Push every 15 minutes  dextrose 50% Injectable 25 milliLiter(s) IV Push every 15 minutes  digoxin     Tablet 125 MICROGram(s) Oral daily  digoxin  Injectable 125 MICROGram(s) IV Push daily  diltiazem Infusion 5 mG/Hr (5 mL/Hr) IV Continuous <Continuous>  glucagon  Injectable 1 milliGRAM(s) IntraMuscular once  heparin   Injectable 5000 Unit(s) SubCutaneous every 8 hours  insulin glargine Injectable (LANTUS) 20 Unit(s) SubCutaneous every morning  insulin lispro (ADMELOG) corrective regimen sliding scale   SubCutaneous every 6 hours  metoprolol tartrate Injectable 5 milliGRAM(s) IV Push every 6 hours  metroNIDAZOLE  IVPB      metroNIDAZOLE  IVPB 500 milliGRAM(s) IV Intermittent every 8 hours  pantoprazole  Injectable 40 milliGRAM(s) IV Push every 12 hours  phenylephrine    Infusion 0.5 MICROgram(s)/kG/Min (8.2 mL/Hr) IV Continuous <Continuous>  sodium chloride 0.9% 1000 milliLiter(s) (100 mL/Hr) IV Continuous <Continuous>  sodium chloride 0.9%. 1000 milliLiter(s) (100 mL/Hr) IV Continuous <Continuous>    MEDICATIONS  (PRN):      LABS:                        17.2   15.24 )-----------( 195      ( 2022 02:30 )             53.0     01-16    150<H>  |  118<H>  |  35<H>  ----------------------------<  175<H>  4.2   |  23  |  1.23    Ca    7.6<L>      2022 02:30  Phos  3.5     -  Mg     2.60     -    TPro  6.3  /  Alb  3.3  /  TBili  0.9  /  DBili  x   /  AST  47<H>  /  ALT  32  /  AlkPhos  77  01-15    PT/INR - ( 15 Travis 2022 22:17 )   PT: 13.7 sec;   INR: 1.20 ratio         PTT - ( 15 Travis 2022 22:17 )  PTT:26.9 sec       GENERAL SURGERY DAILY PROGRESS NOTE:    Interval:  - R IJ central line placed   - A-fib RVR improved on cardizem gtt/dig load/lopressor  - Given IVP of Lopressor 5mg IV and dilaudid 0.5mg for agitation  - Off sedation and delfin  - sliding scale changed to q6h  - febrile 100.8F one time o/n  - repleted calcium o/nt    Subjective:  Patient seen and examined. Patient is no longer sedated and remains intubated. Patient mentating well and able to answer y/n questions. Endorsed mild abdominal pain.    Vital Signs Last 24 Hrs  T(C): 38.2 (2022 00:00), Max: 38.2 (2022 00:00)  T(F): 100.8 (2022 00:00), Max: 100.8 (2022 00:00)  HR: 112 (2022 00:00) (63 - 168)  BP: --  BP(mean): --  RR: 18 (2022 00:00) (14 - 22)  SpO2: 100% (2022 00:00) (94% - 100%)    PHYSICAL EXAMINATION:  General: intubated and no longer sedated; A&Ox2  Resp: no increased WOB on vent  Abd: soft, mildly distended; dressing mildly saturated    I&O's Detail    15 Travis 2022 07:01  -  2022 07:00  --------------------------------------------------------  IN:    Dexmedetomidine: 20 mL    Insulin: 7.5 mL    Insulin: 4 mL    Insulin: 7 mL    Insulin: 2 mL    IV PiggyBack: 400 mL    sodium chloride 0.9%: 1000 mL  Total IN: 1440.5 mL    OUT:    Indwelling Catheter - Urethral (mL): 420 mL    Nasogastric/Oral tube (mL): 150 mL  Total OUT: 570 mL    Total NET: 870.5 mL      2022 07:01  -  2022 01:25  --------------------------------------------------------  IN:    Diltiazem: 215 mL    Insulin: 3 mL    IV PiggyBack: 600 mL    Phenylephrine: 116.5 mL    sodium chloride 0.9%: 400 mL    sodium chloride 0.9% w/ Additives: 1300 mL  Total IN: 2634.5 mL    OUT:    Indwelling Catheter - Urethral (mL): 750 mL    Nasogastric/Oral tube (mL): 250 mL  Total OUT: 1000 mL    Total NET: 1634.5 mL          Daily     Daily Weight in k.6 (2022 04:00)    MEDICATIONS  (STANDING):  acetaminophen   IVPB .. 1000 milliGRAM(s) IV Intermittent every 6 hours  acetaminophen   IVPB .. 1000 milliGRAM(s) IV Intermittent every 6 hours  cefepime   IVPB 1000 milliGRAM(s) IV Intermittent every 8 hours  chlorhexidine 0.12% Liquid 15 milliLiter(s) Oral Mucosa every 12 hours  dextrose 40% Gel 15 Gram(s) Oral once  dextrose 5%. 1000 milliLiter(s) (50 mL/Hr) IV Continuous <Continuous>  dextrose 5%. 1000 milliLiter(s) (100 mL/Hr) IV Continuous <Continuous>  dextrose 50% Injectable 50 milliLiter(s) IV Push every 15 minutes  dextrose 50% Injectable 25 milliLiter(s) IV Push every 15 minutes  digoxin     Tablet 125 MICROGram(s) Oral daily  digoxin  Injectable 125 MICROGram(s) IV Push daily  diltiazem Infusion 5 mG/Hr (5 mL/Hr) IV Continuous <Continuous>  glucagon  Injectable 1 milliGRAM(s) IntraMuscular once  heparin   Injectable 5000 Unit(s) SubCutaneous every 8 hours  insulin glargine Injectable (LANTUS) 20 Unit(s) SubCutaneous every morning  insulin lispro (ADMELOG) corrective regimen sliding scale   SubCutaneous every 6 hours  metoprolol tartrate Injectable 5 milliGRAM(s) IV Push every 6 hours  metroNIDAZOLE  IVPB      metroNIDAZOLE  IVPB 500 milliGRAM(s) IV Intermittent every 8 hours  pantoprazole  Injectable 40 milliGRAM(s) IV Push every 12 hours  phenylephrine    Infusion 0.5 MICROgram(s)/kG/Min (8.2 mL/Hr) IV Continuous <Continuous>  sodium chloride 0.9% 1000 milliLiter(s) (100 mL/Hr) IV Continuous <Continuous>  sodium chloride 0.9%. 1000 milliLiter(s) (100 mL/Hr) IV Continuous <Continuous>    MEDICATIONS  (PRN):      LABS:                        17.2   15.24 )-----------( 195      ( 2022 02:30 )             53.0     01-16    150<H>  |  118<H>  |  35<H>  ----------------------------<  175<H>  4.2   |  23  |  1.23    Ca    7.6<L>      2022 02:30  Phos  3.5     -16  Mg     2.60     -16    TPro  6.3  /  Alb  3.3  /  TBili  0.9  /  DBili  x   /  AST  47<H>  /  ALT  32  /  AlkPhos  77  01-15    PT/INR - ( 15 Travis 2022 22:17 )   PT: 13.7 sec;   INR: 1.20 ratio         PTT - ( 15 Travis 2022 22:17 )  PTT:26.9 sec
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
## 2022-01-17 NOTE — ADVANCED PRACTICE NURSE CONSULT - REASON FOR CONSULT
Patient appropriately being seeing by wound care MD attending for knees skin injuries. SICU team to place recommendations as orders.

## 2022-01-18 NOTE — PROGRESS NOTE ADULT - ATTENDING COMMENTS
Perforated peptic ulcer with peritonitis  a.  WBC up to 28k today  b.  Remains in atrial fibrillation, on diltiazem gtt digoxin and lopressor  c.  Extubated today per SICU  d,.  May have po if passes clears vs start TF  e.  Continue IV zosyn
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
## 2022-01-18 NOTE — PROGRESS NOTE ADULT - ATTENDING COMMENTS
Critical Care Dx  Mr. Whitten is s/p ex lap and priscilla patch repair of a duodenal perforation. Currently reintubated for mental status depression and inability to protect his airway after initially being extubated.   Sepsis due to perforation, unspecified organism but had previous isolates  -con't broad spectrum abx, zosyn will cover H pylori   -resuscitated well, will consider diuresis to improve pulmonary function   -monitor WBC, abd exam  Respiratory insufficiency  -tolerating CPAP, 5/5, initiating own breaths.  Will attempt to extubate today. No BIPAP if needed due to upper GI surgery   -CXR  DM  -insulin gtt transition to basal/bolus regimen, targetr BG < 180  -A1c  Recent CVA  -holding off on any anticoagulation due to brain infarct, appreciate recs  -Monitor neuro exam.  If fails S&S may need PEG     DOS:1/18/22  The patient is a critical care patient with life threatening hemodynamic and respiratory instability in SICU.  I have personally interviewed and examined the patient, reviewed data and laboratory tests/x-rays and all pertinent electronic images.  The SICU team has a constant risk benefit analyzes discussion with the primary team, all consultants, House Staff and PA's on all decisions.   Time involved in performance of separately billable procedures was not counted toward my critical care time. There is no overlap.    I have personally provided 60 minutes of critical care time concurrently with the resident/fellow. This time excludes time spent on separate procedures and time spent teaching. I have reviewed the resident's/fellow's documentation and agree with the assessment and plan of care.  I was physically present for the key portions of the evaluation and management (E/M) service provided.      Tian Del Real MD  Acute and Critical Care Surgery

## 2022-01-18 NOTE — AIRWAY REMOVAL NOTE  ADULT & PEDS - ARTIFICAL AIRWAY REMOVAL COMMENTS
Patient extubated as per SICU Attedking ADAM Patient extubated as per SICU Attending MD Del Real's oders. MD Del Real at bedside.

## 2022-01-18 NOTE — PROGRESS NOTE ADULT - SUBJECTIVE AND OBJECTIVE BOX
GENERAL SURGERY DAILY PROGRESS NOTE:    Interval:  No acute events overnight.    Subjective:  Patient seen and examined. Reports pain is well controlled. Denies N/V.    Vital Signs Last 24 Hrs  T(C): 37 (17 Jan 2022 20:00), Max: 38.2 (17 Jan 2022 12:00)  T(F): 98.6 (17 Jan 2022 20:00), Max: 100.8 (17 Jan 2022 12:00)  HR: 81 (17 Jan 2022 23:04) (81 - 160)  BP: 119/69 (17 Jan 2022 23:00) (109/48 - 141/73)  BP(mean): 78 (17 Jan 2022 23:00) (62 - 87)  RR: 10 (17 Jan 2022 23:00) (9 - 25)  SpO2: 100% (17 Jan 2022 23:04) (99% - 100%)    PHYSICAL EXAMINATION:  General: intubated and no longer sedated; A&Ox2  Resp: no increased WOB on vent  Abd: soft, mildly distended; dressing mildly saturated    I&O's Detail    16 Jan 2022 07:01  -  17 Jan 2022 07:00  --------------------------------------------------------  IN:    Diltiazem: 345 mL    Insulin: 3 mL    IV PiggyBack: 600 mL    Phenylephrine: 132.9 mL    sodium chloride 0.9%: 400 mL    sodium chloride 0.9% w/ Additives: 2000 mL  Total IN: 3480.9 mL    OUT:    Indwelling Catheter - Urethral (mL): 1175 mL    Nasogastric/Oral tube (mL): 550 mL  Total OUT: 1725 mL    Total NET: 1755.9 mL      17 Jan 2022 07:01  -  18 Jan 2022 01:07  --------------------------------------------------------  IN:    Diltiazem: 240 mL    IV PiggyBack: 400 mL    Lactated Ringers: 1400 mL    Phenylephrine: 59 mL    sodium chloride 0.9% w/ Additives: 200 mL  Total IN: 2299 mL    OUT:    Indwelling Catheter - Urethral (mL): 920 mL    Nasogastric/Oral tube (mL): 150 mL  Total OUT: 1070 mL    Total NET: 1229 mL          Daily     Daily     MEDICATIONS  (STANDING):  acetaminophen   IVPB .. 1000 milliGRAM(s) IV Intermittent every 6 hours  chlorhexidine 0.12% Liquid 15 milliLiter(s) Oral Mucosa every 12 hours  digoxin  Injectable 125 MICROGram(s) IV Push daily  diltiazem Infusion 5 mG/Hr (5 mL/Hr) IV Continuous <Continuous>  heparin   Injectable 5000 Unit(s) SubCutaneous every 8 hours  insulin glargine Injectable (LANTUS) 20 Unit(s) SubCutaneous every morning  insulin lispro (ADMELOG) corrective regimen sliding scale   SubCutaneous every 6 hours  lactated ringers. 1000 milliLiter(s) (100 mL/Hr) IV Continuous <Continuous>  metoprolol tartrate Injectable 5 milliGRAM(s) IV Push every 6 hours  pantoprazole  Injectable 40 milliGRAM(s) IV Push every 12 hours  phenylephrine    Infusion 0.1 MICROgram(s)/kG/Min (1.64 mL/Hr) IV Continuous <Continuous>  piperacillin/tazobactam IVPB.. 3.375 Gram(s) IV Intermittent every 8 hours  sodium chloride 0.9% 1000 milliLiter(s) (100 mL/Hr) IV Continuous <Continuous>    MEDICATIONS  (PRN):  HYDROmorphone  Injectable 0.25 milliGRAM(s) IV Push every 3 hours PRN Severe Pain (7 - 10)      LABS:                        14.5   19.67 )-----------( 194      ( 17 Jan 2022 01:23 )             46.8     01-17    148<H>  |  118<H>  |  40<H>  ----------------------------<  179<H>  4.3   |  22  |  1.19    Ca    7.4<L>      17 Jan 2022 01:23  Phos  2.6     01-17  Mg     2.80     01-17             GENERAL SURGERY DAILY PROGRESS NOTE:    Interval:  No acute events overnight.   Tachycardic and hypotensive with intermittent pauses; delfin started; cardizem gtt; standing IV lopressor ordered  - Episode of desaturation to 86%  - Remains off sedation  - Given IV push dilaudid 0.5mg o/n for agitation  - CTH: Unchanged: nsgx wait 2weeks until starting AC  - Restarted dig given trough of 0.5   - Repleted Ca o/n  - D/c'd R radial A line    Subjective:  Patient seen and examined. Reports pain is well controlled. Denies N/V.    Vital Signs Last 24 Hrs  T(C): 37 (17 Jan 2022 20:00), Max: 38.2 (17 Jan 2022 12:00)  T(F): 98.6 (17 Jan 2022 20:00), Max: 100.8 (17 Jan 2022 12:00)  HR: 81 (17 Jan 2022 23:04) (81 - 160)  BP: 119/69 (17 Jan 2022 23:00) (109/48 - 141/73)  BP(mean): 78 (17 Jan 2022 23:00) (62 - 87)  RR: 10 (17 Jan 2022 23:00) (9 - 25)  SpO2: 100% (17 Jan 2022 23:04) (99% - 100%)    PHYSICAL EXAMINATION:  General: intubated and no longer sedated; A&Ox2  Resp: no increased WOB on vent  Abd: soft, mildly distended; dressing mildly saturated    I&O's Detail    16 Jan 2022 07:01  -  17 Jan 2022 07:00  --------------------------------------------------------  IN:    Diltiazem: 345 mL    Insulin: 3 mL    IV PiggyBack: 600 mL    Phenylephrine: 132.9 mL    sodium chloride 0.9%: 400 mL    sodium chloride 0.9% w/ Additives: 2000 mL  Total IN: 3480.9 mL    OUT:    Indwelling Catheter - Urethral (mL): 1175 mL    Nasogastric/Oral tube (mL): 550 mL  Total OUT: 1725 mL    Total NET: 1755.9 mL      17 Jan 2022 07:01  -  18 Jan 2022 01:07  --------------------------------------------------------  IN:    Diltiazem: 240 mL    IV PiggyBack: 400 mL    Lactated Ringers: 1400 mL    Phenylephrine: 59 mL    sodium chloride 0.9% w/ Additives: 200 mL  Total IN: 2299 mL    OUT:    Indwelling Catheter - Urethral (mL): 920 mL    Nasogastric/Oral tube (mL): 150 mL  Total OUT: 1070 mL    Total NET: 1229 mL          Daily     Daily     MEDICATIONS  (STANDING):  acetaminophen   IVPB .. 1000 milliGRAM(s) IV Intermittent every 6 hours  chlorhexidine 0.12% Liquid 15 milliLiter(s) Oral Mucosa every 12 hours  digoxin  Injectable 125 MICROGram(s) IV Push daily  diltiazem Infusion 5 mG/Hr (5 mL/Hr) IV Continuous <Continuous>  heparin   Injectable 5000 Unit(s) SubCutaneous every 8 hours  insulin glargine Injectable (LANTUS) 20 Unit(s) SubCutaneous every morning  insulin lispro (ADMELOG) corrective regimen sliding scale   SubCutaneous every 6 hours  lactated ringers. 1000 milliLiter(s) (100 mL/Hr) IV Continuous <Continuous>  metoprolol tartrate Injectable 5 milliGRAM(s) IV Push every 6 hours  pantoprazole  Injectable 40 milliGRAM(s) IV Push every 12 hours  phenylephrine    Infusion 0.1 MICROgram(s)/kG/Min (1.64 mL/Hr) IV Continuous <Continuous>  piperacillin/tazobactam IVPB.. 3.375 Gram(s) IV Intermittent every 8 hours  sodium chloride 0.9% 1000 milliLiter(s) (100 mL/Hr) IV Continuous <Continuous>    MEDICATIONS  (PRN):  HYDROmorphone  Injectable 0.25 milliGRAM(s) IV Push every 3 hours PRN Severe Pain (7 - 10)      LABS:                        14.5   19.67 )-----------( 194      ( 17 Jan 2022 01:23 )             46.8     01-17    148<H>  |  118<H>  |  40<H>  ----------------------------<  179<H>  4.3   |  22  |  1.19    Ca    7.4<L>      17 Jan 2022 01:23  Phos  2.6     01-17  Mg     2.80     01-17             GENERAL SURGERY DAILY PROGRESS NOTE:    Interval:  No acute events overnight.   Tachycardic and hypotensive with intermittent pauses; delfin started; cardizem gtt; standing IV lopressor ordered  - Episode of desaturation to 86%  - Remains off sedation  - Given IV push dilaudid 0.5mg o/n for agitation  - CTH: Unchanged: nsgx wait 2weeks until starting AC  - Restarted dig given trough of 0.5   - Repleted Ca o/n  - D/c'd R radial A line    Subjective:  Patient seen and examined. Reports pain is well controlled. Denies N/V.     Vital Signs Last 24 Hrs  T(C): 37 (17 Jan 2022 20:00), Max: 38.2 (17 Jan 2022 12:00)  T(F): 98.6 (17 Jan 2022 20:00), Max: 100.8 (17 Jan 2022 12:00)  HR: 81 (17 Jan 2022 23:04) (81 - 160)  BP: 119/69 (17 Jan 2022 23:00) (109/48 - 141/73)  BP(mean): 78 (17 Jan 2022 23:00) (62 - 87)  RR: 10 (17 Jan 2022 23:00) (9 - 25)  SpO2: 100% (17 Jan 2022 23:04) (99% - 100%)    PHYSICAL EXAMINATION:  General: intubated and no longer sedated; A&Ox2  Resp: no increased WOB on vent  Abd: soft, mildly distended; midline incision c/d/i.    I&O's Detail    16 Jan 2022 07:01  -  17 Jan 2022 07:00  --------------------------------------------------------  IN:    Diltiazem: 345 mL    Insulin: 3 mL    IV PiggyBack: 600 mL    Phenylephrine: 132.9 mL    sodium chloride 0.9%: 400 mL    sodium chloride 0.9% w/ Additives: 2000 mL  Total IN: 3480.9 mL    OUT:    Indwelling Catheter - Urethral (mL): 1175 mL    Nasogastric/Oral tube (mL): 550 mL  Total OUT: 1725 mL    Total NET: 1755.9 mL      17 Jan 2022 07:01  -  18 Jan 2022 01:07  --------------------------------------------------------  IN:    Diltiazem: 240 mL    IV PiggyBack: 400 mL    Lactated Ringers: 1400 mL    Phenylephrine: 59 mL    sodium chloride 0.9% w/ Additives: 200 mL  Total IN: 2299 mL    OUT:    Indwelling Catheter - Urethral (mL): 920 mL    Nasogastric/Oral tube (mL): 150 mL  Total OUT: 1070 mL    Total NET: 1229 mL          Daily     Daily     MEDICATIONS  (STANDING):  acetaminophen   IVPB .. 1000 milliGRAM(s) IV Intermittent every 6 hours  chlorhexidine 0.12% Liquid 15 milliLiter(s) Oral Mucosa every 12 hours  digoxin  Injectable 125 MICROGram(s) IV Push daily  diltiazem Infusion 5 mG/Hr (5 mL/Hr) IV Continuous <Continuous>  heparin   Injectable 5000 Unit(s) SubCutaneous every 8 hours  insulin glargine Injectable (LANTUS) 20 Unit(s) SubCutaneous every morning  insulin lispro (ADMELOG) corrective regimen sliding scale   SubCutaneous every 6 hours  lactated ringers. 1000 milliLiter(s) (100 mL/Hr) IV Continuous <Continuous>  metoprolol tartrate Injectable 5 milliGRAM(s) IV Push every 6 hours  pantoprazole  Injectable 40 milliGRAM(s) IV Push every 12 hours  phenylephrine    Infusion 0.1 MICROgram(s)/kG/Min (1.64 mL/Hr) IV Continuous <Continuous>  piperacillin/tazobactam IVPB.. 3.375 Gram(s) IV Intermittent every 8 hours  sodium chloride 0.9% 1000 milliLiter(s) (100 mL/Hr) IV Continuous <Continuous>    MEDICATIONS  (PRN):  HYDROmorphone  Injectable 0.25 milliGRAM(s) IV Push every 3 hours PRN Severe Pain (7 - 10)      LABS:                        14.5   19.67 )-----------( 194      ( 17 Jan 2022 01:23 )             46.8     01-17    148<H>  |  118<H>  |  40<H>  ----------------------------<  179<H>  4.3   |  22  |  1.19    Ca    7.4<L>      17 Jan 2022 01:23  Phos  2.6     01-17  Mg     2.80     01-17

## 2022-01-18 NOTE — PROGRESS NOTE ADULT - SUBJECTIVE AND OBJECTIVE BOX
Follow Up:      Interval History/ROS:     Allergies  No Known Allergies        ANTIMICROBIALS:  piperacillin/tazobactam IVPB.. 3.375 every 8 hours      OTHER MEDS:  acetaminophen   IVPB .. 1000 milliGRAM(s) IV Intermittent every 6 hours  acetaminophen   IVPB .. 1000 milliGRAM(s) IV Intermittent every 6 hours  chlorhexidine 0.12% Liquid 15 milliLiter(s) Oral Mucosa every 12 hours  chlorhexidine 4% Liquid 1 Application(s) Topical <User Schedule>  digoxin  Injectable 125 MICROGram(s) IV Push daily  diltiazem Infusion 5 mG/Hr IV Continuous <Continuous>  heparin   Injectable 5000 Unit(s) SubCutaneous every 8 hours  insulin glargine Injectable (LANTUS) 20 Unit(s) SubCutaneous every morning  insulin lispro (ADMELOG) corrective regimen sliding scale   SubCutaneous every 6 hours  lactated ringers. 1000 milliLiter(s) IV Continuous <Continuous>  metoprolol tartrate Injectable 5 milliGRAM(s) IV Push every 6 hours  pantoprazole  Injectable 40 milliGRAM(s) IV Push every 12 hours  phenylephrine    Infusion 0.1 MICROgram(s)/kG/Min IV Continuous <Continuous>  sodium chloride 0.9% 1000 milliLiter(s) IV Continuous <Continuous>      Vital Signs Last 24 Hrs  T(C): 37.6 (18 Jan 2022 08:00), Max: 37.7 (17 Jan 2022 15:00)  T(F): 99.7 (18 Jan 2022 08:00), Max: 99.8 (17 Jan 2022 15:00)  HR: 100 (18 Jan 2022 11:18) (81 - 124)  BP: 155/62 (18 Jan 2022 11:00) (109/48 - 155/62)  BP(mean): 82 (18 Jan 2022 11:00) (62 - 88)  RR: 15 (18 Jan 2022 11:00) (9 - 17)  SpO2: 93% (18 Jan 2022 11:18) (86% - 100%)    Physical Exam:  General: non toxic  Cardio: regular rate   Respiratory: nonlabored   abd: nondistended  Musculoskeletal: no focal joint swelling, no edema  vascular: no phlebitis   Skin: no rash  Neurologic: no focal deficit  psych: normal affect                          13.8   27.47 )-----------( 204      ( 18 Jan 2022 02:39 )             43.3       01-18    148<H>  |  117<H>  |  32<H>  ----------------------------<  144<H>  4.6   |  22  |  1.05    Ca    7.8<L>      18 Jan 2022 01:03  Phos  2.5     01-18  Mg     2.70     01-18            MICROBIOLOGY:  Culture - Blood (collected 01-15-22 @ 16:45)  Source: .Blood Blood-Venous  Preliminary Report (01-16-22 @ 17:01):    No growth to date.    Culture - Blood (collected 01-14-22 @ 08:23)  Source: .Blood Blood-Peripheral  Preliminary Report (01-15-22 @ 09:01):    No growth to date.    Culture - Blood (collected 01-14-22 @ 08:23)  Source: .Blood Blood-Venous  Preliminary Report (01-15-22 @ 09:01):    No growth to date.    Culture - Blood (collected 01-13-22 @ 09:30)  Source: .Blood Blood-Peripheral  Gram Stain (01-14-22 @ 03:23):    Growth in anaerobic bottle: Gram Negative Rods  Final Report (01-15-22 @ 17:23):    Growth in anaerobic bottle: Proteus mirabilis    See previous culture  [id number]    Culture - Urine (collected 01-12-22 @ 20:01)  Source: Clean Catch Clean Catch (Midstream)  Final Report (01-13-22 @ 18:33):    No growth    Culture - Blood (collected 01-12-22 @ 16:30)  Source: .Blood Blood-Peripheral  Gram Stain (01-13-22 @ 08:58):    Growth in aerobic and anaerobic bottles: Gram Negative Rods  Final Report (01-15-22 @ 10:41):    Growth in aerobic and anaerobic bottles: Proteus mirabilis    See previous culture [id number]    Growth in aerobic bottle: Enterobacter cloacae complex  Organism: Enterobacter cloacae complex (01-15-22 @ 10:41)  Organism: Enterobacter cloacae complex (01-15-22 @ 10:41)      -  Amikacin: S <=16      -  Ampicillin: R >16 These ampicillin results predict results for amoxicillin      -  Ampicillin/Sulbactam: R >16/8 Enterobacter, Klebsiella aerogenes, Citrobacter, and Serratia may develop resistance during prolonged therapy (3-4 days)      -  Aztreonam: S <=4      -  Cefazolin: R 8 Enterobacter, Klebsiella aerogenes, Citrobacter, and Serratia may develop resistance during prolonged therapy (3-4 days)      -  Cefepime: S <=2      -  Cefoxitin: R 16      -  Ceftriaxone: S <=1 Enterobacter, Klebsiella aerogenes, Citrobacter, and Serratia may develop resistance during prolonged therapy      -  Ciprofloxacin: S <=0.25      -  Ertapenem: S <=0.5      -  Gentamicin: R >8      -  Imipenem: S <=1      -  Levofloxacin: S <=0.5      -  Meropenem: S <=1      -  Piperacillin/Tazobactam: S <=8      -  Tobramycin: I 8      -  Trimethoprim/Sulfamethoxazole: S <=0.5/9.5      Method Type: RODOLFO    Culture - Blood (collected 01-12-22 @ 16:05)  Source: .Blood Blood-Peripheral  Gram Stain (01-13-22 @ 07:36):    Growth in aerobic and anaerobic bottles: Gram Negative Rods  Final Report (01-15-22 @ 14:37):    Growth in aerobic and anaerobic bottles: Proteus mirabilis    Growth in anaerobic bottle: Enterobacter cloacae complex    See previous culture [id number]    ***Blood Panel PCR results on this specimen are available    approximately 3 hours after the Gram stain result.***    Gram stain, PCR, and/or culture results may not always    correspond due to difference in methodologies.    ************************************************************    This PCR assay was performed by multiplex PCR. This    Assay tests for 66 bacterial and resistance gene targets.    Please refer to the [facility] test directory    at [email or web address] for details.  Organism: Blood Culture PCR  Proteus mirabilis (01-15-22 @ 14:37)  Organism: Proteus mirabilis (01-15-22 @ 14:37)      -  Amikacin: S <=16      -  Ampicillin: R >16 These ampicillin results predict results for amoxicillin      -  Ampicillin/Sulbactam: I 16/8 Enterobacter, Klebsiella aerogenes, Citrobacter, and Serratia may develop resistance during prolonged therapy (3-4 days)      -  Aztreonam: S <=4      -  Cefazolin: I 4 Enterobacter, Klebsiella aerogenes, Citrobacter, and Serratia may develop resistance during prolonged therapy (3-4 days)      -  Cefepime: S <=2      -  Cefoxitin: S <=8      -  Ceftriaxone: S <=1 Enterobacter, Klebsiella aerogenes, Citrobacter, and Serratia may develop resistance during prolonged therapy      -  Ciprofloxacin: S <=0.25      -  Ertapenem: S <=0.5      -  Gentamicin: R >8      -  Levofloxacin: S <=0.5      -  Meropenem: S <=1      -  Piperacillin/Tazobactam: S <=8      -  Tobramycin: I 8      -  Trimethoprim/Sulfamethoxazole: S 1/19      Method Type: RODOLFO  Organism: Blood Culture PCR (01-15-22 @ 14:37)      -  Enterobacter cloacae complex: Detec      -  Proteus mirabilis: Detec      Method Type: PCR      RADIOLOGY:  Images below reviewed personally  CT Head No Cont (01.17.22 @ 09:32)   Unchanged right basal ganglia intraparenchymal hematoma measuring 2.2 x 2.5 x 3.6 cm.    Xray Chest 1 View- PORTABLE-Routine (01.17.22 @ 02:30)   Nasogastric tube overlies the stomach. Right central line SVC region.  HEART:difficult to access in this projection  LUNGS: Left basilar infiltrate, atelectasis versus pneumonia, unchanged.  BONES: within normal limits    CT Abdomen and Pelvis w/ IV Cont (01.15.22 @ 15:18)   Interval small right pleural effusion.  Interval moderate volume pneumoperitoneum and small volume ascites.  Edema surrounding the pylorus with questionable wall irregularity   suggesting an ulcer perforation. Mural thickening jejunal loops which may   also represent ischemia  Distended urinary bladder and enlarged prostate. Correlate clinically for   bladder outlet Follow Up: Bacteremia    Interval History/ROS: Bowel perf over the weekend. s/p OR. Feels ok.     Allergies  No Known Allergies        ANTIMICROBIALS:  piperacillin/tazobactam IVPB.. 3.375 every 8 hours      OTHER MEDS:  acetaminophen   IVPB .. 1000 milliGRAM(s) IV Intermittent every 6 hours  acetaminophen   IVPB .. 1000 milliGRAM(s) IV Intermittent every 6 hours  chlorhexidine 0.12% Liquid 15 milliLiter(s) Oral Mucosa every 12 hours  chlorhexidine 4% Liquid 1 Application(s) Topical <User Schedule>  digoxin  Injectable 125 MICROGram(s) IV Push daily  diltiazem Infusion 5 mG/Hr IV Continuous <Continuous>  heparin   Injectable 5000 Unit(s) SubCutaneous every 8 hours  insulin glargine Injectable (LANTUS) 20 Unit(s) SubCutaneous every morning  insulin lispro (ADMELOG) corrective regimen sliding scale   SubCutaneous every 6 hours  lactated ringers. 1000 milliLiter(s) IV Continuous <Continuous>  metoprolol tartrate Injectable 5 milliGRAM(s) IV Push every 6 hours  pantoprazole  Injectable 40 milliGRAM(s) IV Push every 12 hours  phenylephrine    Infusion 0.1 MICROgram(s)/kG/Min IV Continuous <Continuous>  sodium chloride 0.9% 1000 milliLiter(s) IV Continuous <Continuous>      Vital Signs Last 24 Hrs  T(C): 37.6 (18 Jan 2022 08:00), Max: 37.7 (17 Jan 2022 15:00)  T(F): 99.7 (18 Jan 2022 08:00), Max: 99.8 (17 Jan 2022 15:00)  HR: 100 (18 Jan 2022 11:18) (81 - 124)  BP: 155/62 (18 Jan 2022 11:00) (109/48 - 155/62)  BP(mean): 82 (18 Jan 2022 11:00) (62 - 88)  RR: 15 (18 Jan 2022 11:00) (9 - 17)  SpO2: 93% (18 Jan 2022 11:18) (86% - 100%)    Physical Exam:  General: fatigued, deconditioned, answers simple questions   Cardio: regular rate   ENT: NG tube   Respiratory: nonlabored on high flow   abd: surgical site intact not inflamed, nondistended, soft, a little tender without guarding   : lilly   psych: calm                           13.8   27.47 )-----------( 204      ( 18 Jan 2022 02:39 )             43.3       01-18    148<H>  |  117<H>  |  32<H>  ----------------------------<  144<H>  4.6   |  22  |  1.05    Ca    7.8<L>      18 Jan 2022 01:03  Phos  2.5     01-18  Mg     2.70     01-18            MICROBIOLOGY:  Culture - Blood (collected 01-15-22 @ 16:45)  Source: .Blood Blood-Venous  Preliminary Report (01-16-22 @ 17:01):    No growth to date.    Culture - Blood (collected 01-14-22 @ 08:23)  Source: .Blood Blood-Peripheral  Preliminary Report (01-15-22 @ 09:01):    No growth to date.    Culture - Blood (collected 01-14-22 @ 08:23)  Source: .Blood Blood-Venous  Preliminary Report (01-15-22 @ 09:01):    No growth to date.    Culture - Blood (collected 01-13-22 @ 09:30)  Source: .Blood Blood-Peripheral  Gram Stain (01-14-22 @ 03:23):    Growth in anaerobic bottle: Gram Negative Rods  Final Report (01-15-22 @ 17:23):    Growth in anaerobic bottle: Proteus mirabilis    See previous culture  [id number]    Culture - Urine (collected 01-12-22 @ 20:01)  Source: Clean Catch Clean Catch (Midstream)  Final Report (01-13-22 @ 18:33):    No growth    Culture - Blood (collected 01-12-22 @ 16:30)  Source: .Blood Blood-Peripheral  Gram Stain (01-13-22 @ 08:58):    Growth in aerobic and anaerobic bottles: Gram Negative Rods  Final Report (01-15-22 @ 10:41):    Growth in aerobic and anaerobic bottles: Proteus mirabilis    See previous culture [id number]    Growth in aerobic bottle: Enterobacter cloacae complex  Organism: Enterobacter cloacae complex (01-15-22 @ 10:41)  Organism: Enterobacter cloacae complex (01-15-22 @ 10:41)      -  Amikacin: S <=16      -  Ampicillin: R >16 These ampicillin results predict results for amoxicillin      -  Ampicillin/Sulbactam: R >16/8 Enterobacter, Klebsiella aerogenes, Citrobacter, and Serratia may develop resistance during prolonged therapy (3-4 days)      -  Aztreonam: S <=4      -  Cefazolin: R 8 Enterobacter, Klebsiella aerogenes, Citrobacter, and Serratia may develop resistance during prolonged therapy (3-4 days)      -  Cefepime: S <=2      -  Cefoxitin: R 16      -  Ceftriaxone: S <=1 Enterobacter, Klebsiella aerogenes, Citrobacter, and Serratia may develop resistance during prolonged therapy      -  Ciprofloxacin: S <=0.25      -  Ertapenem: S <=0.5      -  Gentamicin: R >8      -  Imipenem: S <=1      -  Levofloxacin: S <=0.5      -  Meropenem: S <=1      -  Piperacillin/Tazobactam: S <=8      -  Tobramycin: I 8      -  Trimethoprim/Sulfamethoxazole: S <=0.5/9.5      Method Type: RODOLFO    Culture - Blood (collected 01-12-22 @ 16:05)  Source: .Blood Blood-Peripheral  Gram Stain (01-13-22 @ 07:36):    Growth in aerobic and anaerobic bottles: Gram Negative Rods  Final Report (01-15-22 @ 14:37):    Growth in aerobic and anaerobic bottles: Proteus mirabilis    Growth in anaerobic bottle: Enterobacter cloacae complex    See previous culture [id number]    ***Blood Panel PCR results on this specimen are available    approximately 3 hours after the Gram stain result.***    Gram stain, PCR, and/or culture results may not always    correspond due to difference in methodologies.    ************************************************************    This PCR assay was performed by multiplex PCR. This    Assay tests for 66 bacterial and resistance gene targets.    Please refer to the [facility] test directory    at [email or web address] for details.  Organism: Blood Culture PCR  Proteus mirabilis (01-15-22 @ 14:37)  Organism: Proteus mirabilis (01-15-22 @ 14:37)      -  Amikacin: S <=16      -  Ampicillin: R >16 These ampicillin results predict results for amoxicillin      -  Ampicillin/Sulbactam: I 16/8 Enterobacter, Klebsiella aerogenes, Citrobacter, and Serratia may develop resistance during prolonged therapy (3-4 days)      -  Aztreonam: S <=4      -  Cefazolin: I 4 Enterobacter, Klebsiella aerogenes, Citrobacter, and Serratia may develop resistance during prolonged therapy (3-4 days)      -  Cefepime: S <=2      -  Cefoxitin: S <=8      -  Ceftriaxone: S <=1 Enterobacter, Klebsiella aerogenes, Citrobacter, and Serratia may develop resistance during prolonged therapy      -  Ciprofloxacin: S <=0.25      -  Ertapenem: S <=0.5      -  Gentamicin: R >8      -  Levofloxacin: S <=0.5      -  Meropenem: S <=1      -  Piperacillin/Tazobactam: S <=8      -  Tobramycin: I 8      -  Trimethoprim/Sulfamethoxazole: S 1/19      Method Type: RODOLFO  Organism: Blood Culture PCR (01-15-22 @ 14:37)      -  Enterobacter cloacae complex: Detec      -  Proteus mirabilis: Detec      Method Type: PCR      RADIOLOGY:  Images below reviewed personally  CT Head No Cont (01.17.22 @ 09:32)   Unchanged right basal ganglia intraparenchymal hematoma measuring 2.2 x 2.5 x 3.6 cm.    Xray Chest 1 View- PORTABLE-Routine (01.17.22 @ 02:30)   Nasogastric tube overlies the stomach. Right central line SVC region.  HEART:difficult to access in this projection  LUNGS: Left basilar infiltrate, atelectasis versus pneumonia, unchanged.  BONES: within normal limits    CT Abdomen and Pelvis w/ IV Cont (01.15.22 @ 15:18)   Interval small right pleural effusion.  Interval moderate volume pneumoperitoneum and small volume ascites.  Edema surrounding the pylorus with questionable wall irregularity   suggesting an ulcer perforation. Mural thickening jejunal loops which may   also represent ischemia  Distended urinary bladder and enlarged prostate. Correlate clinically for   bladder outlet
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
## 2022-01-19 NOTE — PROGRESS NOTE ADULT - SUBJECTIVE AND OBJECTIVE BOX
Follow Up: Bacteremia    Interval History/ROS: Intubated this morning. Left sided whiteout. Newly COVID positive too.     Allergies  No Known Allergies        ANTIMICROBIALS:  fluconAZOLE IVPB 400 every 24 hours  fluconAZOLE IVPB    piperacillin/tazobactam IVPB.. 3.375 every 8 hours  remdesivir  IVPB        OTHER MEDS:  acetaminophen   IVPB .. 1000 milliGRAM(s) IV Intermittent every 6 hours  acetaminophen   IVPB .. 1000 milliGRAM(s) IV Intermittent every 6 hours  chlorhexidine 0.12% Liquid 15 milliLiter(s) Oral Mucosa every 12 hours  chlorhexidine 4% Liquid 1 Application(s) Topical <User Schedule>  dextrose 5% + lactated ringers. 1000 milliLiter(s) IV Continuous <Continuous>  digoxin  Injectable 125 MICROGram(s) IV Push daily  diltiazem Infusion 5 mG/Hr IV Continuous <Continuous>  heparin   Injectable 5000 Unit(s) SubCutaneous every 8 hours  insulin glargine Injectable (LANTUS) 20 Unit(s) SubCutaneous every morning  insulin lispro (ADMELOG) corrective regimen sliding scale   SubCutaneous every 6 hours  pantoprazole  Injectable 40 milliGRAM(s) IV Push every 12 hours  phenylephrine    Infusion 0.1 MICROgram(s)/kG/Min IV Continuous <Continuous>  propofol Infusion 10 MICROgram(s)/kG/Min IV Continuous <Continuous>  sodium chloride 0.9% 1000 milliLiter(s) IV Continuous <Continuous>      Vital Signs Last 24 Hrs  T(C): 36.1 (19 Jan 2022 16:00), Max: 37.3 (19 Jan 2022 00:00)  T(F): 96.9 (19 Jan 2022 16:00), Max: 99.1 (19 Jan 2022 00:00)  HR: 92 (19 Jan 2022 16:00) (76 - 119)  BP: 98/54 (19 Jan 2022 14:00) (73/46 - 155/81)  BP(mean): 62 (19 Jan 2022 14:00) (51 - 97)  RR: 18 (19 Jan 2022 16:00) (15 - 26)  SpO2: 100% (19 Jan 2022 16:00) (68% - 100%)    Physical Exam:  General: intubated, sedated  Cardio: regular rate   ENT: NG tube   Respiratory: mechanically ventilated  abd: nondistended  Musculoskeletal: no focal joint swelling   Skin: stable eschars both knees, improved surrounding erythema, minimal now                           13.9   22.91 )-----------( 215      ( 19 Jan 2022 01:14 )             46.6       01-19    x   |  x   |  x   ----------------------------<  x   x    |  x   |  1.05    Ca    7.9<L>      19 Jan 2022 01:14  Phos  2.2     01-19  Mg     2.20     01-19    TPro  5.1<L>  /  Alb  2.2<L>  /  TBili  0.4  /  DBili  0.2  /  AST  66<H>  /  ALT  35  /  AlkPhos  86  01-19          MICROBIOLOGY:  Culture - Blood (collected 01-15-22 @ 16:45)  Source: .Blood Blood-Venous  Preliminary Report (01-16-22 @ 17:01):    No growth to date.    Culture - Blood (collected 01-14-22 @ 02:58)  Source: .Blood Blood-Venous  Final Report (01-19-22 @ 09:00):    No Growth Final    Culture - Blood (collected 01-14-22 @ 02:57)  Source: .Blood Blood-Peripheral  Final Report (01-19-22 @ 09:00):    No Growth Final    Culture - Blood (collected 01-13-22 @ 09:30)  Source: .Blood Blood-Peripheral  Gram Stain (01-14-22 @ 03:23):    Growth in anaerobic bottle: Gram Negative Rods  Final Report (01-15-22 @ 17:23):    Growth in anaerobic bottle: Proteus mirabilis    See previous culture  [id number]    Culture - Urine (collected 01-12-22 @ 20:01)  Source: Clean Catch Clean Catch (Midstream)  Final Report (01-13-22 @ 18:33):    No growth    RADIOLOGY:  Images below reviewed personally  Xray Chest 1 View- PORTABLE-Urgent (Xray Chest 1 View- PORTABLE-Urgent .) (01.19.22 @ 15:47)   Improved aeration of the left lung, with residual hazy opacification   throughout both lobes.  No pneumothorax.    Xray Chest 1 View- PORTABLE-Urgent (Xray Chest 1 View- PORTABLE-Urgent .) (01.19.22 @ 11:46)  Unchanged total opacification of the left lung, likely secondary to atelectasis.  Tubes and lines in place as above.    CT Abdomen and Pelvis w/ IV Cont (01.15.22 @ 15:18)   Interval small right pleural effusion.  Interval moderate volume pneumoperitoneum and small volume ascites.  Edema surrounding the pylorus with questionable wall irregularity   suggesting an ulcer perforation. Mural thickening jejunal loops which may   also represent ischemia  Distended urinary bladder and enlarged prostate. Correlate clinically for   bladder outlet

## 2022-01-19 NOTE — PROGRESS NOTE ADULT - ASSESSMENT
admitted s/p fall with basal ganglia hemorrhagic stroke.   - awaiting LLE demarcation.   - no acute vascular surgery intervention. Will follow peripherally as patient's acute issues are being addressed. please call with any questions.
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
## 2022-01-19 NOTE — PROCEDURE NOTE - NSTRACHPOSTINTU_RESP_A_CORE
Appropriate capnography/Breath sounds bilateral/Chest excursion noted/Chest X-Ray
Appropriate capnography/Breath sounds bilateral/Breath sounds equal/Chest excursion noted/Chest X-Ray

## 2022-01-19 NOTE — PROGRESS NOTE ADULT - SUBJECTIVE AND OBJECTIVE BOX
GENERAL SURGERY DAILY PROGRESS NOTE:    Interval:  No acute events overnight.    Subjective:  Patient seen and examined. Reports pain is well controlled. Denies N/V.    Vital Signs Last 24 Hrs  T(C): 36.9 (2022 20:00), Max: 37.7 (2022 04:00)  T(F): 98.4 (2022 20:00), Max: 99.8 (2022 04:00)  HR: 95 (2022 00:00) (83 - 124)  BP: 122/68 (2022 00:00) (110/64 - 155/62)  BP(mean): 80 (2022 00:00) (69 - 90)  RR: 18 (2022 00:00) (12 - 23)  SpO2: 99% (2022 00:00) (86% - 99%)    PHYSICAL EXAMINATION:  General: intubated and no longer sedated; A&Ox2  Resp: no increased WOB on vent  Abd: soft, mildly distended; midline incision c/d/i.    I&O's Detail    2022 07:01  -  2022 07:00  --------------------------------------------------------  IN:    Diltiazem: 240 mL    Diltiazem: 30 mL    IV PiggyBack: 700 mL    Lactated Ringers: 1900 mL    Phenylephrine: 77.1 mL    sodium chloride 0.9% w/ Additives: 500 mL  Total IN: 3447.1 mL    OUT:    Indwelling Catheter - Urethral (mL): 1410 mL    Nasogastric/Oral tube (mL): 150 mL  Total OUT: 1560 mL    Total NET: 1887.1 mL      2022 07:01  -  2022 00:40  --------------------------------------------------------  IN:    Diltiazem: 290 mL    IV PiggyBack: 350 mL    Lactated Ringers: 1000 mL    sodium chloride 0.9% w/ Additives: 700 mL  Total IN: 2340 mL    OUT:    Indwelling Catheter - Urethral (mL): 2725 mL    Nasogastric/Oral tube (mL): 100 mL  Total OUT: 2825 mL    Total NET: -485 mL          Daily     Daily Weight in k.5 (2022 04:00)    MEDICATIONS  (STANDING):  acetaminophen   IVPB .. 1000 milliGRAM(s) IV Intermittent every 6 hours  acetaminophen   IVPB .. 1000 milliGRAM(s) IV Intermittent every 6 hours  chlorhexidine 4% Liquid 1 Application(s) Topical <User Schedule>  digoxin  Injectable 125 MICROGram(s) IV Push daily  diltiazem Infusion 5 mG/Hr (5 mL/Hr) IV Continuous <Continuous>  fluconAZOLE IVPB 400 milliGRAM(s) IV Intermittent every 24 hours  fluconAZOLE IVPB      heparin   Injectable 5000 Unit(s) SubCutaneous every 8 hours  insulin glargine Injectable (LANTUS) 20 Unit(s) SubCutaneous every morning  insulin lispro (ADMELOG) corrective regimen sliding scale   SubCutaneous every 6 hours  lactated ringers. 1000 milliLiter(s) (100 mL/Hr) IV Continuous <Continuous>  metoprolol tartrate Injectable 5 milliGRAM(s) IV Push every 6 hours  pantoprazole  Injectable 40 milliGRAM(s) IV Push every 12 hours  phenylephrine    Infusion 0.1 MICROgram(s)/kG/Min (1.64 mL/Hr) IV Continuous <Continuous>  piperacillin/tazobactam IVPB.. 3.375 Gram(s) IV Intermittent every 8 hours  sodium chloride 0.9% 1000 milliLiter(s) (100 mL/Hr) IV Continuous <Continuous>    MEDICATIONS  (PRN):      LABS:                        13.8   27.47 )-----------( 204      ( 2022 02:39 )             43.3     -18    148<H>  |  117<H>  |  32<H>  ----------------------------<  144<H>  4.6   |  22  |  1.05    Ca    7.8<L>      2022 01:03  Phos  2.5       Mg     2.70                  GENERAL SURGERY DAILY PROGRESS NOTE:    Interval:  Extubated yesterday to HFNC.    Subjective:  Patient seen and examined. Pain controlled. Denies N/V.    ICU Vital Signs Last 24 Hrs  T(C): 36.2 (19 Jan 2022 08:00), Max: 37.3 (19 Jan 2022 00:00)  T(F): 97.2 (19 Jan 2022 08:00), Max: 99.1 (19 Jan 2022 00:00)  HR: 95 (19 Jan 2022 10:00) (76 - 113)  BP: 137/65 (19 Jan 2022 10:00) (110/61 - 155/62)  BP(mean): 77 (19 Jan 2022 10:00) (71 - 92)  ABP: --  ABP(mean): --  RR: 20 (19 Jan 2022 10:30) (15 - 24)  SpO2: 100% (19 Jan 2022 10:30) (91% - 100%)    PHYSICAL EXAMINATION:  General: lying in bed, NAD  Resp: on HFNC  Abd: soft, mildly distended; midline incision c/d/i.      LABS:                                   13.9   22.91 )-----------( 215      ( 19 Jan 2022 01:14 )             46.6   01-19    149<H>  |  114<H>  |  31<H>  ----------------------------<  138<H>  3.6   |  26  |  1.15    Ca    7.9<L>      19 Jan 2022 01:14  Phos  2.2     01-19  Mg     2.20     01-19             GENERAL SURGERY DAILY PROGRESS NOTE:    Interval:  Extubated yesterday to HFNC.    Subjective:  Patient seen and examined. Pain controlled. Denies N/V.    ICU Vital Signs Last 24 Hrs  T(C): 36.2 (19 Jan 2022 08:00), Max: 37.3 (19 Jan 2022 00:00)  T(F): 97.2 (19 Jan 2022 08:00), Max: 99.1 (19 Jan 2022 00:00)  HR: 95 (19 Jan 2022 10:00) (76 - 113)  BP: 137/65 (19 Jan 2022 10:00) (110/61 - 155/62)  BP(mean): 77 (19 Jan 2022 10:00) (71 - 92)  ABP: --  ABP(mean): --  RR: 20 (19 Jan 2022 10:30) (15 - 24)  SpO2: 100% (19 Jan 2022 10:30) (91% - 100%)    PHYSICAL EXAMINATION:  General: lying in bed, NAD  Resp: on HFNC  Abd: soft, mildly distended; midline incision c/d/i.      LABS:                                   13.9   22.91 )-----------( 215      ( 19 Jan 2022 01:14 )             46.6   01-19    149<H>  |  114<H>  |  31<H>  ----------------------------<  138<H>  3.6   |  26  |  1.15    Ca    7.9<L>      19 Jan 2022 01:14  Phos  2.2     01-19  Mg     2.20     01-19
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
## 2022-01-19 NOTE — PROGRESS NOTE ADULT - SUBJECTIVE AND OBJECTIVE BOX
Intubated and sedated  COVID +    Vital Signs Last 24 Hrs  T(C): 36.2 (19 Jan 2022 08:00), Max: 37.3 (19 Jan 2022 00:00)  T(F): 97.2 (19 Jan 2022 08:00), Max: 99.1 (19 Jan 2022 00:00)  HR: 94 (19 Jan 2022 13:00) (76 - 119)  BP: 92/58 (19 Jan 2022 13:00) (73/46 - 155/81)  BP(mean): 66 (19 Jan 2022 13:00) (51 - 97)  RR: 19 (19 Jan 2022 13:00) (15 - 26)  SpO2: 96% (19 Jan 2022 13:00) (68% - 100%)    Gen: intubated, sedated  LE: right foot cool to touch, stable knee eschar. left foot ischemia with stable knee eschar.

## 2022-01-20 NOTE — PROGRESS NOTE ADULT - SUBJECTIVE AND OBJECTIVE BOX
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  --------------------------------------------------------------------------------------------------  V/S:  Vital Signs Last 24 Hrs  T(C): 37.9 (20 Jan 2022 04:00), Max: 37.9 (20 Jan 2022 04:00)  T(F): 100.2 (20 Jan 2022 04:00), Max: 100.2 (20 Jan 2022 04:00)  HR: 116 (20 Jan 2022 06:30) (84 - 148)  BP: 100/56 (19 Jan 2022 17:30) (73/46 - 155/81)  BP(mean): 65 (19 Jan 2022 17:30) (51 - 97)  RR: 19 (20 Jan 2022 06:30) (13 - 26)  SpO2: 99% (20 Jan 2022 06:30) (68% - 100%)  Mode: AC/ CMV (Assist Control/ Continuous Mandatory Ventilation)  RR (machine): 14  TV (machine): 450  FiO2: 40  PEEP: 10  ITime: 0.84  MAP: 11  PIP: 16    --------------------------------------------------------------------------------------------------  I/Os:    18 Jan 2022 07:01  -  19 Jan 2022 07:00  --------------------------------------------------------  IN:    Diltiazem: 375 mL    IV PiggyBack: 750 mL    Lactated Ringers: 1700 mL    sodium chloride 0.9% w/ Additives: 700 mL  Total IN: 3525 mL    OUT:    Indwelling Catheter - Urethral (mL): 4025 mL    Nasogastric/Oral tube (mL): 150 mL  Total OUT: 4175 mL    Total NET: -650 mL      19 Jan 2022 07:01  -  20 Jan 2022 06:53  --------------------------------------------------------  IN:    dextrose 5% + lactated ringers: 900 mL    Diltiazem: 140 mL    IV PiggyBack: 1850 mL    Lactated Ringers: 200 mL    Phenylephrine: 149.5 mL    Propofol: 55.3 mL    Propofol: 192 mL    sodium chloride 0.9% w/ Additives: 1100 mL  Total IN: 4586.8 mL    OUT:    Indwelling Catheter - Urethral (mL): 1305 mL    Nasogastric/Oral tube (mL): 150 mL  Total OUT: 1455 mL    Total NET: 3131.8 mL        --------------------------------------------------------------------------------------------------  LABS:                        12.4   23.94 )-----------( 266      ( 20 Jan 2022 02:04 )             40.1     20 Jan 2022 02:04    147    |  116    |  27     ----------------------------<  181    3.7     |  23     |  1.02     Ca    7.2        20 Jan 2022 02:04  Phos  2.6       20 Jan 2022 02:04  Mg     2.20      20 Jan 2022 02:04    TPro  5.1    /  Alb  1.9    /  TBili  0.3    /  DBili  x      /  AST  73     /  ALT  39     /  AlkPhos  82     20 Jan 2022 02:04    PT/INR - ( 20 Jan 2022 02:00 )   PT: 14.1 sec;   INR: 1.25 ratio         PTT - ( 19 Jan 2022 01:14 )  PTT:25.9 sec  CAPILLARY BLOOD GLUCOSE      POCT Blood Glucose.: 189 mg/dL (20 Jan 2022 05:55)  POCT Blood Glucose.: 131 mg/dL (19 Jan 2022 23:04)  POCT Blood Glucose.: 127 mg/dL (19 Jan 2022 17:08)  POCT Blood Glucose.: 141 mg/dL (19 Jan 2022 14:42)        LIVER FUNCTIONS - ( 20 Jan 2022 02:04 )  Alb: 1.9 g/dL / Pro: 5.1 g/dL / ALK PHOS: 82 U/L / ALT: 39 U/L / AST: 73 U/L / GGT: x             Culture - Sputum (collected 19 Jan 2022 18:17)  Source: .Sputum Sputum  Gram Stain (19 Jan 2022 21:40):    Few polymorphonuclear leukocytes per low power field    No Squamous epithelial cells per low power field    No organisms seen        --------------------------------------------------------------------------------------------------  MEDICATIONS  (STANDING):  acetaminophen   IVPB .. 1000 milliGRAM(s) IV Intermittent every 6 hours  acetaminophen   IVPB .. 1000 milliGRAM(s) IV Intermittent every 6 hours  chlorhexidine 0.12% Liquid 15 milliLiter(s) Oral Mucosa every 12 hours  chlorhexidine 4% Liquid 1 Application(s) Topical <User Schedule>  dextrose 5% + lactated ringers. 1000 milliLiter(s) (75 mL/Hr) IV Continuous <Continuous>  digoxin  Injectable 125 MICROGram(s) IV Push daily  diltiazem Infusion 5 mG/Hr (5 mL/Hr) IV Continuous <Continuous>  fluconAZOLE IVPB 400 milliGRAM(s) IV Intermittent every 24 hours  fluconAZOLE IVPB      heparin   Injectable 5000 Unit(s) SubCutaneous every 8 hours  insulin glargine Injectable (LANTUS) 20 Unit(s) SubCutaneous every morning  insulin lispro (ADMELOG) corrective regimen sliding scale   SubCutaneous every 6 hours  pantoprazole  Injectable 40 milliGRAM(s) IV Push every 12 hours  piperacillin/tazobactam IVPB.. 3.375 Gram(s) IV Intermittent every 8 hours  remdesivir  IVPB   IV Intermittent   remdesivir  IVPB 100 milliGRAM(s) IV Intermittent every 24 hours  sodium chloride 0.9% 1000 milliLiter(s) (100 mL/Hr) IV Continuous <Continuous>    MEDICATIONS  (PRN):  HYDROmorphone  Injectable 0.5 milliGRAM(s) IV Push every 4 hours PRN Severe Pain (7 - 10)    --------------------------------------------------------------------------------------------------
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
## 2022-01-20 NOTE — PROGRESS NOTE ADULT - SUBJECTIVE AND OBJECTIVE BOX
Follow Up:  Bacteremia, COVID    Interval History/ROS: Febrile this morning. Remains critically ill, intubated.     Allergies  No Known Allergies        ANTIMICROBIALS:  fluconAZOLE IVPB 400 every 24 hours  fluconAZOLE IVPB    piperacillin/tazobactam IVPB.. 3.375 every 8 hours  remdesivir  IVPB    remdesivir  IVPB 100 every 24 hours      OTHER MEDS:  acetaminophen    Suspension .. 1000 milliGRAM(s) Oral every 6 hours  acetylcysteine 10%  Inhalation 4 milliLiter(s) Inhalation two times a day  albuterol/ipratropium for Nebulization 3 milliLiter(s) Nebulizer every 6 hours  chlorhexidine 0.12% Liquid 15 milliLiter(s) Oral Mucosa every 12 hours  chlorhexidine 4% Liquid 1 Application(s) Topical <User Schedule>  dextrose 5% + lactated ringers. 1000 milliLiter(s) IV Continuous <Continuous>  digoxin  Injectable 125 MICROGram(s) IV Push daily  diltiazem Infusion 5 mG/Hr IV Continuous <Continuous>  enoxaparin Injectable 40 milliGRAM(s) SubCutaneous daily  insulin glargine Injectable (LANTUS) 20 Unit(s) SubCutaneous every morning  insulin lispro (ADMELOG) corrective regimen sliding scale   SubCutaneous every 6 hours  multivitamin/minerals/iron Oral Solution (CENTRUM) 15 milliLiter(s) Oral daily  oxyCODONE    Solution 5 milliGRAM(s) Oral every 4 hours PRN  pantoprazole  Injectable 40 milliGRAM(s) IV Push every 12 hours  phenylephrine    Infusion 0.1 MICROgram(s)/kG/Min IV Continuous <Continuous>  sodium chloride 3%  Inhalation 4 milliLiter(s) Inhalation every 12 hours  thiamine 100 milliGRAM(s) Oral daily      Vital Signs Last 24 Hrs  T(C): 38.1 (20 Jan 2022 08:00), Max: 38.1 (20 Jan 2022 08:00)  T(F): 100.5 (20 Jan 2022 08:00), Max: 100.5 (20 Jan 2022 08:00)  HR: 96 (20 Jan 2022 12:00) (84 - 148)  BP: 100/56 (19 Jan 2022 17:30) (98/54 - 100/56)  BP(mean): 65 (19 Jan 2022 17:30) (62 - 65)  RR: 20 (20 Jan 2022 12:00) (13 - 24)  SpO2: 98% (20 Jan 2022 12:00) (87% - 100%)    Physical Exam:  General: sedated  Cardio: regular rate   ENT: NG tube  Respiratory: mechanically ventilated   abd: nondistended, soft  : lilly  Musculoskeletal: no focal joint swelling  vascular: right IJ CVC, no phlebitis   Skin: stable eschars both knees, left lateral arm                           12.4   23.94 )-----------( 266      ( 20 Jan 2022 02:04 )             40.1       01-20    147<H>  |  116<H>  |  27<H>  ----------------------------<  181<H>  3.7   |  23  |  1.02    Ca    7.2<L>      20 Jan 2022 02:04  Phos  2.6     01-20  Mg     2.20     01-20    TPro  5.1<L>  /  Alb  1.9<L>  /  TBili  0.3  /  DBili  x   /  AST  73<H>  /  ALT  39  /  AlkPhos  82  01-20          MICROBIOLOGY:  Culture - Acid Fast - Bronchial w/Smear (collected 01-19-22 @ 18:21)  Source: BAL sputum    Culture - Acid Fast - Sputum w/Smear (collected 01-19-22 @ 18:17)  Source: .Sputum Sputum    Culture - Sputum (collected 01-19-22 @ 18:17)  Source: .Sputum Sputum  Gram Stain (01-19-22 @ 21:40):    Few polymorphonuclear leukocytes per low power field    No Squamous epithelial cells per low power field    No organisms seen    Culture - Blood (collected 01-15-22 @ 16:45)  Source: .Blood Blood-Venous  Preliminary Report (01-16-22 @ 17:01):    No growth to date.    Culture - Blood (collected 01-14-22 @ 02:58)  Source: .Blood Blood-Venous  Final Report (01-19-22 @ 09:00):    No Growth Final    Culture - Blood (collected 01-14-22 @ 02:57)  Source: .Blood Blood-Peripheral  Final Report (01-19-22 @ 09:00):    No Growth Final    RADIOLOGY:  Images below reviewed personally  Xray Chest 1 View- PORTABLE-Routine (Xray Chest 1 View- PORTABLE-Routine in AM.) (01.20.22 @ 00:14)   The endotracheal and enteric tube in addition to a right IJ line are   unchanged.  Small to moderate layering left effusion slightly decreased since the   study of the day before. No focal consolidations or cardiomegaly. No   pneumothorax.    CT Abdomen and Pelvis w/ IV Cont (01.15.22 @ 15:18)   Interval small right pleural effusion.  Interval moderate volume pneumoperitoneum and small volume ascites.  Edema surrounding the pylorus with questionable wall irregularity   suggesting an ulcer perforation. Mural thickening jejunal loops which may   also represent ischemia  Distended urinary bladder and enlarged prostate. Correlate clinically for   bladder outlet
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
## 2022-01-20 NOTE — PROGRESS NOTE ADULT - SUBJECTIVE AND OBJECTIVE BOX
GENERAL SURGERY DAILY PROGRESS NOTE:    Interval:  No acute events overnight.    Subjective:  Patient seen and examined. Reports pain is well controlled. Denies N/V.    Vital Signs Last 24 Hrs  T(C): 37.1 (19 Jan 2022 20:00), Max: 37.1 (19 Jan 2022 04:00)  T(F): 98.7 (19 Jan 2022 20:00), Max: 98.7 (19 Jan 2022 04:00)  HR: 97 (19 Jan 2022 23:00) (76 - 119)  BP: 100/56 (19 Jan 2022 17:30) (73/46 - 155/81)  BP(mean): 65 (19 Jan 2022 17:30) (51 - 97)  RR: 23 (19 Jan 2022 23:00) (15 - 26)  SpO2: 100% (19 Jan 2022 23:00) (68% - 100%)    PHYSICAL EXAMINATION:  General: lying in bed, NAD  Resp: on HFNC  Abd: soft, mildly distended; midline incision c/d/i.    I&O's Detail    18 Jan 2022 07:01  -  19 Jan 2022 07:00  --------------------------------------------------------  IN:    Diltiazem: 375 mL    IV PiggyBack: 750 mL    Lactated Ringers: 1700 mL    sodium chloride 0.9% w/ Additives: 700 mL  Total IN: 3525 mL    OUT:    Indwelling Catheter - Urethral (mL): 4025 mL    Nasogastric/Oral tube (mL): 150 mL  Total OUT: 4175 mL    Total NET: -650 mL      19 Jan 2022 07:01  -  20 Jan 2022 00:19  --------------------------------------------------------  IN:    dextrose 5% + lactated ringers: 300 mL    Diltiazem: 80 mL    IV PiggyBack: 1100 mL    Lactated Ringers: 200 mL    Phenylephrine: 138 mL    Propofol: 192 mL    Propofol: 31.6 mL    sodium chloride 0.9% w/ Additives: 1100 mL  Total IN: 3141.6 mL    OUT:    Indwelling Catheter - Urethral (mL): 955 mL    Nasogastric/Oral tube (mL): 100 mL  Total OUT: 1055 mL    Total NET: 2086.6 mL          Daily     Daily     MEDICATIONS  (STANDING):  acetaminophen   IVPB .. 1000 milliGRAM(s) IV Intermittent every 6 hours  acetaminophen   IVPB .. 1000 milliGRAM(s) IV Intermittent every 6 hours  acetaminophen   IVPB .. 1000 milliGRAM(s) IV Intermittent every 6 hours  chlorhexidine 0.12% Liquid 15 milliLiter(s) Oral Mucosa every 12 hours  chlorhexidine 4% Liquid 1 Application(s) Topical <User Schedule>  dextrose 5% + lactated ringers. 1000 milliLiter(s) (75 mL/Hr) IV Continuous <Continuous>  digoxin  Injectable 125 MICROGram(s) IV Push daily  diltiazem Infusion 5 mG/Hr (5 mL/Hr) IV Continuous <Continuous>  fluconAZOLE IVPB 400 milliGRAM(s) IV Intermittent every 24 hours  fluconAZOLE IVPB      heparin   Injectable 5000 Unit(s) SubCutaneous every 8 hours  insulin glargine Injectable (LANTUS) 20 Unit(s) SubCutaneous every morning  insulin lispro (ADMELOG) corrective regimen sliding scale   SubCutaneous every 6 hours  pantoprazole  Injectable 40 milliGRAM(s) IV Push every 12 hours  phenylephrine    Infusion 0.1 MICROgram(s)/kG/Min (1.64 mL/Hr) IV Continuous <Continuous>  piperacillin/tazobactam IVPB.. 3.375 Gram(s) IV Intermittent every 8 hours  propofol Infusion 10 MICROgram(s)/kG/Min (5.25 mL/Hr) IV Continuous <Continuous>  remdesivir  IVPB   IV Intermittent   remdesivir  IVPB 100 milliGRAM(s) IV Intermittent every 24 hours  sodium chloride 0.9% 1000 milliLiter(s) (100 mL/Hr) IV Continuous <Continuous>    MEDICATIONS  (PRN):  HYDROmorphone  Injectable 0.5 milliGRAM(s) IV Push every 4 hours PRN Severe Pain (7 - 10)      LABS:                        13.9   22.91 )-----------( 215      ( 19 Jan 2022 01:14 )             46.6     01-19    x   |  x   |  x   ----------------------------<  x   x    |  x   |  1.05    Ca    7.9<L>      19 Jan 2022 01:14  Phos  2.2     01-19  Mg     2.20     01-19    TPro  5.1<L>  /  Alb  2.2<L>  /  TBili  0.4  /  DBili  0.2  /  AST  66<H>  /  ALT  35  /  AlkPhos  86  01-19    PT/INR - ( 19 Jan 2022 15:09 )   PT: 14.1 sec;   INR: 1.25 ratio         PTT - ( 19 Jan 2022 01:14 )  PTT:25.9 sec       GENERAL SURGERY DAILY PROGRESS NOTE:    Interval:  - Extubated 1/18 AM, Desat in PM, started HFNC  - Diuresed Lasix 20mg overnight  - ID rec starting diflucan and Zosyn   - TTE w/o thrombus  - Remains on cardizem gtt and Digoxin for persistent Rapid AFib  - DCd Lopressor  - COVID+  - pO2 55-65 on ABG  - ReIntubated 1/19 AM for white out on L Lung on CXR and persistent hypoxia    Subjective:  Patient seen and examined. Pt is intubated.    Vital Signs Last 24 Hrs  T(C): 37.1 (19 Jan 2022 20:00), Max: 37.1 (19 Jan 2022 04:00)  T(F): 98.7 (19 Jan 2022 20:00), Max: 98.7 (19 Jan 2022 04:00)  HR: 97 (19 Jan 2022 23:00) (76 - 119)  BP: 100/56 (19 Jan 2022 17:30) (73/46 - 155/81)  BP(mean): 65 (19 Jan 2022 17:30) (51 - 97)  RR: 23 (19 Jan 2022 23:00) (15 - 26)  SpO2: 100% (19 Jan 2022 23:00) (68% - 100%)    PHYSICAL EXAMINATION:  General: lying in bed, NAD  Resp: intubated  Abd: soft, mildly distended; midline incision c/d/i.    I&O's Detail    18 Jan 2022 07:01  -  19 Jan 2022 07:00  --------------------------------------------------------  IN:    Diltiazem: 375 mL    IV PiggyBack: 750 mL    Lactated Ringers: 1700 mL    sodium chloride 0.9% w/ Additives: 700 mL  Total IN: 3525 mL    OUT:    Indwelling Catheter - Urethral (mL): 4025 mL    Nasogastric/Oral tube (mL): 150 mL  Total OUT: 4175 mL    Total NET: -650 mL      19 Jan 2022 07:01  -  20 Jan 2022 00:19  --------------------------------------------------------  IN:    dextrose 5% + lactated ringers: 300 mL    Diltiazem: 80 mL    IV PiggyBack: 1100 mL    Lactated Ringers: 200 mL    Phenylephrine: 138 mL    Propofol: 192 mL    Propofol: 31.6 mL    sodium chloride 0.9% w/ Additives: 1100 mL  Total IN: 3141.6 mL    OUT:    Indwelling Catheter - Urethral (mL): 955 mL    Nasogastric/Oral tube (mL): 100 mL  Total OUT: 1055 mL    Total NET: 2086.6 mL          Daily     Daily     MEDICATIONS  (STANDING):  acetaminophen   IVPB .. 1000 milliGRAM(s) IV Intermittent every 6 hours  acetaminophen   IVPB .. 1000 milliGRAM(s) IV Intermittent every 6 hours  acetaminophen   IVPB .. 1000 milliGRAM(s) IV Intermittent every 6 hours  chlorhexidine 0.12% Liquid 15 milliLiter(s) Oral Mucosa every 12 hours  chlorhexidine 4% Liquid 1 Application(s) Topical <User Schedule>  dextrose 5% + lactated ringers. 1000 milliLiter(s) (75 mL/Hr) IV Continuous <Continuous>  digoxin  Injectable 125 MICROGram(s) IV Push daily  diltiazem Infusion 5 mG/Hr (5 mL/Hr) IV Continuous <Continuous>  fluconAZOLE IVPB 400 milliGRAM(s) IV Intermittent every 24 hours  fluconAZOLE IVPB      heparin   Injectable 5000 Unit(s) SubCutaneous every 8 hours  insulin glargine Injectable (LANTUS) 20 Unit(s) SubCutaneous every morning  insulin lispro (ADMELOG) corrective regimen sliding scale   SubCutaneous every 6 hours  pantoprazole  Injectable 40 milliGRAM(s) IV Push every 12 hours  phenylephrine    Infusion 0.1 MICROgram(s)/kG/Min (1.64 mL/Hr) IV Continuous <Continuous>  piperacillin/tazobactam IVPB.. 3.375 Gram(s) IV Intermittent every 8 hours  propofol Infusion 10 MICROgram(s)/kG/Min (5.25 mL/Hr) IV Continuous <Continuous>  remdesivir  IVPB   IV Intermittent   remdesivir  IVPB 100 milliGRAM(s) IV Intermittent every 24 hours  sodium chloride 0.9% 1000 milliLiter(s) (100 mL/Hr) IV Continuous <Continuous>    MEDICATIONS  (PRN):  HYDROmorphone  Injectable 0.5 milliGRAM(s) IV Push every 4 hours PRN Severe Pain (7 - 10)      LABS:                        13.9   22.91 )-----------( 215      ( 19 Jan 2022 01:14 )             46.6     01-19    x   |  x   |  x   ----------------------------<  x   x    |  x   |  1.05    Ca    7.9<L>      19 Jan 2022 01:14  Phos  2.2     01-19  Mg     2.20     01-19    TPro  5.1<L>  /  Alb  2.2<L>  /  TBili  0.4  /  DBili  0.2  /  AST  66<H>  /  ALT  35  /  AlkPhos  86  01-19    PT/INR - ( 19 Jan 2022 15:09 )   PT: 14.1 sec;   INR: 1.25 ratio         PTT - ( 19 Jan 2022 01:14 )  PTT:25.9 sec

## 2022-01-21 NOTE — PROGRESS NOTE ADULT - SUBJECTIVE AND OBJECTIVE BOX
Follow Up:  Bacteremia, COVID    Interval History/ROS: Febrile. Remains critically ill. Seen after CT scan. Lots of respiratory secretions per staff.     Allergies  No Known Allergies        ANTIMICROBIALS:  fluconAZOLE IVPB    fluconAZOLE IVPB 400 every 24 hours  piperacillin/tazobactam IVPB.. 3.375 every 8 hours  remdesivir  IVPB    remdesivir  IVPB 100 every 24 hours      OTHER MEDS:  acetaminophen    Suspension .. 1000 milliGRAM(s) Oral every 6 hours  acetylcysteine 10%  Inhalation 4 milliLiter(s) Inhalation two times a day  ALBUTerol    90 MICROgram(s) HFA Inhaler 2 Puff(s) Inhalation every 6 hours  chlorhexidine 0.12% Liquid 15 milliLiter(s) Oral Mucosa every 12 hours  chlorhexidine 4% Liquid 1 Application(s) Topical <User Schedule>  dexMEDEtomidine Infusion 0.2 MICROgram(s)/kG/Hr IV Continuous <Continuous>  digoxin  Injectable 125 MICROGram(s) IV Push daily  diltiazem Infusion 5 mG/Hr IV Continuous <Continuous>  enoxaparin Injectable 40 milliGRAM(s) SubCutaneous daily  furosemide   Injectable 20 milliGRAM(s) IV Push once  haloperidol    Injectable 5 milliGRAM(s) IV Push every 6 hours PRN  HYDROmorphone  Injectable 0.25 milliGRAM(s) IV Push every 3 hours PRN  insulin glargine Injectable (LANTUS) 20 Unit(s) SubCutaneous every morning  insulin lispro (ADMELOG) corrective regimen sliding scale   SubCutaneous every 6 hours  multivitamin/minerals/iron Oral Solution (CENTRUM) 15 milliLiter(s) Oral daily  oxyCODONE    Solution 5 milliGRAM(s) Oral every 4 hours PRN  pantoprazole  Injectable 40 milliGRAM(s) IV Push every 12 hours  phenylephrine    Infusion 0.1 MICROgram(s)/kG/Min IV Continuous <Continuous>  sodium chloride 3%  Inhalation 4 milliLiter(s) Inhalation every 12 hours  thiamine 100 milliGRAM(s) Oral daily      Vital Signs Last 24 Hrs  T(C): 39.7 (2022 08:00), Max: 39.7 (2022 08:00)  T(F): 103.5 (2022 08:00), Max: 103.5 (2022 08:00)  HR: 67 (2022 11:00) (67 - 117)  BP: --  BP(mean): --  RR: 19 (2022 11:00) (15 - 20)  SpO2: 97% (2022 11:00) (93% - 100%)    Physical Exam:  General: not awake   Cardio: regular rate   Respiratory: mechanically ventilated   abd: surgical site intact, no acute inflammation, soft, nondistended  Musculoskeletal: no focal joint swelling  vascular: right IJ CVC, no phlebitis   Skin: stable eschar both knees                           12.6   28.82 )-----------( 394      ( 2022 01:12 )             39.0           147<H>  |  113<H>  |  21  ----------------------------<  149<H>  3.9   |  24  |  1.09    Ca    7.6<L>      2022 01:12  Phos  3.1       Mg     2.50         TPro  5.5<L>  /  Alb  2.1<L>  /  TBili  0.3  /  DBili  x   /  AST  73<H>  /  ALT  44<H>  /  AlkPhos  89        Urinalysis Basic - ( 2022 05:09 )    Color: Brown / Appearance: Turbid / S.028 / pH: x  Gluc: x / Ketone: Negative  / Bili: Negative / Urobili: <2 mg/dL   Blood: x / Protein: 30 mg/dL / Nitrite: Negative   Leuk Esterase: Moderate / RBC: >720 /HPF / WBC 32 /HPF   Sq Epi: x / Non Sq Epi: 9 /HPF / Bacteria: Moderate        MICROBIOLOGY:  Culture - Bronchial (collected 22 @ 18:21)  Source: Combi-Cath bronchial lavage  Preliminary Report (22 @ 15:48):    No growth    Culture - Acid Fast - Bronchial w/Smear (collected 22 @ 18:21)  Source: BAL sputum    Culture - Acid Fast - Sputum w/Smear (collected 22 @ 18:17)  Source: .Sputum Sputum    Culture - Sputum (collected 22 @ 18:17)  Source: .Sputum Sputum  Gram Stain (22 @ 21:40):    Few polymorphonuclear leukocytes per low power field    No Squamous epithelial cells per low power field    No organisms seen  Preliminary Report (22 @ 15:39):    Normal Respiratory Vijaya present    Culture - Blood (collected 01-15-22 @ 08:50)  Source: .Blood Blood-Venous  Final Report (22 @ 17:01):    No Growth Final      RADIOLOGY:  Images below reviewed personally  Xray Chest 1 View- PORTABLE-Urgent (Xray Chest 1 View- PORTABLE-Urgent .) (22 @ 11:09)   Small left pleural effusion with associated atelectasis.  Tubes and lines unchanged.    CT Abdomen and Pelvis w/ IV Cont (01.15.22 @ 15:18)   Interval small right pleural effusion.  Interval moderate volume pneumoperitoneum and small volume ascites.  Edema surrounding the pylorus with questionable wall irregularity   suggesting an ulcer perforation. Mural thickening jejunal loops which may   also represent ischemia  Distended urinary bladder and enlarged prostate. Correlate clinically for   bladder outlet

## 2022-01-21 NOTE — PROCEDURE NOTE - NSICDXIRPREOP_GEN_A_CORE_FT
PRE-OP DIAGNOSIS:  Atelectasis, left 19-Jan-2022 15:07:38  Jalil Mckinney  
PRE-OP DIAGNOSIS:  Atelectasis, left 19-Jan-2022 15:07:38  Jalil Mckinney
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
## 2022-01-21 NOTE — PROGRESS NOTE ADULT - SUBJECTIVE AND OBJECTIVE BOX
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 due to COVID; BCx and UA sent  - Decrease FIO2 to 30%  - F/U CXR and CT CAP     Allergies: No Known Allergies    PAST MEDICAL & SURGICAL HISTORY:  DM (diabetes mellitus)  HTN (hypertension)  Perforated duodenal ulcer  Mucus clot in bronchi  Altered mental status  Intracerebral hemorrhage  Critical limb ischemia of left lower extremity  Sepsis with encephalopathy  Atrial fibrillation with RVR  AMARJIT (acute kidney injury)  Rhabdomyolysis  Non-hospital acquired pressure injury of skin  Dementia  Severe dehydration  Upper GI bleed  Hypoxia      MEDICATIONS:   --------------------------------------------------------------------------------------  Neurologic Medications  acetaminophen    Suspension .. 1000 milliGRAM(s) Oral every 6 hours  dexMEDEtomidine Infusion 0.2 MICROgram(s)/kG/Hr IV Continuous <Continuous>  haloperidol    Injectable 5 milliGRAM(s) IV Push every 6 hours PRN Agitation  HYDROmorphone  Injectable 0.25 milliGRAM(s) IV Push every 3 hours PRN Severe Pain (7 - 10)  oxyCODONE    Solution 5 milliGRAM(s) Oral every 4 hours PRN Moderate and Severe Pain    Respiratory Medications  acetylcysteine 10%  Inhalation 4 milliLiter(s) Inhalation two times a day  ALBUTerol    90 MICROgram(s) HFA Inhaler 2 Puff(s) Inhalation every 6 hours  sodium chloride 3%  Inhalation 4 milliLiter(s) Inhalation every 12 hours    Cardiovascular Medications  digoxin  Injectable 125 MICROGram(s) IV Push daily  diltiazem Infusion 5 mG/Hr IV Continuous <Continuous>  phenylephrine    Infusion 0.1 MICROgram(s)/kG/Min IV Continuous <Continuous>    Gastrointestinal Medications  multivitamin/minerals/iron Oral Solution (CENTRUM) 15 milliLiter(s) Oral daily  pantoprazole  Injectable 40 milliGRAM(s) IV Push every 12 hours  thiamine 100 milliGRAM(s) Oral daily    Genitourinary Medications    Hematologic/Oncologic Medications  enoxaparin Injectable 40 milliGRAM(s) SubCutaneous daily    Antimicrobial/Immunologic Medications  fluconAZOLE IVPB 400 milliGRAM(s) IV Intermittent every 24 hours  fluconAZOLE IVPB      piperacillin/tazobactam IVPB.. 3.375 Gram(s) IV Intermittent every 8 hours  remdesivir  IVPB   IV Intermittent   remdesivir  IVPB 100 milliGRAM(s) IV Intermittent every 24 hours    Endocrine/Metabolic Medications  insulin glargine Injectable (LANTUS) 20 Unit(s) SubCutaneous every morning  insulin lispro (ADMELOG) corrective regimen sliding scale   SubCutaneous every 6 hours    Topical/Other Medications  chlorhexidine 0.12% Liquid 15 milliLiter(s) Oral Mucosa every 12 hours  chlorhexidine 4% Liquid 1 Application(s) Topical <User Schedule>    --------------------------------------------------------------------------------------    VITAL SIGNS, INS/OUTS (last 24 hours):  --------------------------------------------------------------------------------------  T(C): 39 (01-21-22 @ 00:00), Max: 39 (01-21-22 @ 00:00)  HR: 71 (01-21-22 @ 00:00) (67 - 148)  ABP: 116/47 (01-21-22 @ 00:00) (87/40 - 128/60)  ABP(mean): 69 (01-21-22 @ 00:00) (57 - 84)  RR: 18 (01-21-22 @ 00:00) (15 - 24)  SpO2: 99% (01-21-22 @ 00:00) (93% - 100%)  CI: 2.7 (01-20-22 @ 23:00) (2.7 - 2.7)    CAPILLARY BLOOD GLUCOSE  POCT Blood Glucose.: 120 mg/dL (20 Jan 2022 23:36)  POCT Blood Glucose.: 138 mg/dL (20 Jan 2022 18:29)  POCT Blood Glucose.: 128 mg/dL (20 Jan 2022 11:50)  POCT Blood Glucose.: 144 mg/dL (20 Jan 2022 08:00)  POCT Blood Glucose.: 189 mg/dL (20 Jan 2022 05:55)    01-19 @ 07:01 - 01-20 @ 07:00  --------------------------------------------------------  IN:    dextrose 5% + lactated ringers: 900 mL    Diltiazem: 160 mL    IV PiggyBack: 1850 mL    Lactated Ringers: 200 mL    Phenylephrine: 149.5 mL    Propofol: 55.3 mL    Propofol: 192 mL    sodium chloride 0.9% w/ Additives: 1100 mL  Total IN: 4606.8 mL  OUT:    Indwelling Catheter - Urethral (mL): 1340 mL    Nasogastric/Oral tube (mL): 150 mL  Total OUT: 1490 mL  Total NET: 3116.8 mL    01-20 @ 07:01  -  01-21 @ 00:45  --------------------------------------------------------  IN:    Dexmedetomidine: 65.8 mL    dextrose 5% + lactated ringers: 525 mL    Diltiazem: 320 mL    Phenylephrine: 144.1 mL  Total IN: 1054.9 mL    OUT:    Indwelling Catheter - Urethral (mL): 1465 mL    Nasogastric/Oral tube (mL): 0 mL  Total OUT: 1465 mL    Total NET: -410.1 mL  --------------------------------------------------------------------------------------    EXAM:  NEUROLOGY  Exam: Sedated, RASS -2  [x] Adequacy of sedation and pain control has been assessed and adjusted    RESPIRATORY  Exam: Lungs clear to auscultation, Normal expansion/effort.   [] Tracheostomy   [x] Intubated  Mechanical Ventilation: Mode: AC/ CMV (Assist Control/ Continuous Mandatory Ventilation), RR (machine): 14, TV (machine): 450, FiO2: 100, PEEP: 10, ITime: 0.87, MAP: 13, PIP: 20  [x] Extubation Readiness Assessed    CARDIOVASCULAR  Exam: S1, S2.  Regular rate and rhythm.  Peripheral edema  Cardiac Rhythm: Normal Sinus Rhythm      GI/NUTRITION  Exam: Abdomen soft, distended superior midline incision CDI, with minimal SS strikethrough  Current Diet: Tube feeds    METABOLIC/FLUIDS/ELECTROLYTES  multivitamin/minerals/iron Oral Solution (CENTRUM) 15 milliLiter(s) Oral daily  thiamine 100 milliGRAM(s) Oral daily      HEMATOLOGIC  [x] DVT Prophylaxis: enoxaparin Injectable 40 milliGRAM(s) SubCutaneous daily    Transfusions:	[] PRBC	[] Platelets		[] FFP	[] Cryoprecipitate    INFECTIOUS DISEASE  Antimicrobials/Immunologic Medications:  fluconAZOLE IVPB 400 milliGRAM(s) IV Intermittent every 24 hours  fluconAZOLE IVPB      piperacillin/tazobactam IVPB.. 3.375 Gram(s) IV Intermittent every 8 hours  remdesivir  IVPB   IV Intermittent   remdesivir  IVPB 100 milliGRAM(s) IV Intermittent every 24 hours        Tubes/Lines/Drains  [x] Peripheral IV  [] Central Venous Line     	[] R	[] L	[] IJ	[] Fem	[] SC	Date Placed:   [] Arterial Line		[] R	[] L	[] Fem	[] Rad	[] Ax	Date Placed:   [] PICC		[] Midline		[] Mediport  [] Urinary Catheter		Date Placed:   [x] Necessity of urinary, arterial, and venous catheters discussed    VASCULAR  Exam: Extremities warm, pink, well-perfused.     MUSCULOSKELETAL  Exam: All extremities moving spontaneously without limitations    SKIN  Exam: Good skin turgor, no skin breakdown.    LABS  --------------------------------------------------------------------------------------  CBC (01-20 @ 02:04)                              12.4<L>                         23.94<H>  )----------------(  266        93.1<H>% Neutrophils, 3.1<L>% Lymphocytes, ANC: 22.29<H>                              40.1      BMP (01-20 @ 02:04)             147<H>  |  116<H>  |  27<H> 		Ca++ --      Ca 7.2<L>             ---------------------------------( 181<H>		Mg 2.20               3.7     |  23      |  1.02  			Ph 2.6     BMP (01-20 @ 02:00)             --      |  --      |  --    		Ca++ --      Ca --                 ---------------------------------( --    		Mg --                 --      |  --      |  1.02  			Ph --        LFTs (01-20 @ 02:04)      TPro 5.1<L> / Alb 1.9<L> / TBili 0.3 / DBili -- / AST 73<H> / ALT 39 / AlkPhos 82  LFTs (01-20 @ 02:00)      TPro 5.1<L> / Alb 1.9<L> / TBili 0.3 / DBili <0.2 / AST 72<H> / ALT 41 / AlkPhos 82    Coags (01-20 @ 02:00)  aPTT -- / INR 1.25<H> / PT 14.1<H>  Coags (01-19 @ 15:09)  aPTT -- / INR 1.25<H> / PT 14.1<H>      ABG (01-20 @ 12:01)     7.50<H> / 31<L> / 93 / 24 / 1.6 / 98.7<H>%     Lactate:     ABG (01-20 @ 02:00)     7.52<H> / 30<L> / 164<H> / 24 / 2.3 / 99.3<H>%     Lactate:           -> BAL sputum Culture (01-19 @ 18:21)     NG    NG    No growth    -> .Sputum Sputum Culture (01-19 @ 18:17)       Few polymorphonuclear leukocytes per low power field  No Squamous epithelial cells per low power field  No organisms seen    NG    Normal Respiratory Vijaya present    -> .Blood Blood-Venous Culture (01-15 @ 08:50)     NG    NG    No Growth Final    -> .Blood Blood-Venous Culture (01-14 @ 02:58)     NG    NG    No Growth Final    -> .Blood Blood-Peripheral Culture (01-14 @ 02:57)     NG    NG    No Growth Final    -> .Blood Blood-Peripheral Culture (01-13 @ 09:30)       Growth in anaerobic bottle: Gram Negative Rods    NG    Growth in anaerobic bottle: Proteus mirabilis  See previous culture  [id number]    -> Clean Catch Clean Catch (Midstream) Culture (01-12 @ 20:01)     NG    NG    No growth  --------------------------------------------------------------------------------------    OTHER LABORATORY:     IMAGING STUDIES:   CXR:
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
## 2022-01-21 NOTE — PROCEDURE NOTE - NSBRONCHFINDINGS_GEN_A_CORE_FT
Inspissated mucus plug in left bronchi and thick mucus plugging with minor erythema and inflammatory changes of smaller airway, right airways clear of any obvious thick obstructive mucus.
Inspissated mucus plug in left bronchi and thick mucus plugging with minor erythema and inflammatory changes of smaller airway

## 2022-01-21 NOTE — PROCEDURE NOTE - NSICDXIRPOSTOP_GEN_A_CORE_FT
POST-OP DIAGNOSIS:  Mucus clot in bronchi 21-Jan-2022 15:17:32  Javier Fitzgerald  
POST-OP DIAGNOSIS:  Mucus clot in bronchi 19-Jan-2022 15:08:12 Left Jalil Mckinney

## 2022-01-21 NOTE — PROGRESS NOTE ADULT - SUBJECTIVE AND OBJECTIVE BOX
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  --------------------------------------------------------------------------------------------------  V/S:  Vital Signs Last 24 Hrs  T(C): 39.2 (2022 04:00), Max: 39.2 (2022 04:00)  T(F): 102.5 (2022 04:00), Max: 102.5 (2022 04:00)  HR: 75 (2022 07:00) (67 - 117)  BP: --  BP(mean): --  RR: 17 (2022 07:00) (15 - 20)  SpO2: 98% (2022 07:00) (93% - 100%)  Mode: AC/ CMV (Assist Control/ Continuous Mandatory Ventilation)  RR (machine): 14  TV (machine): 450  FiO2: 50  PEEP: 10  ITime: 0.83  MAP: 13  PIP: 23    --------------------------------------------------------------------------------------------------  I/Os:    2022 07:01  -  2022 07:00  --------------------------------------------------------  IN:    Dexmedetomidine: 142.1 mL    dextrose 5% + lactated ringers: 525 mL    Diltiazem: 390 mL    Phenylephrine: 255.7 mL  Total IN: 1312.8 mL    OUT:    Indwelling Catheter - Urethral (mL): 1925 mL    Nasogastric/Oral tube (mL): 0 mL  Total OUT: 1925 mL    Total NET: -612.2 mL        --------------------------------------------------------------------------------------------------  LABS:                        12.6   28.82 )-----------( 394      ( 2022 01:12 )             39.0     2022 01:12    147    |  113    |  21     ----------------------------<  149    3.9     |  24     |  1.09     Ca    7.6        2022 01:12  Phos  3.1       2022 01:12  Mg     2.50      2022 01:12    TPro  5.5    /  Alb  2.1    /  TBili  0.3    /  DBili  x      /  AST  73     /  ALT  44     /  AlkPhos  89     2022 01:12    PT/INR - ( 2022 01:12 )   PT: 13.2 sec;   INR: 1.17 ratio         PTT - ( 2022 01:12 )  PTT:28.3 sec  CAPILLARY BLOOD GLUCOSE      POCT Blood Glucose.: 149 mg/dL (2022 06:07)  POCT Blood Glucose.: 120 mg/dL (2022 23:36)  POCT Blood Glucose.: 138 mg/dL (2022 18:29)  POCT Blood Glucose.: 128 mg/dL (2022 11:50)        LIVER FUNCTIONS - ( 2022 01:12 )  Alb: 2.1 g/dL / Pro: 5.5 g/dL / ALK PHOS: 89 U/L / ALT: 44 U/L / AST: 73 U/L / GGT: x             Culture - Bronchial (collected 2022 18:21)  Source: Combi-Cath bronchial lavage  Preliminary Report (2022 15:48):    No growth    Culture - Acid Fast - Bronchial w/Smear (collected 2022 18:21)  Source: BAL sputum    Culture - Acid Fast - Sputum w/Smear (collected 2022 18:17)  Source: .Sputum Sputum    Culture - Sputum (collected 2022 18:17)  Source: .Sputum Sputum  Gram Stain (2022 21:40):    Few polymorphonuclear leukocytes per low power field    No Squamous epithelial cells per low power field    No organisms seen  Preliminary Report (2022 15:39):    Normal Respiratory Vijaya present      Urinalysis Basic - ( 2022 05:09 )    Color: Brown / Appearance: Turbid / S.028 / pH: x  Gluc: x / Ketone: Negative  / Bili: Negative / Urobili: <2 mg/dL   Blood: x / Protein: 30 mg/dL / Nitrite: Negative   Leuk Esterase: Moderate / RBC: >720 /HPF / WBC 32 /HPF   Sq Epi: x / Non Sq Epi: 9 /HPF / Bacteria: Moderate      --------------------------------------------------------------------------------------------------  MEDICATIONS  (STANDING):  acetaminophen    Suspension .. 1000 milliGRAM(s) Oral every 6 hours  acetylcysteine 10%  Inhalation 4 milliLiter(s) Inhalation two times a day  ALBUTerol    90 MICROgram(s) HFA Inhaler 2 Puff(s) Inhalation every 6 hours  chlorhexidine 0.12% Liquid 15 milliLiter(s) Oral Mucosa every 12 hours  chlorhexidine 4% Liquid 1 Application(s) Topical <User Schedule>  dexMEDEtomidine Infusion 0.2 MICROgram(s)/kG/Hr (4.38 mL/Hr) IV Continuous <Continuous>  digoxin  Injectable 125 MICROGram(s) IV Push daily  diltiazem Infusion 5 mG/Hr (5 mL/Hr) IV Continuous <Continuous>  enoxaparin Injectable 40 milliGRAM(s) SubCutaneous daily  fluconAZOLE IVPB      fluconAZOLE IVPB 400 milliGRAM(s) IV Intermittent every 24 hours  insulin glargine Injectable (LANTUS) 20 Unit(s) SubCutaneous every morning  insulin lispro (ADMELOG) corrective regimen sliding scale   SubCutaneous every 6 hours  multivitamin/minerals/iron Oral Solution (CENTRUM) 15 milliLiter(s) Oral daily  pantoprazole  Injectable 40 milliGRAM(s) IV Push every 12 hours  phenylephrine    Infusion 0.1 MICROgram(s)/kG/Min (1.64 mL/Hr) IV Continuous <Continuous>  piperacillin/tazobactam IVPB.. 3.375 Gram(s) IV Intermittent every 8 hours  remdesivir  IVPB   IV Intermittent   remdesivir  IVPB 100 milliGRAM(s) IV Intermittent every 24 hours  sodium chloride 3%  Inhalation 4 milliLiter(s) Inhalation every 12 hours  thiamine 100 milliGRAM(s) Oral daily    MEDICATIONS  (PRN):  haloperidol    Injectable 5 milliGRAM(s) IV Push every 6 hours PRN Agitation  HYDROmorphone  Injectable 0.25 milliGRAM(s) IV Push every 3 hours PRN Severe Pain (7 - 10)  oxyCODONE    Solution 5 milliGRAM(s) Oral every 4 hours PRN Moderate and Severe Pain    --------------------------------------------------------------------------------------------------

## 2022-01-21 NOTE — CHART NOTE - NSCHARTNOTEFT_GEN_A_CORE
Ultrasound Exam  Type: BARRINGTON BOB MALDONADO  76y Male  [facility] 03  01-21-22 @ 10:41    Indication: Fluid status    Right lung sliding: Yes  Right B-Lines: Yes  Right Pleural Free Fluid: No    Left lung sliding: Yes  Left B-Lines: Yes  Left Pleural Free Fluid: No    Pericardium: No effusion noted  Left Ventricular function: Grossly normal; not hyperdynamic  IVC: Minimal variation    Additional Findings: None    Performed By: Ultrasound Exam  Type: MALDONADO FAIRCHILD  76y Male  [facility] 03  01-21-22 @ 10:41    Indication: Fluid status    Right lung sliding: Yes  Right B-Lines: No  Right Pleural Free Fluid: No    Left lung sliding: Yes  Left B-Lines: No  Left Pleural Free Fluid: No    Pericardium: No effusion noted  Left Ventricular function: Grossly normal; not hyperdynamic, no wall motion abnormality  IVC: 2.5cm, minimal respiratory variation    Additional Findings: None    Performed by: Javier Fitzgerald PGY2  Assist by: Raymond Taylor MS3, Benjamin Glover PA-student

## 2022-01-21 NOTE — PROGRESS NOTE ADULT - ASSESSMENT
76M found on the floor at home 1/12.   Acute right basal ganglia stroke.   Enterobacter and Proteus bacteremia 1/12.   Duodenal perforation 1/15 s/p OR washout and omental patch repair. Was this a developing source for his bacteremia?   Acute respiratory failure 1/19, suspect mucous plugging/atelectasis but newly positive COVID PCR too. BAL cultures negative to date.   Remains critically ill and fever curve worsening.     Suggest  -repeat blood cultures   -f/u repeat CT   -broaden Zosyn to Meropenem for now and continue Fluconazole for bacteremia and bowel perf pending the above   -Remdesivir out of caution, day 3 of 5   -would not give Decadron in the setting of bacterial infection     Discussed with SICU     Edy Nixon MD   Infectious Disease   Pager [phone]   After 5PM and on weekends please page fellow on call or call [phone]
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
## 2022-01-21 NOTE — PROGRESS NOTE ADULT - ATTENDING COMMENTS
Patient overnight spiked a fever, and mucus plugged requiring saline lavage and suctioning. Patient requiring 100% fio2 for oxygenation and has weaned down to 50%.  N mentating at baseline, off sedation  resp mucus plug, obtain cxr and ct scan for evaluation of lungs, will continue to wean down fio2 requirements, adequate gas exchange, continue mucolytic therapy  cv decreasing requirement of cardiezem on 10mg/hr pocus exam shows patient euvolemic, will diurese to obtain net negative fluid balance  gi npo, start tube feeds, obtain ct a/p  gu/renal monitor uop, diuresis maintain net negative fluid blanace  heme vte ppx  id fungatel elevated, leukocytosis increasing maintain on antibiotics, perhaps intraabdominal sepsis vs pneumonia  endo no changes  f/u ct scan for sepsis workup    The patient is a critical care patient with life threatening hemodynamic and metabolic instability in SICU.  I have personally interviewed when possible and examined the patient, reviewed data and laboratory tests/x-rays and all pertinent electronic images.  I was physically present for the key portions of the evaluation and management (E/M) service provided.   The SICU team has a constant risk benefit analyzes discussion with the primary team, all consultants, House Staff and PA's on all decisions.  These diagnoses are unrelated to the surgical procedure noted above.  I meet with family if needed to get further history, discuss the case and make care decisions for this patient who might not be able to participate.  Time involved in performance of separately billable procedures was not counted toward my critical care time. There is no overlap.  I spent 55-75 minutes ( 0800Hrs-0915Hrs in AM/ 1600Hrs-1715Hrs in PM, or other time indicated) of critical care time for the diagnoses, assessment, plan and interventions.  This time excludes time spent on separate procedures and teaching.

## 2022-01-21 NOTE — PROGRESS NOTE ADULT - SUBJECTIVE AND OBJECTIVE BOX
GENERAL SURGERY DAILY PROGRESS NOTE:    Interval:  No acute events overnight.    Subjective:  Patient seen and examined. Reports pain is well controlled. Denies N/V.    Vital Signs Last 24 Hrs  T(C): 39 (21 Jan 2022 00:00), Max: 39 (21 Jan 2022 00:00)  T(F): 102.2 (21 Jan 2022 00:00), Max: 102.2 (21 Jan 2022 00:00)  HR: 71 (21 Jan 2022 00:00) (67 - 148)  BP: --  BP(mean): --  RR: 18 (21 Jan 2022 00:00) (13 - 24)  SpO2: 99% (21 Jan 2022 00:00) (93% - 100%)    PHYSICAL EXAMINATION:  General: lying in bed, NAD  Resp: intubated  Abd: soft, mildly distended; midline incision c/d/i.    I&O's Detail    19 Jan 2022 07:01  -  20 Jan 2022 07:00  --------------------------------------------------------  IN:    dextrose 5% + lactated ringers: 900 mL    Diltiazem: 160 mL    IV PiggyBack: 1850 mL    Lactated Ringers: 200 mL    Phenylephrine: 149.5 mL    Propofol: 55.3 mL    Propofol: 192 mL    sodium chloride 0.9% w/ Additives: 1100 mL  Total IN: 4606.8 mL    OUT:    Indwelling Catheter - Urethral (mL): 1340 mL    Nasogastric/Oral tube (mL): 150 mL  Total OUT: 1490 mL    Total NET: 3116.8 mL      20 Jan 2022 07:01  -  21 Jan 2022 00:33  --------------------------------------------------------  IN:    Dexmedetomidine: 65.8 mL    dextrose 5% + lactated ringers: 525 mL    Diltiazem: 320 mL    Phenylephrine: 144.1 mL  Total IN: 1054.9 mL    OUT:    Indwelling Catheter - Urethral (mL): 1465 mL    Nasogastric/Oral tube (mL): 0 mL  Total OUT: 1465 mL    Total NET: -410.1 mL          Daily     Daily     MEDICATIONS  (STANDING):  acetaminophen    Suspension .. 1000 milliGRAM(s) Oral every 6 hours  acetylcysteine 10%  Inhalation 4 milliLiter(s) Inhalation two times a day  ALBUTerol    90 MICROgram(s) HFA Inhaler 2 Puff(s) Inhalation every 6 hours  chlorhexidine 0.12% Liquid 15 milliLiter(s) Oral Mucosa every 12 hours  chlorhexidine 4% Liquid 1 Application(s) Topical <User Schedule>  dexMEDEtomidine Infusion 0.2 MICROgram(s)/kG/Hr (4.38 mL/Hr) IV Continuous <Continuous>  digoxin  Injectable 125 MICROGram(s) IV Push daily  diltiazem Infusion 5 mG/Hr (5 mL/Hr) IV Continuous <Continuous>  enoxaparin Injectable 40 milliGRAM(s) SubCutaneous daily  fluconAZOLE IVPB 400 milliGRAM(s) IV Intermittent every 24 hours  fluconAZOLE IVPB      insulin glargine Injectable (LANTUS) 20 Unit(s) SubCutaneous every morning  insulin lispro (ADMELOG) corrective regimen sliding scale   SubCutaneous every 6 hours  multivitamin/minerals/iron Oral Solution (CENTRUM) 15 milliLiter(s) Oral daily  pantoprazole  Injectable 40 milliGRAM(s) IV Push every 12 hours  phenylephrine    Infusion 0.1 MICROgram(s)/kG/Min (1.64 mL/Hr) IV Continuous <Continuous>  piperacillin/tazobactam IVPB.. 3.375 Gram(s) IV Intermittent every 8 hours  remdesivir  IVPB   IV Intermittent   remdesivir  IVPB 100 milliGRAM(s) IV Intermittent every 24 hours  sodium chloride 3%  Inhalation 4 milliLiter(s) Inhalation every 12 hours  thiamine 100 milliGRAM(s) Oral daily    MEDICATIONS  (PRN):  haloperidol    Injectable 5 milliGRAM(s) IV Push every 6 hours PRN Agitation  HYDROmorphone  Injectable 0.25 milliGRAM(s) IV Push every 3 hours PRN Severe Pain (7 - 10)  oxyCODONE    Solution 5 milliGRAM(s) Oral every 4 hours PRN Moderate and Severe Pain      LABS:                        12.4   23.94 )-----------( 266      ( 20 Jan 2022 02:04 )             40.1     01-20    147<H>  |  116<H>  |  27<H>  ----------------------------<  181<H>  3.7   |  23  |  1.02    Ca    7.2<L>      20 Jan 2022 02:04  Phos  2.6     01-20  Mg     2.20     01-20    TPro  5.1<L>  /  Alb  1.9<L>  /  TBili  0.3  /  DBili  x   /  AST  73<H>  /  ALT  39  /  AlkPhos  82  01-20    PT/INR - ( 20 Jan 2022 02:00 )   PT: 14.1 sec;   INR: 1.25 ratio         PTT - ( 19 Jan 2022 01:14 )  PTT:25.9 sec       GENERAL SURGERY DAILY PROGRESS NOTE:    Interval:  No acute events overnight.    Subjective:  Patient seen and examined. Limited secondary to patient clinical condition.    Vital Signs Last 24 Hrs  T(C): 39 (21 Jan 2022 00:00), Max: 39 (21 Jan 2022 00:00)  T(F): 102.2 (21 Jan 2022 00:00), Max: 102.2 (21 Jan 2022 00:00)  HR: 71 (21 Jan 2022 00:00) (67 - 148)  BP: --  BP(mean): --  RR: 18 (21 Jan 2022 00:00) (13 - 24)  SpO2: 99% (21 Jan 2022 00:00) (93% - 100%)    PHYSICAL EXAMINATION:  General: lying in bed, NAD  Resp: intubated  Abd: soft, mildly distended; midline incision c/d/i.    I&O's Detail    19 Jan 2022 07:01  -  20 Jan 2022 07:00  --------------------------------------------------------  IN:    dextrose 5% + lactated ringers: 900 mL    Diltiazem: 160 mL    IV PiggyBack: 1850 mL    Lactated Ringers: 200 mL    Phenylephrine: 149.5 mL    Propofol: 55.3 mL    Propofol: 192 mL    sodium chloride 0.9% w/ Additives: 1100 mL  Total IN: 4606.8 mL    OUT:    Indwelling Catheter - Urethral (mL): 1340 mL    Nasogastric/Oral tube (mL): 150 mL  Total OUT: 1490 mL    Total NET: 3116.8 mL      20 Jan 2022 07:01  -  21 Jan 2022 00:33  --------------------------------------------------------  IN:    Dexmedetomidine: 65.8 mL    dextrose 5% + lactated ringers: 525 mL    Diltiazem: 320 mL    Phenylephrine: 144.1 mL  Total IN: 1054.9 mL    OUT:    Indwelling Catheter - Urethral (mL): 1465 mL    Nasogastric/Oral tube (mL): 0 mL  Total OUT: 1465 mL    Total NET: -410.1 mL          Daily     Daily     MEDICATIONS  (STANDING):  acetaminophen    Suspension .. 1000 milliGRAM(s) Oral every 6 hours  acetylcysteine 10%  Inhalation 4 milliLiter(s) Inhalation two times a day  ALBUTerol    90 MICROgram(s) HFA Inhaler 2 Puff(s) Inhalation every 6 hours  chlorhexidine 0.12% Liquid 15 milliLiter(s) Oral Mucosa every 12 hours  chlorhexidine 4% Liquid 1 Application(s) Topical <User Schedule>  dexMEDEtomidine Infusion 0.2 MICROgram(s)/kG/Hr (4.38 mL/Hr) IV Continuous <Continuous>  digoxin  Injectable 125 MICROGram(s) IV Push daily  diltiazem Infusion 5 mG/Hr (5 mL/Hr) IV Continuous <Continuous>  enoxaparin Injectable 40 milliGRAM(s) SubCutaneous daily  fluconAZOLE IVPB 400 milliGRAM(s) IV Intermittent every 24 hours  fluconAZOLE IVPB      insulin glargine Injectable (LANTUS) 20 Unit(s) SubCutaneous every morning  insulin lispro (ADMELOG) corrective regimen sliding scale   SubCutaneous every 6 hours  multivitamin/minerals/iron Oral Solution (CENTRUM) 15 milliLiter(s) Oral daily  pantoprazole  Injectable 40 milliGRAM(s) IV Push every 12 hours  phenylephrine    Infusion 0.1 MICROgram(s)/kG/Min (1.64 mL/Hr) IV Continuous <Continuous>  piperacillin/tazobactam IVPB.. 3.375 Gram(s) IV Intermittent every 8 hours  remdesivir  IVPB   IV Intermittent   remdesivir  IVPB 100 milliGRAM(s) IV Intermittent every 24 hours  sodium chloride 3%  Inhalation 4 milliLiter(s) Inhalation every 12 hours  thiamine 100 milliGRAM(s) Oral daily    MEDICATIONS  (PRN):  haloperidol    Injectable 5 milliGRAM(s) IV Push every 6 hours PRN Agitation  HYDROmorphone  Injectable 0.25 milliGRAM(s) IV Push every 3 hours PRN Severe Pain (7 - 10)  oxyCODONE    Solution 5 milliGRAM(s) Oral every 4 hours PRN Moderate and Severe Pain      LABS:                        12.4   23.94 )-----------( 266      ( 20 Jan 2022 02:04 )             40.1     01-20    147<H>  |  116<H>  |  27<H>  ----------------------------<  181<H>  3.7   |  23  |  1.02    Ca    7.2<L>      20 Jan 2022 02:04  Phos  2.6     01-20  Mg     2.20     01-20    TPro  5.1<L>  /  Alb  1.9<L>  /  TBili  0.3  /  DBili  x   /  AST  73<H>  /  ALT  39  /  AlkPhos  82  01-20    PT/INR - ( 20 Jan 2022 02:00 )   PT: 14.1 sec;   INR: 1.25 ratio         PTT - ( 19 Jan 2022 01:14 )  PTT:25.9 sec
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
## 2022-01-22 NOTE — PROGRESS NOTE ADULT - SUBJECTIVE AND OBJECTIVE BOX
Follow Up:  Bacteremia, COVID    Interval History/ROS: Febrile still. Remains critically ill.     Allergies  No Known Allergies        ANTIMICROBIALS:  fluconAZOLE IVPB    fluconAZOLE IVPB 400 every 24 hours  meropenem  IVPB 1000 every 8 hours  remdesivir  IVPB    remdesivir  IVPB 100 every 24 hours  vancomycin  IVPB    vancomycin  IVPB 1250 every 12 hours      OTHER MEDS:  acetaminophen    Suspension .. 1000 milliGRAM(s) Oral every 6 hours  acetylcysteine 10%  Inhalation 4 milliLiter(s) Inhalation two times a day  ALBUTerol    90 MICROgram(s) HFA Inhaler 2 Puff(s) Inhalation every 6 hours  chlorhexidine 0.12% Liquid 15 milliLiter(s) Oral Mucosa every 12 hours  chlorhexidine 4% Liquid 1 Application(s) Topical <User Schedule>  dexMEDEtomidine Infusion 0.2 MICROgram(s)/kG/Hr IV Continuous <Continuous>  digoxin  Injectable 125 MICROGram(s) IV Push daily  diltiazem    Tablet 30 milliGRAM(s) Oral every 6 hours  diltiazem Infusion 5 mG/Hr IV Continuous <Continuous>  enoxaparin Injectable 40 milliGRAM(s) SubCutaneous daily  haloperidol    Injectable 5 milliGRAM(s) IV Push every 6 hours PRN  HYDROmorphone  Injectable 0.25 milliGRAM(s) IV Push every 3 hours PRN  insulin glargine Injectable (LANTUS) 20 Unit(s) SubCutaneous every morning  insulin lispro (ADMELOG) corrective regimen sliding scale   SubCutaneous every 6 hours  midazolam Injectable 2 milliGRAM(s) IV Push every 10 minutes PRN  multivitamin/minerals/iron Oral Solution (CENTRUM) 15 milliLiter(s) Oral daily  oxyCODONE    Solution 5 milliGRAM(s) Oral every 4 hours PRN  pantoprazole  Injectable 40 milliGRAM(s) IV Push every 12 hours  phenylephrine    Infusion 0.1 MICROgram(s)/kG/Min IV Continuous <Continuous>  sodium chloride 3%  Inhalation 4 milliLiter(s) Inhalation every 12 hours  thiamine 100 milliGRAM(s) Oral daily      Vital Signs Last 24 Hrs  T(C): 38.4 (2022 12:00), Max: 38.7 (2022 04:00)  T(F): 101.2 (2022 12:00), Max: 101.7 (2022 04:00)  HR: 67 (2022 16:13) (62 - 75)  BP: --  BP(mean): --  RR: 19 (2022 15:00) (19 - 23)  SpO2: 99% (2022 16:13) (93% - 99%)    Physical Exam:  General: not awake   Cardio: regular rate   Respiratory: mechanically ventilated   abd: soft, nondistended, surgical site intact without acute inflammation   vascular: right IJ CVC, no phlebitis   Skin: stable eschar both knees                           11.2   28.26 )-----------( 419      ( 2022 01:39 )             34.5           147<H>  |  114<H>  |  22  ----------------------------<  136<H>  3.4<L>   |  22  |  1.04    Ca    7.1<L>      2022 01:39  Phos  3.1       Mg     2.50         TPro  5.5<L>  /  Alb  2.1<L>  /  TBili  0.3  /  DBili  x   /  AST  73<H>  /  ALT  44<H>  /  AlkPhos  89        Urinalysis Basic - ( 2022 05:09 )    Color: Brown / Appearance: Turbid / S.028 / pH: x  Gluc: x / Ketone: Negative  / Bili: Negative / Urobili: <2 mg/dL   Blood: x / Protein: 30 mg/dL / Nitrite: Negative   Leuk Esterase: Moderate / RBC: >720 /HPF / WBC 32 /HPF   Sq Epi: x / Non Sq Epi: 9 /HPF / Bacteria: Moderate        MICROBIOLOGY:  Culture - Acid Fast - Bronchial w/Smear (collected 22 @ 18:21)  Source: BAL sputum  Preliminary Report (22 @ 15:04):    Culture is being performed.    Culture - Acid Fast - Sputum w/Smear (collected 22 @ 18:17)  Source: .Sputum Sputum  Preliminary Report (22 @ 15:04):    Culture is being performed.    Culture - Sputum (collected 22 @ 18:17)  Source: .Sputum Sputum  Gram Stain (22 @ 21:40):    Few polymorphonuclear leukocytes per low power field    No Squamous epithelial cells per low power field    No organisms seen  Preliminary Report (22 @ 15:39):    Normal Respiratory Vijaya present    Culture - Bronchial (collected 22 @ 15:49)  Source: Combi-Cath bronchial lavage  Final Report (22 @ 17:16):    Normal Respiratory Vijaya present    RADIOLOGY:  Images below reviewed personally  Xray Chest 1 View- PORTABLE-Routine (Xray Chest 1 View- PORTABLE-Routine in AM.) (22 @ 00:43)   2022, 3:35 PM:  Appropriate course of endotracheal tube, nasogastric tube, and right   central venous catheter. Normal heart size. Stable layering pleural   effusions and atelectasis or consolidation in the lower lungs. No   pneumothorax.  2022, 12:18 AM:  No significant change.  IMPRESSION:  No significant change since 2022.    CT Chest Abdomen and Pelvis w/ Oral Cont and w/ IV Cont (22 @ 12:21)   *  Moderate bilateral pleural effusions with adjacent passive   atelectasis. Mild hypoenhancement of the lung at the left lung base   raises a question of superimposed pneumonia.  *  Trace free fluid in the abdomen and pelvis. No evidence of   pneumoperitoneum or intra-abdominal abscess.
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
## 2022-01-22 NOTE — PROGRESS NOTE ADULT - ASSESSMENT
76M found at home on the floor 1/12.   Acute right basal ganglia stroke.   Enterobacter and Proteus bacteremia 1/12.   Duodenal perforation 1/15 s/p OR washout, omental patch repair. Related to bacteremia? Stress ulcer? H pylori IgG negative.   Acute respiratory failure 1/19, suspect mucous plugging/atelectasis but newly positive COVID PCR too.   Remains critically ill and fever curve worsening this week.   Central fever?   No perforation or abscess on repeat CT.   Possible developing pneumonia but bronchoscopy cultures have been negative.   Skin wounds/eschars without associated cellulitis.     Suggest  -check procalcitonin although might be elevated for other noninfectious reasons   -monitor cultures   -I don't think Vanc is indicated, no evidence of resistance gram positive organisms anywhere   -empiric Meropenem and Fluconazole for now, agree the latter can probably stop soon   -Remdesivir out of caution, day 4 of 5   -would not give Decadron in the setting of bacterial infection     Discussed with SICU     Edy Nixon MD   Infectious Disease   Pager [phone]   After 5PM and on weekends please page fellow on call or call [phone]

## 2022-01-22 NOTE — PROGRESS NOTE ADULT - SUBJECTIVE AND OBJECTIVE BOX
GENERAL SURGERY DAILY PROGRESS NOTE:    Interval:  No acute events overnight.    Subjective:  Patient seen and examined. Reports pain is well controlled. Denies N/V.    Vital Signs Last 24 Hrs  T(C): 38.2 (2022 20:00), Max: 39.7 (2022 08:00)  T(F): 100.7 (2022 20:00), Max: 103.5 (2022 08:00)  HR: 67 (2022 23:00) (62 - 76)  BP: --  BP(mean): --  RR: 21 (2022 23:00) (17 - 22)  SpO2: 93% (2022 23:00) (92% - 99%)    PHYSICAL EXAMINATION:  General: lying in bed, NAD  Resp: intubated  Abd: soft, mildly distended; midline incision c/d/i.    I&O's Detail    2022 07:01  -  2022 07:00  --------------------------------------------------------  IN:    Dexmedetomidine: 142.1 mL    dextrose 5% + lactated ringers: 525 mL    Diltiazem: 390 mL    Phenylephrine: 255.7 mL  Total IN: 1312.8 mL    OUT:    Indwelling Catheter - Urethral (mL): 1925 mL    Nasogastric/Oral tube (mL): 0 mL  Total OUT: 1925 mL    Total NET: -612.2 mL      2022 07:01  -  2022 01:01  --------------------------------------------------------  IN:    Dexmedetomidine: 152.6 mL    Diltiazem: 75 mL    Glucerna 1.5: 75 mL    IV PiggyBack: 300 mL    Phenylephrine: 111.6 mL  Total IN: 714.2 mL    OUT:    Indwelling Catheter - Urethral (mL): 1830 mL  Total OUT: 1830 mL    Total NET: -1115.8 mL          Daily     Daily     MEDICATIONS  (STANDING):  acetaminophen    Suspension .. 1000 milliGRAM(s) Oral every 6 hours  acetylcysteine 10%  Inhalation 4 milliLiter(s) Inhalation two times a day  ALBUTerol    90 MICROgram(s) HFA Inhaler 2 Puff(s) Inhalation every 6 hours  chlorhexidine 0.12% Liquid 15 milliLiter(s) Oral Mucosa every 12 hours  chlorhexidine 4% Liquid 1 Application(s) Topical <User Schedule>  dexMEDEtomidine Infusion 0.2 MICROgram(s)/kG/Hr (4.38 mL/Hr) IV Continuous <Continuous>  digoxin  Injectable 125 MICROGram(s) IV Push daily  diltiazem Infusion 5 mG/Hr (5 mL/Hr) IV Continuous <Continuous>  enoxaparin Injectable 40 milliGRAM(s) SubCutaneous daily  fluconAZOLE IVPB      fluconAZOLE IVPB 400 milliGRAM(s) IV Intermittent every 24 hours  insulin glargine Injectable (LANTUS) 20 Unit(s) SubCutaneous every morning  insulin lispro (ADMELOG) corrective regimen sliding scale   SubCutaneous every 6 hours  meropenem  IVPB 1000 milliGRAM(s) IV Intermittent every 8 hours  multivitamin/minerals/iron Oral Solution (CENTRUM) 15 milliLiter(s) Oral daily  pantoprazole  Injectable 40 milliGRAM(s) IV Push every 12 hours  phenylephrine    Infusion 0.1 MICROgram(s)/kG/Min (1.64 mL/Hr) IV Continuous <Continuous>  remdesivir  IVPB   IV Intermittent   remdesivir  IVPB 100 milliGRAM(s) IV Intermittent every 24 hours  sodium chloride 3%  Inhalation 4 milliLiter(s) Inhalation every 12 hours  thiamine 100 milliGRAM(s) Oral daily    MEDICATIONS  (PRN):  haloperidol    Injectable 5 milliGRAM(s) IV Push every 6 hours PRN Agitation  HYDROmorphone  Injectable 0.25 milliGRAM(s) IV Push every 3 hours PRN Severe Pain (7 - 10)  midazolam Injectable 2 milliGRAM(s) IV Push every 10 minutes PRN Sedation.Bronchoscopy  oxyCODONE    Solution 5 milliGRAM(s) Oral every 4 hours PRN Moderate and Severe Pain      LABS:                        12.6   28.82 )-----------( 394      ( 2022 01:12 )             39.0     01-21    147<H>  |  113<H>  |  21  ----------------------------<  149<H>  3.9   |  24  |  1.09    Ca    7.6<L>      2022 01:12  Phos  3.1       Mg     2.50         TPro  5.5<L>  /  Alb  2.1<L>  /  TBili  0.3  /  DBili  x   /  AST  73<H>  /  ALT  44<H>  /  AlkPhos  89      PT/INR - ( 2022 01:12 )   PT: 13.2 sec;   INR: 1.17 ratio         PTT - ( 2022 01:12 )  PTT:28.3 sec  Urinalysis Basic - ( 2022 05:09 )    Color: Brown / Appearance: Turbid / S.028 / pH: x  Gluc: x / Ketone: Negative  / Bili: Negative / Urobili: <2 mg/dL   Blood: x / Protein: 30 mg/dL / Nitrite: Negative   Leuk Esterase: Moderate / RBC: >720 /HPF / WBC 32 /HPF   Sq Epi: x / Non Sq Epi: 9 /HPF / Bacteria: Moderate

## 2022-01-22 NOTE — CHART NOTE - NSCHARTNOTEFT_GEN_A_CORE
Patient assessed by RDN on 1/15, now for nutrition follow up. Spoke with RN and obtained subjective information from extensive chart review.       Enteral Nutrition: Diet, NPO with Tube Feed:   Tube Feeding Modality: Nasogastric  Glucerna 1.5 Naresh (GLUCERNA1.5RTH)  Total Volume for 24 Hours (mL): 1080  Continuous  Starting Tube Feed Rate {mL per Hour}: 25  Increase Tube Feed Rate by (mL): 20  Until Goal Tube Feed Rate (mL per Hour): 45  Tube Feed Duration (in Hours): 24  Tube Feed Start Time: 11:00 (01-20-22 @ 10:52)    TF Provides:  1620 kcals  89 gms protein  819 ml free H2O  1080 ml total volume    Weight Trend:              Dosing Weight: 87.5 kg                       IBW: 69.8    Nutrition Interval Events: Pt intubated and sedated on precedex. Tolerating TF as ordered with no GI distress; last BM 1/22. Weight trend reflective of edema now 3+ generalized. Receiving IVPB fluids.  - 153 mg/dl with Lantus and Admelog insulin coverage. Pt with unstageable L/R knee pressure injuries which has higher nutrition need. Suggest adding No Carb Prosource x 3/day (45 gms protein) to enteral feeding. Enteral regimen will provide 18.5 kcals/kg and 1.5 gms protein/kg (TF+Prosource) of dosing weight. RDN services to remain available as needed.     __________________ Pertinent Medications__________________   MEDICATIONS  (STANDING):  acetaminophen    Suspension .. 1000 milliGRAM(s) Oral every 6 hours  acetylcysteine 10%  Inhalation 4 milliLiter(s) Inhalation two times a day  ALBUTerol    90 MICROgram(s) HFA Inhaler 2 Puff(s) Inhalation every 6 hours  chlorhexidine 0.12% Liquid 15 milliLiter(s) Oral Mucosa every 12 hours  chlorhexidine 4% Liquid 1 Application(s) Topical <User Schedule>  dexMEDEtomidine Infusion 0.2 MICROgram(s)/kG/Hr (4.38 mL/Hr) IV Continuous <Continuous>  digoxin  Injectable 125 MICROGram(s) IV Push daily  diltiazem Infusion 5 mG/Hr (5 mL/Hr) IV Continuous <Continuous>  enoxaparin Injectable 40 milliGRAM(s) SubCutaneous daily  fluconAZOLE IVPB      fluconAZOLE IVPB 400 milliGRAM(s) IV Intermittent every 24 hours  insulin glargine Injectable (LANTUS) 20 Unit(s) SubCutaneous every morning  insulin lispro (ADMELOG) corrective regimen sliding scale   SubCutaneous every 6 hours  meropenem  IVPB 1000 milliGRAM(s) IV Intermittent every 8 hours  multivitamin/minerals/iron Oral Solution (CENTRUM) 15 milliLiter(s) Oral daily  pantoprazole  Injectable 40 milliGRAM(s) IV Push every 12 hours  phenylephrine    Infusion 0.1 MICROgram(s)/kG/Min (1.64 mL/Hr) IV Continuous <Continuous>  remdesivir  IVPB   IV Intermittent   remdesivir  IVPB 100 milliGRAM(s) IV Intermittent every 24 hours  sodium chloride 3%  Inhalation 4 milliLiter(s) Inhalation every 12 hours  thiamine 100 milliGRAM(s) Oral daily  vancomycin  IVPB      vancomycin  IVPB 1250 milliGRAM(s) IV Intermittent once  vancomycin  IVPB 1250 milliGRAM(s) IV Intermittent every 12 hours    MEDICATIONS  (PRN):  haloperidol    Injectable 5 milliGRAM(s) IV Push every 6 hours PRN Agitation  HYDROmorphone  Injectable 0.25 milliGRAM(s) IV Push every 3 hours PRN Severe Pain (7 - 10)  midazolam Injectable 2 milliGRAM(s) IV Push every 10 minutes PRN Sedation.Bronchoscopy  oxyCODONE    Solution 5 milliGRAM(s) Oral every 4 hours PRN Moderate and Severe Pain      __________________ Pertinent Labs__________________   01-22 Na147 mmol/L<H> Glu 136 mg/dL<H> K+ 3.4 mmol/L<L> Cr  1.04 mg/dL BUN 22 mg/dL 01-21 Phos 3.1 mg/dL 01-21 Alb 2.1 g/dL<L>        Skin: Intact    Estimated Needs:     1396 - 1745 kcals/day (20-25 kcals/kg IBW)  70 - 105 gms protein/day (1.0-1.5 gms/kg IBW)    Previous Nutrition Diagnosis:     Inadequate Oral Intake     Nutrition Diagnosis is [ ] ongoing  [x] resolved       New Nutrition Diagnosis:     Increased Nutrient Needs        Related to: physiological causes  As evidenced by: unstageable L/R knee pressure injuries        Goal(s):  1. Patient to meet > 75% estimated energy needs    Recommendations:   1. No Carb Prosource x 3/day to enteral feeding.    Monitoring and Evaluation:   1. Monitor weights, labs, BMs, skin integrity, enteral tolerance and edema.  2. RD services to remain available. Request obtaining new weight to assess trend and determine adequacy of TF.

## 2022-01-23 NOTE — PROGRESS NOTE ADULT - SUBJECTIVE AND OBJECTIVE BOX
SURGERY DAILY PROGRESS NOTE:       SUBJECTIVE/ROS:   Interval/Overnight Events:    - FiO2 at 40%  - Starting PO cardizem 30 mg q6 1/22; wean cardizem gt  - Antibiotics broadened to Vickie/Vanc for concern of Ventilator acquired pneumonia  - C dif and Procal sent w/ worsening leukocytosis   - Free water flushes 250 mL q8 for hypernatremia    Patient seen and examined at bedside during morning rounds.    OBJECTIVE:    Vital Signs Last 24 Hrs  T(C): 38.7 (23 Jan 2022 04:00), Max: 38.7 (22 Jan 2022 06:00)  T(F): 101.6 (23 Jan 2022 04:00), Max: 101.7 (22 Jan 2022 06:00)  HR: 64 (23 Jan 2022 04:00) (60 - 72)  BP: --  BP(mean): --  RR: 21 (23 Jan 2022 04:00) (18 - 23)  SpO2: 98% (23 Jan 2022 04:00) (97% - 99%)    I&O's Detail    21 Jan 2022 07:01  -  22 Jan 2022 07:00  --------------------------------------------------------  IN:    Dexmedetomidine: 246.6 mL    Diltiazem: 120 mL    Glucerna 1.5: 540 mL    IV PiggyBack: 600 mL    Phenylephrine: 155.7 mL  Total IN: 1662.3 mL    OUT:    Indwelling Catheter - Urethral (mL): 2355 mL  Total OUT: 2355 mL    Total NET: -692.7 mL      22 Jan 2022 07:01  -  23 Jan 2022 05:26  --------------------------------------------------------  IN:    Dexmedetomidine: 237.6 mL    Diltiazem: 110 mL    Free Water: 500 mL    Glucerna 1.5: 990 mL    IV PiggyBack: 1000 mL    Phenylephrine: 77.4 mL  Total IN: 2915 mL    OUT:    Indwelling Catheter - Urethral (mL): 1500 mL    Rectal Tube (mL): 25 mL  Total OUT: 1525 mL    Total NET: 1390 mL    PHYSICAL EXAMINATION:  General: lying in bed, NAD  Resp: intubated  Abd: soft, mildly distended; midline incision c/d/i.    LABS:                        10.8   26.20 )-----------( 445      ( 23 Jan 2022 00:52 )             34.0     01-23    143  |  112<H>  |  23  ----------------------------<  233<H>  3.3<L>   |  22  |  0.91    Ca    7.3<L>      23 Jan 2022 00:52  Phos  2.0     01-23  Mg     2.50     01-23                           SURGERY DAILY PROGRESS NOTE:       SUBJECTIVE/ROS:   Interval/Overnight Events:  - FiO2 at 40%  - Starting PO cardizem 30 mg q6 1/22; wean cardizem gt  - Antibiotics broadened to Vickie/Vanc for concern of Ventilator acquired pneumonia  - C dif and Procal sent w/ worsening leukocytosis   - Free water flushes 250 mL q8 for hypernatremia    Patient seen and examined at bedside during morning rounds. Pt remains intubated.     OBJECTIVE:    Vital Signs Last 24 Hrs  T(C): 38.7 (23 Jan 2022 04:00), Max: 38.7 (22 Jan 2022 06:00)  T(F): 101.6 (23 Jan 2022 04:00), Max: 101.7 (22 Jan 2022 06:00)  HR: 64 (23 Jan 2022 04:00) (60 - 72)  BP: --  BP(mean): --  RR: 21 (23 Jan 2022 04:00) (18 - 23)  SpO2: 98% (23 Jan 2022 04:00) (97% - 99%)    I&O's Detail    21 Jan 2022 07:01  -  22 Jan 2022 07:00  --------------------------------------------------------  IN:    Dexmedetomidine: 246.6 mL    Diltiazem: 120 mL    Glucerna 1.5: 540 mL    IV PiggyBack: 600 mL    Phenylephrine: 155.7 mL  Total IN: 1662.3 mL    OUT:    Indwelling Catheter - Urethral (mL): 2355 mL  Total OUT: 2355 mL    Total NET: -692.7 mL      22 Jan 2022 07:01  -  23 Jan 2022 05:26  --------------------------------------------------------  IN:    Dexmedetomidine: 237.6 mL    Diltiazem: 110 mL    Free Water: 500 mL    Glucerna 1.5: 990 mL    IV PiggyBack: 1000 mL    Phenylephrine: 77.4 mL  Total IN: 2915 mL    OUT:    Indwelling Catheter - Urethral (mL): 1500 mL    Rectal Tube (mL): 25 mL  Total OUT: 1525 mL    Total NET: 1390 mL    PHYSICAL EXAMINATION:  General: lying in bed, NAD  Resp: intubated  Abd: soft, mildly distended; midline incision is apparent for midline incisional erythema. Every other staple was removed with leakage of thick sanguinous discharge; discharge was cultured and sent to the lab. The integrity of the fascia was assessed at bedside and determined to be intact. Wound was subsequently packed with packing strip.    LABS:                        10.8   26.20 )-----------( 445      ( 23 Jan 2022 00:52 )             34.0     01-23    143  |  112<H>  |  23  ----------------------------<  233<H>  3.3<L>   |  22  |  0.91    Ca    7.3<L>      23 Jan 2022 00:52  Phos  2.0     01-23  Mg     2.50     01-23

## 2022-01-23 NOTE — PROGRESS NOTE ADULT - ASSESSMENT
76M found at home on the floor 1/12.   Diagnosed with acute right basal ganglia stroke.   1/12 Enterobacter and Proteus bacteremia.   1/15 Duodenal perforation s/p OR washout, repair. Related to bacteremia? Stress ulcer? H pylori IgG negative.   1/19 Respiratory failure, mucous plugging but newly positive COVID PCR too.   Remains critically ill with worsening fever curve.   Suspect central fever at this point given negative cultures from blood and bronch and repeat CT abdomen without complications.   However, now with C diff.     Suggest  -monitor cultures   -agree with PO Vanc   -would stop IV antibiotics in the setting of C diff and negative cultures   -Remdesivir out of caution, day 5 of 5   -no Decadron in the setting of bacterial infection     Discussed with SICU     Edy Nixon MD   Infectious Disease   Pager [phone]   After 5PM and on weekends please page fellow on call or call [phone]

## 2022-01-23 NOTE — PROGRESS NOTE ADULT - SUBJECTIVE AND OBJECTIVE BOX
Follow Up:  Fevers    Interval History/ROS: Still febrile. Remains critically ill     Allergies  No Known Allergies        ANTIMICROBIALS:  fluconAZOLE IVPB    fluconAZOLE IVPB 400 every 24 hours  meropenem  IVPB 1000 every 8 hours  remdesivir  IVPB    remdesivir  IVPB 100 every 24 hours  vancomycin    Solution 125 every 6 hours  vancomycin  IVPB    vancomycin  IVPB 1250 every 12 hours      OTHER MEDS:  acetaminophen   IVPB .. 1000 milliGRAM(s) IV Intermittent every 6 hours  acetaminophen   IVPB .. 1000 milliGRAM(s) IV Intermittent every 6 hours  acetylcysteine 10%  Inhalation 4 milliLiter(s) Inhalation two times a day  ALBUTerol    90 MICROgram(s) HFA Inhaler 2 Puff(s) Inhalation every 6 hours  chlorhexidine 0.12% Liquid 15 milliLiter(s) Oral Mucosa every 12 hours  chlorhexidine 4% Liquid 1 Application(s) Topical <User Schedule>  dexMEDEtomidine Infusion 0.2 MICROgram(s)/kG/Hr IV Continuous <Continuous>  digoxin  Injectable 125 MICROGram(s) IV Push daily  diltiazem    Tablet 30 milliGRAM(s) Oral every 6 hours  enoxaparin Injectable 40 milliGRAM(s) SubCutaneous daily  haloperidol    Injectable 5 milliGRAM(s) IV Push every 6 hours PRN  HYDROmorphone  Injectable 0.25 milliGRAM(s) IV Push every 3 hours PRN  insulin glargine Injectable (LANTUS) 22 Unit(s) SubCutaneous every morning  insulin lispro (ADMELOG) corrective regimen sliding scale   SubCutaneous every 6 hours  multivitamin/minerals/iron Oral Solution (CENTRUM) 15 milliLiter(s) Oral daily  oxyCODONE    Solution 5 milliGRAM(s) Oral every 4 hours PRN  pantoprazole  Injectable 40 milliGRAM(s) IV Push every 12 hours  phenylephrine    Infusion 0.1 MICROgram(s)/kG/Min IV Continuous <Continuous>  thiamine 100 milliGRAM(s) Oral daily      Vital Signs Last 24 Hrs  T(C): 38.6 (23 Jan 2022 12:00), Max: 38.7 (22 Jan 2022 16:00)  T(F): 101.4 (23 Jan 2022 12:00), Max: 101.7 (22 Jan 2022 16:00)  HR: 64 (23 Jan 2022 13:12) (58 - 69)  BP: 103/45 (23 Jan 2022 09:00) (103/45 - 103/45)  BP(mean): 58 (23 Jan 2022 09:00) (58 - 58)  RR: 21 (23 Jan 2022 13:12) (18 - 23)  SpO2: 98% (23 Jan 2022 13:12) (97% - 99%)    Physical Exam:  General: intubated   ENT: NG and ET tubes   Cardio: regular rate   Respiratory: mechanically ventilated   abd: nondistended, soft   Musculoskeletal: anasarca   vascular: right IJ CVC, no phlebitis   Skin: stable eschars both knees                           10.8   26.20 )-----------( 445      ( 23 Jan 2022 00:52 )             34.0       01-23    143  |  112<H>  |  23  ----------------------------<  233<H>  3.3<L>   |  22  |  0.91    Ca    7.3<L>      23 Jan 2022 00:52  Phos  2.0     01-23  Mg     2.50     01-23            MICROBIOLOGY:  Culture - Blood (collected 01-21-22 @ 16:24)  Source: .Blood Blood-Peripheral  Preliminary Report (01-22-22 @ 17:01):    No growth to date.    Culture - Blood (collected 01-21-22 @ 16:24)  Source: .Blood Blood-Arterial  Preliminary Report (01-22-22 @ 17:01):    No growth to date.    Culture - Acid Fast - Bronchial w/Smear (collected 01-19-22 @ 18:21)  Source: BAL sputum  Preliminary Report (01-22-22 @ 15:04):    Culture is being performed.    Culture - Acid Fast - Sputum w/Smear (collected 01-19-22 @ 18:17)  Source: .Sputum Sputum  Preliminary Report (01-22-22 @ 15:04):    Culture is being performed.    Culture - Sputum (collected 01-19-22 @ 18:17)  Source: .Sputum Sputum  Gram Stain (01-19-22 @ 21:40):    Few polymorphonuclear leukocytes per low power field    No Squamous epithelial cells per low power field    No organisms seen  Preliminary Report (01-20-22 @ 15:39):    Normal Respiratory Vijaya present    Culture - Bronchial (collected 01-19-22 @ 15:49)  Source: Combi-Cath bronchial lavage  Final Report (01-21-22 @ 17:16):    Normal Respiratory Vijaya present    C Diff by PCR Result: Detected (01-22 @ 12:39)    C Diff by PCR Result: Detected (01-22-22 @ 12:39)    RADIOLOGY:  Images below reviewed personally  Xray Chest 1 View- PORTABLE-Routine (Xray Chest 1 View- PORTABLE-Routine in AM.) (01.22.22 @ 00:43)   No significant change since 1/21/2022.
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
## 2022-01-23 NOTE — PROGRESS NOTE ADULT - ATTENDING COMMENTS
Patient overnight no major issues. Patient adequate gas exchange, cxr appears improved.  N mentating at baseline rass of -2, decrease precedex, pain controlled  resp adequate gas exchange, cxr improved, will decrease vent settings, decrease peep from 10 to 8, plan for sbt  cv hemodynamically stable, weaned off cardiezem gtt, maintain on po cardiezem and digoxin, likely hypervolemic will start on diuresis  gi tube feeds at goal, ppi bid  gu/renal diuresis, monitor uop  heme vte ppx  id c diff positive start on po vancomycin, maintain on iv vanc and meropenem for VAP, d/c diflucan tomorrow, trend procal level  endo no changes  Prolonged intubation likely will require tracheostomy    The patient is a critical care patient with life threatening hemodynamic and metabolic instability in SICU.  I have personally interviewed when possible and examined the patient, reviewed data and laboratory tests/x-rays and all pertinent electronic images.  I was physically present for the key portions of the evaluation and management (E/M) service provided.   The SICU team has a constant risk benefit analyzes discussion with the primary team, all consultants, House Staff and PA's on all decisions.  These diagnoses are unrelated to the surgical procedure noted above.  I meet with family if needed to get further history, discuss the case and make care decisions for this patient who might not be able to participate.  Time involved in performance of separately billable procedures was not counted toward my critical care time. There is no overlap.  I spent 55-75 minutes ( 0800Hrs-0915Hrs in AM/ 1600Hrs-1715Hrs in PM, or other time indicated) of critical care time for the diagnoses, assessment, plan and interventions.  This time excludes time spent on separate procedures and teaching. That inpatient services furnished since the previous certification or recertification were, and continue to be required: for treatment that could reasonably be expected to improve the patient's condition; or, for diagnostic study;    That the hospital records show that the services furnished were: intensive treatment services; admission and related services necessary for diagnostic study or equivalent services;    That the patient continues to need, on a daily basis active inpatient treatment furnished directly by or requiring the supervision of inpatient psychiatric facility personnel. That inpatient services furnished since the previous certification or recertification were, and continue to be required: for treatment that could reasonably be expected to improve the patient's condition; or, for diagnostic study;    That the hospital records show that the services furnished were: intensive treatment services; admission and related services necessary for diagnostic study or equivalent services;    That the patient continues to need, on a daily basis active inpatient treatment furnished directly by or requiring the supervision of inpatient psychiatric facility personnel. That inpatient services furnished since the previous certification or recertification were, and continue to be required: for treatment that could reasonably be expected to improve the patient's condition; or, for diagnostic study;    That the hospital records show that the services furnished were: intensive treatment services; admission and related services necessary for diagnostic study or equivalent services;    That the patient continues to need, on a daily basis active inpatient treatment furnished directly by or requiring the supervision of inpatient psychiatric facility personnel. That inpatient services furnished since the previous certification or recertification were, and continue to be required: for treatment that could reasonably be expected to improve the patient's condition; or, for diagnostic study;    That the hospital records show that the services furnished were: intensive treatment services; admission and related services necessary for diagnostic study or equivalent services;    That the patient continues to need, on a daily basis active inpatient treatment furnished directly by or requiring the supervision of inpatient psychiatric facility personnel.
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
## 2022-01-24 NOTE — PROGRESS NOTE ADULT - ATTENDING COMMENTS
I have personally examined this patient, reviewed pertinent labs and imaging on SICU rounds.    50  minutes in total were spent in providing direct critical care for the diagnoses, assessment and plan outlined below.  This patient suffers from a critical illness that acutely impairs one or more vital organ systems such that there is a high probability of life threatening or imminent deterioration of the patient's condition.  These diagnoses are unrelated to the surgical procedure.    Additionally, time spent in teaching or the performance of separately billable procedures was not counted toward my critical care time.  There is no overlap.  Time included review of vitals, labs, imaging.    ASSESSMENT:  76M DM, HTN, Dementia, PAD presents with acute hemorrhagic stroke, rhabdomylosis, LE ischemia and perforated DU.  POD#9 s/p franki patch.  Re-intubated x2, pneumonia with hypoxemic resp insufficiency.  Afib with RVR better on dig.  CDT diarrhea.  Prognosis guarded.  F/u SW consult.    Plan:  Neuro-stroke, delirium, dementia  - Pain control with enteral tylenol and 0.25 mg Dilaudid  - Sedation with Precedex, wean as tolerated  - Ok to resume AC for AFib on 1/26; 2 weeks post event per NSG    Respiratory  - Re-Intubated x2 post op; most recently 1/19  - COVID+; remdesevir completed a 5 day course on 1/24  - S/P Bronch on 1/19 and 1/21  - Mucomyst, duoneb, chest PT   - Wean vent settings; currently on Pressure Support    Cardiovascular  - AFib with RVR; rates improved  - TTE w/out thrombus on 1/18  - Patient remains on Dig 125 mcg IV daily; Rates controlled; Cardizem DCd  - Remains on Berny for hypotension; downtrending requirements  - low dose lasix for bilat effusions and positive fluid balance    GI  - Gastric Ulcer Perforation s/p Franki patch of duodenal ulcer 1/15  - H. pylori negative  - completed sol-op anti-microbials  - Protonix 40 BID  - Thiamine 500 qD  - Tube Feeds at goal w/ Glucerna 1.5   - Cdiff positive 1/23, started on PO vancomycin  - start fiber supplement for liquid stools      - Creatinine stable; adequate urine output last 24 hours  - start Free water flushes 250 mL q12  - Monitor electrolytes; replete PRN  - Monitor urine output  - discontinue lilly, TOV f/u 1/24    Heme  - Stable H/H  - Normal Coags  - DVT PPx: Lovenox 40 mg daily     ID  - 1/13 BCx Grew Proteus; 1/14 BCx negative x2  - Acid Fast and Sputum Cx negative  - COVID+ on 1/19; Started Remdesevir 1/19  - Fungitell 172 (elevated)  - Zosyn started 1/17; Transitioned to Flynn 1/21; Fluconazole started 1/18-1/24  - Started Vanc on 1/22 w/ worsening leukocytosis and fevers  - Procal mildly elevated to 0.48  - F/U Bronchoscopy cultures  - F/U 1/21 BCx - no growth to date  - cdiff positive, on PO vanc (1/23-)  - DC flynn, vanc IV, and fluconazole    Endo:   - History DM2; A1C 6.1  - Increased Lantus to 22 units nightly and high dose correctional scale  - Increase basal bolus insulin regimen as needed     Lines  - ETT, NGT, RADHA Lilly Triple Lumen, Peripheral IVs    Dispo: SICU
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
## 2022-01-24 NOTE — PROGRESS NOTE ADULT - SUBJECTIVE AND OBJECTIVE BOX
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  --------------------------------------------------------------------------------------------------  V/S:  Vital Signs Last 24 Hrs  T(C): 37.6 (24 Jan 2022 08:00), Max: 38.6 (23 Jan 2022 12:00)  T(F): 99.7 (24 Jan 2022 08:00), Max: 101.4 (23 Jan 2022 12:00)  HR: 67 (24 Jan 2022 08:00) (58 - 79)  BP: 117/68 (24 Jan 2022 08:00) (103/45 - 117/68)  BP(mean): 79 (24 Jan 2022 08:00) (58 - 79)  RR: 24 (24 Jan 2022 08:00) (14 - 26)  SpO2: 95% (24 Jan 2022 08:00) (93% - 99%)  Mode: AC/ CMV (Assist Control/ Continuous Mandatory Ventilation)  RR (machine): 14  TV (machine): 450  FiO2: 60  PEEP: 8  ITime: 0.78  MAP: 10  PIP: 15    --------------------------------------------------------------------------------------------------  I/Os:    23 Jan 2022 07:01  -  24 Jan 2022 07:00  --------------------------------------------------------  IN:    Dexmedetomidine: 245.6 mL    Diltiazem: 10 mL    Free Water: 500 mL    Glucerna 1.5: 1080 mL    IV PiggyBack: 650 mL    Phenylephrine: 95.5 mL  Total IN: 2581.1 mL    OUT:    Indwelling Catheter - Urethral (mL): 2665 mL    Rectal Tube (mL): 350 mL  Total OUT: 3015 mL    Total NET: -433.9 mL      24 Jan 2022 07:01  -  24 Jan 2022 08:44  --------------------------------------------------------  IN:    Dexmedetomidine: 8.8 mL    Glucerna 1.5: 45 mL    Phenylephrine: 4.8 mL  Total IN: 58.6 mL    OUT:    Indwelling Catheter - Urethral (mL): 60 mL  Total OUT: 60 mL    Total NET: -1.4 mL        --------------------------------------------------------------------------------------------------  LABS:                        11.0   26.69 )-----------( 512      ( 24 Jan 2022 01:23 )             33.5     24 Jan 2022 01:23    146    |  114    |  22     ----------------------------<  192    3.6     |  24     |  0.77     Ca    7.2        24 Jan 2022 01:23  Phos  1.9       24 Jan 2022 01:23  Mg     2.40      24 Jan 2022 01:23        CAPILLARY BLOOD GLUCOSE      POCT Blood Glucose.: 215 mg/dL (24 Jan 2022 08:16)  POCT Blood Glucose.: 166 mg/dL (24 Jan 2022 05:19)  POCT Blood Glucose.: 168 mg/dL (24 Jan 2022 00:25)  POCT Blood Glucose.: 132 mg/dL (23 Jan 2022 17:18)  POCT Blood Glucose.: 212 mg/dL (23 Jan 2022 11:03)            Culture - Blood (collected 21 Jan 2022 16:24)  Source: .Blood Blood-Peripheral  Preliminary Report (22 Jan 2022 17:01):    No growth to date.    Culture - Blood (collected 21 Jan 2022 16:24)  Source: .Blood Blood-Arterial  Preliminary Report (22 Jan 2022 17:01):    No growth to date.        --------------------------------------------------------------------------------------------------  MEDICATIONS  (STANDING):  acetaminophen   IVPB .. 1000 milliGRAM(s) IV Intermittent every 6 hours  acetylcysteine 10%  Inhalation 4 milliLiter(s) Inhalation two times a day  ALBUTerol    90 MICROgram(s) HFA Inhaler 2 Puff(s) Inhalation every 6 hours  chlorhexidine 0.12% Liquid 15 milliLiter(s) Oral Mucosa every 12 hours  chlorhexidine 4% Liquid 1 Application(s) Topical <User Schedule>  dexMEDEtomidine Infusion 0.2 MICROgram(s)/kG/Hr (4.38 mL/Hr) IV Continuous <Continuous>  digoxin  Injectable 125 MICROGram(s) IV Push daily  diltiazem    Tablet 30 milliGRAM(s) Oral every 6 hours  enoxaparin Injectable 40 milliGRAM(s) SubCutaneous daily  fluconAZOLE IVPB      fluconAZOLE IVPB 400 milliGRAM(s) IV Intermittent every 24 hours  insulin glargine Injectable (LANTUS) 22 Unit(s) SubCutaneous every morning  insulin lispro (ADMELOG) corrective regimen sliding scale   SubCutaneous every 6 hours  meropenem  IVPB 1000 milliGRAM(s) IV Intermittent every 8 hours  multivitamin/minerals/iron Oral Solution (CENTRUM) 15 milliLiter(s) Oral daily  pantoprazole  Injectable 40 milliGRAM(s) IV Push every 12 hours  phenylephrine    Infusion 0.1 MICROgram(s)/kG/Min (1.64 mL/Hr) IV Continuous <Continuous>  thiamine 100 milliGRAM(s) Oral daily  vancomycin    Solution 125 milliGRAM(s) Enteral Tube every 6 hours  vancomycin  IVPB 1500 milliGRAM(s) IV Intermittent every 12 hours    MEDICATIONS  (PRN):  haloperidol    Injectable 5 milliGRAM(s) IV Push every 6 hours PRN Agitation  HYDROmorphone  Injectable 0.25 milliGRAM(s) IV Push every 3 hours PRN Severe Pain (7 - 10)  oxyCODONE    Solution 5 milliGRAM(s) Oral every 4 hours PRN Moderate and Severe Pain    --------------------------------------------------------------------------------------------------

## 2022-01-24 NOTE — PROGRESS NOTE ADULT - SUBJECTIVE AND OBJECTIVE BOX
SURGERY DAILY PROGRESS NOTE:       SUBJECTIVE/ROS:   Interval/Overnight Events: Patient seen and examined at bedside during morning rounds. Pt remains intubated.     OBJECTIVE:    Vital Signs Last 24 Hrs  T(C): 38.7 (23 Jan 2022 04:00), Max: 38.7 (22 Jan 2022 06:00)  T(F): 101.6 (23 Jan 2022 04:00), Max: 101.7 (22 Jan 2022 06:00)  HR: 64 (23 Jan 2022 04:00) (60 - 72)  BP: --  BP(mean): --  RR: 21 (23 Jan 2022 04:00) (18 - 23)  SpO2: 98% (23 Jan 2022 04:00) (97% - 99%)    I&O's Detail    21 Jan 2022 07:01  -  22 Jan 2022 07:00  --------------------------------------------------------  IN:    Dexmedetomidine: 246.6 mL    Diltiazem: 120 mL    Glucerna 1.5: 540 mL    IV PiggyBack: 600 mL    Phenylephrine: 155.7 mL  Total IN: 1662.3 mL    OUT:    Indwelling Catheter - Urethral (mL): 2355 mL  Total OUT: 2355 mL    Total NET: -692.7 mL      22 Jan 2022 07:01  -  23 Jan 2022 05:26  --------------------------------------------------------  IN:    Dexmedetomidine: 237.6 mL    Diltiazem: 110 mL    Free Water: 500 mL    Glucerna 1.5: 990 mL    IV PiggyBack: 1000 mL    Phenylephrine: 77.4 mL  Total IN: 2915 mL    OUT:    Indwelling Catheter - Urethral (mL): 1500 mL    Rectal Tube (mL): 25 mL  Total OUT: 1525 mL    Total NET: 1390 mL    PHYSICAL EXAMINATION:  General: lying in bed, NAD  Resp: intubated  Abd: soft, mildly distended; midline incision is apparent for midline incisional erythema. Every other staple was removed with leakage of thick sanguinous discharge; discharge was cultured and sent to the lab. The integrity of the fascia was assessed at bedside and determined to be intact. Wound was subsequently packed with packing strip.    LABS:                        10.8   26.20 )-----------( 445      ( 23 Jan 2022 00:52 )             34.0     01-23    143  |  112<H>  |  23  ----------------------------<  233<H>  3.3<L>   |  22  |  0.91    Ca    7.3<L>      23 Jan 2022 00:52  Phos  2.0     01-23  Mg     2.50     01-23

## 2022-01-25 NOTE — PROGRESS NOTE ADULT - ATTENDING COMMENTS
Critical Care Dx    U07.1 Pneumonia due to COVID-19 virus   J12.82 Pneumonia due to coronavirus disease 2019  -recovering well, CXR with less infiltrate and consolidation, ARDS improved, oxygenating well  -aggressive pulmonary toilet  -extubation today   I61.9 Nontraumatic acute hemorrhage of basal ganglia   I63.9 Cerebrovascular accident (CVA), unspecified mechanism   -new baseline neuro exam intact, nodding   -neuro recs appreciated - no full dose anticoagulation for two weeks   A04.72 Clostridium difficile diarrhea   -PO vanco, trend WBC, diarrhea improved   I48.91 Atrial fibrillation, unspecified type   -rate controlled, con't regimen. DVT prophylaxis only, no anticoagulation yet for fear of hemorrhagic conversion of ischemic stroke       The patient is a critical care patient with life threatening respiratory instability in SICU.  I have personally interviewed and examined the patient, reviewed data and laboratory tests/x-rays and all pertinent electronic images.  The SICU team has a constant risk benefit analyzes discussion with the primary team, all consultants, House Staff and PA's on all decisions.   Time involved in performance of separately billable procedures was not counted toward my critical care time. There is no overlap.    I have personally provided 60 minutes of critical care time concurrently with the resident/fellow. This time excludes time spent on separate procedures and time spent teaching. I have reviewed the resident's/fellow's documentation and agree with the assessment and plan of care.  I was physically present for the key portions of the evaluation and management (E/M) service provided.      Tian Del Real MD  Acute and Critical Care Surgery

## 2022-01-25 NOTE — PROGRESS NOTE ADULT - SUBJECTIVE AND OBJECTIVE BOX
Follow Up: Bacteremia, C diff    Interval History/ROS: Febrile yesterday. Awake. On CPAP mode. Points to his right leg.     Allergies  No Known Allergies        ANTIMICROBIALS:  vancomycin    Solution 125 every 6 hours      OTHER MEDS:  acetaminophen    Suspension .. 650 milliGRAM(s) Oral every 6 hours  acetylcysteine 10%  Inhalation 4 milliLiter(s) Inhalation two times a day  ALBUTerol    90 MICROgram(s) HFA Inhaler 2 Puff(s) Inhalation every 6 hours  ALBUTerol    90 MICROgram(s) HFA Inhaler 2 Puff(s) Inhalation every 6 hours PRN  budesonide 160 MICROgram(s)/formoterol 4.5 MICROgram(s) Inhaler 2 Puff(s) Inhalation two times a day  chlorhexidine 0.12% Liquid 15 milliLiter(s) Oral Mucosa every 12 hours  chlorhexidine 4% Liquid 1 Application(s) Topical <User Schedule>  dexMEDEtomidine Infusion 0.2 MICROgram(s)/kG/Hr IV Continuous <Continuous>  digoxin  Injectable 125 MICROGram(s) IV Push daily  diltiazem    Tablet 15 milliGRAM(s) Oral every 8 hours  enoxaparin Injectable 40 milliGRAM(s) SubCutaneous daily  haloperidol    Injectable 5 milliGRAM(s) IV Push every 6 hours PRN  HYDROmorphone  Injectable 0.25 milliGRAM(s) IV Push every 3 hours PRN  insulin glargine Injectable (LANTUS) 22 Unit(s) SubCutaneous every morning  insulin lispro (ADMELOG) corrective regimen sliding scale   SubCutaneous every 6 hours  multivitamin/minerals/iron Oral Solution (CENTRUM) 15 milliLiter(s) Oral daily  oxyCODONE    Solution 5 milliGRAM(s) Oral every 4 hours PRN  pantoprazole  Injectable 40 milliGRAM(s) IV Push every 12 hours  phenylephrine    Infusion 0.1 MICROgram(s)/kG/Min IV Continuous <Continuous>  potassium chloride  10 mEq/100 mL IVPB 10 milliEquivalent(s) IV Intermittent every 1 hour  thiamine 100 milliGRAM(s) Oral daily      Vital Signs Last 24 Hrs  T(C): 36.6 (25 Jan 2022 12:00), Max: 37.8 (24 Jan 2022 16:00)  T(F): 97.8 (25 Jan 2022 12:00), Max: 100.1 (24 Jan 2022 16:00)  HR: 95 (25 Jan 2022 14:24) (71 - 126)  BP: 135/96 (25 Jan 2022 14:00) (78/47 - 147/42)  BP(mean): 102 (25 Jan 2022 14:00) (52 - 110)  RR: 28 (25 Jan 2022 14:24) (18 - 30)  SpO2: 100% (25 Jan 2022 14:24) (91% - 100%)    Physical Exam:  General: awake, non toxic  Cardio: regular rate   Respiratory: nonlabored on vent CPAP mode   abd: nondistended, soft, nontender   vascular: right IJ CVC, no phlebitis   Skin: abdominal surgical site erythema, packed, no blayne pus   psych: calm                           10.8   23.19 )-----------( 542      ( 25 Jan 2022 02:58 )             32.5       01-25    146<H>  |  112<H>  |  22  ----------------------------<  152<H>  3.7   |  25  |  0.78    Ca    7.3<L>      25 Jan 2022 02:58  Phos  2.4     01-25  Mg     2.50     01-25    TPro  5.4<L>  /  Alb  1.9<L>  /  TBili  0.3  /  DBili  x   /  AST  65<H>  /  ALT  72<H>  /  AlkPhos  98  01-25          MICROBIOLOGY:  Culture - Abscess with Gram Stain (collected 01-23-22 @ 10:20)  Source: .Abscess Midline incision  Preliminary Report (01-24-22 @ 12:51):    Few Candida albicans "Susceptibilities not performed"    Culture - Blood (collected 01-21-22 @ 16:24)  Source: .Blood Blood-Peripheral  Preliminary Report (01-22-22 @ 17:01):    No growth to date.    Culture - Blood (collected 01-21-22 @ 16:24)  Source: .Blood Blood-Arterial  Preliminary Report (01-22-22 @ 17:01):    No growth to date.    Culture - Acid Fast - Bronchial w/Smear (collected 01-19-22 @ 18:21)  Source: BAL sputum  Preliminary Report (01-22-22 @ 15:04):    Culture is being performed.    Culture - Acid Fast - Sputum w/Smear (collected 01-19-22 @ 18:17)  Source: .Sputum Sputum  Preliminary Report (01-22-22 @ 15:04):    Culture is being performed.    Culture - Bronchial (collected 01-19-22 @ 15:49)  Source: Combi-Cath bronchial lavage  Final Report (01-21-22 @ 17:16):    Normal Respiratory Vijaya present    Culture - Sputum (collected 01-19-22 @ 15:43)  Source: .Sputum Sputum  Gram Stain (01-19-22 @ 21:40):    Few polymorphonuclear leukocytes per low power field    No Squamous epithelial cells per low power field    No organisms seen  Final Report (01-24-22 @ 14:49):    Normal Respiratory Vijaya present    C Diff by PCR Result: Detected (01-22 @ 12:39)    C Diff by PCR Result: Detected (01-22-22 @ 12:39)    RADIOLOGY:  Images below reviewed personally  Xray Chest 1 View- PORTABLE-Routine (Xray Chest 1 View- PORTABLE-Routine in AM.) (01.25.22 @ 01:28)   Progression of right infiltrate and left effusion.    Xray Chest 1 View- PORTABLE-Routine (Xray Chest 1 View- PORTABLE-Routine in AM.) (01.22.22 @ 00:43)   1/21/2022, 3:35 PM:  Appropriate course of endotracheal tube, nasogastric tube, and right   central venous catheter. Normal heart size. Stable layering pleural   effusions and atelectasis or consolidation in the lower lungs. No   pneumothorax.  1/22/2022, 12:18 AM:  No significant change.  IMPRESSION:  No significant change since 1/21/2022.    CT Chest Abdomen and Pelvis w/ Oral Cont and w/ IV Cont (01.21.22 @ 12:21)   *  Moderate bilateral pleural effusions with adjacent passive   atelectasis. Mild hypoenhancement of the lung at the left lung base   raises a question of superimposed pneumonia.  *  Trace free fluid in the abdomen and pelvis. No evidence of   pneumoperitoneum or intra-abdominal abscess.
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
## 2022-01-25 NOTE — PROGRESS NOTE ADULT - ASSESSMENT
77yo RH gentleman who presents to the facility on the evening of 1/12/22 after being found down in the bathroom s/p fall. Patient harbors a past medical history significant for DM II and HTN. Last known normal of 1/9/22. NIHSS of 16. CT Head on arrival demonstrating: Acute hemorrhage right basal ganglia 1.8 x 2.2 x 3.6 cm. ICH Score of 0, ICH volume of 8.9ml (cm^3). Hospital course complicated by new onset afib with RVR, right lower limb ischemia, and proteus and enterobacter bacteremia, now found to have possible perforated duodenal ulcer s/p ex lap. Follow up CT brain w/o contrast scans demonstrate stability and resorption of the hemorrhage. Most recent NCCT demonstrates a 1.5 x 1.3 cm (previously 2.5 x 1.9 cm) basal ganglia IPH with some surrounding vasogenic edema. TTE demonstrates ejection fraction of 72%, mild MR, and moderate pulmonary hypertension. Today (1/25/2022), patient was seen at bedside, exam as above.     Impression: L hemiparesis due to R brain dysfunction from R basal ganglia intraparenchymal hemorrhage most likely secondary to chronic hypertension.    Recommendations    Imaging:  [] Follow up stability scan in 2 weeks (February 1st)   [] MRI brain w/w/o contrast if not contraindicated by metal fragment     Meds:  [] Would continue to hold starting anticoagulation for new onset afib until follow up stability scan in 2 weeks demonstrates further resorption.   [] Can c/w chemical DVT prophylaxis  [] On precedex, phenylephrine as per primary team     Other:  [] Keep BP < 160/90 mmHg,   [] PT/OT - dispo to inpatient rehab facility for skilled PT needs. Appreciate recommendations  [] NPO, consider S/S evaluation when extubated  [] Rest of workup as per primary team  [] Signing off, please reconsult PRN.  [] Patient should follow up with Stroke NP, Yovana Brandt, in clinic at [address] [place] [address]. Please email [email or web address] w/ basic PHI.     Patient case discussed and seen with stroke attending, Dr. Garcia.    Please call with questions: [phone]

## 2022-01-25 NOTE — PROGRESS NOTE ADULT - SUBJECTIVE AND OBJECTIVE BOX
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  --------------------------------------------------------------------------------------------------  V/S:  Vital Signs Last 24 Hrs  T(C): 36.2 (25 Jan 2022 08:00), Max: 38.2 (24 Jan 2022 14:00)  T(F): 97.1 (25 Jan 2022 08:00), Max: 100.7 (24 Jan 2022 14:00)  HR: 108 (25 Jan 2022 11:00) (71 - 126)  BP: 119/62 (25 Jan 2022 11:00) (78/47 - 147/42)  BP(mean): 75 (25 Jan 2022 11:00) (50 - 110)  RR: 22 (25 Jan 2022 11:00) (18 - 30)  SpO2: 98% (25 Jan 2022 11:00) (91% - 100%)  Mode: CPAP with PS  FiO2: 40  PEEP: 5  PS: 10  MAP: 9  PIP: 16    --------------------------------------------------------------------------------------------------  I/Os:    24 Jan 2022 07:01  -  25 Jan 2022 07:00  --------------------------------------------------------  IN:    Dexmedetomidine: 210.7 mL    Free Water: 250 mL    Glucerna 1.5: 1035 mL    Phenylephrine: 66.5 mL  Total IN: 1562.2 mL    OUT:    Incontinent per Retracted Penis Pouch (mL): 150 mL    Indwelling Catheter - Urethral (mL): 1335 mL    Nasogastric/Oral tube (mL): 250 mL    Rectal Tube (mL): 500 mL  Total OUT: 2235 mL    Total NET: -672.8 mL      25 Jan 2022 07:01  -  25 Jan 2022 12:05  --------------------------------------------------------  IN:    Dexmedetomidine: 35.2 mL    Glucerna 1.5: 180 mL    Phenylephrine: 26.4 mL  Total IN: 241.6 mL    OUT:  Total OUT: 0 mL    Total NET: 241.6 mL        --------------------------------------------------------------------------------------------------  LABS:                        10.8   23.19 )-----------( 542      ( 25 Jan 2022 02:58 )             32.5     25 Jan 2022 02:58    146    |  112    |  22     ----------------------------<  152    3.7     |  25     |  0.78     Ca    7.3        25 Jan 2022 02:58  Phos  2.4       25 Jan 2022 02:58  Mg     2.50      25 Jan 2022 02:58    TPro  5.4    /  Alb  1.9    /  TBili  0.3    /  DBili  x      /  AST  65     /  ALT  72     /  AlkPhos  98     25 Jan 2022 02:58      CAPILLARY BLOOD GLUCOSE      POCT Blood Glucose.: 164 mg/dL (25 Jan 2022 07:45)  POCT Blood Glucose.: 165 mg/dL (25 Jan 2022 05:03)  POCT Blood Glucose.: 129 mg/dL (25 Jan 2022 01:01)  POCT Blood Glucose.: 154 mg/dL (24 Jan 2022 17:53)        LIVER FUNCTIONS - ( 25 Jan 2022 02:58 )  Alb: 1.9 g/dL / Pro: 5.4 g/dL / ALK PHOS: 98 U/L / ALT: 72 U/L / AST: 65 U/L / GGT: x             Culture - Abscess with Gram Stain (collected 23 Jan 2022 10:20)  Source: .Abscess Midline incision  Preliminary Report (24 Jan 2022 12:51):    Few Candida albicans "Susceptibilities not performed"        --------------------------------------------------------------------------------------------------  MEDICATIONS  (STANDING):  acetaminophen    Suspension .. 650 milliGRAM(s) Oral every 6 hours  acetylcysteine 10%  Inhalation 4 milliLiter(s) Inhalation two times a day  ALBUTerol    90 MICROgram(s) HFA Inhaler 2 Puff(s) Inhalation every 6 hours  chlorhexidine 0.12% Liquid 15 milliLiter(s) Oral Mucosa every 12 hours  chlorhexidine 4% Liquid 1 Application(s) Topical <User Schedule>  dexMEDEtomidine Infusion 0.2 MICROgram(s)/kG/Hr (4.38 mL/Hr) IV Continuous <Continuous>  digoxin  Injectable 125 MICROGram(s) IV Push daily  diltiazem    Tablet 15 milliGRAM(s) Oral every 8 hours  enoxaparin Injectable 40 milliGRAM(s) SubCutaneous daily  insulin glargine Injectable (LANTUS) 22 Unit(s) SubCutaneous every morning  insulin lispro (ADMELOG) corrective regimen sliding scale   SubCutaneous every 6 hours  multivitamin/minerals/iron Oral Solution (CENTRUM) 15 milliLiter(s) Oral daily  pantoprazole  Injectable 40 milliGRAM(s) IV Push every 12 hours  phenylephrine    Infusion 0.1 MICROgram(s)/kG/Min (1.64 mL/Hr) IV Continuous <Continuous>  potassium chloride  10 mEq/100 mL IVPB 10 milliEquivalent(s) IV Intermittent every 1 hour  thiamine 100 milliGRAM(s) Oral daily  vancomycin    Solution 125 milliGRAM(s) Enteral Tube every 6 hours    MEDICATIONS  (PRN):  haloperidol    Injectable 5 milliGRAM(s) IV Push every 6 hours PRN Agitation  HYDROmorphone  Injectable 0.25 milliGRAM(s) IV Push every 3 hours PRN Severe Pain (7 - 10)  oxyCODONE    Solution 5 milliGRAM(s) Oral every 4 hours PRN Moderate and Severe Pain    --------------------------------------------------------------------------------------------------

## 2022-01-25 NOTE — PROGRESS NOTE ADULT - ATTENDING COMMENTS
agree with plan.  repeat CTH to decide on Anti thrombotic. hold for now   seen in ICU agree with plan.  repeat CTH to decide on Anti thrombotic. hold for now   seen in ICU under covid precuations.
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
## 2022-01-25 NOTE — PROGRESS NOTE ADULT - SUBJECTIVE AND OBJECTIVE BOX
INTERVAL HISTORY: Patient seen at bedside with stroke attending. Intubated, on CPAP. On Precedex, Phenylephrine.  Following up after stability scan to determine timing of anticoagulation.     PAST MEDICAL & SURGICAL HISTORY:  DM (diabetes mellitus)    HTN (hypertension)      FAMILY HISTORY:  FH: Alzheimers disease (Mother)  early onset      SOCIAL HISTORY:   T/E/D:   Occupation:   Lives with:     MEDICATIONS (HOME):  Home Medications:    MEDICATIONS  (STANDING):  acetaminophen    Suspension .. 650 milliGRAM(s) Oral every 6 hours  acetylcysteine 10%  Inhalation 4 milliLiter(s) Inhalation two times a day  ALBUTerol    90 MICROgram(s) HFA Inhaler 2 Puff(s) Inhalation every 6 hours  chlorhexidine 0.12% Liquid 15 milliLiter(s) Oral Mucosa every 12 hours  chlorhexidine 4% Liquid 1 Application(s) Topical <User Schedule>  dexMEDEtomidine Infusion 0.2 MICROgram(s)/kG/Hr (4.38 mL/Hr) IV Continuous <Continuous>  digoxin  Injectable 125 MICROGram(s) IV Push daily  enoxaparin Injectable 40 milliGRAM(s) SubCutaneous daily  insulin glargine Injectable (LANTUS) 22 Unit(s) SubCutaneous every morning  insulin lispro (ADMELOG) corrective regimen sliding scale   SubCutaneous every 6 hours  multivitamin/minerals/iron Oral Solution (CENTRUM) 15 milliLiter(s) Oral daily  pantoprazole  Injectable 40 milliGRAM(s) IV Push every 12 hours  phenylephrine    Infusion 0.1 MICROgram(s)/kG/Min (1.64 mL/Hr) IV Continuous <Continuous>  potassium chloride  10 mEq/100 mL IVPB 10 milliEquivalent(s) IV Intermittent every 1 hour  thiamine 100 milliGRAM(s) Oral daily  vancomycin    Solution 125 milliGRAM(s) Enteral Tube every 6 hours    MEDICATIONS  (PRN):  haloperidol    Injectable 5 milliGRAM(s) IV Push every 6 hours PRN Agitation  HYDROmorphone  Injectable 0.25 milliGRAM(s) IV Push every 3 hours PRN Severe Pain (7 - 10)  oxyCODONE    Solution 5 milliGRAM(s) Oral every 4 hours PRN Moderate and Severe Pain    ALLERGIES/INTOLERANCES:  Allergies  No Known Allergies    Intolerances    VITALS & EXAMINATION:  Vital Signs Last 24 Hrs  T(C): 36.2 (25 Jan 2022 08:00), Max: 38.2 (24 Jan 2022 14:00)  T(F): 97.1 (25 Jan 2022 08:00), Max: 100.7 (24 Jan 2022 14:00)  HR: 100 (25 Jan 2022 08:00) (66 - 126)  BP: 117/63 (25 Jan 2022 08:00) (78/47 - 135/89)  BP(mean): 68 (25 Jan 2022 08:00) (50 - 110)  RR: 30 (25 Jan 2022 08:00) (18 - 30)  SpO2: 98% (25 Jan 2022 08:00) (91% - 99%)    General:  Constitutional: Male, appears stated age, in no apparent distress including pain.    Neurological:  MS: Awake, alert, unable to assess orientation. Normal affect. Cooperative. Offers handshake with right hand. Follows all commands on Right side.    Language: Patient is intubated and nonverbal.    CNs: EOMI, no nystagmus. L facial droop, difficult to determine degree with intubation.     Motor: Normal muscle bulk. No noticeable tremor. Spontaneous & purposeful movement of the RUE, no drifts. LUE no effort against gravity, LLE no movement.     Sensation: Diminished sensation to LT on L side.    Gait: deferred.    LABORATORY:  CBC                       10.8   23.19 )-----------( 542      ( 25 Jan 2022 02:58 )             32.5     Chem 01-25    146<H>  |  112<H>  |  22  ----------------------------<  152<H>  3.7   |  25  |  0.78    Ca    7.3<L>      25 Jan 2022 02:58  Phos  2.4     01-25  Mg     2.50     01-25    TPro  5.4<L>  /  Alb  1.9<L>  /  TBili  0.3  /  DBili  x   /  AST  65<H>  /  ALT  72<H>  /  AlkPhos  98  01-25    LFTs LIVER FUNCTIONS - ( 25 Jan 2022 02:58 )  Alb: 1.9 g/dL / Pro: 5.4 g/dL / ALK PHOS: 98 U/L / ALT: 72 U/L / AST: 65 U/L / GGT: x           Lipid Panel   A1c     STUDIES & IMAGING:  Radiology (XR, CT, MR, U/S, TTE/TANA):      NCCT (1/24/2022):  Decreased size of resolving right basal ganglia parenchymal hemorrhage,   currently 1.5 x 1.3 cm, previously 2.5 x 1.9 cm. Similar surrounding   vasogenic edema.    No hydrocephalus, midline shift, or effacement of basal cisterns.    Moderate white matter microvascular ischemic disease.    No displaced calvarial fracture.    IMPRESSION:    Decreased size of resolving right basal ganglia parenchymal hemorrhage.  Recommend following hemorrhage to resolution.    --- End of Report ---      TTE:   Ejection Fraction (Teicholtz): 72 %  ------------------------------------------------------------------------  OBSERVATIONS:  Mitral Valve: Mitral annular calcification, otherwise  normal mitral valve. Mild mitral regurgitation.  Aortic Root: Normal aortic root.  Aortic Valve: Calcified trileaflet aortic valve with midly  decreased opening.  Left Atrium: Normal left atrium.  Left Ventricle: Endocardium not well visualized; grossly  normal left ventricular systolic function. Normal left  ventricular internal dimensions and wall thicknesses.  Right Heart: Normal right atrium. Normal right ventricular  size and function. Normal tricuspid valve. Mild tricuspid  regurgitation. Normal pulmonic valve.  Pericardium/Pleura Normal pericardium with no pericardial  effusion.  Hemodynamic: Estimated right ventricular systolic pressure  equals 51 mm Hg, assuming right atrial pressure equals 10  mm Hg, consistent with moderate pulmonary hypertension.  ------------------------------------------------------------------------  CONCLUSIONS:  1. Mitral annular calcification, otherwise normal mitral  valve. Mild mitral regurgitation.  2. Calcified trileaflet aortic valve with mildly decreased  opening.  3. Endocardium not well visualized; grossly normal left  ventricular systolic function.  4. Normal right ventricular size and function.  5. Estimated pulmonary artery systolic pressure equals 51  mm Hg, assuming right atrial pressure equals 10  mm Hg,  consistent with moderate pulmonary hypertension.  ------------------------------------------------------------------------  Confirmed on  1/18/2022 - 17:21:00 by Anjelica Fenton M.D. [facility]        NCCT stability scan (1/17/2022):  Unchanged right basal ganglia intraparenchymal hematoma measuring 2.2 x   2.5 x 3.6 cm. There is no surrounding vasogenic edema. No mass effect or   midline shift.    The ventricles, sulci and cisternal spaces are stable in size.    Periventricular and pontine hypodensities suggesting moderate chronic   white matter ischemia.    The calvarium is intact. Left posterior scalp radiopaque densities. The   visualized intraorbital compartments, paranasal sinuses and mastoid   complexes appear free of acute disease.  Multiple occipital scalp metallic densities. Correlate clinically.    IMPRESSION:  Unchanged right basal ganglia intraparenchymal hematoma measuring 2.2 x   2.5 x 3.6 cm.    --- End of Report ---        CT Head No Cont (01.12.22 @ 18:51)     FINDINGS:  Ventricles and sulci: Parenchymal volume loss is present which is   commensurate with patient age.  Intra-axial: There are hemispheric white matter areas of low attenuation   which are nonspecific but likely related to sequelae of microvascular   disease.  Acute hemorrhage right basal ganglia 1.8 x 2.2 x 3.6 cm.  Extra-axial: There is no extra-axial collection.  Visualized sinuses: No air-fluid levels are identified. Clear.  Visualized mastoids:  Clear.  Calvarium: Unremarkable.  Miscellaneous:  Streak artifact left posterior scalp obscures some   visualization.    Impression: See below    ===========================================================================  ===========      CT CERVICAL SPINE:    TECHNIQUE:  Axial images were obtained through the cervical spine using   multislice helical technique.  Reformatted coronal and sagittal images   were performed.    COMPARISON EXAMINATION:  None available at this time.    FINDINGS:  On the sagittal reformations, there is no prevertebral soft tissue   swelling. There is no splaying of the spinous processes.  On the coronal reformations, occipital condyles are normal. Lateral   masses of C1 align normally with C2.  On the axial images, no lucent fracture line is identified.    Multilevel degenerative osteoarthritis is present. Findings include   marginal osteophytes, uncovertebral spurring, and facet joint space   compartment narrowing with subchondral sclerosis and hypertrophic   osteophytes at multiple levels. There is multilevel degenerative disc   disease. Findings include loss of normal disc space height and endplate   sclerosis.    Miscellaneous:  None.    IMPRESSIONS:    Head CT: Acute hemorrhage right basal ganglia 1.8 x 2.2 x 3.6 cm.   consider hypertensive hemorrhage rather than traumatic etiology.    C-spine CT:  No acute fracture.

## 2022-01-25 NOTE — PROGRESS NOTE ADULT - SUBJECTIVE AND OBJECTIVE BOX
SURGERY DAILY PROGRESS NOTE:       SUBJECTIVE/ROS:   Interval/Overnight Events: Patient seen and examined at bedside during morning rounds.     OBJECTIVE:  Vital Signs Last 24 Hrs  T(C): 38.7 (23 Jan 2022 04:00), Max: 38.7 (22 Jan 2022 06:00)  T(F): 101.6 (23 Jan 2022 04:00), Max: 101.7 (22 Jan 2022 06:00)  HR: 64 (23 Jan 2022 04:00) (60 - 72)  BP: --  BP(mean): --  RR: 21 (23 Jan 2022 04:00) (18 - 23)  SpO2: 98% (23 Jan 2022 04:00) (97% - 99%)    I&O's Detail    21 Jan 2022 07:01  -  22 Jan 2022 07:00  --------------------------------------------------------  IN:    Dexmedetomidine: 246.6 mL    Diltiazem: 120 mL    Glucerna 1.5: 540 mL    IV PiggyBack: 600 mL    Phenylephrine: 155.7 mL  Total IN: 1662.3 mL    OUT:    Indwelling Catheter - Urethral (mL): 2355 mL  Total OUT: 2355 mL    Total NET: -692.7 mL      22 Jan 2022 07:01  -  23 Jan 2022 05:26  --------------------------------------------------------  IN:    Dexmedetomidine: 237.6 mL    Diltiazem: 110 mL    Free Water: 500 mL    Glucerna 1.5: 990 mL    IV PiggyBack: 1000 mL    Phenylephrine: 77.4 mL  Total IN: 2915 mL    OUT:    Indwelling Catheter - Urethral (mL): 1500 mL    Rectal Tube (mL): 25 mL  Total OUT: 1525 mL    Total NET: 1390 mL    PHYSICAL EXAMINATION:  General: lying in bed, NAD  Resp: intubated  Abd: soft, mildly distended; midline incision is apparent for midline incisional erythema. Every other staple was removed with leakage of thick sanguinous discharge; discharge was cultured and sent to the lab. The integrity of the fascia was assessed at bedside and determined to be intact. Wound was subsequently packed with packing strip.    LABS:                        10.8   26.20 )-----------( 445      ( 23 Jan 2022 00:52 )             34.0     01-23    143  |  112<H>  |  23  ----------------------------<  233<H>  3.3<L>   |  22  |  0.91    Ca    7.3<L>      23 Jan 2022 00:52  Phos  2.0     01-23  Mg     2.50     01-23

## 2022-01-25 NOTE — PROGRESS NOTE ADULT - ASSESSMENT
76M found at home on the floor 1/12.   Acute right basal ganglia stroke.   1/12 Enterobacter and Proteus bacteremia.   1/15 Duodenal perforation s/p OR washout, repair. Related to bacteremia? Stress ulcer? H pylori IgG negative.   1/19 Respiratory failure, mucous plugging but newly positive COVID PCR too s/p remdesivir.   Had prolonged fevers, I suspected central cause.   This week developed C diff diarrhea 1/22 and abdominal surgical site erythema and drainage growing few Candida albicans 1/23, unclear significance.   Overall improved, planning extubation today.     Suggest  -local wound care of abdominal surgical site, would try to avoid systemic antibiotics given C diff but if worsening can try IV Vancomycin   -monitor cultures   -PO Vanc 125mg QID for C diff, day 2 of 10   -monitor stool quality     Discussed with SICU   I will be away tomorrow, returning Thurs. If follow up is needed tomorrow please page on call fellow.     Edy Nixon MD   Infectious Disease   Pager [phone]   After 5PM and on weekends please page fellow on call or call [phone]

## 2022-01-25 NOTE — PROGRESS NOTE ADULT - SUBJECTIVE AND OBJECTIVE BOX
Patient s/p extubation with covid pneumonia. Patient agitated and ripped of high flow. Bagged patient for 5- 10 minutes however patient desaturated down to 80 and decided to reintubate patient.  N on precedex, keep sedated target rass of -1 to -2  resp on mechanical ventilation required reintubation, covid pneumonia  cv afib rvr, on cardiezem, given 5 metoprolol on digoxin, small dose phenylephrine vasoplegic from medications  gi npo, ppi bid  gu/renal monitor uop, hold off diuresis  heme vte ppx  id on po vanc  endo no changes    The patient is a critical care patient with life threatening hemodynamic and metabolic instability in SICU.  I have personally interviewed when possible and examined the patient, reviewed data and laboratory tests/x-rays and all pertinent electronic images.  I was physically present for the key portions of the evaluation and management (E/M) service provided.   The SICU team has a constant risk benefit analyzes discussion with the primary team, all consultants, House Staff and PA's on all decisions.  These diagnoses are unrelated to the surgical procedure noted above.  I meet with family if needed to get further history, discuss the case and make care decisions for this patient who might not be able to participate.  Time involved in performance of separately billable procedures was not counted toward my critical care time. There is no overlap.  I spent 55-75 minutes ( 0800Hrs-0915Hrs in AM/ 1600Hrs-1715Hrs in PM, or other time indicated) of critical care time for the diagnoses, assessment, plan and interventions.  This time excludes time spent on separate procedures and teaching.

## 2022-01-26 NOTE — PROGRESS NOTE ADULT - SUBJECTIVE AND OBJECTIVE BOX
SURGERY DAILY PROGRESS NOTE:       SUBJECTIVE/ROS:   Interval/Overnight Events: Patient seen and examined at bedside during morning rounds. Reintubated yesterday PM    OBJECTIVE:  Vital Signs Last 24 Hrs  T(C): 38.7 (23 Jan 2022 04:00), Max: 38.7 (22 Jan 2022 06:00)  T(F): 101.6 (23 Jan 2022 04:00), Max: 101.7 (22 Jan 2022 06:00)  HR: 64 (23 Jan 2022 04:00) (60 - 72)  BP: --  BP(mean): --  RR: 21 (23 Jan 2022 04:00) (18 - 23)  SpO2: 98% (23 Jan 2022 04:00) (97% - 99%)    I&O's Detail    21 Jan 2022 07:01  -  22 Jan 2022 07:00  --------------------------------------------------------  IN:    Dexmedetomidine: 246.6 mL    Diltiazem: 120 mL    Glucerna 1.5: 540 mL    IV PiggyBack: 600 mL    Phenylephrine: 155.7 mL  Total IN: 1662.3 mL    OUT:    Indwelling Catheter - Urethral (mL): 2355 mL  Total OUT: 2355 mL    Total NET: -692.7 mL      22 Jan 2022 07:01  -  23 Jan 2022 05:26  --------------------------------------------------------  IN:    Dexmedetomidine: 237.6 mL    Diltiazem: 110 mL    Free Water: 500 mL    Glucerna 1.5: 990 mL    IV PiggyBack: 1000 mL    Phenylephrine: 77.4 mL  Total IN: 2915 mL    OUT:    Indwelling Catheter - Urethral (mL): 1500 mL    Rectal Tube (mL): 25 mL  Total OUT: 1525 mL    Total NET: 1390 mL    PHYSICAL EXAMINATION:  General: lying in bed, NAD  Resp: intubated  Abd: soft, mildly distended; midline incision is apparent for midline incisional erythema. Every other staple was removed with leakage of thick sanguinous discharge; discharge was cultured and sent to the lab. The integrity of the fascia was assessed at bedside and determined to be intact. Wound was subsequently packed with packing strip.    LABS:                        10.8   26.20 )-----------( 445      ( 23 Jan 2022 00:52 )             34.0     01-23    143  |  112<H>  |  23  ----------------------------<  233<H>  3.3<L>   |  22  |  0.91    Ca    7.3<L>      23 Jan 2022 00:52  Phos  2.0     01-23  Mg     2.50     01-23                           SURGERY DAILY PROGRESS NOTE:       SUBJECTIVE/ROS:   Interval/Overnight Events: Patient seen and examined at bedside during morning rounds. Reintubated.    OBJECTIVE:  Vital Signs Last 24 Hrs  T(C): 38.7 (23 Jan 2022 04:00), Max: 38.7 (22 Jan 2022 06:00)  T(F): 101.6 (23 Jan 2022 04:00), Max: 101.7 (22 Jan 2022 06:00)  HR: 64 (23 Jan 2022 04:00) (60 - 72)  BP: --  BP(mean): --  RR: 21 (23 Jan 2022 04:00) (18 - 23)  SpO2: 98% (23 Jan 2022 04:00) (97% - 99%)    I&O's Detail    21 Jan 2022 07:01  -  22 Jan 2022 07:00  --------------------------------------------------------  IN:    Dexmedetomidine: 246.6 mL    Diltiazem: 120 mL    Glucerna 1.5: 540 mL    IV PiggyBack: 600 mL    Phenylephrine: 155.7 mL  Total IN: 1662.3 mL    OUT:    Indwelling Catheter - Urethral (mL): 2355 mL  Total OUT: 2355 mL    Total NET: -692.7 mL      22 Jan 2022 07:01  -  23 Jan 2022 05:26  --------------------------------------------------------  IN:    Dexmedetomidine: 237.6 mL    Diltiazem: 110 mL    Free Water: 500 mL    Glucerna 1.5: 990 mL    IV PiggyBack: 1000 mL    Phenylephrine: 77.4 mL  Total IN: 2915 mL    OUT:    Indwelling Catheter - Urethral (mL): 1500 mL    Rectal Tube (mL): 25 mL  Total OUT: 1525 mL    Total NET: 1390 mL    PHYSICAL EXAMINATION:  General: lying in bed, NAD  Resp: intubated  Abd: soft, mildly distended; midline incision is apparent for midline incisional erythema. Every other staple was removed with leakage of thick sanguinous discharge; discharge was cultured and sent to the lab. The integrity of the fascia was assessed at bedside and determined to be intact. Wound was subsequently packed with packing strip.    LABS:                        10.8   26.20 )-----------( 445      ( 23 Jan 2022 00:52 )             34.0     01-23    143  |  112<H>  |  23  ----------------------------<  233<H>  3.3<L>   |  22  |  0.91    Ca    7.3<L>      23 Jan 2022 00:52  Phos  2.0     01-23  Mg     2.50     01-23
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
## 2022-01-26 NOTE — PROGRESS NOTE ADULT - ATTENDING COMMENTS
Critical Care Dx    U07.1 Pneumonia due to COVID-19 virus   J12.82 Pneumonia due to coronavirus disease 2019  -recovering well, CXR with less infiltrate and consolidation, but large left effusion. ARDS improved, oxygenating well  -aggressive pulmonary toilet  -extubation failed yesterday.  Will reassess today  -will tap left sided effusion to optimize further, consider tracheostomy plan  I61.9 Nontraumatic acute hemorrhage of basal ganglia   I63.9 Cerebrovascular accident (CVA), unspecified mechanism   -new baseline neuro exam intact, no sedated on vent  -neuro recs appreciated - no full dose anticoagulation for two weeks   A04.72 Clostridium difficile diarrhea   -PO vanco, trend WBC, diarrhea improved   I48.91 Atrial fibrillation, unspecified type   -rate controlled, con't regimen. Dilt IV transition to PO. Keep digoxin for now, check level today.  DVT prophylaxis only, no anticoagulation yet for fear of hemorrhagic conversion of ischemic stroke       The patient is a critical care patient with life threatening respiratory instability in SICU.  I have personally interviewed and examined the patient, reviewed data and laboratory tests/x-rays and all pertinent electronic images.  The SICU team has a constant risk benefit analyzes discussion with the primary team, all consultants, House Staff and PA's on all decisions.   Time involved in performance of separately billable procedures was not counted toward my critical care time. There is no overlap.    I have personally provided 60 minutes of critical care time concurrently with the resident/fellow. This time excludes time spent on separate procedures and time spent teaching. I have reviewed the resident's/fellow's documentation and agree with the assessment and plan of care.  I was physically present for the key portions of the evaluation and management (E/M) service provided.      Tian Del Real MD  Acute and Critical Care Surgery.
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
## 2022-01-27 NOTE — PROCEDURE NOTE - NSBRONCHPROCDETAILS_GEN_A_CORE_FT
Patient premedicated with 50mcg fentanyl. 2mg versed subsequently administered. Thick secretions encountered near distal tip of ETT, suctioned and lavaged. R and L main stem bronchus visualized, lavaged, suctioned, significant mucus evacuated. 50mcg additional fentanyl administered during procedure.     Patient tolerated procedure well with no complications. 
Time out performed and patient prepped in usual fashion, PEEP at 10 and FIO2 at 100% in vent settings,  sedation performed with propofol push pre and intraprocedural T-tube placed at ET tubing and bronchoscope advanced through ET tube to just above brandy. Right main bronchus clean and with healthy appearing aiways. Left airways with thick obstructing mucus tinged with serosanguinous streaking and tan colored debris. All visible and accessible mucus plug aspirated and irrigated copiously. Confirmed major airways were clear of any mucus plugging prior to completion of bronchoscopy 
Time out performed and patient prepped in usual fashion, PEEP at 10 and FIO2 at 100% in vent settings,  sedation performed with propofol push pre and intraprocedural T-tube placed at ET tubing and bronchoscope advanced through ET tube to just above brandy. Right main bronchus clean and with healthy appearing aiways. Left airways with thick obstructing mucus tinged with serosanguinous streaking and tan colored debris. All visible and accessible mucus plug aspirated and irrigated copiously. Confirmed major airways were clear of any mucus plugging prior to completion of bronchoscopy
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
## 2022-01-27 NOTE — PROGRESS NOTE ADULT - ASSESSMENT
76M found at home on the floor 1/12.   Acute right basal ganglia stroke.   1/12 Enterobacter and Proteus bacteremia.   1/15 Duodenal perforation s/p OR washout, repair. Related to bacteremia? Stress ulcer? H pylori IgG negative.   1/19 Respiratory failure, mucous plugging but newly positive COVID PCR too s/p remdesivir.   1/22 C diff diarrhea   Suspect prolonged recurring fevers are central or inflammatory given imaging and negative cultures.   Abdominal surgical site inflammation improved, no pus, few Candida albicans unlikely significanct.   Remains intubated, bronched today.     Suggest  -unclear indication for Meropenem, concern for effect on C diff, would at least reculture blood and sputum for guidance   -local wound care of abdominal surgical site  -PO Vanc 125mg QID for C diff, day 4 of 10   -monitor stool quality     Discussed with SICU     Edy Nixon MD   Infectious Disease   Pager [phone]   After 5PM and on weekends please page fellow on call or call [phone]

## 2022-01-27 NOTE — PROCEDURE NOTE - NSBRONCHHISTORY_GEN_A_CORE_FT
76M covid PNA, prolonged intubation with repeat mucus plugs now w imaging findings c/w mucus plug of left side, increased FIO2 requirements on vent
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
## 2022-01-27 NOTE — PROGRESS NOTE ADULT - ATTENDING COMMENTS
Critical Care Dx    U07.1 Pneumonia due to COVID-19 virus   J12.82 Pneumonia due to coronavirus disease 2019  -recovering well, CXR with less infiltrate and consolidation, but large left effusion. ARDS improved, oxygenating well  -aggressive pulmonary toilet - meropenem for possible bacterial PNA   -continued pulmonary infiltrates - will bronch today to improve ventilation/oxygenation  -deferred thoracocentesis given no window on ultrasound   I61.9 Nontraumatic acute hemorrhage of basal ganglia   I63.9 Cerebrovascular accident (CVA), unspecified mechanism   -new baseline neuro exam intact, weaning sedation  -neuro recs appreciated - no full dose anticoagulation for two weeks   A04.72 Clostridium difficile diarrhea   -PO vanco, trend WBC, diarrhea improved   I48.91 Atrial fibrillation, unspecified type   -rate controlled, con't regimen. Dilt IV transition to PO beta blocker. Keep digoxin for now, level appropriate.  DVT prophylaxis only, no anticoagulation yet for fear of hemorrhagic conversion of ischemic stroke     The patient is a critical care patient with life threatening hemodynamic and respiratory instability in SICU.  I have personally interviewed and examined the patient, reviewed data and laboratory tests/x-rays and all pertinent electronic images.  The SICU team has a constant risk benefit analyzes discussion with the primary team, all consultants, House Staff and PA's on all decisions.   Time involved in performance of separately billable procedures was not counted toward my critical care time. There is no overlap.    I have personally provided 60 minutes of critical care time concurrently with the resident/fellow. This time excludes time spent on separate procedures and time spent teaching. I have reviewed the resident's/fellow's documentation and agree with the assessment and plan of care.  I was physically present for the key portions of the evaluation and management (E/M) service provided.      Tian Del Real MD  Acute and Critical Care Surgery

## 2022-01-27 NOTE — PROGRESS NOTE ADULT - SUBJECTIVE AND OBJECTIVE BOX
SURGERY DAILY PROGRESS NOTE:       SUBJECTIVE/ROS:   Interval/Overnight Events: Patient seen and examined at bedside during morning rounds.    OBJECTIVE:  Vital Signs Last 24 Hrs  T(C): 38.7 (23 Jan 2022 04:00), Max: 38.7 (22 Jan 2022 06:00)  T(F): 101.6 (23 Jan 2022 04:00), Max: 101.7 (22 Jan 2022 06:00)  HR: 64 (23 Jan 2022 04:00) (60 - 72)  BP: --  BP(mean): --  RR: 21 (23 Jan 2022 04:00) (18 - 23)  SpO2: 98% (23 Jan 2022 04:00) (97% - 99%)    I&O's Detail    21 Jan 2022 07:01  -  22 Jan 2022 07:00  --------------------------------------------------------  IN:    Dexmedetomidine: 246.6 mL    Diltiazem: 120 mL    Glucerna 1.5: 540 mL    IV PiggyBack: 600 mL    Phenylephrine: 155.7 mL  Total IN: 1662.3 mL    OUT:    Indwelling Catheter - Urethral (mL): 2355 mL  Total OUT: 2355 mL    Total NET: -692.7 mL      22 Jan 2022 07:01  -  23 Jan 2022 05:26  --------------------------------------------------------  IN:    Dexmedetomidine: 237.6 mL    Diltiazem: 110 mL    Free Water: 500 mL    Glucerna 1.5: 990 mL    IV PiggyBack: 1000 mL    Phenylephrine: 77.4 mL  Total IN: 2915 mL    OUT:    Indwelling Catheter - Urethral (mL): 1500 mL    Rectal Tube (mL): 25 mL  Total OUT: 1525 mL    Total NET: 1390 mL    PHYSICAL EXAMINATION:  General: lying in bed, NAD  Resp: intubated  Abd: soft, mildly distended; midline incision is apparent for midline incisional erythema. Every other staple was removed with leakage of thick sanguinous discharge; discharge was cultured and sent to the lab. The integrity of the fascia was assessed at bedside and determined to be intact. Wound was subsequently packed with packing strip.    LABS:                        10.8   26.20 )-----------( 445      ( 23 Jan 2022 00:52 )             34.0     01-23    143  |  112<H>  |  23  ----------------------------<  233<H>  3.3<L>   |  22  |  0.91    Ca    7.3<L>      23 Jan 2022 00:52  Phos  2.0     01-23  Mg     2.50     01-23                           SURGERY DAILY PROGRESS NOTE:     24 hrs:  - Left pleural effusion, continuing to diurese  - Restarted on Cardizem gtt and required Lopressor pushes; Wean off  - Increasing PO Cardizem to 30 mg TID  - Digoxin level 0.7; continue current dose  - Starting meropenem for possible pneumonia 1/26 course of 10 days    SUBJECTIVE/ROS:   Interval/Overnight Events: Patient seen and examined at bedside during morning rounds. Patient remains intubated but fully alert. Denies CP, chills. Mild abdominal pain.     OBJECTIVE:  Vital Signs Last 24 Hrs  T(C): 38.7 (23 Jan 2022 04:00), Max: 38.7 (22 Jan 2022 06:00)  T(F): 101.6 (23 Jan 2022 04:00), Max: 101.7 (22 Jan 2022 06:00)  HR: 64 (23 Jan 2022 04:00) (60 - 72)  BP: --  BP(mean): --  RR: 21 (23 Jan 2022 04:00) (18 - 23)  SpO2: 98% (23 Jan 2022 04:00) (97% - 99%)    I&O's Detail    21 Jan 2022 07:01  -  22 Jan 2022 07:00  --------------------------------------------------------  IN:    Dexmedetomidine: 246.6 mL    Diltiazem: 120 mL    Glucerna 1.5: 540 mL    IV PiggyBack: 600 mL    Phenylephrine: 155.7 mL  Total IN: 1662.3 mL    OUT:    Indwelling Catheter - Urethral (mL): 2355 mL  Total OUT: 2355 mL    Total NET: -692.7 mL      22 Jan 2022 07:01  -  23 Jan 2022 05:26  --------------------------------------------------------  IN:    Dexmedetomidine: 237.6 mL    Diltiazem: 110 mL    Free Water: 500 mL    Glucerna 1.5: 990 mL    IV PiggyBack: 1000 mL    Phenylephrine: 77.4 mL  Total IN: 2915 mL    OUT:    Indwelling Catheter - Urethral (mL): 1500 mL    Rectal Tube (mL): 25 mL  Total OUT: 1525 mL    Total NET: 1390 mL    PHYSICAL EXAMINATION:  General: lying in bed, NAD  Resp: intubated  Abd: soft, mildly distended; midline incision is apparent for midline incisional erythema. Every other staple was removed with leakage of thick sanguinous discharge; discharge was cultured and sent to the lab. The integrity of the fascia was assessed at bedside and determined to be intact. Wound was subsequently packed with packing strip. (daily dressing changes)    LABS:                        10.8   26.20 )-----------( 445      ( 23 Jan 2022 00:52 )             34.0     01-23    143  |  112<H>  |  23  ----------------------------<  233<H>  3.3<L>   |  22  |  0.91    Ca    7.3<L>      23 Jan 2022 00:52  Phos  2.0     01-23  Mg     2.50     01-23

## 2022-01-27 NOTE — PROCEDURE NOTE - NSPRE-BRON/TUBRISKASSES_GEN_ALL_CORE
I evaluated the patient prior to bronchoscopy procedure for active pulmonary/laryngeal M. tuberculosis disease and the risk and actions taken:    Low risk with routine standard of care measures followed.
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
## 2022-01-27 NOTE — PROGRESS NOTE ADULT - SUBJECTIVE AND OBJECTIVE BOX
Follow Up: Bacteremia, C diff     Interval History/ROS: Febrile. Remains intubated. Seen after bronch.     Allergies  No Known Allergies        ANTIMICROBIALS:  meropenem  IVPB 1000 every 8 hours  vancomycin    Solution 125 every 6 hours      OTHER MEDS:  acetaminophen    Suspension .. 650 milliGRAM(s) Oral every 6 hours  ALBUTerol    90 MICROgram(s) HFA Inhaler 2 Puff(s) Inhalation every 6 hours  ALBUTerol    90 MICROgram(s) HFA Inhaler 2 Puff(s) Inhalation every 6 hours PRN  budesonide 160 MICROgram(s)/formoterol 4.5 MICROgram(s) Inhaler 2 Puff(s) Inhalation two times a day  chlorhexidine 0.12% Liquid 15 milliLiter(s) Oral Mucosa every 12 hours  chlorhexidine 4% Liquid 1 Application(s) Topical <User Schedule>  dexMEDEtomidine Infusion 0.2 MICROgram(s)/kG/Hr IV Continuous <Continuous>  digoxin  Injectable 125 MICROGram(s) IV Push daily  diltiazem    Tablet 30 milliGRAM(s) Oral every 12 hours  diltiazem Infusion 5 mG/Hr IV Continuous <Continuous>  enoxaparin Injectable 40 milliGRAM(s) SubCutaneous daily  furosemide    Tablet 40 milliGRAM(s) Oral daily  haloperidol    Injectable 5 milliGRAM(s) IV Push every 6 hours PRN  HYDROmorphone  Injectable 0.25 milliGRAM(s) IV Push every 3 hours PRN  insulin glargine Injectable (LANTUS) 22 Unit(s) SubCutaneous every morning  insulin lispro (ADMELOG) corrective regimen sliding scale   SubCutaneous every 6 hours  metoprolol tartrate 12.5 milliGRAM(s) Oral every 12 hours  midodrine 10 milliGRAM(s) Oral every 8 hours  multivitamin/minerals/iron Oral Solution (CENTRUM) 15 milliLiter(s) Oral daily  oxyCODONE    Solution 5 milliGRAM(s) Oral every 4 hours PRN  pantoprazole  Injectable 40 milliGRAM(s) IV Push every 12 hours  phenylephrine    Infusion 0.1 MICROgram(s)/kG/Min IV Continuous <Continuous>  thiamine 100 milliGRAM(s) Oral daily      Vital Signs Last 24 Hrs  T(C): 38.1 (27 Jan 2022 16:00), Max: 38.7 (27 Jan 2022 08:00)  T(F): 100.6 (27 Jan 2022 16:00), Max: 101.6 (27 Jan 2022 08:00)  HR: 79 (27 Jan 2022 17:00) (52 - 115)  BP: 109/56 (27 Jan 2022 17:00) (92/41 - 144/50)  BP(mean): 70 (27 Jan 2022 17:00) (52 - 87)  RR: 15 (27 Jan 2022 17:00) (15 - 26)  SpO2: 100% (27 Jan 2022 17:00) (92% - 100%)    Physical Exam:  General: not awake, chronically ill appearing   Cardio: tachycardic   Respiratory: mechanically ventilated   abd: nondistended. soft. midline surgical site packed, no longer erythematous, no purulence. rectal tube liquid stool   : lilly   vascular: right IJ CVC, no phlebitis   Skin: stable eschar both knees, no associated cellulitis                           9.8    23.24 )-----------( 562      ( 27 Jan 2022 03:32 )             31.2       01-27    151<H>  |  116<H>  |  31<H>  ----------------------------<  207<H>  4.0   |  28  |  0.94    Ca    7.6<L>      27 Jan 2022 03:32  Phos  2.6     01-27  Mg     2.80     01-27            MICROBIOLOGY:  Culture - Abscess with Gram Stain (collected 01-23-22 @ 10:20)  Source: .Abscess Midline incision  Preliminary Report (01-24-22 @ 12:51):    Few Candida albicans "Susceptibilities not performed"    Culture - Blood (collected 01-21-22 @ 10:19)  Source: .Blood Blood-Peripheral  Final Report (01-26-22 @ 17:01):    No Growth Final    Culture - Blood (collected 01-21-22 @ 10:19)  Source: .Blood Blood-Arterial  Final Report (01-26-22 @ 17:01):    No Growth Final    C Diff by PCR Result: Detected (01-22 @ 12:39)    C Diff by PCR Result: Detected (01-22-22 @ 12:39)      RADIOLOGY:  Images below reviewed personally  Xray Chest 1 View- PORTABLE-Urgent (Xray Chest 1 View- PORTABLE-Urgent .) (01.25.22 @ 22:52)   Bilateral layering effusions left greater than right that   appears to have increased on the left side since the last study   associated with interstitial edema but no cardiomegaly.    CT Chest Abdomen and Pelvis w/ Oral Cont and w/ IV Cont (01.21.22 @ 12:21)   *  Moderate bilateral pleural effusions with adjacent passive   atelectasis. Mild hypoenhancement of the lung at the left lung base   raises a question of superimposed pneumonia.  *  Trace free fluid in the abdomen and pelvis. No evidence of   pneumoperitoneum or intra-abdominal abscess.

## 2022-01-28 NOTE — PROGRESS NOTE ADULT - SUBJECTIVE AND OBJECTIVE BOX
Follow Up:  Fevers, bacteremia    Interval History/ROS: Intermittent fevers still. Awake, tries to tell me something. Remains intubated.     Allergies  No Known Allergies        ANTIMICROBIALS:  meropenem  IVPB 1000 every 8 hours  vancomycin    Solution 125 every 6 hours      OTHER MEDS:  acetaminophen    Suspension .. 650 milliGRAM(s) Oral every 6 hours  ALBUTerol    90 MICROgram(s) HFA Inhaler 2 Puff(s) Inhalation every 6 hours  ALBUTerol    90 MICROgram(s) HFA Inhaler 2 Puff(s) Inhalation every 6 hours PRN  budesonide 160 MICROgram(s)/formoterol 4.5 MICROgram(s) Inhaler 2 Puff(s) Inhalation two times a day  chlorhexidine 0.12% Liquid 15 milliLiter(s) Oral Mucosa every 12 hours  chlorhexidine 4% Liquid 1 Application(s) Topical <User Schedule>  dexMEDEtomidine Infusion 0.2 MICROgram(s)/kG/Hr IV Continuous <Continuous>  digoxin  Injectable 125 MICROGram(s) IV Push daily  diltiazem    Tablet 30 milliGRAM(s) Oral daily  furosemide    Tablet 40 milliGRAM(s) Oral daily  haloperidol    Injectable 5 milliGRAM(s) IV Push every 6 hours PRN  insulin glargine Injectable (LANTUS) 24 Unit(s) SubCutaneous every morning  insulin lispro (ADMELOG) corrective regimen sliding scale   SubCutaneous every 6 hours  metoprolol tartrate 25 milliGRAM(s) Oral every 12 hours  metoprolol tartrate 12.5 milliGRAM(s) Oral once  midodrine 10 milliGRAM(s) Oral every 8 hours  multivitamin/minerals/iron Oral Solution (CENTRUM) 15 milliLiter(s) Oral daily  pantoprazole  Injectable 40 milliGRAM(s) IV Push every 12 hours  phenylephrine    Infusion 0.1 MICROgram(s)/kG/Min IV Continuous <Continuous>  thiamine 100 milliGRAM(s) Oral daily      Vital Signs Last 24 Hrs  T(C): 36.9 (28 Jan 2022 16:05), Max: 37.6 (28 Jan 2022 15:45)  T(F): 98.5 (28 Jan 2022 16:05), Max: 99.7 (28 Jan 2022 15:45)  HR: 73 (28 Jan 2022 16:05) (65 - 120)  BP: 141/52 (28 Jan 2022 16:05) (98/81 - 141/52)  BP(mean): 75 (28 Jan 2022 16:05) (58 - 96)  RR: 16 (28 Jan 2022 16:05) (13 - 20)  SpO2: 100% (28 Jan 2022 16:05) (100% - 100%)    Physical Exam:  General: awake, non toxic   Cardio: regular rate   ENT: NG tube   Respiratory: mechanically ventilated  abd: nondistended, soft  Musculoskeletal: no focal joint swelling, no edema  vascular: right IJ CVC, no phlebitis   Skin: minimal erythema around packed anterior abdominal surgical site, no purulence. stable eschars both knees and left arm   psych: calm                           7.2    16.37 )-----------( 508      ( 28 Jan 2022 15:27 )             23.3       01-28    152<H>  |  116<H>  |  34<H>  ----------------------------<  235<H>  3.9   |  30  |  0.79    Ca    7.6<L>      28 Jan 2022 01:30  Phos  2.9     01-28  Mg     2.50     01-28        MICROBIOLOGY:  Culture - Abscess with Gram Stain (collected 01-23-22 @ 08:56)  Source: .Abscess Midline incision  Final Report (01-28-22 @ 09:18):    Few Candida albicans "Susceptibilities not performed"    C Diff by PCR Result: Detected (01-22 @ 12:39)    C Diff by PCR Result: Detected (01-22-22 @ 12:39)      RADIOLOGY:  Images below reviewed personally  Xray Chest 1 View- PORTABLE-Routine (Xray Chest 1 View- PORTABLE-Routine in AM.) (01.28.22 @ 01:11)   Waxing and waning left pleural effusion with significant atelectasis of   the left lung.  Near total resolution of right pleural effusion.  Tubes and lines as above.  No complicating pneumothorax or pneumomediastinum post bronchoscopy.

## 2022-01-28 NOTE — PROCEDURE NOTE - NSPROCDETAILS_GEN_ALL_CORE
location identified, draped/prepped, sterile technique used, needle inserted/introduced/positive blood return obtained via catheter/connected to a pressurized flush line/sutured in place/hemostasis with direct pressure, dressing applied/Seldinger technique/all materials/supplies accounted for at end of procedure
patient pre-oxygenated, tube inserted, placement confirmed
patient pre-oxygenated, tube inserted, placement confirmed
guidewire recovered/lumen(s) aspirated and flushed/sterile dressing applied/sterile technique, catheter placed/ultrasound guidance with use of sterile gel and probe cove

## 2022-01-28 NOTE — PROGRESS NOTE ADULT - ASSESSMENT
76M found on the floor at home 1/12 with an acute right basal ganglia stroke.   1/12 Enterobacter and Proteus bacteremia.   1/15 Duodenal perforation s/p OR washout, repair. Related to bacteremia? Stress ulcer? H pylori IgG negative.   1/19 Respiratory failure, mucous plugging but newly positive COVID PCR too s/p remdesivir.   1/22 C diff diarrhea   Suspect prolonged recurring fevers are central or inflammatory.   Blood and sputum cultures have been negative.   Abdominal surgical site minimal erythema, no pus, few Candida albicans unlikely significant.   Failed extubations due to recurring mucous plugging.     Suggest  -unclear indication for Meropenem, would at least reculture blood and sputum for guidance especially in the setting of C diff   -PO Vanc 125mg QID for C diff, day 5 of 10, possibly longer if systemic antibiotics are continued   -monitor stool quality   -local wound care of abdominal surgical site    Discussed with SICU     Edy Nixon MD   Infectious Disease   Pager [phone]   After 5PM and on weekends please page fellow on call or call [phone]
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
## 2022-01-28 NOTE — PROGRESS NOTE ADULT - ATTENDING COMMENTS
I agree with the detailed interval history, physical, and plan, which I have reviewed and edited where appropriate'; also agree with notes/assessment with my team on service.  I have personally examined the patient.  I was physically present for the key portions of the evaluation and management (E/M) service provided.  I reviewed all the pertinent data.  The patient is a critical care patient with life threatening hemodynamic and metabolic instability in SICU.  The SICU team has a constant risk benefit analyzes discussion and coordinating care with the primary team and all consultants.   The patient is in SICU with the chief complaint and diagnosis mentioned in the note.   The plan will be specified in the note.  77 y/o M DM, HTN, Dementia, sp  fall and rhabdomyolysis, pressure injuries, acute hemorraghic stroke and s/p 1/15 Franki path with multiple re-intubations in SICU.  no focal deficits.   RESPIRATORY  Exam: Lungs clear   CARDIOVASCULAR  Exam: Tachycardic with irregular rhythm.   GI/NUTRITION  Exam: Abdomen soft, distended   VASCULAR  Exam:   Upper extremities warm,    Plan:  Neuro; dementia, hemorrhagic stroke w/ Intracerebral hemorrhage and Right basal ganglia hemorrhage with left sided hemiplegia; dysarthria  - Sedation w/ Precedex; wean as tolerated  Respiratory; respiratory failure/COVID+/ pneumonia  - Continue Meropenem  - F/U Sputum cultures  Cardiovascular;  AFib with RVR; hypotension   -On Dig 125 mcg IV daily, Metoprolol 25 mg BID  - Off pressors; Midodrine 10 mg TID  GI; Gastric Ulcer Perforation; s/p Franki patch, C dif positive;  -  PO Vanc   - Protonix 40 BID  - Thiamine 500 qD  - ;  Hypernatremia;  - Increase Free water to 250 mL   - Continue 40 mg PO Lasix daily  Heme  - SQH for DVT PPx  ID  -  meropenem   - F/U Repeat Sputum Cx  Endo:   -Lantus increased to 24 units nightly

## 2022-01-28 NOTE — PROCEDURE NOTE - NSPROCSEDATIONNOT_GEN_ALL_CORE
Hematology/Oncology  Progress Note    Name: Dakota Harrell  Date: 2017  : 1967    PCP: Darby Green MD     Ms. Fareed Perry is a 46year old female who was seen for management of her underlying anemia. The patient also past chronic thrombocytopenia/ITP. Current therapy: IVIG monthly, ferrous sulfate BID, and Venofer PRN. Subjective:     Ms. Fareed Perry is a 51-year-old woman who has anemia and thrombocytopenia. Her anemia is related more to chronic kidney disease at this point. She received Procrit whenever the hemoglobin is below 10 g/dL with hematocrit below 30%. Past medical history, family history, and social history: these were reviewed and remains unchanged.     Past Medical History:   Diagnosis Date    Anemia     Arthritis     Diabetes ([facility] [address]     Hypertension     ITP (idiopathic thrombocytopenic purpura)      Past Surgical History:   Procedure Laterality Date    HX GYN      c section     HX HYSTERECTOMY      HX TUBAL LIGATION       Social History     Socioeconomic History    Marital status: SINGLE     Spouse name: Not on file    Number of children: Not on file    Years of education: Not on file    Highest education level: Not on file   Social Needs    Financial resource strain: Not on file    Food insecurity - worry: Not on file    Food insecurity - inability: Not on file   [employer] needs - medical: Not on file   [employer] needs - non-medical: Not on file   Occupational History    Not on file   Tobacco Use    Smoking status: Never Smoker    Smokeless tobacco: Never Used   Substance and Sexual Activity    Alcohol use: No    Drug use: No    Sexual activity: Not on file   Other Topics Concern    Not on file   Social History Narrative    Not on file     Family History   Problem Relation Age of Onset    Hypertension Mother     Cancer Father         Colon     Current Outpatient Medications   Medication Sig Dispense Refill    metFORMIN (GLUCOPHAGE) 500
No
mg tablet Take 2 Tabs by mouth two (2) times daily (with meals). Indications: TYPE 2 DIABETES MELLITUS 120 Tab 2    lisinopril-hydrochlorothiazide (PRINZIDE, ZESTORETIC) 20-25 mg per tablet Take 1 Tab by mouth daily. Indications: HYPERTENSION      amLODIPine (NORVASC) 10 mg tablet Take 10 mg by mouth daily. Indications: HYPERTENSION      ferrous sulfate 325 mg (65 mg iron) tablet Take 325 mg by mouth two (2) times a day. Facility-Administered Medications Ordered in Other Visits   Medication Dose Route Frequency Provider Last Rate Last Dose    epoetin kelsie (EPOGEN;PROCRIT) injection 40,000 Units  40,000 Units SubCUTAneous ONCE Valerio Wolfe MD   Stopped at 11/27/18 1000    And    epoetin kelsie (EPOGEN;PROCRIT) injection 20,000 Units  20,000 Units SubCUTAneous ONCE Valerio Wolfe MD   Stopped at 11/27/18 1000    diphenhydrAMINE (BENADRYL) injection 50 mg  50 mg IntraVENous Q4H PRN Valerio Wolfe MD        acetaminophen (TYLENOL) tablet 650 mg  650 mg Oral Q4H PRN Valerio Wolfe MD        0.9% sodium chloride infusion 250 mL  250 mL IntraVENous CONTINUOUS Valerio Wolfe MD   Stopped at 11/27/18 1340    sodium chloride (NS) flush 10-40 mL  10-40 mL IntraVENous PRN Valerio Wolfe MD   10 mL at 11/27/18 0945    immune globulin 10% (FLEBOGAMMA DIF) infusion 5 g  5 g IntraVENous ONCE Ray Cano MD   Stopped at 11/27/18 1325       Review of Systems  Constitutional: The patient denies fatigue or acute distress  HEENT: The patient denies recent head trauma, eye pain, blurred vision,  hearing deficit, oropharyngeal mucosal pain or lesions, and the patient denies throat pain or discomfort. Lymphatics: The patient denies palpable peripheral lymphadenopathy. Hematologic: The patient denies having bruising, bleeding, or progressive fatigue. Respiratory: Patient denies having shortness of breath, cough, sputum production, fever, or dyspnea on exertion. Cardiovascular:  The patient denies having leg
pain, leg swelling, heart palpitations, chest permit, chest pain, or lightheadedness. The patient denies having dyspnea on exertion. Gastrointestinal: The patient denies having nausea, emesis, or diarrhea. The patient denies having any hematemesis or blood in the stool. Genitourinary: Patient denies having urinary urgency, frequency, or dysuria. The patient denies having blood in the urine. Psychological: The patient denies having symptoms of nervousness, anxiety, depression, or thoughts of harming himself some of this. Skin: Patient denies having skin rashes, skin, ulcerations, or unexplained itching or pruritus. Musculoskeletal: The patient denies having pain in the joints or bones. Objective:     Visit Vitals  /78   Pulse 86   Temp 98.7 °F (37.1 °C) (Oral)   Resp 16   Ht 5' 7\" (1.702 m)   Wt 92.5 kg (204 lb)   LMP 06/18/2017   SpO2 99%   BMI 31.95 kg/m²     ECOG 0  Physical Exam:   Gen. Appearance: The patient is in no acute distress. Skin: There is no bruise or rash. HEENT: The exam is unremarkable. Neck: Supple without lymphadenopathy or thyromegaly. Lungs: Clear to auscultation and percussion; there are no wheezes or rhonchi. Heart: Regular rate and rhythm; there are no murmurs, gallops, or rubs. Anterior chest wall and breasts: Deferred. Abdomen: Bowel sounds are present and normal.  There is no guarding, tenderness, or hepatosplenomegaly. Extremities: There is no clubbing, cyanosis, or edema. Neurologic: There are no focal neurologic deficits. Lymphatics: There is no palpable peripheral lymphadenopathy. Musculoskeletal: The patient has full range of motion at all joints. There is no evidence of joint deformity or effusions. There is no focal joint tenderness. Psychological/psychiatric: There is no clinical evidence of anxiety, depression, or melancholy.     Lab data:      Results for orders placed or performed during the hospital encounter of 11/27/18   CBC WITH 3 PART DIFF
Status: Abnormal   Result Value Ref Range Status    WBC 6.2 4.5 - 13.0 K/uL Final    RBC 3.85 (L) 4.10 - 5.10 M/uL Final    HGB 10.2 (L) 12.0 - 16.0 g/dL Final    HCT 31.7 (L) 36 - 48 % Final    MCV 82.3 78 - 102 FL Final    MCH 26.5 25.0 - 35.0 PG Final    MCHC 32.2 31 - 37 g/dL Final    RDW 13.2 11.5 - 14.5 % Final    NEUTROPHILS 81 (H) 40 - 70 % Final    MIXED CELLS 4 0.1 - 17 % Final    LYMPHOCYTES 15 14 - 44 % Final    ABS. NEUTROPHILS 5.0 1.8 - 9.5 K/UL Final    ABS. MIXED CELLS 0.3 0.0 - 2.3 K/uL Final    ABS. LYMPHOCYTES 0.9 (L) 1.1 - 5.9 K/UL Final     Comment: Test performed at [address] Results Reviewed by Medical Director. DF AUTOMATED   Final         Assessment:     1. Anemia in stage 3 chronic kidney disease ([address]    2. Menorrhagia with regular cycle    3. Anemia due to blood loss, chronic    4. Thrombocytopenia ([address]      Plan:   Anemia due to blood loss/menorrhagia and chronic kidney disease: The patient is continuing to receive Procrit every 2 weeks at a dose of 60,000 units subcutaneous whenever the hemoglobin is below 10 g/dL. 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 The platelets are continuing to respond to intravenous gamma globulin. The current CBC shows that her platelet count is 39,322. Menorrhagia (resolved problem: Patient underwent a hysterectomy in 2017 and no longer has evidence of vaginal bleeding or genitourinary blood loss. The patient will return to clinic for complete reassessment again in 12 weeks.   Orders Placed This Encounter    METABOLIC PANEL, COMPREHENSIVE     Standing Status:   Future     Standing Expiration Date:   11/28/2019    IRON PROFILE     Standing Status:   Future     Standing Expiration Date:   11/28/2019    FERRITIN     Standing Status:   Future     Standing Expiration Date:   11/28/2019       Feng Mccallum MD  4/25/2017
Yes
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
## 2022-01-28 NOTE — PROCEDURE NOTE - NSINDICATIONS_GEN_A_CORE
arterial puncture to obtain ABG's/cannulation purposes/critical patient/monitoring purposes
respiratory distress
airway protection/mental status change
critical illness/hemodynamic monitoring

## 2022-01-28 NOTE — CHART NOTE - NSCHARTNOTEFT_GEN_A_CORE
Patient last seen by this RDN on 1/22, now for nutrition follow up. Spoke with RN and obtained subjective information from extensive chart review.     Current Diet : Diet, NPO (01-28-22 @ 04:06)    Reported:  [ ] nausea  [ ] vomiting [ ] diarrhea [ ] constipation  [ ]chewing problems [ ] swallowing issues  [ ] other:   PO intake:  < 50% [ ] 50-75% [ ]   % [ ]  other :  Enteral /Parenteral Nutrition:   Current Weight: Height (cm): 172.7 (01-15 @ 18:28)  Weight (kg): 87.5 (01-15 @ 18:28)  BMI (kg/m2): 29.3 (01-15 @ 18:28)    __________________ Pertinent Medications__________________   MEDICATIONS  (STANDING):  acetaminophen    Suspension .. 650 milliGRAM(s) Oral every 6 hours  ALBUTerol    90 MICROgram(s) HFA Inhaler 2 Puff(s) Inhalation every 6 hours  budesonide 160 MICROgram(s)/formoterol 4.5 MICROgram(s) Inhaler 2 Puff(s) Inhalation two times a day  chlorhexidine 0.12% Liquid 15 milliLiter(s) Oral Mucosa every 12 hours  chlorhexidine 4% Liquid 1 Application(s) Topical <User Schedule>  dexMEDEtomidine Infusion 0.2 MICROgram(s)/kG/Hr (4.38 mL/Hr) IV Continuous <Continuous>  digoxin  Injectable 125 MICROGram(s) IV Push daily  diltiazem    Tablet 30 milliGRAM(s) Oral daily  furosemide    Tablet 40 milliGRAM(s) Oral daily  heparin   Injectable 5000 Unit(s) SubCutaneous every 8 hours  insulin glargine Injectable (LANTUS) 24 Unit(s) SubCutaneous every morning  insulin lispro (ADMELOG) corrective regimen sliding scale   SubCutaneous every 6 hours  meropenem  IVPB 1000 milliGRAM(s) IV Intermittent every 8 hours  metoprolol tartrate 25 milliGRAM(s) Oral every 12 hours  metoprolol tartrate 12.5 milliGRAM(s) Oral once  midodrine 10 milliGRAM(s) Oral every 8 hours  multivitamin/minerals/iron Oral Solution (CENTRUM) 15 milliLiter(s) Oral daily  pantoprazole  Injectable 40 milliGRAM(s) IV Push every 12 hours  phenylephrine    Infusion 0.1 MICROgram(s)/kG/Min (1.64 mL/Hr) IV Continuous <Continuous>  thiamine 100 milliGRAM(s) Oral daily  vancomycin    Solution 125 milliGRAM(s) Enteral Tube every 6 hours    MEDICATIONS  (PRN):  ALBUTerol    90 MICROgram(s) HFA Inhaler 2 Puff(s) Inhalation every 6 hours PRN Shortness of Breath and/or Wheezing  haloperidol    Injectable 5 milliGRAM(s) IV Push every 6 hours PRN Agitation      __________________ Pertinent Labs__________________   01-28 Na152 mmol/L<H> Glu 235 mg/dL<H> K+ 3.9 mmol/L Cr  0.79 mg/dL BUN 34 mg/dL<H> 01-28 Phos 2.9 mg/dL 01-25 Alb 1.9 g/dL<L>        Skin:     Estimated Needs:   [ ] no change since previous assessment  [ ] recalculated:   kcals/day  gms protein/day    Previous Nutrition Diagnosis:     [ ] Inadequate Energy Intake [ ]Inadequate Oral Intake [ ] Excessive Energy Intake   [ ] Underweight [ ] Increased Nutrient Needs [ ] Overweight/Obesity   [ ] Altered GI Function [ ] Unintended Weight Loss [ ] Food & Nutrition Related Knowledge Deficit [ ] Malnutrition     Nutrition Diagnosis is [ ] ongoing  [ ] resolved [ ] not applicable     New Nutrition Diagnosis: [ ] not applicable    [ ] Inadequate Protein Energy Intake   [ ]Inadequate Oral Intake   [ ] Excessive Energy Intake   [ ] Underweight   [ ] Increased Nutrient Needs   [ ] Overweight/Obesity   [ ] Altered GI Function   [ ] Unintended Weight Loss   [ ] Food & Nutrition Related Knowledge Deficit  [ ] Limited Adherence to nutrition related recommendations   [ ] Malnutrition    [ ] other:        Related to:   As evidenced by:     Monitoring and Evaluation: Patient last seen by this RDN on 1/22, now for nutrition follow up. Spoke with RN and obtained subjective information from extensive chart review.     Current Diet : Diet, NPO (01-28-22 @ 04:06)    BMI (kg/m2): 29.3 (01-15 @ 18:28)    Weight Trend:              Dosing Weight: 87.5 kg                       IBW: 69.8    94.4 kg (1/27)  95.6 kg (1/26)  99.1 kg (1/25)  99 kg (1/23)  105.9 kg (1/22)    Nutrition Interval Events: Pt intubated and sedated on precedex. Pt NPO due to melanotic stools with downtrending CBC. Weight trend reflective of edema now 3+ L/R arms, hands and wrists. Receiving IVPB fluids.  - 185 mg/dl with Lantus and Admelog insulin coverage. Pt continues with unstageable L/R knee pressure injuries. Consider PN if unable to reinstate TF. RDN services to remain available as needed.     __________________ Pertinent Medications__________________   MEDICATIONS  (STANDING):  acetaminophen    Suspension .. 650 milliGRAM(s) Oral every 6 hours  ALBUTerol    90 MICROgram(s) HFA Inhaler 2 Puff(s) Inhalation every 6 hours  budesonide 160 MICROgram(s)/formoterol 4.5 MICROgram(s) Inhaler 2 Puff(s) Inhalation two times a day  chlorhexidine 0.12% Liquid 15 milliLiter(s) Oral Mucosa every 12 hours  chlorhexidine 4% Liquid 1 Application(s) Topical <User Schedule>  dexMEDEtomidine Infusion 0.2 MICROgram(s)/kG/Hr (4.38 mL/Hr) IV Continuous <Continuous>  digoxin  Injectable 125 MICROGram(s) IV Push daily  diltiazem    Tablet 30 milliGRAM(s) Oral daily  furosemide    Tablet 40 milliGRAM(s) Oral daily  heparin   Injectable 5000 Unit(s) SubCutaneous every 8 hours  insulin glargine Injectable (LANTUS) 24 Unit(s) SubCutaneous every morning  insulin lispro (ADMELOG) corrective regimen sliding scale   SubCutaneous every 6 hours  meropenem  IVPB 1000 milliGRAM(s) IV Intermittent every 8 hours  metoprolol tartrate 25 milliGRAM(s) Oral every 12 hours  metoprolol tartrate 12.5 milliGRAM(s) Oral once  midodrine 10 milliGRAM(s) Oral every 8 hours  multivitamin/minerals/iron Oral Solution (CENTRUM) 15 milliLiter(s) Oral daily  pantoprazole  Injectable 40 milliGRAM(s) IV Push every 12 hours  phenylephrine    Infusion 0.1 MICROgram(s)/kG/Min (1.64 mL/Hr) IV Continuous <Continuous>  thiamine 100 milliGRAM(s) Oral daily  vancomycin    Solution 125 milliGRAM(s) Enteral Tube every 6 hours    MEDICATIONS  (PRN):  ALBUTerol    90 MICROgram(s) HFA Inhaler 2 Puff(s) Inhalation every 6 hours PRN Shortness of Breath and/or Wheezing  haloperidol    Injectable 5 milliGRAM(s) IV Push every 6 hours PRN Agitation      __________________ Pertinent Labs__________________   01-28 Na152 mmol/L<H> Glu 235 mg/dL<H> K+ 3.9 mmol/L Cr  0.79 mg/dL BUN 34 mg/dL<H> 01-28 Phos 2.9 mg/dL 01-25 Alb 1.9 g/dL<L>      Estimated Needs:     1396 - 1745 kcals/day (20-25 kcals/kg IBW)  70 - 105 gms protein/day (1.0-1.5 gms/kg IBW)    Previous Nutrition Diagnosis:     Increased Nutrient Needs     Nutrition Diagnosis is: Ongoing      Goal(s):  1. Patient to meet > 75% estimated energy needs    Recommendations:   1. Consider PN if unable to reinstate TF.    Monitoring and Evaluation:   1. Monitor weights, labs, BMs, skin integrity and edema.  2. RD services to remain available.

## 2022-01-28 NOTE — PROCEDURE NOTE - NSINFORMCONSENT_GEN_A_CORE
This was an emergent procedure.
Patients daughter Nataly/Benefits, risks, and possible complications of procedure explained to patient/caregiver who verbalized understanding and gave written consent.
This was an emergent procedure.
from daughter by phone/Benefits, risks, and possible complications of procedure explained to patient/caregiver who verbalized understanding and gave verbal consent.

## 2022-01-28 NOTE — PROCEDURE NOTE - SUPERVISORY STATEMENT
Tian Del Real MD  Acute Care Surgery
Modified RSI w/ Propofol 30 mg, Rocuronium 100 mg, Phenylephrine 300 mcg. Indirect laryngoscopy w/ [place] 3. No complications. CXR pending.
Agree

## 2022-01-28 NOTE — PROGRESS NOTE ADULT - SUBJECTIVE AND OBJECTIVE BOX
SURGERY DAILY PROGRESS NOTE:     SUBJECTIVE/ROS:   Interval/Overnight Events: Patient seen and examined at bedside during morning rounds. No new complaints    OBJECTIVE:  Vital Signs Last 24 Hrs  T(C): 38.7 (23 Jan 2022 04:00), Max: 38.7 (22 Jan 2022 06:00)  T(F): 101.6 (23 Jan 2022 04:00), Max: 101.7 (22 Jan 2022 06:00)  HR: 64 (23 Jan 2022 04:00) (60 - 72)  BP: --  BP(mean): --  RR: 21 (23 Jan 2022 04:00) (18 - 23)  SpO2: 98% (23 Jan 2022 04:00) (97% - 99%)    I&O's Detail    21 Jan 2022 07:01  -  22 Jan 2022 07:00  --------------------------------------------------------  IN:    Dexmedetomidine: 246.6 mL    Diltiazem: 120 mL    Glucerna 1.5: 540 mL    IV PiggyBack: 600 mL    Phenylephrine: 155.7 mL  Total IN: 1662.3 mL    OUT:    Indwelling Catheter - Urethral (mL): 2355 mL  Total OUT: 2355 mL    Total NET: -692.7 mL      22 Jan 2022 07:01  -  23 Jan 2022 05:26  --------------------------------------------------------  IN:    Dexmedetomidine: 237.6 mL    Diltiazem: 110 mL    Free Water: 500 mL    Glucerna 1.5: 990 mL    IV PiggyBack: 1000 mL    Phenylephrine: 77.4 mL  Total IN: 2915 mL    OUT:    Indwelling Catheter - Urethral (mL): 1500 mL    Rectal Tube (mL): 25 mL  Total OUT: 1525 mL    Total NET: 1390 mL    PHYSICAL EXAMINATION:  General: lying in bed, NAD  Resp: intubated  Abd: soft, mildly distended; midline incision is apparent for midline incisional erythema. Every other staple was removed with leakage of thick sanguinous discharge; discharge was cultured and sent to the lab. The integrity of the fascia was assessed at bedside and determined to be intact. Wound was subsequently packed with packing strip. (daily dressing changes)    LABS:                        10.8   26.20 )-----------( 445      ( 23 Jan 2022 00:52 )             34.0     01-23    143  |  112<H>  |  23  ----------------------------<  233<H>  3.3<L>   |  22  |  0.91    Ca    7.3<L>      23 Jan 2022 00:52  Phos  2.0     01-23  Mg     2.50     01-23

## 2022-01-29 NOTE — PROGRESS NOTE ADULT - ATTENDING COMMENTS
Call and discussed medication refill with patient primary clinic. Follow up plan discussed with patient for medication management and follow up care with pain clinic and primary doctor.   Pt seen and examined.  Agree with resident eval and plan.
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
## 2022-01-29 NOTE — PROGRESS NOTE ADULT - ATTENDING COMMENTS
I agree with the detailed interval history, physical, and plan, which I have reviewed and edited where appropriate'; also agree with notes/assessment with my team on service.  I have personally examined the patient.  I was physically present for the key portions of the evaluation and management (E/M) service provided.  I reviewed all the pertinent data.  The patient is a critical care patient with life threatening hemodynamic and metabolic instability in SICU.  The SICU team has a constant risk benefit analyzes discussion and coordinating care with the primary team and all consultants.   The patient is in SICU with the chief complaint and diagnosis mentioned in the note.   The plan will be specified in the note.  75 y/o M DM, HTN, Dementia, sp fall, down with acute hemorraghic stroke, s/p 1/15 Franki patch with multiple re-intuabations in SICU  Follows simple commands   RESPIRATORY  Exam: Lungs clear   CARDIOVASCULAR  Exam: Tachycardic with irregular rhythm.   GI/NUTRITION  Exam: Abdomen soft, distended superior midline incision with SSpurulent strikethrough    VASCULAR  Upper extremities warm    Plan:  Neuro; Hemorrhagic stroke w/ Intracerebral hemorrhage and Right basal ganglia hemorrhage  -Sedation w/ Precedex; wean as tolerated  Respiratory; respiratory failure  - Pressure support ventilation;   Cardiovascular; - AFib with RVR;   On Dig 125 mcg   - Midodrine 10 mg TID  GI;  Gastric Ulcer Perforation s/p Franki patch of duodenal ulcer.  C dif positive  - ; PO Vanc / flagyl IV   - PEG eval based on GOC  ;  Hypernatremia;  - Increase Free water to 500 mL q6;   - diamox 250 IV Q6 for 1 day   Heme  - SQH for DVT PPx  - ID  - meropenem   Endo:   -- Lantus increase to 30 units
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
## 2022-01-30 NOTE — PROGRESS NOTE ADULT - SUBJECTIVE AND OBJECTIVE BOX
GENERAL SURGERY DAILY PROGRESS NOTE:    Interval:  No acute events overnight.    Subjective:  Patient seen and examined. Reports pain is well controlled. Denies N/V.    Vital Signs Last 24 Hrs  T(C): 36.8 (29 Jan 2022 20:00), Max: 36.8 (29 Jan 2022 20:00)  T(F): 98.2 (29 Jan 2022 20:00), Max: 98.2 (29 Jan 2022 20:00)  HR: 71 (29 Jan 2022 22:16) (50 - 78)  BP: 100/45 (29 Jan 2022 19:00) (99/40 - 142/49)  BP(mean): 58 (29 Jan 2022 19:00) (52 - 72)  RR: 20 (29 Jan 2022 21:30) (14 - 20)  SpO2: 100% (29 Jan 2022 22:16) (98% - 100%)    PHYSICAL EXAMINATION:  General: lying in bed, NAD  Resp: intubated  Abd: soft, mildly distended; midline incision is apparent for midline incisional erythema. Every other staple was removed with leakage of thick sanguinous discharge; discharge was cultured and sent to the lab. The integrity of the fascia was assessed at bedside and determined to be intact. Wound was subsequently packed with packing strip. (daily dressing changes)    I&O's Detail    28 Jan 2022 07:01  -  29 Jan 2022 07:00  --------------------------------------------------------  IN:    Dexmedetomidine: 189.9 mL    Enteral Tube Flush: 600 mL    IV PiggyBack: 750 mL    Phenylephrine: 51.2 mL  Total IN: 1591.1 mL    OUT:    Glucerna 1.5: 0 mL    Indwelling Catheter - Urethral (mL): 2580 mL    Rectal Tube (mL): 500 mL  Total OUT: 3080 mL    Total NET: -1488.9 mL      29 Jan 2022 07:01  -  30 Jan 2022 00:21  --------------------------------------------------------  IN:    Dexmedetomidine: 105.4 mL    dextrose 5%: 420 mL    Enteral Tube Flush: 30 mL    Glucerna 1.5: 360 mL    IV PiggyBack: 200 mL  Total IN: 1115.4 mL    OUT:    Indwelling Catheter - Urethral (mL): 1400 mL    Phenylephrine: 0 mL    Rectal Tube (mL): 800 mL  Total OUT: 2200 mL    Total NET: -1084.6 mL          Daily     Daily     MEDICATIONS  (STANDING):  acetaminophen    Suspension .. 650 milliGRAM(s) Oral every 6 hours  acetaZOLAMIDE Injectable 250 milliGRAM(s) IV Push every 6 hours  ALBUTerol    90 MICROgram(s) HFA Inhaler 2 Puff(s) Inhalation every 6 hours  budesonide 160 MICROgram(s)/formoterol 4.5 MICROgram(s) Inhaler 2 Puff(s) Inhalation two times a day  chlorhexidine 0.12% Liquid 15 milliLiter(s) Oral Mucosa every 12 hours  chlorhexidine 4% Liquid 1 Application(s) Topical <User Schedule>  dexMEDEtomidine Infusion 0.2 MICROgram(s)/kG/Hr (4.38 mL/Hr) IV Continuous <Continuous>  dextrose 5%. 1000 milliLiter(s) (30 mL/Hr) IV Continuous <Continuous>  digoxin  Injectable 125 MICROGram(s) IV Push daily  heparin   Injectable 5000 Unit(s) SubCutaneous every 8 hours  HYDROmorphone  Injectable 0.25 milliGRAM(s) IV Push once  insulin glargine Injectable (LANTUS) 30 Unit(s) SubCutaneous every morning  insulin lispro (ADMELOG) corrective regimen sliding scale   SubCutaneous every 6 hours  meropenem  IVPB 1000 milliGRAM(s) IV Intermittent every 8 hours  metoprolol tartrate 25 milliGRAM(s) Oral every 12 hours  metroNIDAZOLE  IVPB      metroNIDAZOLE  IVPB 500 milliGRAM(s) IV Intermittent every 8 hours  midodrine 10 milliGRAM(s) Oral every 8 hours  multivitamin/minerals/iron Oral Solution (CENTRUM) 15 milliLiter(s) Oral daily  pantoprazole  Injectable 40 milliGRAM(s) IV Push every 12 hours  phenylephrine    Infusion 0.1 MICROgram(s)/kG/Min (1.64 mL/Hr) IV Continuous <Continuous>  thiamine 100 milliGRAM(s) Oral daily  vancomycin    Solution 125 milliGRAM(s) Enteral Tube every 6 hours    MEDICATIONS  (PRN):  ALBUTerol    90 MICROgram(s) HFA Inhaler 2 Puff(s) Inhalation every 6 hours PRN Shortness of Breath and/or Wheezing      LABS:                        7.4    15.38 )-----------( 396      ( 29 Jan 2022 08:29 )             22.7     01-29    155<H>  |  119<H>  |  32<H>  ----------------------------<  153<H>  4.0   |  28  |  0.79    Ca    7.6<L>      29 Jan 2022 06:37  Phos  3.0     01-29  Mg     2.50     01-29      PT/INR - ( 28 Jan 2022 01:30 )   PT: 14.2 sec;   INR: 1.26 ratio         PTT - ( 28 Jan 2022 01:30 )  PTT:27.9 sec       GENERAL SURGERY DAILY PROGRESS NOTE:    Interval:  -patient restarted on feeds  -cardizem dc'd   -intermittent bradycardia in setting of precedex  -patient c/o RLE pain overnight.    Subjective:  Patient seen and examined. Pt remains sedated. Midline dressing changed at bedside    Vital Signs Last 24 Hrs  T(C): 36.8 (29 Jan 2022 20:00), Max: 36.8 (29 Jan 2022 20:00)  T(F): 98.2 (29 Jan 2022 20:00), Max: 98.2 (29 Jan 2022 20:00)  HR: 71 (29 Jan 2022 22:16) (50 - 78)  BP: 100/45 (29 Jan 2022 19:00) (99/40 - 142/49)  BP(mean): 58 (29 Jan 2022 19:00) (52 - 72)  RR: 20 (29 Jan 2022 21:30) (14 - 20)  SpO2: 100% (29 Jan 2022 22:16) (98% - 100%)    PHYSICAL EXAMINATION:  General: lying in bed, NAD  Resp: intubated  Abd: soft, mildly distended; midline incision is apparent for midline incisional erythema. Every other staple was removed with leakage of thick sanguinous discharge; discharge was cultured and sent to the lab. The integrity of the fascia was assessed at bedside and determined to be intact. Wound was subsequently packed with packing strip. (daily dressing changes)    I&O's Detail    28 Jan 2022 07:01  -  29 Jan 2022 07:00  --------------------------------------------------------  IN:    Dexmedetomidine: 189.9 mL    Enteral Tube Flush: 600 mL    IV PiggyBack: 750 mL    Phenylephrine: 51.2 mL  Total IN: 1591.1 mL    OUT:    Glucerna 1.5: 0 mL    Indwelling Catheter - Urethral (mL): 2580 mL    Rectal Tube (mL): 500 mL  Total OUT: 3080 mL    Total NET: -1488.9 mL      29 Jan 2022 07:01  -  30 Jan 2022 00:21  --------------------------------------------------------  IN:    Dexmedetomidine: 105.4 mL    dextrose 5%: 420 mL    Enteral Tube Flush: 30 mL    Glucerna 1.5: 360 mL    IV PiggyBack: 200 mL  Total IN: 1115.4 mL    OUT:    Indwelling Catheter - Urethral (mL): 1400 mL    Phenylephrine: 0 mL    Rectal Tube (mL): 800 mL  Total OUT: 2200 mL    Total NET: -1084.6 mL          Daily     Daily     MEDICATIONS  (STANDING):  acetaminophen    Suspension .. 650 milliGRAM(s) Oral every 6 hours  acetaZOLAMIDE Injectable 250 milliGRAM(s) IV Push every 6 hours  ALBUTerol    90 MICROgram(s) HFA Inhaler 2 Puff(s) Inhalation every 6 hours  budesonide 160 MICROgram(s)/formoterol 4.5 MICROgram(s) Inhaler 2 Puff(s) Inhalation two times a day  chlorhexidine 0.12% Liquid 15 milliLiter(s) Oral Mucosa every 12 hours  chlorhexidine 4% Liquid 1 Application(s) Topical <User Schedule>  dexMEDEtomidine Infusion 0.2 MICROgram(s)/kG/Hr (4.38 mL/Hr) IV Continuous <Continuous>  dextrose 5%. 1000 milliLiter(s) (30 mL/Hr) IV Continuous <Continuous>  digoxin  Injectable 125 MICROGram(s) IV Push daily  heparin   Injectable 5000 Unit(s) SubCutaneous every 8 hours  HYDROmorphone  Injectable 0.25 milliGRAM(s) IV Push once  insulin glargine Injectable (LANTUS) 30 Unit(s) SubCutaneous every morning  insulin lispro (ADMELOG) corrective regimen sliding scale   SubCutaneous every 6 hours  meropenem  IVPB 1000 milliGRAM(s) IV Intermittent every 8 hours  metoprolol tartrate 25 milliGRAM(s) Oral every 12 hours  metroNIDAZOLE  IVPB      metroNIDAZOLE  IVPB 500 milliGRAM(s) IV Intermittent every 8 hours  midodrine 10 milliGRAM(s) Oral every 8 hours  multivitamin/minerals/iron Oral Solution (CENTRUM) 15 milliLiter(s) Oral daily  pantoprazole  Injectable 40 milliGRAM(s) IV Push every 12 hours  phenylephrine    Infusion 0.1 MICROgram(s)/kG/Min (1.64 mL/Hr) IV Continuous <Continuous>  thiamine 100 milliGRAM(s) Oral daily  vancomycin    Solution 125 milliGRAM(s) Enteral Tube every 6 hours    MEDICATIONS  (PRN):  ALBUTerol    90 MICROgram(s) HFA Inhaler 2 Puff(s) Inhalation every 6 hours PRN Shortness of Breath and/or Wheezing      LABS:                        7.4    15.38 )-----------( 396      ( 29 Jan 2022 08:29 )             22.7     01-29    155<H>  |  119<H>  |  32<H>  ----------------------------<  153<H>  4.0   |  28  |  0.79    Ca    7.6<L>      29 Jan 2022 06:37  Phos  3.0     01-29  Mg     2.50     01-29      PT/INR - ( 28 Jan 2022 01:30 )   PT: 14.2 sec;   INR: 1.26 ratio         PTT - ( 28 Jan 2022 01:30 )  PTT:27.9 sec
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
## 2022-01-30 NOTE — GOALS OF CARE CONVERSATION - ADVANCED CARE PLANNING - CONVERSATION DETAILS
I discussed the patient's diagnoses and prognosis with his wife and daughter over the phone. We discussed his re intubations and possible tracheostomy, PEG tube, and pending foot amputation to evaluate if this would be in line with the patient's goals of care. His wife does not believe that he would want to have a tracheostomy, permanent feeding tube, or amputation based on their previous discussions, but he never completed a living will. She is leaving decision making to their daughter Nataly, though they are trying to make decisions together. They will have a family discussion to determine if Trach/PEG and amputation are within the goals of care or if they would like to pursue hospice level care.

## 2022-01-30 NOTE — PROGRESS NOTE ADULT - SUBJECTIVE AND OBJECTIVE BOX
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  --------------------------------------------------------------------------------------------------  V/S:  Vital Signs Last 24 Hrs  T(C): 37.3 (30 Jan 2022 08:00), Max: 37.5 (30 Jan 2022 04:00)  T(F): 99.1 (30 Jan 2022 08:00), Max: 99.5 (30 Jan 2022 04:00)  HR: 62 (30 Jan 2022 10:37) (50 - 78)  BP: 110/48 (30 Jan 2022 08:00) (99/40 - 142/49)  BP(mean): 63 (30 Jan 2022 08:00) (52 - 72)  RR: 16 (30 Jan 2022 10:00) (14 - 20)  SpO2: 98% (30 Jan 2022 10:37) (98% - 100%)  Mode: AC/ CMV (Assist Control/ Continuous Mandatory Ventilation)  RR (machine): 14  TV (machine): 450  FiO2: 50  PEEP: 8  ITime: 0.92  MAP: 11  PIP: 21    --------------------------------------------------------------------------------------------------  I/Os:    29 Jan 2022 07:01  -  30 Jan 2022 07:00  --------------------------------------------------------  IN:    Dexmedetomidine: 149.4 mL    dextrose 5%: 600 mL    Enteral Tube Flush: 530 mL    Glucerna 1.5: 405 mL    IV PiggyBack: 350 mL  Total IN: 2034.4 mL    OUT:    Indwelling Catheter - Urethral (mL): 2195 mL    Phenylephrine: 0 mL    Rectal Tube (mL): 900 mL  Total OUT: 3095 mL    Total NET: -1060.6 mL      30 Jan 2022 07:01  -  30 Jan 2022 10:40  --------------------------------------------------------  IN:    Glucerna 1.5: 90 mL  Total IN: 90 mL    OUT:    Indwelling Catheter - Urethral (mL): 150 mL  Total OUT: 150 mL    Total NET: -60 mL        --------------------------------------------------------------------------------------------------  LABS:                        7.1    14.89 )-----------( 389      ( 30 Jan 2022 02:22 )             23.7     30 Jan 2022 02:22    153    |  116    |  32     ----------------------------<  212    3.4     |  30     |  0.78     Ca    7.6        30 Jan 2022 02:22  Phos  3.2       30 Jan 2022 02:22  Mg     2.50      30 Jan 2022 02:22        CAPILLARY BLOOD GLUCOSE      POCT Blood Glucose.: 228 mg/dL (30 Jan 2022 07:57)  POCT Blood Glucose.: 197 mg/dL (30 Jan 2022 05:59)  POCT Blood Glucose.: 196 mg/dL (30 Jan 2022 00:48)  POCT Blood Glucose.: 244 mg/dL (30 Jan 2022 00:46)  POCT Blood Glucose.: 112 mg/dL (29 Jan 2022 17:23)  POCT Blood Glucose.: 149 mg/dL (29 Jan 2022 11:56)            Culture - Sputum (collected 28 Jan 2022 18:50)  Source: .Sputum Sputum  Gram Stain (28 Jan 2022 23:22):    Few polymorphonuclear leukocytes per low power field    Rare Squamous epithelial cells per low power field    Numerous Gram Negative Rods per oil power field        --------------------------------------------------------------------------------------------------  MEDICATIONS  (STANDING):  acetaminophen    Suspension .. 650 milliGRAM(s) Oral every 6 hours  ALBUTerol    90 MICROgram(s) HFA Inhaler 2 Puff(s) Inhalation every 6 hours  budesonide 160 MICROgram(s)/formoterol 4.5 MICROgram(s) Inhaler 2 Puff(s) Inhalation two times a day  chlorhexidine 0.12% Liquid 15 milliLiter(s) Oral Mucosa every 12 hours  chlorhexidine 4% Liquid 1 Application(s) Topical <User Schedule>  dexMEDEtomidine Infusion 0.2 MICROgram(s)/kG/Hr (4.38 mL/Hr) IV Continuous <Continuous>  digoxin  Injectable 125 MICROGram(s) IV Push daily  heparin   Injectable 5000 Unit(s) SubCutaneous every 8 hours  insulin glargine Injectable (LANTUS) 30 Unit(s) SubCutaneous every morning  insulin lispro (ADMELOG) corrective regimen sliding scale   SubCutaneous every 6 hours  meropenem  IVPB 1000 milliGRAM(s) IV Intermittent every 8 hours  metoprolol tartrate 12.5 milliGRAM(s) Oral every 12 hours  metroNIDAZOLE  IVPB      metroNIDAZOLE  IVPB 500 milliGRAM(s) IV Intermittent every 8 hours  midodrine 10 milliGRAM(s) Oral every 8 hours  multivitamin/minerals/iron Oral Solution (CENTRUM) 15 milliLiter(s) Oral daily  pantoprazole  Injectable 40 milliGRAM(s) IV Push every 12 hours  phenylephrine    Infusion 0.1 MICROgram(s)/kG/Min (1.64 mL/Hr) IV Continuous <Continuous>  thiamine 100 milliGRAM(s) Oral daily  vancomycin    Solution 125 milliGRAM(s) Enteral Tube every 6 hours    MEDICATIONS  (PRN):  ALBUTerol    90 MICROgram(s) HFA Inhaler 2 Puff(s) Inhalation every 6 hours PRN Shortness of Breath and/or Wheezing    --------------------------------------------------------------------------------------------------

## 2022-01-30 NOTE — PROGRESS NOTE ADULT - ATTENDING COMMENTS
I agree with the detailed interval history, physical, and plan, which I have reviewed and edited where appropriate'; also agree with notes/assessment with my team on service.  I have personally examined the patient.  I was physically present for the key portions of the evaluation and management (E/M) service provided.  I reviewed all the pertinent data.  The patient is a critical care patient with life threatening hemodynamic and metabolic instability in SICU.  The SICU team has a constant risk benefit analyzes discussion and coordinating care with the primary team and all consultants.   The patient is in SICU with the chief complaint and diagnosis mentioned in the note.   The plan will be specified in the note.  77 y/o M sp  fall, with rhabdomyolysis, pressure injuries, acute hemorraghic stroke and duodenal perforation, s/p 1/15 Franki patch with multiple re-intubations in SICU  RESPIRATORY  Exam: Lungs clear   CARDIOVASCULAR  Exam: Tachycardic with irregular rhythm.   GI/NUTRITION  Exam: Abdomen soft, distended superior midline incision with minimal SS strikethrough    VASCULAR  Exam:   Lower extremities   RLE signals in femoral, pop and PT no DP signal found  LLE DP or PT signals, mottling of foot, coolness below the knee.     PLAN  Neuro;  Hemorrhagic stroke w/ Intracerebral hemorrhage and Right basal ganglia hemorrhage  -Sedation w/ Precedex; wean as tolerated  Respiratory; respiratory failure / COVID+  - trach eval based on GOC  Cardiovascular; AFib with RVR;  -  On Dig 125 mcg IV daily, Metoprolol 25 mg BID  GI;  Gastric Ulcer Perforation s/p Franki patch of duodenal ulcer; C dif positive;  - PO Vanco / flagyl   - PEG eval based on GOC  ;  Hypernatremia  -; Increase Free water to 500 mL q6;   Heme  - SQH for DVT PPx  ID  -meropenem   Endo:   - Lantus increase to 30 units
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
## 2022-01-31 NOTE — PROGRESS NOTE ADULT - ATTENDING COMMENTS
Appreciate sicu care  awaiting goals of care decision by family  reg diet  c diff treatment    I have personally interviewed and examined this patient, reviewed pertinent labs and imaging, and discussed the case with colleagues, residents, and physician assistants on B Team rounds.    The active care issues are:  1. mechanical ventilator dependent  2. s/p priscilla patch  3. c diff    The Acute Care Surgery (B Team) Attending Group Practice:  Dr. Rosa Anaya, Dr. Mike Baeza, Dr. Tian Del Real, Dr. Benjamin Stauffer,     urgent issues - spectra [id number]  nonurgent issues - [phone]  patient appointments or afterhours - [phone]

## 2022-01-31 NOTE — PROGRESS NOTE ADULT - SUBJECTIVE AND OBJECTIVE BOX
Follow Up: Bacteremia    Interval History/ROS: Afebrile. Flagyl added. Remains intubated.     Allergies  No Known Allergies        ANTIMICROBIALS:  meropenem  IVPB 1000 every 8 hours  metroNIDAZOLE  IVPB    metroNIDAZOLE  IVPB 500 every 8 hours  vancomycin    Solution 125 every 6 hours      OTHER MEDS:  acetaminophen    Suspension .. 650 milliGRAM(s) Oral every 6 hours  ALBUTerol    90 MICROgram(s) HFA Inhaler 2 Puff(s) Inhalation every 6 hours  ALBUTerol    90 MICROgram(s) HFA Inhaler 2 Puff(s) Inhalation every 6 hours PRN  budesonide 160 MICROgram(s)/formoterol 4.5 MICROgram(s) Inhaler 2 Puff(s) Inhalation two times a day  chlorhexidine 0.12% Liquid 15 milliLiter(s) Oral Mucosa every 12 hours  chlorhexidine 4% Liquid 1 Application(s) Topical <User Schedule>  dexMEDEtomidine Infusion 0.2 MICROgram(s)/kG/Hr IV Continuous <Continuous>  digoxin  Injectable 125 MICROGram(s) IV Push daily  heparin   Injectable 5000 Unit(s) SubCutaneous every 8 hours  insulin glargine Injectable (LANTUS) 30 Unit(s) SubCutaneous every morning  insulin lispro (ADMELOG) corrective regimen sliding scale   SubCutaneous every 6 hours  metoprolol tartrate 12.5 milliGRAM(s) Oral every 12 hours  midodrine 10 milliGRAM(s) Oral every 8 hours  multivitamin/minerals/iron Oral Solution (CENTRUM) 15 milliLiter(s) Oral daily  pantoprazole  Injectable 40 milliGRAM(s) IV Push every 12 hours  thiamine 100 milliGRAM(s) Oral daily      Vital Signs Last 24 Hrs  T(C): 37.1 (31 Jan 2022 08:00), Max: 37.1 (31 Jan 2022 08:00)  T(F): 98.8 (31 Jan 2022 08:00), Max: 98.8 (31 Jan 2022 08:00)  HR: 63 (31 Jan 2022 11:10) (53 - 65)  BP: --  BP(mean): --  RR: 16 (31 Jan 2022 10:00) (14 - 17)  SpO2: 100% (31 Jan 2022 11:10) (96% - 100%)    Physical Exam:  General: deconditioned, limited responsiveness   ENT: NG tube   Cardio: regular rate   Respiratory: mechanically ventilated   abd: nondistended, soft   Musculoskeletal: no focal joint swelling  vascular: right IJ CVC   Skin: stable eschar both knees and left elbow. abdominal surgical site with resolving erythema, no pus.                           7.2    15.41 )-----------( 363      ( 31 Jan 2022 00:47 )             23.6       01-31    148<H>  |  113<H>  |  28<H>  ----------------------------<  159<H>  3.4<L>   |  28  |  0.74    Ca    7.5<L>      31 Jan 2022 00:47  Phos  3.2     01-31  Mg     2.60     01-31    TPro  5.4<L>  /  Alb  2.0<L>  /  TBili  0.2  /  DBili  x   /  AST  67<H>  /  ALT  75<H>  /  AlkPhos  139<H>  01-31          MICROBIOLOGY:  Culture - Sputum (collected 01-28-22 @ 18:50)  Source: .Sputum Sputum  Gram Stain (01-28-22 @ 23:22):    Few polymorphonuclear leukocytes per low power field    Rare Squamous epithelial cells per low power field    Numerous Gram Negative Rods per oil power field  Preliminary Report (01-30-22 @ 23:30):    Culture in progress    RADIOLOGY:  Images below reviewed personally  Xray Chest 1 View- PORTABLE-Urgent (Xray Chest 1 View- PORTABLE-Urgent .) (01.31.22 @ 07:42)   *  Endotracheal tube in satisfactory position.  *  Bilateral effusions with underlying atelectasis.    CT Chest Abdomen and Pelvis w/ Oral Cont and w/ IV Cont (01.21.22 @ 12:21)   *  Moderate bilateral pleural effusions with adjacent passive   atelectasis. Mild hypoenhancement of the lung at the left lung base   raises a question of superimposed pneumonia.  *  Trace free fluid in the abdomen and pelvis. No evidence of   pneumoperitoneum or intra-abdominal abscess.

## 2022-01-31 NOTE — PROGRESS NOTE ADULT - ASSESSMENT
Sputum culture pending- report should be back this afternoon   Cefepime 1/13-17, Zosyn 1/17-21, Meropenem 1/21-24, 26-   I think he's gotten enough antibiotics   PO Vancomycin day 8   In the absence of fulminant C diff, would not give Flaglori Nixon MD   Infectious Disease   Pager [phone]   After 5PM and on weekends please page fellow on call or call [phone] 76M found on the floor at home 1/12, acute right basal ganglia stroke.   1/12 Enterobacter and Proteus bacteremia.   1/15 Duodenal perforation s/p OR washout, repair. Related to bacteremia? Stress ulcer? H pylori IgG negative.   1/19 Respiratory failure, mucous plugging but newly positive COVID PCR too s/p remdesivir.   1/22 C diff diarrhea   1/23 Abdominal surgical site inflammation - resolved, no pus, doubt few Candida significant.   Poor long term prognosis.   Continued respiratory failure, intermittent fevers, now with a cold left leg.   Goals of care being addressed.     Antimicrobial course:   Cefepime 1/13-17, Zosyn 1/17-21, Meropenem 1/21-24, 26-   Flagyl IV 1/15-17, 1/28-   Fluconazole 1/19-23   Vancomycin IV 1/22-24    Suggest  -goals of care   -f/u sputum culture - report should be back this afternoon   -would stop Meropenem I think he's gotten enough systemic antibiotics especially in the setting of C diff   -would stop IV Flagyl in the absence of fulminant C diff  -PO Vanc 125mg QID, day 8, continue for a week after systemic antibiotics stop   -monitor stool quality   -local wound care of abdominal surgical site    Discussed with SICU   I will be rotating to [facility]. ID service will follow.     Edy Nixon MD   Infectious Disease   Pager [phone]   After 5PM and on weekends please page fellow on call or call [phone]

## 2022-01-31 NOTE — PROGRESS NOTE ADULT - SUBJECTIVE AND OBJECTIVE BOX
GENERAL SURGERY DAILY PROGRESS NOTE:    Subjective:  Patient seen and examined. Pt remains sedated.    Vital Signs Last 24 Hrs  T(C): 36.8 (29 Jan 2022 20:00), Max: 36.8 (29 Jan 2022 20:00)  T(F): 98.2 (29 Jan 2022 20:00), Max: 98.2 (29 Jan 2022 20:00)  HR: 71 (29 Jan 2022 22:16) (50 - 78)  BP: 100/45 (29 Jan 2022 19:00) (99/40 - 142/49)  BP(mean): 58 (29 Jan 2022 19:00) (52 - 72)  RR: 20 (29 Jan 2022 21:30) (14 - 20)  SpO2: 100% (29 Jan 2022 22:16) (98% - 100%)    PHYSICAL EXAMINATION:  General: lying in bed, NAD  Resp: intubated  Abd: soft, mildly distended; midline incision is apparent for midline incisional erythema. Every other staple was removed with leakage of thick sanguinous discharge; discharge was cultured and sent to the lab. The integrity of the fascia was assessed at bedside and determined to be intact. Wound was subsequently packed with packing strip. (daily dressing changes)    I&O's Detail    28 Jan 2022 07:01  -  29 Jan 2022 07:00  --------------------------------------------------------  IN:    Dexmedetomidine: 189.9 mL    Enteral Tube Flush: 600 mL    IV PiggyBack: 750 mL    Phenylephrine: 51.2 mL  Total IN: 1591.1 mL    OUT:    Glucerna 1.5: 0 mL    Indwelling Catheter - Urethral (mL): 2580 mL    Rectal Tube (mL): 500 mL  Total OUT: 3080 mL    Total NET: -1488.9 mL      29 Jan 2022 07:01  -  30 Jan 2022 00:21  --------------------------------------------------------  IN:    Dexmedetomidine: 105.4 mL    dextrose 5%: 420 mL    Enteral Tube Flush: 30 mL    Glucerna 1.5: 360 mL    IV PiggyBack: 200 mL  Total IN: 1115.4 mL    OUT:    Indwelling Catheter - Urethral (mL): 1400 mL    Phenylephrine: 0 mL    Rectal Tube (mL): 800 mL  Total OUT: 2200 mL    Total NET: -1084.6 mL          Daily     Daily     MEDICATIONS  (STANDING):  acetaminophen    Suspension .. 650 milliGRAM(s) Oral every 6 hours  acetaZOLAMIDE Injectable 250 milliGRAM(s) IV Push every 6 hours  ALBUTerol    90 MICROgram(s) HFA Inhaler 2 Puff(s) Inhalation every 6 hours  budesonide 160 MICROgram(s)/formoterol 4.5 MICROgram(s) Inhaler 2 Puff(s) Inhalation two times a day  chlorhexidine 0.12% Liquid 15 milliLiter(s) Oral Mucosa every 12 hours  chlorhexidine 4% Liquid 1 Application(s) Topical <User Schedule>  dexMEDEtomidine Infusion 0.2 MICROgram(s)/kG/Hr (4.38 mL/Hr) IV Continuous <Continuous>  dextrose 5%. 1000 milliLiter(s) (30 mL/Hr) IV Continuous <Continuous>  digoxin  Injectable 125 MICROGram(s) IV Push daily  heparin   Injectable 5000 Unit(s) SubCutaneous every 8 hours  HYDROmorphone  Injectable 0.25 milliGRAM(s) IV Push once  insulin glargine Injectable (LANTUS) 30 Unit(s) SubCutaneous every morning  insulin lispro (ADMELOG) corrective regimen sliding scale   SubCutaneous every 6 hours  meropenem  IVPB 1000 milliGRAM(s) IV Intermittent every 8 hours  metoprolol tartrate 25 milliGRAM(s) Oral every 12 hours  metroNIDAZOLE  IVPB      metroNIDAZOLE  IVPB 500 milliGRAM(s) IV Intermittent every 8 hours  midodrine 10 milliGRAM(s) Oral every 8 hours  multivitamin/minerals/iron Oral Solution (CENTRUM) 15 milliLiter(s) Oral daily  pantoprazole  Injectable 40 milliGRAM(s) IV Push every 12 hours  phenylephrine    Infusion 0.1 MICROgram(s)/kG/Min (1.64 mL/Hr) IV Continuous <Continuous>  thiamine 100 milliGRAM(s) Oral daily  vancomycin    Solution 125 milliGRAM(s) Enteral Tube every 6 hours    MEDICATIONS  (PRN):  ALBUTerol    90 MICROgram(s) HFA Inhaler 2 Puff(s) Inhalation every 6 hours PRN Shortness of Breath and/or Wheezing      LABS:                        7.4    15.38 )-----------( 396      ( 29 Jan 2022 08:29 )             22.7     01-29    155<H>  |  119<H>  |  32<H>  ----------------------------<  153<H>  4.0   |  28  |  0.79    Ca    7.6<L>      29 Jan 2022 06:37  Phos  3.0     01-29  Mg     2.50     01-29      PT/INR - ( 28 Jan 2022 01:30 )   PT: 14.2 sec;   INR: 1.26 ratio         PTT - ( 28 Jan 2022 01:30 )  PTT:27.9 sec       GENERAL SURGERY DAILY PROGRESS NOTE:    Subjective:  Patient seen and examined. Pt remains sedated.    Vital Signs Last 24 Hrs  T(C): 36.8 (29 Jan 2022 20:00), Max: 36.8 (29 Jan 2022 20:00)  T(F): 98.2 (29 Jan 2022 20:00), Max: 98.2 (29 Jan 2022 20:00)  HR: 71 (29 Jan 2022 22:16) (50 - 78)  BP: 100/45 (29 Jan 2022 19:00) (99/40 - 142/49)  BP(mean): 58 (29 Jan 2022 19:00) (52 - 72)  RR: 20 (29 Jan 2022 21:30) (14 - 20)  SpO2: 100% (29 Jan 2022 22:16) (98% - 100%)    PHYSICAL EXAMINATION:  General: lying in bed, NAD  Resp: intubated  Abd: soft, mildly distended.    I&O's Detail    28 Jan 2022 07:01  -  29 Jan 2022 07:00  --------------------------------------------------------  IN:    Dexmedetomidine: 189.9 mL    Enteral Tube Flush: 600 mL    IV PiggyBack: 750 mL    Phenylephrine: 51.2 mL  Total IN: 1591.1 mL    OUT:    Glucerna 1.5: 0 mL    Indwelling Catheter - Urethral (mL): 2580 mL    Rectal Tube (mL): 500 mL  Total OUT: 3080 mL    Total NET: -1488.9 mL      29 Jan 2022 07:01  -  30 Jan 2022 00:21  --------------------------------------------------------  IN:    Dexmedetomidine: 105.4 mL    dextrose 5%: 420 mL    Enteral Tube Flush: 30 mL    Glucerna 1.5: 360 mL    IV PiggyBack: 200 mL  Total IN: 1115.4 mL    OUT:    Indwelling Catheter - Urethral (mL): 1400 mL    Phenylephrine: 0 mL    Rectal Tube (mL): 800 mL  Total OUT: 2200 mL    Total NET: -1084.6 mL          Daily     Daily     MEDICATIONS  (STANDING):  acetaminophen    Suspension .. 650 milliGRAM(s) Oral every 6 hours  acetaZOLAMIDE Injectable 250 milliGRAM(s) IV Push every 6 hours  ALBUTerol    90 MICROgram(s) HFA Inhaler 2 Puff(s) Inhalation every 6 hours  budesonide 160 MICROgram(s)/formoterol 4.5 MICROgram(s) Inhaler 2 Puff(s) Inhalation two times a day  chlorhexidine 0.12% Liquid 15 milliLiter(s) Oral Mucosa every 12 hours  chlorhexidine 4% Liquid 1 Application(s) Topical <User Schedule>  dexMEDEtomidine Infusion 0.2 MICROgram(s)/kG/Hr (4.38 mL/Hr) IV Continuous <Continuous>  dextrose 5%. 1000 milliLiter(s) (30 mL/Hr) IV Continuous <Continuous>  digoxin  Injectable 125 MICROGram(s) IV Push daily  heparin   Injectable 5000 Unit(s) SubCutaneous every 8 hours  HYDROmorphone  Injectable 0.25 milliGRAM(s) IV Push once  insulin glargine Injectable (LANTUS) 30 Unit(s) SubCutaneous every morning  insulin lispro (ADMELOG) corrective regimen sliding scale   SubCutaneous every 6 hours  meropenem  IVPB 1000 milliGRAM(s) IV Intermittent every 8 hours  metoprolol tartrate 25 milliGRAM(s) Oral every 12 hours  metroNIDAZOLE  IVPB      metroNIDAZOLE  IVPB 500 milliGRAM(s) IV Intermittent every 8 hours  midodrine 10 milliGRAM(s) Oral every 8 hours  multivitamin/minerals/iron Oral Solution (CENTRUM) 15 milliLiter(s) Oral daily  pantoprazole  Injectable 40 milliGRAM(s) IV Push every 12 hours  phenylephrine    Infusion 0.1 MICROgram(s)/kG/Min (1.64 mL/Hr) IV Continuous <Continuous>  thiamine 100 milliGRAM(s) Oral daily  vancomycin    Solution 125 milliGRAM(s) Enteral Tube every 6 hours    MEDICATIONS  (PRN):  ALBUTerol    90 MICROgram(s) HFA Inhaler 2 Puff(s) Inhalation every 6 hours PRN Shortness of Breath and/or Wheezing      LABS:                        7.4    15.38 )-----------( 396      ( 29 Jan 2022 08:29 )             22.7     01-29    155<H>  |  119<H>  |  32<H>  ----------------------------<  153<H>  4.0   |  28  |  0.79    Ca    7.6<L>      29 Jan 2022 06:37  Phos  3.0     01-29  Mg     2.50     01-29      PT/INR - ( 28 Jan 2022 01:30 )   PT: 14.2 sec;   INR: 1.26 ratio         PTT - ( 28 Jan 2022 01:30 )  PTT:27.9 sec

## 2022-01-31 NOTE — PROGRESS NOTE ADULT - ATTENDING COMMENTS
I agree with the detailed interval history, physical, and plan, which I have reviewed and edited where appropriate'; also agree with notes/assessment with my team on service.  I have personally examined the patient.  I was physically present for the key portions of the evaluation and management (E/M) service provided.  I reviewed all the pertinent data.  The patient is a critical care patient with life threatening hemodynamic and metabolic instability in SICU.  The SICU team has a constant risk benefit analyzes discussion and coordinating care with the primary team and all consultants.   The patient is in SICU with the chief complaint and diagnosis mentioned in the note.   The plan will be specified in the note.  75 y/o sp Franki patch with multiple re-intubations in SICU.   no focal deficits.   RESPIRATORY  Exam: Lungs clear   CARDIOVASCULAR  Exam: Tachycardic with irregular rhythm.   

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
## 2022-02-01 NOTE — PROGRESS NOTE ADULT - SUBJECTIVE AND OBJECTIVE BOX
GENERAL SURGERY DAILY PROGRESS NOTE:    Subjective:  Patient seen and examined. Pt remains sedated.    Vital Signs Last 24 Hrs  T(C): 36.8 (29 Jan 2022 20:00), Max: 36.8 (29 Jan 2022 20:00)  T(F): 98.2 (29 Jan 2022 20:00), Max: 98.2 (29 Jan 2022 20:00)  HR: 71 (29 Jan 2022 22:16) (50 - 78)  BP: 100/45 (29 Jan 2022 19:00) (99/40 - 142/49)  BP(mean): 58 (29 Jan 2022 19:00) (52 - 72)  RR: 20 (29 Jan 2022 21:30) (14 - 20)  SpO2: 100% (29 Jan 2022 22:16) (98% - 100%)    PHYSICAL EXAMINATION:  General: lying in bed, NAD  Resp: intubated  Abd: soft, mildly distended.    I&O's Detail    28 Jan 2022 07:01  -  29 Jan 2022 07:00  --------------------------------------------------------  IN:    Dexmedetomidine: 189.9 mL    Enteral Tube Flush: 600 mL    IV PiggyBack: 750 mL    Phenylephrine: 51.2 mL  Total IN: 1591.1 mL    OUT:    Glucerna 1.5: 0 mL    Indwelling Catheter - Urethral (mL): 2580 mL    Rectal Tube (mL): 500 mL  Total OUT: 3080 mL    Total NET: -1488.9 mL      29 Jan 2022 07:01  -  30 Jan 2022 00:21  --------------------------------------------------------  IN:    Dexmedetomidine: 105.4 mL    dextrose 5%: 420 mL    Enteral Tube Flush: 30 mL    Glucerna 1.5: 360 mL    IV PiggyBack: 200 mL  Total IN: 1115.4 mL    OUT:    Indwelling Catheter - Urethral (mL): 1400 mL    Phenylephrine: 0 mL    Rectal Tube (mL): 800 mL  Total OUT: 2200 mL    Total NET: -1084.6 mL          Daily     Daily     MEDICATIONS  (STANDING):  acetaminophen    Suspension .. 650 milliGRAM(s) Oral every 6 hours  acetaZOLAMIDE Injectable 250 milliGRAM(s) IV Push every 6 hours  ALBUTerol    90 MICROgram(s) HFA Inhaler 2 Puff(s) Inhalation every 6 hours  budesonide 160 MICROgram(s)/formoterol 4.5 MICROgram(s) Inhaler 2 Puff(s) Inhalation two times a day  chlorhexidine 0.12% Liquid 15 milliLiter(s) Oral Mucosa every 12 hours  chlorhexidine 4% Liquid 1 Application(s) Topical <User Schedule>  dexMEDEtomidine Infusion 0.2 MICROgram(s)/kG/Hr (4.38 mL/Hr) IV Continuous <Continuous>  dextrose 5%. 1000 milliLiter(s) (30 mL/Hr) IV Continuous <Continuous>  digoxin  Injectable 125 MICROGram(s) IV Push daily  heparin   Injectable 5000 Unit(s) SubCutaneous every 8 hours  HYDROmorphone  Injectable 0.25 milliGRAM(s) IV Push once  insulin glargine Injectable (LANTUS) 30 Unit(s) SubCutaneous every morning  insulin lispro (ADMELOG) corrective regimen sliding scale   SubCutaneous every 6 hours  meropenem  IVPB 1000 milliGRAM(s) IV Intermittent every 8 hours  metoprolol tartrate 25 milliGRAM(s) Oral every 12 hours  metroNIDAZOLE  IVPB      metroNIDAZOLE  IVPB 500 milliGRAM(s) IV Intermittent every 8 hours  midodrine 10 milliGRAM(s) Oral every 8 hours  multivitamin/minerals/iron Oral Solution (CENTRUM) 15 milliLiter(s) Oral daily  pantoprazole  Injectable 40 milliGRAM(s) IV Push every 12 hours  phenylephrine    Infusion 0.1 MICROgram(s)/kG/Min (1.64 mL/Hr) IV Continuous <Continuous>  thiamine 100 milliGRAM(s) Oral daily  vancomycin    Solution 125 milliGRAM(s) Enteral Tube every 6 hours    MEDICATIONS  (PRN):  ALBUTerol    90 MICROgram(s) HFA Inhaler 2 Puff(s) Inhalation every 6 hours PRN Shortness of Breath and/or Wheezing      LABS:                        7.4    15.38 )-----------( 396      ( 29 Jan 2022 08:29 )             22.7     01-29    155<H>  |  119<H>  |  32<H>  ----------------------------<  153<H>  4.0   |  28  |  0.79    Ca    7.6<L>      29 Jan 2022 06:37  Phos  3.0     01-29  Mg     2.50     01-29      PT/INR - ( 28 Jan 2022 01:30 )   PT: 14.2 sec;   INR: 1.26 ratio         PTT - ( 28 Jan 2022 01:30 )  PTT:27.9 sec

## 2022-02-01 NOTE — PROGRESS NOTE ADULT - ATTENDING COMMENTS
I agree with the detailed interval history, physical, and plan, which I have reviewed and edited where appropriate'; also agree with notes/assessment with my team on service.  I have personally examined the patient.  I was physically present for the key portions of the evaluation and management (E/M) service provided.  I reviewed all the pertinent data.  The patient is a critical care patient with life threatening hemodynamic and metabolic instability in SICU.  The SICU team has a constant risk benefit analyzes discussion and coordinating care with the primary team and all consultants.   The patient is in SICU with the chief complaint and diagnosis mentioned in the note.   The plan will be specified in the note.  75 y/o M sp  fall, down for approx. 3 days and rhabdomyolysis, s/p Franki patch with multiple re-intubations in SICU  no focal deficits.   RESPIRATORY  Exam: Lungs clear   CARDIOVASCULAR  Exam: Tachycardic with irregular rhythm.   GI/NUTRITION  Exam: Abdomen soft, distended superior midline incision with minimal SS strikethrough    VASCULAR  Exam:   Upper extremities warm  Lower extremities   RLE with [id number] pressure injury with overlying eschar,   LLE with 8cm pressure injury; mottling of foot, coolness below the knee.     

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
## 2022-02-01 NOTE — PROGRESS NOTE ADULT - SUBJECTIVE AND OBJECTIVE BOX
Follow Up:  c diff, resp failure    Interval History/ROS: no abd pain    Allergies  No Known Allergies    ANTIMICROBIALS:  meropenem  IVPB 1000 every 8 hours  metroNIDAZOLE  IVPB    metroNIDAZOLE  IVPB 500 every 8 hours  vancomycin    Solution 125 every 6 hours      OTHER MEDS:  MEDICATIONS  (STANDING):  acetaminophen    Suspension .. 650 every 6 hours  ALBUTerol    90 MICROgram(s) HFA Inhaler 2 every 6 hours  ALBUTerol    90 MICROgram(s) HFA Inhaler 2 every 6 hours PRN  budesonide 160 MICROgram(s)/formoterol 4.5 MICROgram(s) Inhaler 2 two times a day  dexMEDEtomidine Infusion 0.2 <Continuous>  digoxin  Injectable 125 daily  heparin   Injectable 5000 every 8 hours  insulin glargine Injectable (LANTUS) 30 every morning  insulin lispro (ADMELOG) corrective regimen sliding scale  every 6 hours  metoprolol tartrate 12.5 every 12 hours  midodrine 10 every 8 hours  pantoprazole  Injectable 40 every 12 hours      Vital Signs Last 24 Hrs  T(C): 37.2 (01 Feb 2022 08:00), Max: 37.3 (01 Feb 2022 00:00)  T(F): 98.9 (01 Feb 2022 08:00), Max: 99.2 (01 Feb 2022 00:00)  HR: 57 (01 Feb 2022 10:21) (48 - 66)  BP: --  BP(mean): --  RR: 16 (01 Feb 2022 10:00) (14 - 18)  SpO2: 100% (01 Feb 2022 10:21) (100% - 100%)    PHYSICAL EXAM:  General:  NAD, Non-toxic  Neurology: Alert on vent  Respiratory: Oral ET in placeClear to auscultation bilaterally  CV: RRR, S1S2, no murmurs, rubs or gallops  Abdominal: Soft, Non-tender, non-distended, normal bowel sounds, rectal tube in place  Extremities: LLE in dressing,  black eschar adherent to medial aspect right knee  Line Sites: Clear  Skin: No rash                          7.2    16.40 )-----------( 370      ( 01 Feb 2022 01:09 )             23.3   WBC Count: 16.40 (02-01 @ 01:09)  WBC Count: 15.41 (01-31 @ 00:47)  WBC Count: 14.89 (01-30 @ 02:22)  WBC Count: 15.38 (01-29 @ 08:29)  WBC Count: 14.59 (01-29 @ 01:36)  WBC Count: 16.28 (01-28 @ 20:32)  WBC Count: 16.37 (01-28 @ 15:27)  WBC Count: 15.37 (01-28 @ 09:31)  WBC Count: 18.17 (01-28 @ 01:30)      02-01    145  |  113<H>  |  25<H>  ----------------------------<  178<H>  5.2   |  25  |  0.67    Ca    7.4<L>      01 Feb 2022 01:09  Phos  2.5     02-01  Mg     2.50     02-01    TPro  5.4<L>  /  Alb  1.9<L>  /  TBili  <0.2  /  DBili  <0.2  /  AST  48<H>  /  ALT  65<H>  /  AlkPhos  146<H>  02-01      MICROBIOLOGY:  .Sputum Sputum  01-28-22   Numerous Stenotrophomonas maltophilia  Normal Respiratory Vijaya absent  --  Stenotrophomonas maltophilia      .Abscess Midline incision  01-23-22   Few Candida albicans "Susceptibilities not performed"  --  --      .Blood Blood-Arterial  01-21-22   No Growth Final  --  --      BAL sputum  01-19-22   No growth at 1 week.  --  --      .Sputum Sputum  01-19-22   No growth at 1 week.  --  --      Combi-Cath bronchial lavage  01-19-22   Normal Respiratory Vijaya present  --  --      .Sputum Sputum  01-19-22   Normal Respiratory Vijaya present  --    Few polymorphonuclear leukocytes per low power field  No Squamous epithelial cells per low power field  No organisms seen      .Blood Blood-Venous  01-15-22   No Growth Final  --  --      .Blood Blood-Venous  01-14-22   No Growth Final  --  --      .Blood Blood-Peripheral  01-14-22   No Growth Final  --  --      .Blood Blood-Peripheral  01-13-22   Growth in anaerobic bottle: Proteus mirabilis  See previous culture  [id number]  --    Growth in anaerobic bottle: Gram Negative Rods      Clean Catch Clean Catch (Midstream)  01-12-22   No growth  --  --      .Blood Blood-Peripheral  01-12-22   Growth in aerobic and anaerobic bottles: Proteus mirabilis  See previous culture [id number]  Growth in aerobic bottle: Enterobacter cloacae complex  --  Enterobacter cloacae complex      .Blood Blood-Peripheral  01-12-22   Growth in aerobic and anaerobic bottles: Proteus mirabilis  Growth in anaerobic bottle: Enterobacter cloacae complex  See previous culture [id number]    RADIOLOGY:  image independently viewed  < from: Xray Chest 1 View- PORTABLE-Urgent (Xray Chest 1 View- PORTABLE-Urgent .) (01.31.22 @ 07:42) >  IMPRESSION:  *  Endotracheal tube in satisfactory position.  *  Bilateral effusions with underlying atelectasis.    < end of copied text >      Stephan Vizcarra MD; Division of Infectious Disease; Pager: [phone]; nights and weekends: [phone]

## 2022-02-01 NOTE — PROGRESS NOTE ADULT - ATTENDING COMMENTS
Patient no events overnight.  Plan  goals of care  po abx for c diff  care per sicu  diet as tolerated with tf    I have personally interviewed and examined this patient, reviewed pertinent labs and imaging, and discussed the case with colleagues, residents, and physician assistants on B Team rounds.    The active care issues are:  1. goals of care  2. c diff  3. vent dependent    The Acute Care Surgery (B Team) Attending Group Practice:  Dr. Rosa Anaya, Dr. Mike Baeza, Dr. Tian Del Real, Dr. Benjamin Stauffer,     urgent issues - spectra [id number]  nonurgent issues - [phone]  patient appointments or afterhours - [phone]

## 2022-02-01 NOTE — PROGRESS NOTE ADULT - ASSESSMENT
76M found on the floor at home 1/12, acute right basal ganglia stroke.   1/12 Enterobacter and Proteus bacteremia.   1/15 Duodenal perforation s/p OR washout, repair. Related to bacteremia? Stress ulcer? H pylori IgG negative.   1/19 Respiratory failure, mucous plugging but newly positive COVID PCR too s/p remdesivir.   1/22 C diff diarrhea   1/23 Abdominal surgical site inflammation - resolved, no pus, doubt few Candida significant.   Poor long term prognosis.   Continued respiratory failure, intermittent fevers, now with a cold left leg.   

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
## 2022-02-01 NOTE — CHART NOTE - NSCHARTNOTEFT_GEN_A_CORE
Ultrasound Exam  Type: CHELY FAIRCHILDO  76y Male  [facility] 03 02-01-22 @ 15:16    Indication: Fluid status    Right lung sliding: Yes  Right B-Lines: Yes  Right Pleural Free Fluid: Yes; Moderate sized pleural effusion    Left lung sliding: Yes  Left B-Lines: No  Left Pleural Free Fluid: No pleural effusion noted    Cardiac: Unable to assess    Additional Findings: None    Performed By:  Greg Fernandez MD Ultrasound Exam  Type: MALDONADO FAIRCHILD  76y Male  [facility] 03 02-01-22 @ 15:16    Indication: Fluid status    Right lung sliding: Yes  Right B-Lines: Yes  Right Pleural Free Fluid: Yes; Moderate sized pleural effusion    Left lung sliding: Yes  Left B-Lines: No  Left Pleural Free Fluid: No pleural effusion noted    Cardiac: Unable to assess    Additional Findings: None    Performed By:  MD Dayana Deluca/GONZALES

## 2022-02-02 NOTE — PROGRESS NOTE ADULT - ATTENDING COMMENTS
Critical Care Dx     J96.21 Acute on chronic respiratory failure with hypoxia   -despite multiple attempts at extubation and pulmonary optimization, Mr. Whitten   -Pleural effusion not able to be tapped, no clear window  -will consider diuresis postoperatively  -trach planned for today  -meropenem to be d/c'd   I61.9 Nontraumatic acute hemorrhage of basal ganglia  I63.9 Cerebrovascular accident (CVA), unspecified mechanism   -holding anticoagulation due to new infarct - will start next week   -baseline neuro function noted, currently aware, understands basic questions  A04.72 Clostridium difficile diarrhea   -ID input appreciated, d/c abx and observe  I48.91 Atrial fibrillation, unspecified type    -rate control w current regimen. Once able to take PO - will transition to beta blocker     The patient is a critical care patient with life threatening hemodynamic and respiratory instability in SICU.  I have personally interviewed and examined the patient, reviewed data and laboratory tests/x-rays and all pertinent electronic images.  The SICU team has a constant risk benefit analyzes discussion with the primary team, all consultants, House Staff and PA's on all decisions.   Time involved in performance of separately billable procedures was not counted toward my critical care time. There is no overlap.    I have personally provided 60 minutes of critical care time concurrently with the resident/fellow. This time excludes time spent on separate procedures and time spent teaching. I have reviewed the resident's/fellow's documentation and agree with the assessment and plan of care.  I was physically present for the key portions of the evaluation and management (E/M) service provided.      Tian Del Real MD  Acute and Critical Care Surgery

## 2022-02-02 NOTE — PROVIDER CONTACT NOTE (CRITICAL VALUE NOTIFICATION) - ACTION/TREATMENT ORDERED:
Dextrose 50 % given as ordered and blood glucose rechecked - 
Notified provider
Provider made aware, no further interventions at this time.
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
## 2022-02-02 NOTE — PROGRESS NOTE ADULT - SUBJECTIVE AND OBJECTIVE BOX
Follow Up:  cdiff, ischemic LLE    Interval History/ROS:  responsive on vent    Allergies  No Known Allergies    ANTIMICROBIALS:      OTHER MEDS:  MEDICATIONS  (STANDING):  acetaminophen    Suspension .. 650 every 6 hours  ALBUTerol    90 MICROgram(s) HFA Inhaler 2 every 6 hours  ALBUTerol    90 MICROgram(s) HFA Inhaler 2 every 6 hours PRN  budesonide 160 MICROgram(s)/formoterol 4.5 MICROgram(s) Inhaler 2 two times a day  dexMEDEtomidine Infusion 0.2 <Continuous>  digoxin  Injectable 125 daily  heparin   Injectable 5000 every 8 hours  HYDROmorphone  Injectable 0.5 every 4 hours PRN  insulin glargine Injectable (LANTUS) 30 every morning  insulin lispro (ADMELOG) corrective regimen sliding scale  every 6 hours  metoprolol tartrate 12.5 every 12 hours  midodrine 10 every 8 hours  pantoprazole  Injectable 40 every 12 hours      Vital Signs Last 24 Hrs  T(C): 35.6 (02 Feb 2022 12:00), Max: 36.4 (02 Feb 2022 00:00)  T(F): 96 (02 Feb 2022 12:00), Max: 97.5 (02 Feb 2022 00:00)  HR: 63 (02 Feb 2022 13:00) (51 - 73)  BP: --  BP(mean): --  RR: 18 (02 Feb 2022 13:00) (16 - 24)  SpO2: 99% (02 Feb 2022 13:00) (98% - 100%)  FiO2 = 30$    PHYSICAL EXAM:  General: NAD, Non-toxic  Neurology: , Awake  Respiratory: Oral ET in place Clear to auscultation bilaterally  CV: RRR, S1S2, no murmurs, rubs or gallops  Abdominal: Soft, Non-tender, non-distended, normal bowel sounds  Extremities: left foot cool and mottled  Line Sites: Clear  Skin: No rash                        7.1    14.75 )-----------( 391      ( 02 Feb 2022 01:16 )             22.1   WBC Count: 14.75 (02-02 @ 01:16)  WBC Count: 16.40 (02-01 @ 01:09)  WBC Count: 15.41 (01-31 @ 00:47)  WBC Count: 14.89 (01-30 @ 02:22)  WBC Count: 15.38 (01-29 @ 08:29)  WBC Count: 14.59 (01-29 @ 01:36)  WBC Count: 16.28 (01-28 @ 20:32)  WBC Count: 16.37 (01-28 @ 15:27)    02-02    146<H>  |  114<H>  |  24<H>  ----------------------------<  134<H>  4.2   |  25  |  0.64    Ca    7.3<L>      02 Feb 2022 01:16  Phos  2.7     02-02  Mg     2.50     02-02    TPro  5.4<L>  /  Alb  1.9<L>  /  TBili  <0.2  /  DBili  <0.2  /  AST  48<H>  /  ALT  65<H>  /  AlkPhos  146<H>  02-01    MICROBIOLOGY:  .Sputum Sputum  01-28-22   Numerous Stenotrophomonas maltophilia  Normal Respiratory Vijaya absent  --  Stenotrophomonas maltophilia      .Abscess Midline incision  01-23-22   Few Candida albicans "Susceptibilities not performed"  --  --      .Blood Blood-Arterial  01-21-22   No Growth Final  --  --      BAL sputum  01-19-22   No growth at 1 week.  --  --      .Sputum Sputum  01-19-22   No growth at 1 week.  --  --      Combi-Cath bronchial lavage  01-19-22   Normal Respiratory Vijaya present  --  --      .Sputum Sputum  01-19-22   Normal Respiratory Vijaya present  --    Few polymorphonuclear leukocytes per low power field  No Squamous epithelial cells per low power field  No organisms seen      .Blood Blood-Venous  01-15-22   No Growth Final  --  --      .Blood Blood-Venous  01-14-22   No Growth Final  --  --      .Blood Blood-Peripheral  01-14-22   No Growth Final  --  --      .Blood Blood-Peripheral  01-13-22   Growth in anaerobic bottle: Proteus mirabilis  See previous culture  [id number]  --    Growth in anaerobic bottle: Gram Negative Rods      Clean Catch Clean Catch (Midstream)  01-12-22   No growth  --  --      .Blood Blood-Peripheral  01-12-22   Growth in aerobic and anaerobic bottles: Proteus mirabilis  See previous culture [id number]  Growth in aerobic bottle: Enterobacter cloacae complex  --  Enterobacter cloacae complex      .Blood Blood-Peripheral  01-12-22   Growth in aerobic and anaerobic bottles: Proteus mirabilis  Growth in anaerobic bottle: Enterobacter cloacae complex    RADIOLOGY:  < from: Xray Chest 1 View- PORTABLE-Urgent (Xray Chest 1 View- PORTABLE-Urgent .) (01.31.22 @ 07:42) >  IMPRESSION:  *  Endotracheal tube in satisfactory position.  *  Bilateral effusions with underlying atelectasis.    < end of copied text >      Stephan Vizcarra MD; Division of Infectious Disease; Pager: [phone]; nights and weekends: [phone]

## 2022-02-02 NOTE — BRIEF OPERATIVE NOTE - NSICDXBRIEFPREOP_GEN_ALL_CORE_FT
PRE-OP DIAGNOSIS:  Dysphagia 02-Feb-2022 17:08:49  Freya Casas  
PRE-OP DIAGNOSIS:  Acute respiratory failure with hypoxia 02-Feb-2022 16:49:35  Mundo Thompson  
PRE-OP DIAGNOSIS:  Perforated duodenal ulcer 15-Travis-2022 22:53:39  Amado Hale

## 2022-02-02 NOTE — BRIEF OPERATIVE NOTE - OPERATION/FINDINGS
PEG successfully placed,.  Secured at 3.0 at the skin  Bumper at 4.0 Percutaneous endoscopic gastrostomy tube placed.  Secured at 3.0 at the skin  Bumper at 4.0

## 2022-02-02 NOTE — PROGRESS NOTE ADULT - SUBJECTIVE AND OBJECTIVE BOX
GENERAL SURGERY DAILY PROGRESS NOTE:    Subjective:  Patient seen and examined. No new complaints.    Vital Signs Last 24 Hrs  T(C): 36.8 (29 Jan 2022 20:00), Max: 36.8 (29 Jan 2022 20:00)  T(F): 98.2 (29 Jan 2022 20:00), Max: 98.2 (29 Jan 2022 20:00)  HR: 71 (29 Jan 2022 22:16) (50 - 78)  BP: 100/45 (29 Jan 2022 19:00) (99/40 - 142/49)  BP(mean): 58 (29 Jan 2022 19:00) (52 - 72)  RR: 20 (29 Jan 2022 21:30) (14 - 20)  SpO2: 100% (29 Jan 2022 22:16) (98% - 100%)    PHYSICAL EXAMINATION:  General: lying in bed, NAD  Resp: intubated  Abd: soft, mildly distended.    I&O's Detail    28 Jan 2022 07:01  -  29 Jan 2022 07:00  --------------------------------------------------------  IN:    Dexmedetomidine: 189.9 mL    Enteral Tube Flush: 600 mL    IV PiggyBack: 750 mL    Phenylephrine: 51.2 mL  Total IN: 1591.1 mL    OUT:    Glucerna 1.5: 0 mL    Indwelling Catheter - Urethral (mL): 2580 mL    Rectal Tube (mL): 500 mL  Total OUT: 3080 mL    Total NET: -1488.9 mL      29 Jan 2022 07:01  -  30 Jan 2022 00:21  --------------------------------------------------------  IN:    Dexmedetomidine: 105.4 mL    dextrose 5%: 420 mL    Enteral Tube Flush: 30 mL    Glucerna 1.5: 360 mL    IV PiggyBack: 200 mL  Total IN: 1115.4 mL    OUT:    Indwelling Catheter - Urethral (mL): 1400 mL    Phenylephrine: 0 mL    Rectal Tube (mL): 800 mL  Total OUT: 2200 mL    Total NET: -1084.6 mL          Daily     Daily     MEDICATIONS  (STANDING):  acetaminophen    Suspension .. 650 milliGRAM(s) Oral every 6 hours  acetaZOLAMIDE Injectable 250 milliGRAM(s) IV Push every 6 hours  ALBUTerol    90 MICROgram(s) HFA Inhaler 2 Puff(s) Inhalation every 6 hours  budesonide 160 MICROgram(s)/formoterol 4.5 MICROgram(s) Inhaler 2 Puff(s) Inhalation two times a day  chlorhexidine 0.12% Liquid 15 milliLiter(s) Oral Mucosa every 12 hours  chlorhexidine 4% Liquid 1 Application(s) Topical <User Schedule>  dexMEDEtomidine Infusion 0.2 MICROgram(s)/kG/Hr (4.38 mL/Hr) IV Continuous <Continuous>  dextrose 5%. 1000 milliLiter(s) (30 mL/Hr) IV Continuous <Continuous>  digoxin  Injectable 125 MICROGram(s) IV Push daily  heparin   Injectable 5000 Unit(s) SubCutaneous every 8 hours  HYDROmorphone  Injectable 0.25 milliGRAM(s) IV Push once  insulin glargine Injectable (LANTUS) 30 Unit(s) SubCutaneous every morning  insulin lispro (ADMELOG) corrective regimen sliding scale   SubCutaneous every 6 hours  meropenem  IVPB 1000 milliGRAM(s) IV Intermittent every 8 hours  metoprolol tartrate 25 milliGRAM(s) Oral every 12 hours  metroNIDAZOLE  IVPB      metroNIDAZOLE  IVPB 500 milliGRAM(s) IV Intermittent every 8 hours  midodrine 10 milliGRAM(s) Oral every 8 hours  multivitamin/minerals/iron Oral Solution (CENTRUM) 15 milliLiter(s) Oral daily  pantoprazole  Injectable 40 milliGRAM(s) IV Push every 12 hours  phenylephrine    Infusion 0.1 MICROgram(s)/kG/Min (1.64 mL/Hr) IV Continuous <Continuous>  thiamine 100 milliGRAM(s) Oral daily  vancomycin    Solution 125 milliGRAM(s) Enteral Tube every 6 hours    MEDICATIONS  (PRN):  ALBUTerol    90 MICROgram(s) HFA Inhaler 2 Puff(s) Inhalation every 6 hours PRN Shortness of Breath and/or Wheezing      LABS:                        7.4    15.38 )-----------( 396      ( 29 Jan 2022 08:29 )             22.7     01-29    155<H>  |  119<H>  |  32<H>  ----------------------------<  153<H>  4.0   |  28  |  0.79    Ca    7.6<L>      29 Jan 2022 06:37  Phos  3.0     01-29  Mg     2.50     01-29      PT/INR - ( 28 Jan 2022 01:30 )   PT: 14.2 sec;   INR: 1.26 ratio         PTT - ( 28 Jan 2022 01:30 )  PTT:27.9 sec       GENERAL SURGERY DAILY PROGRESS NOTE:    Subjective:  Patient seen and examined. No new complaints.    Vital Signs Last 24 Hrs  T(C): 36.8 (29 Jan 2022 20:00), Max: 36.8 (29 Jan 2022 20:00)  T(F): 98.2 (29 Jan 2022 20:00), Max: 98.2 (29 Jan 2022 20:00)  HR: 71 (29 Jan 2022 22:16) (50 - 78)  BP: 100/45 (29 Jan 2022 19:00) (99/40 - 142/49)  BP(mean): 58 (29 Jan 2022 19:00) (52 - 72)  RR: 20 (29 Jan 2022 21:30) (14 - 20)  SpO2: 100% (29 Jan 2022 22:16) (98% - 100%)    PHYSICAL EXAMINATION:  General: lying in bed, NAD  Resp: intubated, CPAP 5/5/40  Abd: soft, mildly distended, midline abdominal incision with packing improved erythema    I&O's Detail    28 Jan 2022 07:01  -  29 Jan 2022 07:00  --------------------------------------------------------  IN:    Dexmedetomidine: 189.9 mL    Enteral Tube Flush: 600 mL    IV PiggyBack: 750 mL    Phenylephrine: 51.2 mL  Total IN: 1591.1 mL    OUT:    Glucerna 1.5: 0 mL    Indwelling Catheter - Urethral (mL): 2580 mL    Rectal Tube (mL): 500 mL  Total OUT: 3080 mL    Total NET: -1488.9 mL      29 Jan 2022 07:01  -  30 Jan 2022 00:21  --------------------------------------------------------  IN:    Dexmedetomidine: 105.4 mL    dextrose 5%: 420 mL    Enteral Tube Flush: 30 mL    Glucerna 1.5: 360 mL    IV PiggyBack: 200 mL  Total IN: 1115.4 mL    OUT:    Indwelling Catheter - Urethral (mL): 1400 mL    Phenylephrine: 0 mL    Rectal Tube (mL): 800 mL  Total OUT: 2200 mL    Total NET: -1084.6 mL          Daily     Daily     MEDICATIONS  (STANDING):  acetaminophen    Suspension .. 650 milliGRAM(s) Oral every 6 hours  acetaZOLAMIDE Injectable 250 milliGRAM(s) IV Push every 6 hours  ALBUTerol    90 MICROgram(s) HFA Inhaler 2 Puff(s) Inhalation every 6 hours  budesonide 160 MICROgram(s)/formoterol 4.5 MICROgram(s) Inhaler 2 Puff(s) Inhalation two times a day  chlorhexidine 0.12% Liquid 15 milliLiter(s) Oral Mucosa every 12 hours  chlorhexidine 4% Liquid 1 Application(s) Topical <User Schedule>  dexMEDEtomidine Infusion 0.2 MICROgram(s)/kG/Hr (4.38 mL/Hr) IV Continuous <Continuous>  dextrose 5%. 1000 milliLiter(s) (30 mL/Hr) IV Continuous <Continuous>  digoxin  Injectable 125 MICROGram(s) IV Push daily  heparin   Injectable 5000 Unit(s) SubCutaneous every 8 hours  HYDROmorphone  Injectable 0.25 milliGRAM(s) IV Push once  insulin glargine Injectable (LANTUS) 30 Unit(s) SubCutaneous every morning  insulin lispro (ADMELOG) corrective regimen sliding scale   SubCutaneous every 6 hours  meropenem  IVPB 1000 milliGRAM(s) IV Intermittent every 8 hours  metoprolol tartrate 25 milliGRAM(s) Oral every 12 hours  metroNIDAZOLE  IVPB      metroNIDAZOLE  IVPB 500 milliGRAM(s) IV Intermittent every 8 hours  midodrine 10 milliGRAM(s) Oral every 8 hours  multivitamin/minerals/iron Oral Solution (CENTRUM) 15 milliLiter(s) Oral daily  pantoprazole  Injectable 40 milliGRAM(s) IV Push every 12 hours  phenylephrine    Infusion 0.1 MICROgram(s)/kG/Min (1.64 mL/Hr) IV Continuous <Continuous>  thiamine 100 milliGRAM(s) Oral daily  vancomycin    Solution 125 milliGRAM(s) Enteral Tube every 6 hours    MEDICATIONS  (PRN):  ALBUTerol    90 MICROgram(s) HFA Inhaler 2 Puff(s) Inhalation every 6 hours PRN Shortness of Breath and/or Wheezing      LABS:                        7.4    15.38 )-----------( 396      ( 29 Jan 2022 08:29 )             22.7     01-29    155<H>  |  119<H>  |  32<H>  ----------------------------<  153<H>  4.0   |  28  |  0.79    Ca    7.6<L>      29 Jan 2022 06:37  Phos  3.0     01-29  Mg     2.50     01-29      PT/INR - ( 28 Jan 2022 01:30 )   PT: 14.2 sec;   INR: 1.26 ratio         PTT - ( 28 Jan 2022 01:30 )  PTT:27.9 sec
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
## 2022-02-02 NOTE — PROGRESS NOTE ADULT - ASSESSMENT
76M found on the floor at home 1/12, acute right basal ganglia stroke.   1/12 Enterobacter and Proteus bacteremia.   1/15 Duodenal perforation s/p OR washout, repair. Related to bacteremia? Stress ulcer? H pylori IgG negative.   1/19 Respiratory failure, mucous plugging but newly positive COVID PCR too s/p remdesivir.   1/22 C diff diarrhea   1/23 Abdominal surgical site inflammation - resolved, no pus, doubt few Candida significant.   Poor long term prognosis.   Continued respiratory failure, intermittent fevers, now with a cold left leg.   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

## 2022-02-02 NOTE — BRIEF OPERATIVE NOTE - NSICDXBRIEFPROCEDURE_GEN_ALL_CORE_FT
PROCEDURES:  Exploratory laparotomy 15-Travis-2022 22:52:46  Amado Hale  Duodenal ulcer repair 15-Travis-2022 22:53:23  Amado Hale  
PROCEDURES:  Creation, tracheostomy, planned 02-Feb-2022 16:49:05  Mundo Thompson  
PROCEDURES:  Insertion, PEG tube 02-Feb-2022 17:08:35  Freya Casas

## 2022-02-02 NOTE — PROGRESS NOTE ADULT - ATTENDING COMMENTS
Patient s/p priscilla patch multiple reintubations with covid pna.  plan  trach and peg today  vent management per sicu    I have personally interviewed and examined this patient, reviewed pertinent labs and imaging, and discussed the case with colleagues, residents, and physician assistants on B Team rounds.    The active care issues are:  1.vent dependent  2. covid pna    [facility] Acute Care Surgery (B Team) Attending Group Practice:  Dr. Rosa Anaya, Dr. Mike Baeza, Dr. Tian Del Real, Dr. Benjamin Stauffer,     urgent issues - spectra [id number]  nonurgent issues - [phone]  patient appointments or afterhours - [phone]

## 2022-02-02 NOTE — BRIEF OPERATIVE NOTE - NSICDXBRIEFPOSTOP_GEN_ALL_CORE_FT
POST-OP DIAGNOSIS:  Perforated duodenal ulcer 15-Travis-2022 22:53:51  Amado Hale  
POST-OP DIAGNOSIS:  Acute respiratory failure with hypoxia 02-Feb-2022 16:49:41  Mundo Thompson  
POST-OP DIAGNOSIS:  Dysphagia 02-Feb-2022 17:08:58  Freya Casas

## 2022-02-02 NOTE — PROVIDER CONTACT NOTE (CRITICAL VALUE NOTIFICATION) - NAME OF MD/NP/PA/DO NOTIFIED:
Delfina SOLORZANO
[facility]
Sakina Tummings
Enrrique Parry MD
Gastrectomy, sleeve, laparoscopic, with laparoscopic hiatal hernia repair
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
## 2022-02-03 NOTE — PROGRESS NOTE ADULT - ATTENDING COMMENTS
Patient s/p trach and peg. Maintain on ventilator.  plan  dispo planning  d/c flynn  care per sicu    I have personally interviewed and examined this patient, reviewed pertinent labs and imaging, and discussed the case with colleagues, residents, and physician assistants on B Team rounds.    The active care issues are:  1. s/p trach and peg    The Acute Care Surgery (B Team) Attending Group Practice:  Dr. Rosa Anaya, Dr. Mike Baeza, Dr. Tian Del Real, Dr. Benjamin Stauffer,     urgent issues - spectra [id number]  nonurgent issues - [phone]  patient appointments or afterhours - [phone]

## 2022-02-03 NOTE — PROGRESS NOTE ADULT - ATTENDING COMMENTS
Admitted w/ hemorrhagic stroke and acute left ischemia.   s/p ex lap, priscilla patch for perforated duodenal ulcer.   now s/p trach and peg  also COVID+ and now C.diff infection    left leg demarcating.   not infected at this time.  would clarify if above knee amputation is in line with goals of care.   If AKA is in line with patient's GOC, will consider AKA once patient stable and C.diff infection cleared.

## 2022-02-03 NOTE — PROVIDER CONTACT NOTE (OTHER) - REASON
Patient HR sustaining in 130's and 20 beats of Vtach
Rapid AFIB 140s, /55 map 73
Received call from telemetry technician that patient had a run of v-tach. patient had 12 beats of V-tach
received call from telemetry technician, HR went up to 194 rapid atrial fibrillation
MAP 60
Pt's left lower extremity/ left foot is pale, dusky, cool to touch with diminished pulses upon palpation and dopplers.
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
## 2022-02-03 NOTE — PROGRESS NOTE ADULT - SUBJECTIVE AND OBJECTIVE BOX
Follow Up:  c diff    Interval History/ROS:  resting comfortably,   son at bedside   - primary nurse notes diarrhea slowing, stage 2 sacral ulcer healing    Allergies  No Known Allergies    ANTIMICROBIALS:      OTHER MEDS:  MEDICATIONS  (STANDING):  acetaminophen    Suspension .. 650 every 6 hours  ALBUTerol    90 MICROgram(s) HFA Inhaler 2 every 6 hours  ALBUTerol    90 MICROgram(s) HFA Inhaler 2 every 6 hours PRN  budesonide 160 MICROgram(s)/formoterol 4.5 MICROgram(s) Inhaler 2 two times a day  dexMEDEtomidine Infusion 0.2 <Continuous>  digoxin  Injectable 125 daily  heparin   Injectable 5000 every 8 hours  HYDROmorphone  Injectable 0.5 every 4 hours PRN  insulin glargine Injectable (LANTUS) 30 every morning  insulin lispro (ADMELOG) corrective regimen sliding scale  every 6 hours  midodrine 30 every 8 hours  pantoprazole  Injectable 40 every 12 hours  phenylephrine    Infusion 0.1 <Continuous>      Vital Signs Last 24 Hrs  T(C): 37.1 (03 Feb 2022 12:00), Max: 38 (03 Feb 2022 04:00)  T(F): 98.7 (03 Feb 2022 12:00), Max: 100.4 (03 Feb 2022 04:00)  HR: 66 (03 Feb 2022 13:00) (51 - 129)  BP: --  BP(mean): --  RR: 24 (03 Feb 2022 13:00) (14 - 25)  SpO2: 100% (03 Feb 2022 13:00) (100% - 100%)    PHYSICAL EXAM:  General:  NAD, Non-toxic  Neurology: Asleep  Respiratory: Clear to auscultation bilaterally  CV: RRR, S1S2, no murmurs, rubs or gallops  Abdominal: Soft, Non-tender, non-distended, normal bowel sounds  Extremities: No edema, mottling gangrenous changes severe on toes and involving left foot and distal LLE  Line Sites: Clear  Skin: No rash - stable eschar on both knees                        7.3    13.35 )-----------( 479      ( 03 Feb 2022 00:59 )             22.6       02-03    144  |  112<H>  |  21  ----------------------------<  98  4.1   |  24  |  0.74    Ca    7.4<L>      03 Feb 2022 00:59  Phos  3.0     02-03  Mg     2.50     02-03    TPro  5.3<L>  /  Alb  1.9<L>  /  TBili  0.2  /  DBili  x   /  AST  45<H>  /  ALT  44<H>  /  AlkPhos  123<H>  02-03          MICROBIOLOGY:  .Sputum Sputum  01-28-22   Numerous Stenotrophomonas maltophilia  Normal Respiratory Vijaya absent  --  Stenotrophomonas maltophilia      .Abscess Midline incision  01-23-22   Few Candida albicans "Susceptibilities not performed"  --  --      .Blood Blood-Arterial  01-21-22   No Growth Final  --  --      BAL sputum  01-19-22   No growth at 1 week.  --  --      .Sputum Sputum  01-19-22   No growth at 1 week.  --  --      Combi-Cath bronchial lavage  01-19-22   Normal Respiratory Vijaya present  --  --      .Sputum Sputum  01-19-22   Normal Respiratory Vijaya present  --    Few polymorphonuclear leukocytes per low power field  No Squamous epithelial cells per low power field  No organisms seen      .Blood Blood-Venous  01-15-22   No Growth Final  --  --      .Blood Blood-Venous  01-14-22   No Growth Final  --  --      .Blood Blood-Peripheral  01-14-22   No Growth Final  --  --      .Blood Blood-Peripheral  01-13-22   Growth in anaerobic bottle: Proteus mirabilis  See previous culture  [id number]  --    Growth in anaerobic bottle: Gram Negative Rods      Clean Catch Clean Catch (Midstream)  01-12-22   No growth  --  --      .Blood Blood-Peripheral  01-12-22   Growth in aerobic and anaerobic bottles: Proteus mirabilis  See previous culture [id number]  Growth in aerobic bottle: Enterobacter cloacae complex  --  Enterobacter cloacae complex      .Blood Blood-Peripheral  01-12-22   Growth in aerobic and anaerobic bottles: Proteus mirabilis  Growth in anaerobic bottle: Enterobacter cloacae complex      RADIOLOGY:  < from: Xray Chest 1 View- PORTABLE-Urgent (Xray Chest 1 View- PORTABLE-Urgent .) (01.31.22 @ 07:42) >  IMPRESSION:  *  Endotracheal tube in satisfactory position.  *  Bilateral effusions with underlying atelectasis.    < end of copied text >  1/24: Decreased size of resolving right basal ganglia parenchymal hemorrhage,   currently 1.5 x 1.3 cm, previously 2.5 x 1.9 cm. Similar surrounding   vasogenic edema.    No hydrocephalus, midline shift, or effacement of basal cisterns.    Moderate white matter microvascular ischemic disease.    No displaced calvarial fracture.    IMPRESSION:    Decreased size of resolving right basal ganglia parenchymal hemorrhage.  Recommend following hemorrhage to resolution.      Stephan Vizcarra MD; Division of Infectious Disease; Pager: [phone]; nights and weekends: [phone]

## 2022-02-03 NOTE — PROVIDER CONTACT NOTE (OTHER) - SITUATION
Patient BP 95/43 MAP 60, HR 59. Attempted to wean precedex down, patient becomes increasingly agitated and restless requiring increased titration.
Patient HR sustaining in 130's and 20 beats of Vtach. patient was already given 2 rounds of metoprolol 5mg IV push, as per provider orders. Patient /63 all other vitals are stable
Patient in rapid afib with HR 130s-140s, /57 MAP 74.
Pt's left lower extremity/ foot is pale, dusky, cool to touch with diminished pulses upon palpation and dopplers.
Received call from telemetry technician that patient had a run of v-tach. patient had 12 beats of V-tach
received call from telemetry technician, HR went up to 194 rapid atrial fibrillation

## 2022-02-03 NOTE — PROVIDER CONTACT NOTE (OTHER) - DATE AND TIME:
02-Feb-2022 19:40
13-Jan-2022 10:37
03-Feb-2022 00:12
13-Jan-2022 12:00
14-Jan-2022 20:55
13-Jan-2022 11:31

## 2022-02-03 NOTE — PROGRESS NOTE ADULT - ASSESSMENT
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   1/12 Enterobacter and Proteus bacteremia.   1/15 Duodenal perforation s/p OR washout, repair. Related to bacteremia? Stress ulcer? H pylori IgG negative.   1/19 Respiratory failure, mucous plugging but newly positive COVID PCR on 1/19,  2/1 s/p remdesivir 1/19 --> 1/23.   1/22 C diff diarrhea   1/23 Abdominal surgical site inflammation - resolved, no pus, doubt few Candida significant.   Continued respiratory failure, intermittent fevers, now with a cold left leg.   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  2/3 vascular advises Left AKA    Antimicrobial course:   Cefepime 1/13-17, Zosyn 1/17-21, Meropenem 1/21-24, 26-->2/2  Flagyl IV 1/15-17, 1/28-   Fluconazole 1/19-23   Vancomycin IV 1/22-24  po Vanco  1/23-->    Suggest  Continue PO Vanc 125mg QID 1/23  trough at least 2/9
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
## 2022-02-03 NOTE — PROGRESS NOTE ADULT - SUBJECTIVE AND OBJECTIVE BOX
GENERAL SURGERY DAILY PROGRESS NOTE:    Subjective:  Patient seen and examined. No new complaints.    Vital Signs Last 24 Hrs  T(C): 36.8 (29 Jan 2022 20:00), Max: 36.8 (29 Jan 2022 20:00)  T(F): 98.2 (29 Jan 2022 20:00), Max: 98.2 (29 Jan 2022 20:00)  HR: 71 (29 Jan 2022 22:16) (50 - 78)  BP: 100/45 (29 Jan 2022 19:00) (99/40 - 142/49)  BP(mean): 58 (29 Jan 2022 19:00) (52 - 72)  RR: 20 (29 Jan 2022 21:30) (14 - 20)  SpO2: 100% (29 Jan 2022 22:16) (98% - 100%)    PHYSICAL EXAMINATION:  General: lying in bed, NAD  Resp: intubated, CPAP 5/5/40  Abd: soft, mildly distended, midline abdominal incision with packing improved erythema    I&O's Detail    28 Jan 2022 07:01  -  29 Jan 2022 07:00  --------------------------------------------------------  IN:    Dexmedetomidine: 189.9 mL    Enteral Tube Flush: 600 mL    IV PiggyBack: 750 mL    Phenylephrine: 51.2 mL  Total IN: 1591.1 mL    OUT:    Glucerna 1.5: 0 mL    Indwelling Catheter - Urethral (mL): 2580 mL    Rectal Tube (mL): 500 mL  Total OUT: 3080 mL    Total NET: -1488.9 mL      29 Jan 2022 07:01  -  30 Jan 2022 00:21  --------------------------------------------------------  IN:    Dexmedetomidine: 105.4 mL    dextrose 5%: 420 mL    Enteral Tube Flush: 30 mL    Glucerna 1.5: 360 mL    IV PiggyBack: 200 mL  Total IN: 1115.4 mL    OUT:    Indwelling Catheter - Urethral (mL): 1400 mL    Phenylephrine: 0 mL    Rectal Tube (mL): 800 mL  Total OUT: 2200 mL    Total NET: -1084.6 mL          Daily     Daily     MEDICATIONS  (STANDING):  acetaminophen    Suspension .. 650 milliGRAM(s) Oral every 6 hours  acetaZOLAMIDE Injectable 250 milliGRAM(s) IV Push every 6 hours  ALBUTerol    90 MICROgram(s) HFA Inhaler 2 Puff(s) Inhalation every 6 hours  budesonide 160 MICROgram(s)/formoterol 4.5 MICROgram(s) Inhaler 2 Puff(s) Inhalation two times a day  chlorhexidine 0.12% Liquid 15 milliLiter(s) Oral Mucosa every 12 hours  chlorhexidine 4% Liquid 1 Application(s) Topical <User Schedule>  dexMEDEtomidine Infusion 0.2 MICROgram(s)/kG/Hr (4.38 mL/Hr) IV Continuous <Continuous>  dextrose 5%. 1000 milliLiter(s) (30 mL/Hr) IV Continuous <Continuous>  digoxin  Injectable 125 MICROGram(s) IV Push daily  heparin   Injectable 5000 Unit(s) SubCutaneous every 8 hours  HYDROmorphone  Injectable 0.25 milliGRAM(s) IV Push once  insulin glargine Injectable (LANTUS) 30 Unit(s) SubCutaneous every morning  insulin lispro (ADMELOG) corrective regimen sliding scale   SubCutaneous every 6 hours  meropenem  IVPB 1000 milliGRAM(s) IV Intermittent every 8 hours  metoprolol tartrate 25 milliGRAM(s) Oral every 12 hours  metroNIDAZOLE  IVPB      metroNIDAZOLE  IVPB 500 milliGRAM(s) IV Intermittent every 8 hours  midodrine 10 milliGRAM(s) Oral every 8 hours  multivitamin/minerals/iron Oral Solution (CENTRUM) 15 milliLiter(s) Oral daily  pantoprazole  Injectable 40 milliGRAM(s) IV Push every 12 hours  phenylephrine    Infusion 0.1 MICROgram(s)/kG/Min (1.64 mL/Hr) IV Continuous <Continuous>  thiamine 100 milliGRAM(s) Oral daily  vancomycin    Solution 125 milliGRAM(s) Enteral Tube every 6 hours    MEDICATIONS  (PRN):  ALBUTerol    90 MICROgram(s) HFA Inhaler 2 Puff(s) Inhalation every 6 hours PRN Shortness of Breath and/or Wheezing      LABS:                        7.4    15.38 )-----------( 396      ( 29 Jan 2022 08:29 )             22.7     01-29    155<H>  |  119<H>  |  32<H>  ----------------------------<  153<H>  4.0   |  28  |  0.79    Ca    7.6<L>      29 Jan 2022 06:37  Phos  3.0     01-29  Mg     2.50     01-29      PT/INR - ( 28 Jan 2022 01:30 )   PT: 14.2 sec;   INR: 1.26 ratio         PTT - ( 28 Jan 2022 01:30 )  PTT:27.9 sec       GENERAL SURGERY DAILY PROGRESS NOTE:    Subjective:  Patient seen and examined. No new complaints.    Vital Signs Last 24 Hrs  T(C): 36.8 (29 Jan 2022 20:00), Max: 36.8 (29 Jan 2022 20:00)  T(F): 98.2 (29 Jan 2022 20:00), Max: 98.2 (29 Jan 2022 20:00)  HR: 71 (29 Jan 2022 22:16) (50 - 78)  BP: 100/45 (29 Jan 2022 19:00) (99/40 - 142/49)  BP(mean): 58 (29 Jan 2022 19:00) (52 - 72)  RR: 20 (29 Jan 2022 21:30) (14 - 20)  SpO2: 100% (29 Jan 2022 22:16) (98% - 100%)    PHYSICAL EXAMINATION:  General: lying in bed, NAD  Resp: intubated, CPAP 5/5/40  Abd: soft, mildly distended, midline abdominal incision with packing improved erythema      I&O's Detail    28 Jan 2022 07:01  -  29 Jan 2022 07:00  --------------------------------------------------------  IN:    Dexmedetomidine: 189.9 mL    Enteral Tube Flush: 600 mL    IV PiggyBack: 750 mL    Phenylephrine: 51.2 mL  Total IN: 1591.1 mL    OUT:    Glucerna 1.5: 0 mL    Indwelling Catheter - Urethral (mL): 2580 mL    Rectal Tube (mL): 500 mL  Total OUT: 3080 mL    Total NET: -1488.9 mL      29 Jan 2022 07:01  -  30 Jan 2022 00:21  --------------------------------------------------------  IN:    Dexmedetomidine: 105.4 mL    dextrose 5%: 420 mL    Enteral Tube Flush: 30 mL    Glucerna 1.5: 360 mL    IV PiggyBack: 200 mL  Total IN: 1115.4 mL    OUT:    Indwelling Catheter - Urethral (mL): 1400 mL    Phenylephrine: 0 mL    Rectal Tube (mL): 800 mL  Total OUT: 2200 mL    Total NET: -1084.6 mL          Daily     Daily     MEDICATIONS  (STANDING):  acetaminophen    Suspension .. 650 milliGRAM(s) Oral every 6 hours  acetaZOLAMIDE Injectable 250 milliGRAM(s) IV Push every 6 hours  ALBUTerol    90 MICROgram(s) HFA Inhaler 2 Puff(s) Inhalation every 6 hours  budesonide 160 MICROgram(s)/formoterol 4.5 MICROgram(s) Inhaler 2 Puff(s) Inhalation two times a day  chlorhexidine 0.12% Liquid 15 milliLiter(s) Oral Mucosa every 12 hours  chlorhexidine 4% Liquid 1 Application(s) Topical <User Schedule>  dexMEDEtomidine Infusion 0.2 MICROgram(s)/kG/Hr (4.38 mL/Hr) IV Continuous <Continuous>  dextrose 5%. 1000 milliLiter(s) (30 mL/Hr) IV Continuous <Continuous>  digoxin  Injectable 125 MICROGram(s) IV Push daily  heparin   Injectable 5000 Unit(s) SubCutaneous every 8 hours  HYDROmorphone  Injectable 0.25 milliGRAM(s) IV Push once  insulin glargine Injectable (LANTUS) 30 Unit(s) SubCutaneous every morning  insulin lispro (ADMELOG) corrective regimen sliding scale   SubCutaneous every 6 hours  meropenem  IVPB 1000 milliGRAM(s) IV Intermittent every 8 hours  metoprolol tartrate 25 milliGRAM(s) Oral every 12 hours  metroNIDAZOLE  IVPB      metroNIDAZOLE  IVPB 500 milliGRAM(s) IV Intermittent every 8 hours  midodrine 10 milliGRAM(s) Oral every 8 hours  multivitamin/minerals/iron Oral Solution (CENTRUM) 15 milliLiter(s) Oral daily  pantoprazole  Injectable 40 milliGRAM(s) IV Push every 12 hours  phenylephrine    Infusion 0.1 MICROgram(s)/kG/Min (1.64 mL/Hr) IV Continuous <Continuous>  thiamine 100 milliGRAM(s) Oral daily  vancomycin    Solution 125 milliGRAM(s) Enteral Tube every 6 hours    MEDICATIONS  (PRN):  ALBUTerol    90 MICROgram(s) HFA Inhaler 2 Puff(s) Inhalation every 6 hours PRN Shortness of Breath and/or Wheezing      LABS:                        7.4    15.38 )-----------( 396      ( 29 Jan 2022 08:29 )             22.7     01-29    155<H>  |  119<H>  |  32<H>  ----------------------------<  153<H>  4.0   |  28  |  0.79    Ca    7.6<L>      29 Jan 2022 06:37  Phos  3.0     01-29  Mg     2.50     01-29      PT/INR - ( 28 Jan 2022 01:30 )   PT: 14.2 sec;   INR: 1.26 ratio         PTT - ( 28 Jan 2022 01:30 )  PTT:27.9 sec       GENERAL SURGERY DAILY PROGRESS NOTE:    Overnight:  - S/p Trach and PEG on /  - DC flynn  - Patient can come off COVID contact precautions 2 weeks post first positive test    Subjective:  Patient seen and examined. No new complaints.    Objective:  Vital Signs (24 Hrs):  T(C): 37.7 (22 @ 08:00), Max: 38 (22 @ 04:00)  HR: 63 (22 @ 08:00) (51 - 129)  BP: --  RR: 17 (22 @ 08:00) (14 - 24)  SpO2: 100% (22 @ 08:00) (99% - 100%)  Wt(kg): --  Daily     Daily Weight in k.7 (2022 06:00)    I&O's Summary    2022 07:01  -  2022 07:00  --------------------------------------------------------  IN: 2343.7 mL / OUT: 1350 mL / NET: 993.7 mL    2022 07:01  -  2022 08:46  --------------------------------------------------------  IN: 65.3 mL / OUT: 150 mL / NET: -84.7 mL        PHYSICAL EXAMINATION:  General: lying in bed, NAD  Resp: trach in place  Abd: soft, mildly distended, midline abdominal incision with packing improved erythema          Daily     Daily     MEDICATIONS  (STANDING):  acetaminophen    Suspension .. 650 milliGRAM(s) Oral every 6 hours  acetaZOLAMIDE Injectable 250 milliGRAM(s) IV Push every 6 hours  ALBUTerol    90 MICROgram(s) HFA Inhaler 2 Puff(s) Inhalation every 6 hours  budesonide 160 MICROgram(s)/formoterol 4.5 MICROgram(s) Inhaler 2 Puff(s) Inhalation two times a day  chlorhexidine 0.12% Liquid 15 milliLiter(s) Oral Mucosa every 12 hours  chlorhexidine 4% Liquid 1 Application(s) Topical <User Schedule>  dexMEDEtomidine Infusion 0.2 MICROgram(s)/kG/Hr (4.38 mL/Hr) IV Continuous <Continuous>  dextrose 5%. 1000 milliLiter(s) (30 mL/Hr) IV Continuous <Continuous>  digoxin  Injectable 125 MICROGram(s) IV Push daily  heparin   Injectable 5000 Unit(s) SubCutaneous every 8 hours  HYDROmorphone  Injectable 0.25 milliGRAM(s) IV Push once  insulin glargine Injectable (LANTUS) 30 Unit(s) SubCutaneous every morning  insulin lispro (ADMELOG) corrective regimen sliding scale   SubCutaneous every 6 hours  meropenem  IVPB 1000 milliGRAM(s) IV Intermittent every 8 hours  metoprolol tartrate 25 milliGRAM(s) Oral every 12 hours  metroNIDAZOLE  IVPB      metroNIDAZOLE  IVPB 500 milliGRAM(s) IV Intermittent every 8 hours  midodrine 10 milliGRAM(s) Oral every 8 hours  multivitamin/minerals/iron Oral Solution (CENTRUM) 15 milliLiter(s) Oral daily  pantoprazole  Injectable 40 milliGRAM(s) IV Push every 12 hours  phenylephrine    Infusion 0.1 MICROgram(s)/kG/Min (1.64 mL/Hr) IV Continuous <Continuous>  thiamine 100 milliGRAM(s) Oral daily  vancomycin    Solution 125 milliGRAM(s) Enteral Tube every 6 hours    MEDICATIONS  (PRN):  ALBUTerol    90 MICROgram(s) HFA Inhaler 2 Puff(s) Inhalation every 6 hours PRN Shortness of Breath and/or Wheezing      LABS:                         7.3    13.35 )-----------( 479      ( 2022 00:59 )             22.6     02-03    144  |  112<H>  |  21  ----------------------------<  98  4.1   |  24  |  0.74    Ca    7.4<L>      2022 00:59  Phos  3.0     02-03  Mg     2.50     02-03    TPro  5.3<L>  /  Alb  1.9<L>  /  TBili  0.2  /  DBili  x   /  AST  45<H>  /  ALT  44<H>  /  AlkPhos  123<H>  02-03    PT/INR - ( 2022 01:16 )   PT: 14.1 sec;   INR: 1.25 ratio         PTT - ( 2022 01:16 )  PTT:31.8 sec          RADIOLOGY, EKG & ADDITIONAL TESTS: Reviewed.

## 2022-02-03 NOTE — PROVIDER CONTACT NOTE (OTHER) - ACTION/TREATMENT ORDERED:
provider notified and will come see patient
will order 5mg more of lopressor
Continue to monitor.
Per PA give patient 10mg Cardizem IVP and give midodrine 10pm dose early. If patient does not become hypotensive then give 6pm dose of Lopressor via PEG tube.
Team 2 Diego Argueta made aware. SCD ordered. No other further interventions ordered.
no actions ordered. continue to monitor telemetry
will order Lopressor IV push if BP is normal

## 2022-02-03 NOTE — PROVIDER CONTACT NOTE (OTHER) - NAME OF MD/NP/PA/DO NOTIFIED:
Enrrique Parry, Team 2
Enrrique Parry, team 2
JEANINE Mathis
Night SICU PA
T1 pager [phone]
Sakina Parker, [id number]
Team 2 Diego Argueta
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
## 2022-02-03 NOTE — PROGRESS NOTE ADULT - SUBJECTIVE AND OBJECTIVE BOX
Surgery Progress Note    SUBJECTIVE:  - Patient seen and examined at bedside   --------------------------------------------------------------------------------------------------  OBJECTIVE:   Physical Exam:  General: AAOx0  HEENT: MV on tracheostomy  Respiratory: nonlabored breathing  Cardiovascular: RRR, normal S1 and S2, no murmurs or gallops  Abdomen: non-distended, soft, non-tender  Vascular:  Palpable femoral b/l  RLE DP/PT signals; foot warm, good cap refill; motor sensory grossly intact  LLE foot cold to touch from shin down; demarcation to the level of mid foot, no DP/PT signal; absent motor or sensory from knee down  Bilateral anterior knee eschar lesions with adaptic dressing in place  --------------------------------------------------------------------------------------------------  V/S:  Vital Signs Last 24 Hrs  T(C): 37.7 (03 Feb 2022 08:00), Max: 38 (03 Feb 2022 04:00)  T(F): 99.9 (03 Feb 2022 08:00), Max: 100.4 (03 Feb 2022 04:00)  HR: 63 (03 Feb 2022 09:00) (51 - 129)  BP: --  BP(mean): --  RR: 17 (03 Feb 2022 09:00) (14 - 22)  SpO2: 100% (03 Feb 2022 09:00) (99% - 100%)  Mode: AC/ CMV (Assist Control/ Continuous Mandatory Ventilation)  RR (machine): 14  TV (machine): 420  FiO2: 40  PEEP: 5  ITime: 0.8  MAP: 9  PIP: 19    --------------------------------------------------------------------------------------------------  I/Os:    02 Feb 2022 07:01  -  03 Feb 2022 07:00  --------------------------------------------------------  IN:    Dexmedetomidine: 228.7 mL    Enteral Tube Flush: 500 mL    Free Water: 1000 mL    Glucerna 1.5: 585 mL    Phenylephrine: 30 mL  Total IN: 2343.7 mL    OUT:    Indwelling Catheter - Urethral (mL): 1000 mL    Rectal Tube (mL): 350 mL  Total OUT: 1350 mL    Total NET: 993.7 mL      03 Feb 2022 07:01  -  03 Feb 2022 09:45  --------------------------------------------------------  IN:    Dexmedetomidine: 15.3 mL    Glucerna 1.5: 45 mL    Phenylephrine: 5 mL  Total IN: 65.3 mL    OUT:    Indwelling Catheter - Urethral (mL): 100 mL    Rectal Tube (mL): 50 mL  Total OUT: 150 mL    Total NET: -84.7 mL        --------------------------------------------------------------------------------------------------  LABS:                        7.3    13.35 )-----------( 479      ( 03 Feb 2022 00:59 )             22.6     03 Feb 2022 00:59    144    |  112    |  21     ----------------------------<  98     4.1     |  24     |  0.74     Ca    7.4        03 Feb 2022 00:59  Phos  3.0       03 Feb 2022 00:59  Mg     2.50      03 Feb 2022 00:59    TPro  5.3    /  Alb  1.9    /  TBili  0.2    /  DBili  x      /  AST  45     /  ALT  44     /  AlkPhos  123    03 Feb 2022 00:59    PT/INR - ( 02 Feb 2022 01:16 )   PT: 14.1 sec;   INR: 1.25 ratio         PTT - ( 02 Feb 2022 01:16 )  PTT:31.8 sec  CAPILLARY BLOOD GLUCOSE      POCT Blood Glucose.: 109 mg/dL (03 Feb 2022 05:03)  POCT Blood Glucose.: 83 mg/dL (02 Feb 2022 23:32)  POCT Blood Glucose.: 136 mg/dL (02 Feb 2022 19:02)  POCT Blood Glucose.: 67 mg/dL (02 Feb 2022 18:46)  POCT Blood Glucose.: 73 mg/dL (02 Feb 2022 12:32)        LIVER FUNCTIONS - ( 03 Feb 2022 00:59 )  Alb: 1.9 g/dL / Pro: 5.3 g/dL / ALK PHOS: 123 U/L / ALT: 44 U/L / AST: 45 U/L / GGT: x               --------------------------------------------------------------------------------------------------  MEDICATIONS  (STANDING):  acetaminophen    Suspension .. 650 milliGRAM(s) Oral every 6 hours  ALBUTerol    90 MICROgram(s) HFA Inhaler 2 Puff(s) Inhalation every 6 hours  budesonide 160 MICROgram(s)/formoterol 4.5 MICROgram(s) Inhaler 2 Puff(s) Inhalation two times a day  chlorhexidine 0.12% Liquid 15 milliLiter(s) Oral Mucosa every 12 hours  chlorhexidine 4% Liquid 1 Application(s) Topical <User Schedule>  dexMEDEtomidine Infusion 0.2 MICROgram(s)/kG/Hr (4.38 mL/Hr) IV Continuous <Continuous>  digoxin  Injectable 125 MICROGram(s) IV Push daily  heparin   Injectable 5000 Unit(s) SubCutaneous every 8 hours  insulin glargine Injectable (LANTUS) 30 Unit(s) SubCutaneous every morning  insulin lispro (ADMELOG) corrective regimen sliding scale   SubCutaneous every 6 hours  metoprolol tartrate 12.5 milliGRAM(s) Oral every 12 hours  midodrine 20 milliGRAM(s) Oral every 8 hours  multivitamin/minerals/iron Oral Solution (CENTRUM) 15 milliLiter(s) Oral daily  pantoprazole  Injectable 40 milliGRAM(s) IV Push every 12 hours  phenylephrine    Infusion 0.1 MICROgram(s)/kG/Min (3.28 mL/Hr) IV Continuous <Continuous>  thiamine 100 milliGRAM(s) Oral daily    MEDICATIONS  (PRN):  ALBUTerol    90 MICROgram(s) HFA Inhaler 2 Puff(s) Inhalation every 6 hours PRN Shortness of Breath and/or Wheezing  HYDROmorphone  Injectable 0.5 milliGRAM(s) IV Push every 4 hours PRN Severe Pain (7 - 10)    --------------------------------------------------------------------------------------------------
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
## 2022-02-04 NOTE — PROGRESS NOTE ADULT - ASSESSMENT
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   1/12 Enterobacter and Proteus bacteremia.   1/15 Duodenal perforation s/p OR washout, repair. Related to bacteremia? Stress ulcer? H pylori IgG negative.   1/19 Respiratory failure, mucous plugging but newly positive COVID PCR on 1/19,  2/1 s/p remdesivir 1/19 --> 1/23.   1/22 C diff diarrhea   1/23 Abdominal surgical site inflammation - resolved, no pus, doubt few Candida significant.   Continued respiratory failure, intermittent fevers, now with a cold left leg.   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  2/3 vascular advises Left AKA  2/4 increased wbc,  low grade fever  CXR on my read with increased haziness right base suggestive of effusion    increased wbc may be related to pneumonia  or limb ischemia - given improved resp status, I favor limb ischemia    Antimicrobial course:   Cefepime 1/13-17, Zosyn 1/17-21, Meropenem 1/21-24, 26-->2/2  Flagyl IV 1/15-17, 1/28-   Fluconazole 1/19-23   Vancomycin IV 1/22-24  po Vanco  1/23-->    Suggest  Continue PO Vanc 125mg QID 1/23  trough at least 2/9  consider POCUS ultrasound of chest to assess for right posterior effusion  if increased resp distress or evidence of pneumonia: Bactrim 250 mg IVSS every 6hours to cover Stenotrophomonas  repeat sputum culture    ID will see this weekend
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
## 2022-02-04 NOTE — PROGRESS NOTE ADULT - SUBJECTIVE AND OBJECTIVE BOX
GENERAL SURGERY DAILY PROGRESS NOTE:    Subjective:  Patient seen and examined. No new complaints.    Objective:  Vital Signs (24 Hrs):  T(C): 37.7 (22 @ 08:00), Max: 38 (22 @ 04:00)  HR: 63 (22 @ 08:00) (51 - 129)  BP: --  RR: 17 (22 @ 08:00) (14 - 24)  SpO2: 100% (22 @ 08:00) (99% - 100%)  Wt(kg): --  Daily     Daily Weight in k.7 (2022 06:00)    I&O's Summary    2022 07:01  -  2022 07:00  --------------------------------------------------------  IN: 2343.7 mL / OUT: 1350 mL / NET: 993.7 mL    2022 07:01  -  2022 08:46  --------------------------------------------------------  IN: 65.3 mL / OUT: 150 mL / NET: -84.7 mL        PHYSICAL EXAMINATION:  General: lying in bed, NAD  Resp: trach in place  Abd: soft, mildly distended, midline abdominal incision with packing improved erythema          Daily     Daily     MEDICATIONS  (STANDING):  acetaminophen    Suspension .. 650 milliGRAM(s) Oral every 6 hours  acetaZOLAMIDE Injectable 250 milliGRAM(s) IV Push every 6 hours  ALBUTerol    90 MICROgram(s) HFA Inhaler 2 Puff(s) Inhalation every 6 hours  budesonide 160 MICROgram(s)/formoterol 4.5 MICROgram(s) Inhaler 2 Puff(s) Inhalation two times a day  chlorhexidine 0.12% Liquid 15 milliLiter(s) Oral Mucosa every 12 hours  chlorhexidine 4% Liquid 1 Application(s) Topical <User Schedule>  dexMEDEtomidine Infusion 0.2 MICROgram(s)/kG/Hr (4.38 mL/Hr) IV Continuous <Continuous>  dextrose 5%. 1000 milliLiter(s) (30 mL/Hr) IV Continuous <Continuous>  digoxin  Injectable 125 MICROGram(s) IV Push daily  heparin   Injectable 5000 Unit(s) SubCutaneous every 8 hours  HYDROmorphone  Injectable 0.25 milliGRAM(s) IV Push once  insulin glargine Injectable (LANTUS) 30 Unit(s) SubCutaneous every morning  insulin lispro (ADMELOG) corrective regimen sliding scale   SubCutaneous every 6 hours  meropenem  IVPB 1000 milliGRAM(s) IV Intermittent every 8 hours  metoprolol tartrate 25 milliGRAM(s) Oral every 12 hours  metroNIDAZOLE  IVPB      metroNIDAZOLE  IVPB 500 milliGRAM(s) IV Intermittent every 8 hours  midodrine 10 milliGRAM(s) Oral every 8 hours  multivitamin/minerals/iron Oral Solution (CENTRUM) 15 milliLiter(s) Oral daily  pantoprazole  Injectable 40 milliGRAM(s) IV Push every 12 hours  phenylephrine    Infusion 0.1 MICROgram(s)/kG/Min (1.64 mL/Hr) IV Continuous <Continuous>  thiamine 100 milliGRAM(s) Oral daily  vancomycin    Solution 125 milliGRAM(s) Enteral Tube every 6 hours    MEDICATIONS  (PRN):  ALBUTerol    90 MICROgram(s) HFA Inhaler 2 Puff(s) Inhalation every 6 hours PRN Shortness of Breath and/or Wheezing      LABS:                         7.3    13.35 )-----------( 479      ( 2022 00:59 )             22.6     02-    144  |  112<H>  |  21  ----------------------------<  98  4.1   |  24  |  0.74    Ca    7.4<L>      2022 00:59  Phos  3.0     02-03  Mg     2.50     02-03    TPro  5.3<L>  /  Alb  1.9<L>  /  TBili  0.2  /  DBili  x   /  AST  45<H>  /  ALT  44<H>  /  AlkPhos  123<H>  02-03    PT/INR - ( 2022 01:16 )   PT: 14.1 sec;   INR: 1.25 ratio         PTT - ( 2022 01:16 )  PTT:31.8 sec          RADIOLOGY, EKG & ADDITIONAL TESTS: Reviewed.        GENERAL SURGERY DAILY PROGRESS NOTE:    Subjective:  Patient seen and examined. No new complaints.    Objective:  Vital Signs (24 Hrs):  T(C): 37.7 (22 @ 08:00), Max: 38 (22 @ 04:00)  HR: 63 (22 @ 08:00) (51 - 129)  BP: --  RR: 17 (22 @ 08:00) (14 - 24)  SpO2: 100% (22 @ 08:00) (99% - 100%)  Wt(kg): --  Daily     Daily Weight in k.7 (2022 06:00)    I&O's Summary    2022 07:01  -  2022 07:00  --------------------------------------------------------  IN: 2343.7 mL / OUT: 1350 mL / NET: 993.7 mL    2022 07:01  -  2022 08:46  --------------------------------------------------------  IN: 65.3 mL / OUT: 150 mL / NET: -84.7 mL        PHYSICAL EXAMINATION:  General: lying in bed, NAD  Resp: trach in place, no bleeding or crepitus  Abd: soft, mildly distended, midline abdominal incision with packing improved erythema          Daily     Daily     MEDICATIONS  (STANDING):  acetaminophen    Suspension .. 650 milliGRAM(s) Oral every 6 hours  acetaZOLAMIDE Injectable 250 milliGRAM(s) IV Push every 6 hours  ALBUTerol    90 MICROgram(s) HFA Inhaler 2 Puff(s) Inhalation every 6 hours  budesonide 160 MICROgram(s)/formoterol 4.5 MICROgram(s) Inhaler 2 Puff(s) Inhalation two times a day  chlorhexidine 0.12% Liquid 15 milliLiter(s) Oral Mucosa every 12 hours  chlorhexidine 4% Liquid 1 Application(s) Topical <User Schedule>  dexMEDEtomidine Infusion 0.2 MICROgram(s)/kG/Hr (4.38 mL/Hr) IV Continuous <Continuous>  dextrose 5%. 1000 milliLiter(s) (30 mL/Hr) IV Continuous <Continuous>  digoxin  Injectable 125 MICROGram(s) IV Push daily  heparin   Injectable 5000 Unit(s) SubCutaneous every 8 hours  HYDROmorphone  Injectable 0.25 milliGRAM(s) IV Push once  insulin glargine Injectable (LANTUS) 30 Unit(s) SubCutaneous every morning  insulin lispro (ADMELOG) corrective regimen sliding scale   SubCutaneous every 6 hours  meropenem  IVPB 1000 milliGRAM(s) IV Intermittent every 8 hours  metoprolol tartrate 25 milliGRAM(s) Oral every 12 hours  metroNIDAZOLE  IVPB      metroNIDAZOLE  IVPB 500 milliGRAM(s) IV Intermittent every 8 hours  midodrine 10 milliGRAM(s) Oral every 8 hours  multivitamin/minerals/iron Oral Solution (CENTRUM) 15 milliLiter(s) Oral daily  pantoprazole  Injectable 40 milliGRAM(s) IV Push every 12 hours  phenylephrine    Infusion 0.1 MICROgram(s)/kG/Min (1.64 mL/Hr) IV Continuous <Continuous>  thiamine 100 milliGRAM(s) Oral daily  vancomycin    Solution 125 milliGRAM(s) Enteral Tube every 6 hours    MEDICATIONS  (PRN):  ALBUTerol    90 MICROgram(s) HFA Inhaler 2 Puff(s) Inhalation every 6 hours PRN Shortness of Breath and/or Wheezing      LABS:                         7.3    13.35 )-----------( 479      ( 2022 00:59 )             22.6     -    144  |  112<H>  |  21  ----------------------------<  98  4.1   |  24  |  0.74    Ca    7.4<L>      2022 00:59  Phos  3.0     02-03  Mg     2.50     -    TPro  5.3<L>  /  Alb  1.9<L>  /  TBili  0.2  /  DBili  x   /  AST  45<H>  /  ALT  44<H>  /  AlkPhos  123<H>  02-    PT/INR - ( 2022 01:16 )   PT: 14.1 sec;   INR: 1.25 ratio         PTT - ( 2022 01:16 )  PTT:31.8 sec          RADIOLOGY, EKG & ADDITIONAL TESTS: Reviewed.

## 2022-02-04 NOTE — PROGRESS NOTE ADULT - SUBJECTIVE AND OBJECTIVE BOX
INTERVAL HPI/OVERNIGHT EVENTS:  No overnight events    SUBJECTIVE: Patient seen and examined at bedside. No acute distress or complaints, comfortable.  Discussions for planning LLE amputation continue      VITAL SIGNS:  ICU Vital Signs Last 24 Hrs  T(C): 36.7 (04 Feb 2022 00:00), Max: 38 (03 Feb 2022 04:00)  T(F): 98 (04 Feb 2022 00:00), Max: 100.4 (03 Feb 2022 04:00)  HR: 89 (04 Feb 2022 02:00) (62 - 89)  BP: --  BP(mean): --  ABP: 116/51 (04 Feb 2022 02:00) (103/42 - 127/53)  ABP(mean): 75 (04 Feb 2022 02:00) (63 - 80)  RR: 26 (04 Feb 2022 02:00) (15 - 29)  SpO2: 100% (04 Feb 2022 02:00) (100% - 100%)    Mode: CPAP with PS, FiO2: 40, PEEP: 5, PS: 5, MAP: 7, PIP: 12  Plateau pressure:   P/F ratio:     02-02 @ 07:01  -  02-03 @ 07:00  --------------------------------------------------------  IN: 2343.7 mL / OUT: 1350 mL / NET: 993.7 mL    02-03 @ 07:01  -  02-04 @ 02:51  --------------------------------------------------------  IN: 1600.9 mL / OUT: 1125 mL / NET: 475.9 mL            POCT Blood Glucose.: 145 mg/dL (03 Feb 2022 19:59)    EXAM  NEUROLOGY  ROSEMARY 0  Exam: Normal, NAD, alert, oriented x 1, no focal deficits.     HEENT  Exam: Normocephalic, atraumatic.  EOMI    RESPIRATORY  Exam: Lungs clear to auscultation, Normal expansion; Intubated    CARDIOVASCULAR  Exam: S1, S2.  Tachycardic with irregular rhythm.     GI/NUTRITION  Exam: Abdomen soft, distended superior midline incision with minimal SS strikethrough    Current Diet: NPO    VASCULAR  Exam:   Upper extremities warm, radial pulses palpable bilaterally  Lower extremities   RLE with [id number] pressure injury with overlying eschar, CDI with surrounding erythema, signals in femoral, pop and PT no DP signal found  LLE with 8cm pressure injury with overlying eschar on lateral, femoral signal present, no pop, DP or PT signals, mottling of foot, coolness below the knee.     SKIN:  Exam: Good skin turgor, no skin breakdown.        Tubes/Lines/Drains   [x] Peripheral IV R#18 AC 1/15, L#18 UA (1/15)  [x] Central Venous Line     	[x] R	[] L	[x] IJ	[] Fem	[] SC	Date Placed: 1/16  [X] Arterial Line		[X] R	[] L	[] Fem	[] Rad	[X] Ax	Date: Placed:   [] PICC:         	[] Midline		[] Mediport  [x] Urinary Catheter		Date Placed: 1/15/21        MEDICATIONS:  MEDICATIONS  (STANDING):  acetaminophen    Suspension .. 650 milliGRAM(s) Oral every 6 hours  ALBUTerol    90 MICROgram(s) HFA Inhaler 2 Puff(s) Inhalation every 6 hours  budesonide 160 MICROgram(s)/formoterol 4.5 MICROgram(s) Inhaler 2 Puff(s) Inhalation two times a day  chlorhexidine 0.12% Liquid 15 milliLiter(s) Oral Mucosa every 12 hours  chlorhexidine 4% Liquid 1 Application(s) Topical <User Schedule>  dexMEDEtomidine Infusion 0.2 MICROgram(s)/kG/Hr (4.38 mL/Hr) IV Continuous <Continuous>  digoxin  Injectable 125 MICROGram(s) IV Push daily  heparin   Injectable 5000 Unit(s) SubCutaneous every 8 hours  insulin glargine Injectable (LANTUS) 30 Unit(s) SubCutaneous every morning  insulin lispro (ADMELOG) corrective regimen sliding scale   SubCutaneous every 6 hours  midodrine 30 milliGRAM(s) Oral every 8 hours  multivitamin/minerals/iron Oral Solution (CENTRUM) 15 milliLiter(s) Oral daily  pantoprazole  Injectable 40 milliGRAM(s) IV Push every 12 hours  phenylephrine    Infusion 0.1 MICROgram(s)/kG/Min (3.28 mL/Hr) IV Continuous <Continuous>  thiamine 100 milliGRAM(s) Oral daily  vancomycin    Solution 125 milliGRAM(s) Enteral Tube every 6 hours    MEDICATIONS  (PRN):  ALBUTerol    90 MICROgram(s) HFA Inhaler 2 Puff(s) Inhalation every 6 hours PRN Shortness of Breath and/or Wheezing  HYDROmorphone  Injectable 0.5 milliGRAM(s) IV Push every 4 hours PRN Severe Pain (7 - 10)      ALLERGIES:  Allergies    No Known Allergies    Intolerances        LABS:                        7.3    13.35 )-----------( 479      ( 03 Feb 2022 00:59 )             22.6     02-03    144  |  112<H>  |  21  ----------------------------<  98  4.1   |  24  |  0.74    Ca    7.4<L>      03 Feb 2022 00:59  Phos  3.0     02-03  Mg     2.50     02-03    TPro  5.3<L>  /  Alb  1.9<L>  /  TBili  0.2  /  DBili  x   /  AST  45<H>  /  ALT  44<H>  /  AlkPhos  123<H>  02-03          RADIOLOGY & ADDITIONAL TESTS: Reviewed.

## 2022-02-04 NOTE — PROGRESS NOTE ADULT - ATTENDING COMMENTS
Patient s/p trach and peg. No pulse in right lower extremity. Awaiting plan from vascular  plan  dispo planning  f/u vascular  continue tube feeds    I have personally interviewed and examined this patient, reviewed pertinent labs and imaging, and discussed the case with colleagues, residents, and physician assistants on B Team rounds.    The active care issues are:  1. vent dependent  2. acute limb ischemia    The Acute Care Surgery (B Team) Attending Group Practice:  Dr. Rosa Anaya, Dr. Mike Baeza, Dr. Tian Del Real, Dr. Benjamin Stauffer,     urgent issues - spectra [id number]  nonurgent issues - [phone]  patient appointments or afterhours - [phone]
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
## 2022-02-04 NOTE — PROGRESS NOTE ADULT - SUBJECTIVE AND OBJECTIVE BOX
Follow Up:  c diff    Interval History/ROS:  indicates abd, but denies abd pain    Allergies  No Known Allergies    ANTIMICROBIALS:  vancomycin    Solution 125 every 6 hours    OTHER MEDS:  MEDICATIONS  (STANDING):  acetaminophen    Suspension .. 650 every 6 hours  ALBUTerol    90 MICROgram(s) HFA Inhaler 2 every 6 hours  ALBUTerol    90 MICROgram(s) HFA Inhaler 2 every 6 hours PRN  budesonide 160 MICROgram(s)/formoterol 4.5 MICROgram(s) Inhaler 2 two times a day  digoxin  Injectable 125 daily  heparin   Injectable 5000 every 8 hours  HYDROmorphone  Injectable 0.5 every 4 hours PRN  insulin glargine Injectable (LANTUS) 30 every morning  insulin lispro (ADMELOG) corrective regimen sliding scale  every 6 hours  midodrine 30 every 8 hours  pantoprazole  Injectable 40 every 12 hours  phenylephrine    Infusion 0.1 <Continuous>  QUEtiapine 50 every 12 hours      Vital Signs Last 24 Hrs  T(C): 36.7 (04 Feb 2022 12:00), Max: 37.4 (03 Feb 2022 20:00)  T(F): 98 (04 Feb 2022 12:00), Max: 99.3 (03 Feb 2022 20:00)  HR: 126 (04 Feb 2022 15:31) (71 - 126)  BP: --  BP(mean): --  RR: 30 (04 Feb 2022 15:00) (21 - 30)  SpO2: 100% (04 Feb 2022 15:31) (95% - 100%)    PHYSICAL EXAM:  General:  Non-toxic  Neurology: Awake  Respiratory: Clear to auscultation bilaterally  CV: RRR, S1S2, no murmurs, rubs or gallops  Abdominal: Soft, Non-tender, non-distended, normal bowel sounds  Extremities: No edema, cold left foot with gangrenous discoloration extending just past mid foot, cool distal leg   Line Sites: Clear  Skin: No rash                        7.7    16.52 )-----------( 515      ( 04 Feb 2022 04:10 )             25.2   WBC Count: 16.52 (02-04 @ 04:10)  WBC Count: 13.35 (02-03 @ 00:59)  WBC Count: 14.75 (02-02 @ 01:16)  WBC Count: 16.40 (02-01 @ 01:09)  WBC Count: 15.41 (01-31 @ 00:47)    02-04    144  |  110<H>  |  24<H>  ----------------------------<  165<H>  4.1   |  21<L>  |  0.69    Ca    7.5<L>      04 Feb 2022 04:10  Phos  3.0     02-04  Mg     2.50     02-04    TPro  5.3<L>  /  Alb  1.9<L>  /  TBili  0.2  /  DBili  x   /  AST  45<H>  /  ALT  44<H>  /  AlkPhos  123<H>  02-03      MICROBIOLOGY:  .Sputum Sputum  01-28-22   Numerous Stenotrophomonas maltophilia  Normal Respiratory Vijaya absent  --  Stenotrophomonas maltophilia      .Abscess Midline incision  01-23-22   Few Candida albicans "Susceptibilities not performed"  --  --      .Blood Blood-Arterial  01-21-22   No Growth Final  --  --      BAL sputum  01-19-22   No growth at 1 week.  --  --      .Sputum Sputum  01-19-22   No growth at 1 week.  --  --      Combi-Cath bronchial lavage  01-19-22   Normal Respiratory Vijaya present  --  --      .Sputum Sputum  01-19-22   Normal Respiratory Vijaya present  --    Few polymorphonuclear leukocytes per low power field  No Squamous epithelial cells per low power field  No organisms seen      .Blood Blood-Venous  01-15-22   No Growth Final  --  --      .Blood Blood-Venous  01-14-22   No Growth Final  --  --      .Blood Blood-Peripheral  01-14-22   No Growth Final  --  --      .Blood Blood-Peripheral  01-13-22   Growth in anaerobic bottle: Proteus mirabilis  See previous culture  [id number]  --    Growth in anaerobic bottle: Gram Negative Rods      Clean Catch Clean Catch (Midstream)  01-12-22   No growth  --  --      .Blood Blood-Peripheral  01-12-22   Growth in aerobic and anaerobic bottles: Proteus mirabilis  See previous culture [id number]  Growth in aerobic bottle: Enterobacter cloacae complex  --  Enterobacter cloacae complex      .Blood Blood-Peripheral  01-12-22   Growth in aerobic and anaerobic bottles: Proteus mirabilis  Growth in anaerobic bottle: Enterobacter cloacae complex      RADIOLOGY:  independently viewed  < from: Xray Chest 1 View- PORTABLE-Routine (Xray Chest 1 View- PORTABLE-Routine in AM.) (02.04.22 @ 00:38) >  COMPARISON STUDY: 1/31/2022    Frontal expiratory view of the chestshows the heart to be similar in   size. Tracheostomy tube and right jugular line are again noted.    The lungs show progression of right lung infiltrate with similar small   effusions and there is no evidence of pneumothorax.    IMPRESSION:  Progression of right infiltrate.    < end of copied text >      Stephan Vizcarra MD; Division of Infectious Disease; Pager: [phone]; nights and weekends: [phone]

## 2022-02-04 NOTE — CHART NOTE - NSCHARTNOTEFT_GEN_A_CORE
Patient last seen by RDN on 1/28, now for nutrition follow up. Spoke with RN and obtained subjective information from extensive chart review.     Current Diet : Diet, NPO with Tube Feed:   Tube Feeding Modality: Nasogastric  Glucerna 1.5 Naresh (GLUCERNA1.5RTH)  Total Volume for 24 Hours (mL): 1080  Bolus  Total Volume of Bolus (mL):  270  Tube Feed Frequency: Every 6 hours   Tube Feed Start Time: 10:00  Bolus Feed Rate (mL per Hour): 270   Bolus Feed Duration (in Hours): 1 (02-04-22 @ 09:33)    TF Provides:  1620 kcals  89 gms protein  819 ml free H2O  1080 ml total volume    Weight Trend:              Dosing Weight: 87.5 kg                       IBW: 69.8    100.7 kg (2/3)  94.4 kg (1/27)  95.6 kg (1/26)  99.1 kg (1/25)  99 kg (1/23)  105.9 kg (1/22)    Nutrition Interval Events: Pt now s/p Trach and PEG placement. TF changed to boluses as per MD. No GI distress noted; pt r/o for GIB. Rectal tube output of 500 ml 2/3. Weight trend reflective of edema now 3+ L/R arms and hands. Receiving IVPB fluids.  - 216 mg/dl with Lantus and Admelog insulin coverage. Pt continues with unstageable L/R knee pressure injuries. TF meeting pt's estimated nutrition needs; continue nutrition plan of care as ordered. RDN services to remain available as needed.       __________________ Pertinent Medications__________________   MEDICATIONS  (STANDING):  acetaminophen    Suspension .. 650 milliGRAM(s) Oral every 6 hours  ALBUTerol    90 MICROgram(s) HFA Inhaler 2 Puff(s) Inhalation every 6 hours  budesonide 160 MICROgram(s)/formoterol 4.5 MICROgram(s) Inhaler 2 Puff(s) Inhalation two times a day  chlorhexidine 0.12% Liquid 15 milliLiter(s) Oral Mucosa every 12 hours  chlorhexidine 4% Liquid 1 Application(s) Topical <User Schedule>  digoxin  Injectable 125 MICROGram(s) IV Push daily  haloperidol    Injectable 5 milliGRAM(s) IV Push once  heparin   Injectable 5000 Unit(s) SubCutaneous every 8 hours  insulin glargine Injectable (LANTUS) 30 Unit(s) SubCutaneous every morning  insulin lispro (ADMELOG) corrective regimen sliding scale   SubCutaneous every 6 hours  midodrine 30 milliGRAM(s) Oral every 8 hours  multivitamin/minerals/iron Oral Solution (CENTRUM) 15 milliLiter(s) Oral daily  pantoprazole  Injectable 40 milliGRAM(s) IV Push every 12 hours  phenylephrine    Infusion 0.1 MICROgram(s)/kG/Min (3.28 mL/Hr) IV Continuous <Continuous>  QUEtiapine 50 milliGRAM(s) Oral every 12 hours  vancomycin    Solution 125 milliGRAM(s) Enteral Tube every 6 hours    MEDICATIONS  (PRN):  ALBUTerol    90 MICROgram(s) HFA Inhaler 2 Puff(s) Inhalation every 6 hours PRN Shortness of Breath and/or Wheezing  HYDROmorphone  Injectable 0.5 milliGRAM(s) IV Push every 4 hours PRN Severe Pain (7 - 10)      __________________ Pertinent Labs__________________   02-04 Na144 mmol/L Glu 165 mg/dL<H> K+ 4.1 mmol/L Cr  0.69 mg/dL BUN 24 mg/dL<H> 02-04 Phos 3.0 mg/dL 02-03 Alb 1.9 g/dL<L>      Estimated Needs:     1396 - 1745 kcals/day (20-25 kcals/kg IBW)  70 - 105 gms protein/day (1.0-1.5 gms/kg IBW)    Previous Nutrition Diagnosis:     Increased Nutrient Needs     Nutrition Diagnosis is: Ongoing      Goal(s):  1. Patient to meet > 75% estimated energy needs      Recommendations:   1. Continue nutrition plan of care as ordered.     Monitoring and Evaluation:   1. Monitor weights, labs, BMs, skin integrity, PO intake and edema.  2. RD services to remain available. Request obtaining new weight to assess trend and determine adequacy of TF.

## 2022-02-05 NOTE — PHYSICAL THERAPY INITIAL EVALUATION ADULT - PRECAUTIONS/LIMITATIONS, REHAB EVAL
+ COVID/aspiration precautions/fall precautions/isolation precautions
+ contact precautions/aspiration precautions/fall precautions/isolation precautions

## 2022-02-05 NOTE — PROGRESS NOTE ADULT - SUBJECTIVE AND OBJECTIVE BOX
GENERAL SURGERY DAILY PROGRESS NOTE:    Subjective:  Patient seen and examined. No new complaints.    Objective:  Vital Signs (24 Hrs):  T(C): 37.7 (22 @ 08:00), Max: 38 (22 @ 04:00)  HR: 63 (22 @ 08:00) (51 - 129)  BP: --  RR: 17 (22 @ 08:00) (14 - 24)  SpO2: 100% (22 @ 08:00) (99% - 100%)  Wt(kg): --  Daily     Daily Weight in k.7 (2022 06:00)    I&O's Summary    2022 07:01  -  2022 07:00  --------------------------------------------------------  IN: 2343.7 mL / OUT: 1350 mL / NET: 993.7 mL    2022 07:01  -  2022 08:46  --------------------------------------------------------  IN: 65.3 mL / OUT: 150 mL / NET: -84.7 mL        PHYSICAL EXAMINATION:  General: lying in bed, NAD  Resp: trach in place, no bleeding or crepitus  Abd: soft, mildly distended, midline abdominal incision with packing improved erythema          Daily     Daily     MEDICATIONS  (STANDING):  acetaminophen    Suspension .. 650 milliGRAM(s) Oral every 6 hours  acetaZOLAMIDE Injectable 250 milliGRAM(s) IV Push every 6 hours  ALBUTerol    90 MICROgram(s) HFA Inhaler 2 Puff(s) Inhalation every 6 hours  budesonide 160 MICROgram(s)/formoterol 4.5 MICROgram(s) Inhaler 2 Puff(s) Inhalation two times a day  chlorhexidine 0.12% Liquid 15 milliLiter(s) Oral Mucosa every 12 hours  chlorhexidine 4% Liquid 1 Application(s) Topical <User Schedule>  dexMEDEtomidine Infusion 0.2 MICROgram(s)/kG/Hr (4.38 mL/Hr) IV Continuous <Continuous>  dextrose 5%. 1000 milliLiter(s) (30 mL/Hr) IV Continuous <Continuous>  digoxin  Injectable 125 MICROGram(s) IV Push daily  heparin   Injectable 5000 Unit(s) SubCutaneous every 8 hours  HYDROmorphone  Injectable 0.25 milliGRAM(s) IV Push once  insulin glargine Injectable (LANTUS) 30 Unit(s) SubCutaneous every morning  insulin lispro (ADMELOG) corrective regimen sliding scale   SubCutaneous every 6 hours  meropenem  IVPB 1000 milliGRAM(s) IV Intermittent every 8 hours  metoprolol tartrate 25 milliGRAM(s) Oral every 12 hours  metroNIDAZOLE  IVPB      metroNIDAZOLE  IVPB 500 milliGRAM(s) IV Intermittent every 8 hours  midodrine 10 milliGRAM(s) Oral every 8 hours  multivitamin/minerals/iron Oral Solution (CENTRUM) 15 milliLiter(s) Oral daily  pantoprazole  Injectable 40 milliGRAM(s) IV Push every 12 hours  phenylephrine    Infusion 0.1 MICROgram(s)/kG/Min (1.64 mL/Hr) IV Continuous <Continuous>  thiamine 100 milliGRAM(s) Oral daily  vancomycin    Solution 125 milliGRAM(s) Enteral Tube every 6 hours    MEDICATIONS  (PRN):  ALBUTerol    90 MICROgram(s) HFA Inhaler 2 Puff(s) Inhalation every 6 hours PRN Shortness of Breath and/or Wheezing      LABS:                         7.3    13.35 )-----------( 479      ( 2022 00:59 )             22.6     -    144  |  112<H>  |  21  ----------------------------<  98  4.1   |  24  |  0.74    Ca    7.4<L>      2022 00:59  Phos  3.0     02-03  Mg     2.50     -    TPro  5.3<L>  /  Alb  1.9<L>  /  TBili  0.2  /  DBili  x   /  AST  45<H>  /  ALT  44<H>  /  AlkPhos  123<H>  02-    PT/INR - ( 2022 01:16 )   PT: 14.1 sec;   INR: 1.25 ratio         PTT - ( 2022 01:16 )  PTT:31.8 sec          RADIOLOGY, EKG & ADDITIONAL TESTS: Reviewed.        GENERAL SURGERY DAILY PROGRESS NOTE:    Overnight:  - d/c'ed sedation  - on trach collar but, placed back on pressure support  - added seroquel  - off precedex  - d/c thiamine  - PT consult  - changed to bolus feed  - haldol x 1    Subjective:  Patient seen and examined. No new complaints. Dressing changed at bedside.     Objective:  Vital Signs (24 Hrs):  T(C): 36.7 (02-05-22 @ 08:00), Max: 37.4 (02-04-22 @ 20:00)  HR: 102 (02-05-22 @ 10:00) (80 - 135)  BP: --  RR: 20 (02-05-22 @ 10:00) (15 - 30)  SpO2: 99% (02-05-22 @ 10:00) (92% - 100%)  Wt(kg): --  Daily     Daily     I&O's Summary    04 Feb 2022 07:01  -  05 Feb 2022 07:00  --------------------------------------------------------  IN: 2280.2 mL / OUT: 1445 mL / NET: 835.2 mL    05 Feb 2022 07:01  -  05 Feb 2022 10:32  --------------------------------------------------------  IN: 0 mL / OUT: 50 mL / NET: -50 mL      PHYSICAL EXAMINATION:  General: lying in bed, NAD  Resp: trach in place, no bleeding or crepitus  Abd: soft, mildly distended, midline abdominal incision with packing improved erythema          Daily     Daily     MEDICATIONS  (STANDING):  acetaminophen    Suspension .. 650 milliGRAM(s) Oral every 6 hours  acetaZOLAMIDE Injectable 250 milliGRAM(s) IV Push every 6 hours  ALBUTerol    90 MICROgram(s) HFA Inhaler 2 Puff(s) Inhalation every 6 hours  budesonide 160 MICROgram(s)/formoterol 4.5 MICROgram(s) Inhaler 2 Puff(s) Inhalation two times a day  chlorhexidine 0.12% Liquid 15 milliLiter(s) Oral Mucosa every 12 hours  chlorhexidine 4% Liquid 1 Application(s) Topical <User Schedule>  dexMEDEtomidine Infusion 0.2 MICROgram(s)/kG/Hr (4.38 mL/Hr) IV Continuous <Continuous>  dextrose 5%. 1000 milliLiter(s) (30 mL/Hr) IV Continuous <Continuous>  digoxin  Injectable 125 MICROGram(s) IV Push daily  heparin   Injectable 5000 Unit(s) SubCutaneous every 8 hours  HYDROmorphone  Injectable 0.25 milliGRAM(s) IV Push once  insulin glargine Injectable (LANTUS) 30 Unit(s) SubCutaneous every morning  insulin lispro (ADMELOG) corrective regimen sliding scale   SubCutaneous every 6 hours  meropenem  IVPB 1000 milliGRAM(s) IV Intermittent every 8 hours  metoprolol tartrate 25 milliGRAM(s) Oral every 12 hours  metroNIDAZOLE  IVPB      metroNIDAZOLE  IVPB 500 milliGRAM(s) IV Intermittent every 8 hours  midodrine 10 milliGRAM(s) Oral every 8 hours  multivitamin/minerals/iron Oral Solution (CENTRUM) 15 milliLiter(s) Oral daily  pantoprazole  Injectable 40 milliGRAM(s) IV Push every 12 hours  phenylephrine    Infusion 0.1 MICROgram(s)/kG/Min (1.64 mL/Hr) IV Continuous <Continuous>  thiamine 100 milliGRAM(s) Oral daily  vancomycin    Solution 125 milliGRAM(s) Enteral Tube every 6 hours    MEDICATIONS  (PRN):  ALBUTerol    90 MICROgram(s) HFA Inhaler 2 Puff(s) Inhalation every 6 hours PRN Shortness of Breath and/or Wheezing      LABS:                         7.8    21.99 )-----------( 619      ( 05 Feb 2022 04:15 )             25.6     02-05    144  |  112<H>  |  27<H>  ----------------------------<  162<H>  4.5   |  20<L>  |  0.89    Ca    7.8<L>      05 Feb 2022 04:15  Phos  4.1     02-05  Mg     2.50     02-05      PT/INR - ( 05 Feb 2022 04:15 )   PT: 13.4 sec;   INR: 1.17 ratio         PTT - ( 05 Feb 2022 04:15 )  PTT:31.3 sec          RADIOLOGY, EKG & ADDITIONAL TESTS: Reviewed.
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
## 2022-02-05 NOTE — PROGRESS NOTE ADULT - ASSESSMENT
76 M found on the floor at home 1/12, acute right basal ganglia parenchymal hemorrhagic stroke  1/12 Enterobacter and Proteus bacteremia  1/15 Duodenal perforation s/p OR washout, repair. Related to bacteremia? Stress ulcer? H pylori IgG negative  1/19 Respiratory failure, mucous plugging but newly positive COVID PCR on 1/19,  2/1 s/p remdesivir 1/19 --> 1/23 1/22 C diff diarrhea   1/23 Abdominal surgical site inflammation - resolved, no pus, doubt few Candida significant  Continued respiratory failure, intermittent fevers, now with a cold left leg.   Goals of care being addressed:  LLE amputation indicated  2/2: Percutaneous tracheostomy placement, 6F Shiley cuffed, Percutaneous endoscopic gastrostomy tube placed.  2/3 vascular advises Left AKA  2/4 increased wbc,  low grade fever  CXR on my read with increased haziness right base suggestive of effusion    increased wbc may be related to pneumonia  or limb ischemia - given improved resp status, I favor limb ischemia  Overall,  1) Leukocytosis  - Due to progressive LLE ischemia vs C diff? Consideration for new onset pneumonia?  - Trend CBCs  - Monitor resp status for signs bacterial pneumonia/tracheitis  2) C diff  - PO Vanco 125mg q 6 through 2/9  - Monitor Abd and stool counts  3) Positive culture finding  - Steno in sputum culture prior S bactrim, levaquin  - Consider Bactrim vs Levaquin if clinical signs bacterial pneumonia  4) COVID+  - Longstanding positive, likely shedding  - Supportive care    Chacho Andrews MD  Contact on TEAMS messaging from 9am - 5pm  From 5pm-9am, and on weekends call [phone]
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
## 2022-02-05 NOTE — PHYSICAL THERAPY INITIAL EVALUATION ADULT - MD ORDER
· Primary Surgeon	Tian Del Real MD  · Assistant(s)	Amado Hale MD
· Primary Surgeon	Tian Del Real MD  · Assistant(s)	Amado Hale MD

## 2022-02-05 NOTE — PHYSICAL THERAPY INITIAL EVALUATION ADULT - ADDITIONAL COMMENTS
Patient unable to provide social history. Patient was independent prior to admission. Patient was not using assistive device prior to admission. Patient lives alone.
Patient unable to provide social history. Patient was independent prior to admission. Patient was not using assistive device prior to admission. Patient lives alone.

## 2022-02-05 NOTE — PHYSICAL THERAPY INITIAL EVALUATION ADULT - CRITERIA FOR SKILLED THERAPEUTIC INTERVENTIONS
impairments found/functional limitations in following categories/rehab potential/anticipated discharge recommendation
impairments found/functional limitations in following categories/rehab potential
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
## 2022-02-05 NOTE — PHYSICAL THERAPY INITIAL EVALUATION ADULT - LEVEL OF INDEPENDENCE: SUPINE/SIT, REHAB EVAL
RN, Thanks, ordered labs.    TC - Can you please schedule pt f/u visit to discuss diabetes and lipid.    Thanks!  DM  
Unable to perform at this time, to be assessed.
due to patient intubated and lightly sedated. to be assessed./unable to perform

## 2022-02-05 NOTE — PHYSICAL THERAPY INITIAL EVALUATION ADULT - GENERAL OBSERVATIONS, REHAB EVAL
Patient received semi-supine in bed, all lines intact, NAD. + trached, on vent
Patient received semi-supine in bed, all lines intact, NAD. + intubated and lightly sedated.

## 2022-02-05 NOTE — PHYSICAL THERAPY INITIAL EVALUATION ADULT - DISCHARGE DISPOSITION, PT EVAL
anticipated discharge to rehab facility to address current functional limitation to optimize safety to allow pt. to reach their optimal level of function./rehabilitation facility
anticipated discharge to rehab facility to address current functional limitation to optimize safety to allow pt. to reach their optimal level of function./rehabilitation facility

## 2022-02-05 NOTE — PROGRESS NOTE ADULT - SUBJECTIVE AND OBJECTIVE BOX
CC: F/U for C diff    Saw/spoke to patient. Not able to interact with me well. Attempts nonverbal communication.    Allergies  No Known Allergies    ANTIMICROBIALS:  vancomycin    Solution 125 every 6 hours    PE:    Vital Signs Last 24 Hrs  T(C): 36.7 (05 Feb 2022 08:00), Max: 37.4 (04 Feb 2022 20:00)  T(F): 98 (05 Feb 2022 08:00), Max: 99.3 (04 Feb 2022 20:00)  HR: 102 (05 Feb 2022 10:00) (80 - 135)  RR: 20 (05 Feb 2022 10:00) (15 - 30)  SpO2: 99% (05 Feb 2022 10:00) (92% - 100%)    Gen: AOx1-2, NAD, non-toxic  CV: Tachycardic  Resp: Trach  Abd: PEG, soft  Ext: L sided ischemic foot changes    LABS:                        7.8    21.99 )-----------( 619      ( 05 Feb 2022 04:15 )             25.6     02-05    144  |  112<H>  |  27<H>  ----------------------------<  162<H>  4.5   |  20<L>  |  0.89    Ca    7.8<L>      05 Feb 2022 04:15  Phos  4.1     02-05  Mg     2.50     02-05    MICROBIOLOGY:    .Sputum Sputum  01-28-22   Numerous Stenotrophomonas maltophilia  Normal Respiratory Vijaya absent  --  Stenotrophomonas maltophilia    .Abscess Midline incision  01-23-22   Few Candida albicans "Susceptibilities not performed"  --  --    .Blood Blood-Arterial  01-21-22   No Growth Final    BAL sputum  01-19-22   No growth at 1 week.     .Sputum Sputum  01-19-22   No growth at 1 week.      Combi-Cath bronchial lavage  01-19-22   Normal Respiratory Vijaya present     .Sputum Sputum  01-19-22   Normal Respiratory Vijaya present  --    Few polymorphonuclear leukocytes per low power field  No Squamous epithelial cells per low power field  No organisms seen    .Blood Blood-Venous  01-15-22   No Growth Final     .Blood Blood-Peripheral  01-13-22   Growth in anaerobic bottle: Proteus mirabilis  See previous culture  [id number]  --    Growth in anaerobic bottle: Gram Negative Rods    .Blood Blood-Peripheral  01-12-22   Growth in aerobic and anaerobic bottles: Proteus mirabilis  See previous culture [id number]  Growth in aerobic bottle: Enterobacter cloacae complex  --  Enterobacter cloacae complex    .Blood Blood-Peripheral  01-12-22   Growth in aerobic and anaerobic bottles: Proteus mirabilis  Growth in anaerobic bottle: Enterobacter cloacae complex  See previous culture [id number]  ***Blood Panel PCR results on this specimen are available  approximately 3 hours after the Gram stain result.***  Gram stain, PCR, and/or culture results may not always  correspond due to difference in methodologies.  ************************************************************  This PCR assay was performed by multiplex PCR. This  Assay tests for 66 bacterial and resistance gene targets.  Please refer to the [facility] test directory  at [email or web address] for details.  --  Blood Culture PCR  Proteus mirabilis    (otherwise reviewed)    RADIOLOGY:    2/4 XR:    Frontal expiratory view of the chest shows the heart to be similar in   size. Tracheostomy tube and right jugular line are again noted.    The lungs show progression of right lung infiltrate with similar small   effusions and there is no evidence of pneumothorax.    IMPRESSION:  Progression of right infiltrate.
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
## 2022-02-06 NOTE — PROGRESS NOTE ADULT - SUBJECTIVE AND OBJECTIVE BOX
TEAM Surgery Progress Note  Patient is a 76y old  Male who presents with a chief complaint of Fall (06 Feb 2022 01:02)      INTERVAL EVENTS:   Interval/Overnight Events:     - didn't tolerate trach collar 2/5, back on pressure support   - Lopressor 12.5 BID restarted   - 5 of lopressor IV given x2  - 2.5 of lopressor IV given x3    SUBJECTIVE: Patient seen and examined at bedside with surgical team, patient without complaints.      OBJECTIVE:    Vital Signs Last 24 Hrs  T(C): 36.8 (06 Feb 2022 00:00), Max: 37.2 (05 Feb 2022 20:00)  T(F): 98.2 (06 Feb 2022 00:00), Max: 98.9 (05 Feb 2022 20:00)  HR: 100 (06 Feb 2022 01:00) (86 - 148)  BP: --  BP(mean): --  RR: 15 (06 Feb 2022 01:00) (15 - 23)  SpO2: 100% (06 Feb 2022 01:00) (90% - 100%)I&O's Detail    04 Feb 2022 07:01  -  05 Feb 2022 07:00  --------------------------------------------------------  IN:    Dexmedetomidine: 20.2 mL    Free Water: 1000 mL    Glucerna 1.5: 1260 mL  Total IN: 2280.2 mL    OUT:    Indwelling Catheter - Urethral (mL): 1145 mL    Rectal Tube (mL): 300 mL  Total OUT: 1445 mL    Total NET: 835.2 mL      05 Feb 2022 07:01  -  06 Feb 2022 01:37  --------------------------------------------------------  IN:    Free Water: 750 mL    Glucerna 1.5: 810 mL  Total IN: 1560 mL    OUT:    Indwelling Catheter - Urethral (mL): 1860 mL    Rectal Tube (mL): 800 mL  Total OUT: 2660 mL    Total NET: -1100 mL      MEDICATIONS  (STANDING):  acetaminophen    Suspension .. 650 milliGRAM(s) Oral every 6 hours  ALBUTerol    90 MICROgram(s) HFA Inhaler 2 Puff(s) Inhalation every 6 hours  budesonide 160 MICROgram(s)/formoterol 4.5 MICROgram(s) Inhaler 2 Puff(s) Inhalation two times a day  chlorhexidine 0.12% Liquid 15 milliLiter(s) Oral Mucosa every 12 hours  chlorhexidine 4% Liquid 1 Application(s) Topical <User Schedule>  digoxin  Injectable 125 MICROGram(s) IV Push daily  furosemide   Injectable 40 milliGRAM(s) IV Push every 8 hours  heparin   Injectable 5000 Unit(s) SubCutaneous every 8 hours  insulin glargine Injectable (LANTUS) 30 Unit(s) SubCutaneous every morning  insulin lispro (ADMELOG) corrective regimen sliding scale   SubCutaneous every 6 hours  metoprolol tartrate 12.5 milliGRAM(s) Oral every 12 hours  midodrine 30 milliGRAM(s) Oral every 8 hours  multivitamin/minerals/iron Oral Solution (CENTRUM) 15 milliLiter(s) Oral daily  pantoprazole  Injectable 40 milliGRAM(s) IV Push every 12 hours  QUEtiapine 50 milliGRAM(s) Oral every 12 hours  vancomycin    Solution 125 milliGRAM(s) Enteral Tube every 6 hours    MEDICATIONS  (PRN):  ALBUTerol    90 MICROgram(s) HFA Inhaler 2 Puff(s) Inhalation every 6 hours PRN Shortness of Breath and/or Wheezing      PHYSICAL EXAMINATION:  General: lying in bed, NAD  Resp: trach in place, no bleeding or crepitus  Abd: soft, mildly distended, midline abdominal incision with packing improved erythema      LABS:                        7.6    18.41 )-----------( 614      ( 06 Feb 2022 01:17 )             24.5     02-05    144  |  112<H>  |  27<H>  ----------------------------<  162<H>  4.5   |  20<L>  |  0.89    Ca    7.8<L>      05 Feb 2022 04:15  Phos  4.1     02-05  Mg     2.50     02-05      PT/INR - ( 05 Feb 2022 04:15 )   PT: 13.4 sec;   INR: 1.17 ratio         PTT - ( 05 Feb 2022 04:15 )  PTT:31.3 sec          IMAGING:
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
## 2022-02-07 NOTE — PROGRESS NOTE ADULT - ASSESSMENT
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   1/12 Enterobacter and Proteus bacteremia.   1/15 Duodenal perforation s/p OR washout, repair. Related to bacteremia? Stress ulcer? H pylori IgG negative.   1/19 Respiratory failure, mucous plugging but newly positive COVID PCR on 1/19,  2/1 s/p remdesivir 1/19 --> 1/23.   1/22 C diff diarrhea   1/23 Abdominal surgical site inflammation - resolved, no pus, doubt few Candida significant.   Continued respiratory failure, intermittent fevers, now with a cold left leg.   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  2/3 vascular advises Left AKA  2/4 increased wbc,  low grade fever  CXR on my read with increased haziness right base suggestive of effusion    increased wbc may be related to pneumonia  or limb ischemia - given improved resp status, I favor limb ischemia  2/7 fever    Antimicrobial course:   Cefepime 1/13-17, Zosyn 1/17-21, Meropenem 1/21-24, 26-->2/2  Flagyl IV 1/15-17, 1/28-   Fluconazole 1/19-23   Vancomycin IV 1/22-24  po Vanco  1/23-->    Suggest  Continue PO Vanc 125mg QID 1/23  trough at least 2/9  ADD Bactrim 250 mg IVSS every 6hours to cover Stenotrophomonas  repeat sputum culture  check urine cutlure    seen team obtaining blood cultures earlier today

## 2022-02-07 NOTE — PROGRESS NOTE ADULT - ATTENDING COMMENTS
Patient overnight no issues, patient on cpap/ps 10/5.  N agitated, off continuous sedation, increase seroquel 75qhs and 50 daily  resp adequate gas exchange, thick secretions, start on mucomyst and duonebs, IPV and metanebs for pulmonary toilet, cxr improved, keep net negative fluid balance lasix 40 bid  cv hemodynamically stable, on midodrine start weaning midodrine 10 mg tid  gi tf at goal  gu/renal monitor uop  heme vte ppx  id diarrhea improving, po vancomycin plan to d/c discussed with id  endo no changes  dispo planning to long term vent facility  The patient is a critical care patient with life threatening hemodynamic and metabolic instability in SICU.  I have personally interviewed when possible and examined the patient, reviewed data and laboratory tests/x-rays and all pertinent electronic images.  I was physically present for the key portions of the evaluation and management (E/M) service provided.   The SICU team has a constant risk benefit analyzes discussion with the primary team, all consultants, House Staff and PA's on all decisions.  These diagnoses are unrelated to the surgical procedure noted above.  I meet with family if needed to get further history, discuss the case and make care decisions for this patient who might not be able to participate.  Time involved in performance of separately billable procedures was not counted toward my critical care time. There is no overlap.  I spent 55-75 minutes ( 0800Hrs-0915Hrs in AM/ 1600Hrs-1715Hrs in PM, or other time indicated) of critical care time for the diagnoses, assessment, plan and interventions.  This time excludes time spent on separate procedures and teaching.

## 2022-02-07 NOTE — PROGRESS NOTE ADULT - SUBJECTIVE AND OBJECTIVE BOX
Follow Up:  leukocytosis    Interval History/ROS:  sedated,      Allergies  No Known Allergies    ANTIMICROBIALS:  vancomycin    Solution 125 every 6 hours      OTHER MEDS:  MEDICATIONS  (STANDING):  acetaminophen    Suspension .. 650 every 6 hours  acetylcysteine 10%  Inhalation 4 every 6 hours  ALBUTerol    90 MICROgram(s) HFA Inhaler 2 every 6 hours  ALBUTerol    90 MICROgram(s) HFA Inhaler 2 every 6 hours PRN  budesonide 160 MICROgram(s)/formoterol 4.5 MICROgram(s) Inhaler 2 two times a day  digoxin     Tablet 125 daily  furosemide   Injectable 40 every 8 hours  heparin   Injectable 5000 every 8 hours  insulin glargine Injectable (LANTUS) 30 every morning  insulin lispro (ADMELOG) corrective regimen sliding scale  every 6 hours  metoprolol tartrate 25 every 8 hours  midazolam Injectable 2 once  midodrine 10 every 8 hours  pantoprazole  Injectable 40 every 12 hours  QUEtiapine 75 at bedtime      Vital Signs Last 24 Hrs  T(C): 39 (2022 12:00), Max: 39 (2022 12:00)  T(F): 102.2 (2022 12:00), Max: 102.2 (2022 12:00)  HR: 107 (2022 13:00) (96 - 110)  BP: 91/76 (2022 13:00) (91/76 - 119/47)  BP(mean): 73 (2022 13:00) (44 - 73)  RR: 29 (2022 13:00) (16 - 29)  SpO2: 100% (2022 13:00) (98% - 100%)    PHYSICAL EXAM:  General: NAD, Non-toxic  Neurology: A&Ox3, nonfocal  Respiratory: trach, Clear to auscultation bilaterally, small amount of white tannish resp secretions  CV: RRR, S1S2, no murmurs, rubs or gallops  Abdominal: Soft, Non-tender, non-distended, normal bowel sounds  Extremities: No edema, cold left foot with dry gangene and mottling extending from toes to forefoot  Line Sites: Clear  Skin: No rash                        7.1    17.42 )-----------( 540      ( 2022 02:20 )             23.0   WBC Count: 17.42 (02-07 @ 02:20)  WBC Count: 18.41 ( @ 01:17)  WBC Count: 21.99 ( @ 04:15)  WBC Count: 16.52 ( @ 04:10)  WBC Count: 13.35 ( @ 00:59)          145  |  108<H>  |  27<H>  ----------------------------<  107<H>  4.2   |  26  |  0.73    Ca    7.9<L>      2022 02:20  Phos  3.3       Mg     2.20         Urinalysis Basic - ( 2022 12:35 )    Color: Yellow / Appearance: Slightly Turbid / S.023 / pH: x  Gluc: x / Ketone: Negative  / Bili: Negative / Urobili: <2 mg/dL   Blood: x / Protein: 30 mg/dL / Nitrite: Negative   Leuk Esterase: Large / RBC: 640 /HPF / WBC 42 /HPF   Sq Epi: x / Non Sq Epi: 2 /HPF / Bacteria: Moderate      MICROBIOLOGY:  .Sputum Sputum  22   Numerous Stenotrophomonas maltophilia  Normal Respiratory Vijaya absent  --  Stenotrophomonas maltophilia      .Abscess Midline incision  22   Few Candida albicans "Susceptibilities not performed"  --  --      .Blood Blood-Arterial  22   No Growth Final  --  --      BAL sputum  22   No growth at 1 week.  --  --      .Sputum Sputum  22   No growth at 1 week.  --  --      Combi-Cath bronchial lavage  22   Normal Respiratory Vijaya present  --  --      .Sputum Sputum  22   Normal Respiratory Vijaya present  --    Few polymorphonuclear leukocytes per low power field  No Squamous epithelial cells per low power field  No organisms seen      .Blood Blood-Venous  01-15-22   No Growth Final  --  --      .Blood Blood-Venous  22   No Growth Final  --  --      .Blood Blood-Peripheral  22   No Growth Final  --  --      .Blood Blood-Peripheral  22   Growth in anaerobic bottle: Proteus mirabilis  See previous culture  [id number]  --    Growth in anaerobic bottle: Gram Negative Rods      Clean Catch Clean Catch (Midstream)  22   No growth  --  --      .Blood Blood-Peripheral  22   Growth in aerobic and anaerobic bottles: Proteus mirabilis  See previous culture [id number]  Growth in aerobic bottle: Enterobacter cloacae complex  --  Enterobacter cloacae complex      .Blood Blood-Peripheral  22   Growth in aerobic and anaerobic bottles: Proteus mirabilis  Growth in anaerobic bottle: Enterobacter cloacae complex  See previous culture [id number]      RADIOLOGY:  films independently viewed  < from: Xray Chest 1 View- PORTABLE-Routine (Xray Chest 1 View- PORTABLE-Routine in AM.) (22 @ 01:13) >  IMPRESSION:  Bilateral layering effusions greater on the right than the   left side with tracheostomy tube.    < end of copied text >  < from: CT Head No Cont (22 @ 00:59) >    IMPRESSION: Evolving area of parenchymal hemorrhage as described above.    < end of copied text >  < from: Xray Chest 1 View- PORTABLE-Routine (Xray Chest 1 View- PORTABLE-Routine in AM.) (22 @ 03:23) >  INTERPRETATION:  HISTORY: Admitting Dxs: R41.82 R41.82;  SICU, followup;  TECHNIQUE: Portable frontal view of the chest, 1 view.  COMPARISON: .  FINDINGS/  IMPRESSION:  Right central line and tracheostomy tube are unchanged  HEART:  Enlarged  LUNGS: Bilateral effusions and infiltrates, similar to prior study.  BONES: degenerative changes    < end of copied text >      Stephan Vizcarra MD; Division of Infectious Disease; Pager: [phone]; nights and weekends: [phone]
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
## 2022-02-08 NOTE — PROGRESS NOTE ADULT - SUBJECTIVE AND OBJECTIVE BOX
Follow Up:  fevers    Interval History/ROS:  sleeping    Allergies  No Known Allergies    ANTIMICROBIALS:  trimethoprim / sulfamethoxazole IVPB 250 every 6 hours  vancomycin    Solution 125 every 6 hours      OTHER MEDS:  MEDICATIONS  (STANDING):  acetaminophen    Suspension .. 650 every 6 hours  acetylcysteine 10%  Inhalation 4 every 6 hours  ALBUTerol    90 MICROgram(s) HFA Inhaler 2 every 6 hours PRN  ALBUTerol    90 MICROgram(s) HFA Inhaler 2 every 6 hours  ALBUTerol    90 MICROgram(s) HFA Inhaler 2 every 6 hours PRN  ALBUTerol   0.5% 2.5 every 6 hours  budesonide 160 MICROgram(s)/formoterol 4.5 MICROgram(s) Inhaler 2 two times a day  digoxin     Tablet 125 daily  heparin   Injectable 5000 every 8 hours  insulin glargine Injectable (LANTUS) 30 every morning  insulin lispro (ADMELOG) corrective regimen sliding scale  every 6 hours  metoprolol tartrate 25 every 8 hours  midazolam Injectable 2 once  midodrine 10 <User Schedule>  pantoprazole  Injectable 40 every 12 hours  QUEtiapine 100 at bedtime  QUEtiapine 50 daily      Vital Signs Last 24 Hrs  T(C): 38.3 (2022 12:00), Max: 38.6 (2022 08:00)  T(F): 101 (2022 12:00), Max: 101.4 (2022 08:00)  HR: 103 (2022 13:00) (86 - 131)  BP: 120/59 (2022 13:00) (90/50 - 128/52)  BP(mean): 73 (2022 13:00) (55 - 88)  RR: 20 (2022 13:00) (19 - 32)  SpO2: 98% (2022 13:00) (97% - 100%)    PHYSICAL EXAM:  General: NAD, Non-toxic  Neurology: A&Ox3, nonfocal  Respiratory: Clear to auscultation bilaterally  CV: RRR, S1S2, no murmurs, rubs or gallops  Abdominal: Soft, Non-tender, non-distended, normal bowel sounds  Extremities: dry gangrenous changes cold left  foot  Line Sites: Clear  Skin: No rash; eschar on medial right knee with fluctance, malodorous purulence expressible                        7.3    21.46 )-----------( 509      ( 2022 11:25 )             24.1   WBC Count: 21.46 ( @ 11:25)  WBC Count: 17.07 ( @ 02:35)  WBC Count: 17.42 ( @ 02:20)  WBC Count: 18.41 ( @ 01:17)  WBC Count: 21.99 ( @ 04:15)  WBC Count: 16.52 ( @ 04:10)          143  |  105  |  27<H>  ----------------------------<  111<H>  4.4   |  24  |  0.78    Ca    7.9<L>      2022 01:37  Phos  3.9       Mg     2.10     08    Urinalysis Basic - ( 2022 12:35 )    Color: Yellow / Appearance: Slightly Turbid / S.023 / pH: x  Gluc: x / Ketone: Negative  / Bili: Negative / Urobili: <2 mg/dL   Blood: x / Protein: 30 mg/dL / Nitrite: Negative   Leuk Esterase: Large / RBC: 640 /HPF / WBC 42 /HPF   Sq Epi: x / Non Sq Epi: 2 /HPF / Bacteria: Moderate      MICROBIOLOGY:  .Sputum Sputum  22   Moderate Stenotrophomonas maltophilia  Normal Respiratory Vijaya present  --    Few polymorphonuclear leukocytes per low power field  Few Squamous epithelial cells per low power field  Moderate Gram Negative Rods per oil power field      .Sputum Sputum  22   Numerous Stenotrophomonas maltophilia  Normal Respiratory Vijaya absent  --  Stenotrophomonas maltophilia      .Abscess Midline incision  22   Few Candida albicans "Susceptibilities not performed"  --  --      .Blood Blood-Arterial  22   No Growth Final  --  --      BAL sputum  22   No growth at 1 week.  --  --      .Sputum Sputum  22   No growth at 1 week.  --  --      Combi-Cath bronchial lavage  22   Normal Respiratory Vijaya present  --  --      .Sputum Sputum  22   Normal Respiratory Vijaya present  --    Few polymorphonuclear leukocytes per low power field  No Squamous epithelial cells per low power field  No organisms seen      .Blood Blood-Venous  01-15-22   No Growth Final  --  --      .Blood Blood-Venous  22   No Growth Final  --  --      .Blood Blood-Peripheral  22   No Growth Final  --  --      .Blood Blood-Peripheral  22   Growth in anaerobic bottle: Proteus mirabilis  See previous culture  [id number]  --    Growth in anaerobic bottle: Gram Negative Rods      Clean Catch Clean Catch (Midstream)  22   No growth  --  --      .Blood Blood-Peripheral  22   Growth in aerobic and anaerobic bottles: Proteus mirabilis  See previous culture [id number]  Growth in aerobic bottle: Enterobacter cloacae complex  --  Enterobacter cloacae complex      .Blood Blood-Peripheral  22   Growth in aerobic and anaerobic bottles: Proteus mirabilis  Growth in anaerobic bottle: Enterobacter cloacae complex    RADIOLOGY:  < from: Xray Chest 1 View- PORTABLE-Routine (Xray Chest 1 View- PORTABLE-Routine in AM.) (22 @ 02:05) >  COMPARISON STUDY: 2022    Frontal expiratory view of the chest shows the heartto be similar in   size. Tracheostomy tube is again noted.    The lungs show less pulmonary congestion with small pleural effusions and   there is no evidence of pneumothorax.    IMPRESSION:  Decreased congestion.    < end of copied text >      Stephan Vizcarra MD; Division of Infectious Disease; Pager: [phone]; nights and weekends: [phone]

## 2022-02-08 NOTE — PROCEDURE NOTE - NSPROCNAME_GEN_A_CORE
Bronchoscopy
Tracheal Intubation
Bronchoscopy
General
Arterial Puncture/Cannulation
Bronchoscopy
Tracheal Intubation
Central Line Insertion

## 2022-02-08 NOTE — PROGRESS NOTE ADULT - SUBJECTIVE AND OBJECTIVE BOX
SUBJECTIVE:   po meds transitioned to IV. seen and examined at bedside.     OBJECTIVE: T(C): 38.3 (22 @ 12:00), Max: 38.6 (22 @ 08:00)  HR: 103 (22 @ 13:00) (86 - 131)  BP: 120/59 (22 @ 13:00) (90/50 - 128/52)  RR: 20 (22 @ :00) (19 - 32)  SpO2: 98% (22 @ :00) (97% - 100%)  Wt(kg): --  I&O's Summary    2022 07:  -  2022 07:00  --------------------------------------------------------  IN: 1910 mL / OUT: 2215 mL / NET: -305 mL    2022 07:  -  2022 13:31  --------------------------------------------------------  IN: 520 mL / OUT: 265 mL / NET: 255 mL      I&O's Detail    2022 07:  -  2022 07:00  --------------------------------------------------------  IN:    Free Water: 750 mL    Glucerna 1.5: 810 mL    IV PiggyBack: 50 mL    PRBCs (Packed Red Blood Cells): 300 mL  Total IN: 1910 mL    OUT:    Indwelling Catheter - Urethral (mL): 2165 mL    Rectal Tube (mL): 50 mL  Total OUT: 2215 mL    Total NET: -305 mL      2022 07:  -  2022 13:31  --------------------------------------------------------  IN:    Free Water: 250 mL    Glucerna 1.5: 270 mL  Total IN: 520 mL    OUT:    Indwelling Catheter - Urethral (mL): 265 mL  Total OUT: 265 mL    Total NET: 255 mL        General:  Abdomen:    MEDICATIONS  (STANDING):  acetaminophen    Suspension .. 650 milliGRAM(s) Oral every 6 hours  acetylcysteine 10%  Inhalation 4 milliLiter(s) Inhalation every 6 hours  ALBUTerol    90 MICROgram(s) HFA Inhaler 2 Puff(s) Inhalation every 6 hours  ALBUTerol   0.5% 2.5 milliGRAM(s) Nebulizer every 6 hours  budesonide 160 MICROgram(s)/formoterol 4.5 MICROgram(s) Inhaler 2 Puff(s) Inhalation two times a day  chlorhexidine 0.12% Liquid 15 milliLiter(s) Oral Mucosa every 12 hours  chlorhexidine 4% Liquid 1 Application(s) Topical <User Schedule>  digoxin     Tablet 125 MICROGram(s) Oral daily  heparin   Injectable 5000 Unit(s) SubCutaneous every 8 hours  insulin glargine Injectable (LANTUS) 30 Unit(s) SubCutaneous every morning  insulin lispro (ADMELOG) corrective regimen sliding scale   SubCutaneous every 6 hours  metoprolol tartrate 25 milliGRAM(s) Oral every 8 hours  midazolam Injectable 2 milliGRAM(s) IV Push once  midodrine 10 milliGRAM(s) Oral <User Schedule>  multivitamin/minerals/iron Oral Solution (CENTRUM) 15 milliLiter(s) Oral daily  pantoprazole  Injectable 40 milliGRAM(s) IV Push every 12 hours  QUEtiapine 100 milliGRAM(s) Oral at bedtime  QUEtiapine 50 milliGRAM(s) Oral daily  trimethoprim / sulfamethoxazole IVPB 250 milliGRAM(s) IV Intermittent every 6 hours  vancomycin    Solution 125 milliGRAM(s) Enteral Tube every 6 hours    MEDICATIONS  (PRN):  ALBUTerol    90 MICROgram(s) HFA Inhaler 2 Puff(s) Inhalation every 6 hours PRN Wheezing  ALBUTerol    90 MICROgram(s) HFA Inhaler 2 Puff(s) Inhalation every 6 hours PRN Shortness of Breath and/or Wheezing      LABS:                        7.3    21.46 )-----------( 509      ( 2022 11:25 )             24.1     02-08    143  |  105  |  27<H>  ----------------------------<  111<H>  4.4   |  24  |  0.78    Ca    7.9<L>      2022 01:37  Phos  3.9     02-08  Mg     2.10     02-08        Urinalysis Basic - ( 2022 12:35 )    Color: Yellow / Appearance: Slightly Turbid / S.023 / pH: x  Gluc: x / Ketone: Negative  / Bili: Negative / Urobili: <2 mg/dL   Blood: x / Protein: 30 mg/dL / Nitrite: Negative   Leuk Esterase: Large / RBC: 640 /HPF / WBC 42 /HPF   Sq Epi: x / Non Sq Epi: 2 /HPF / Bacteria: Moderate        RADIOLOGY & ADDITIONAL STUDIES:    Assessment/Plan: 76yMale s/p        SUBJECTIVE:   po meds transitioned to IV. seen and examined at bedside.     OBJECTIVE: T(C): 38.3 (22 @ 12:00), Max: 38.6 (22 @ 08:00)  HR: 103 (22 @ 13:00) (86 - 131)  BP: 120/59 (22 @ 13:00) (90/50 - 128/52)  RR: 20 (22 @ :00) (19 - 32)  SpO2: 98% (22 @ :00) (97% - 100%)  Wt(kg): --  I&O's Summary    2022 07:  -  2022 07:00  --------------------------------------------------------  IN: 1910 mL / OUT: 2215 mL / NET: -305 mL    2022 07:  -  2022 13:31  --------------------------------------------------------  IN: 520 mL / OUT: 265 mL / NET: 255 mL      I&O's Detail    2022 07:  -  2022 07:00  --------------------------------------------------------  IN:    Free Water: 750 mL    Glucerna 1.5: 810 mL    IV PiggyBack: 50 mL    PRBCs (Packed Red Blood Cells): 300 mL  Total IN: 1910 mL    OUT:    Indwelling Catheter - Urethral (mL): 2165 mL    Rectal Tube (mL): 50 mL  Total OUT: 2215 mL    Total NET: -305 mL      2022 07:  -  2022 13:31  --------------------------------------------------------  IN:    Free Water: 250 mL    Glucerna 1.5: 270 mL  Total IN: 520 mL    OUT:    Indwelling Catheter - Urethral (mL): 265 mL  Total OUT: 265 mL    Total NET: 255 mL      Exam:  General: intubated   Abdomen: Abdomen soft, distended superior midline incision with minimal SS strikethrough      MEDICATIONS  (STANDING):  acetaminophen    Suspension .. 650 milliGRAM(s) Oral every 6 hours  acetylcysteine 10%  Inhalation 4 milliLiter(s) Inhalation every 6 hours  ALBUTerol    90 MICROgram(s) HFA Inhaler 2 Puff(s) Inhalation every 6 hours  ALBUTerol   0.5% 2.5 milliGRAM(s) Nebulizer every 6 hours  budesonide 160 MICROgram(s)/formoterol 4.5 MICROgram(s) Inhaler 2 Puff(s) Inhalation two times a day  chlorhexidine 0.12% Liquid 15 milliLiter(s) Oral Mucosa every 12 hours  chlorhexidine 4% Liquid 1 Application(s) Topical <User Schedule>  digoxin     Tablet 125 MICROGram(s) Oral daily  heparin   Injectable 5000 Unit(s) SubCutaneous every 8 hours  insulin glargine Injectable (LANTUS) 30 Unit(s) SubCutaneous every morning  insulin lispro (ADMELOG) corrective regimen sliding scale   SubCutaneous every 6 hours  metoprolol tartrate 25 milliGRAM(s) Oral every 8 hours  midazolam Injectable 2 milliGRAM(s) IV Push once  midodrine 10 milliGRAM(s) Oral <User Schedule>  multivitamin/minerals/iron Oral Solution (CENTRUM) 15 milliLiter(s) Oral daily  pantoprazole  Injectable 40 milliGRAM(s) IV Push every 12 hours  QUEtiapine 100 milliGRAM(s) Oral at bedtime  QUEtiapine 50 milliGRAM(s) Oral daily  trimethoprim / sulfamethoxazole IVPB 250 milliGRAM(s) IV Intermittent every 6 hours  vancomycin    Solution 125 milliGRAM(s) Enteral Tube every 6 hours    MEDICATIONS  (PRN):  ALBUTerol    90 MICROgram(s) HFA Inhaler 2 Puff(s) Inhalation every 6 hours PRN Wheezing  ALBUTerol    90 MICROgram(s) HFA Inhaler 2 Puff(s) Inhalation every 6 hours PRN Shortness of Breath and/or Wheezing      LABS:                        7.3    21.46 )-----------( 509      ( 2022 11:25 )             24.1     02-08    143  |  105  |  27<H>  ----------------------------<  111<H>  4.4   |  24  |  0.78    Ca    7.9<L>      2022 01:37  Phos  3.9     -08  Mg     2.10     02-08        Urinalysis Basic - ( 2022 12:35 )    Color: Yellow / Appearance: Slightly Turbid / S.023 / pH: x  Gluc: x / Ketone: Negative  / Bili: Negative / Urobili: <2 mg/dL   Blood: x / Protein: 30 mg/dL / Nitrite: Negative   Leuk Esterase: Large / RBC: 640 /HPF / WBC 42 /HPF   Sq Epi: x / Non Sq Epi: 2 /HPF / Bacteria: Moderate        RADIOLOGY & ADDITIONAL STUDIES:    Assessment/Plan: 76yMale s/p

## 2022-02-08 NOTE — PROCEDURE NOTE - ADDITIONAL PROCEDURE DETAILS
Patient given IV dilaudid for anesthesia. B/l leg well-demarcated leg eschars with purulent drainage from wounds. Eschars de-roofed using scissors and 11-blade. Right leg with abscess in underlying fibrous tissue, also deroofed. Surgical swab sent from abscess. Wet to dry dressings placed on wounds. The patient tolerated the procedure well.
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
## 2022-02-08 NOTE — PROGRESS NOTE ADULT - ATTENDING COMMENTS
Patient overnight continues to spike fevers, cultures sent showing positive ua, and sputum culture with gram negative rods. ID on board recommending bactrim to cover for stenotrophomonas  N mentating at baseline, agitation overnight and during morning, increasing seroquel night time dose  resp on cpap/ps, plan for trach collar  cv hemodynamically stable, wean off midodrine, 10 bid, plan to transition to daily tomorrow if map is appropriate  gi npo, tube feeds  gu/renal monitor uop  heme vte ppx, discuss with nsx regarding therapeutic ac  id on bactrim per id x 7 days  endo no changes    The patient is a critical care patient with life threatening hemodynamic and metabolic instability in SICU.  I have personally interviewed when possible and examined the patient, reviewed data and laboratory tests/x-rays and all pertinent electronic images.  I was physically present for the key portions of the evaluation and management (E/M) service provided.   The SICU team has a constant risk benefit analyzes discussion with the primary team, all consultants, House Staff and PA's on all decisions.  These diagnoses are unrelated to the surgical procedure noted above.  I meet with family if needed to get further history, discuss the case and make care decisions for this patient who might not be able to participate.  Time involved in performance of separately billable procedures was not counted toward my critical care time. There is no overlap.  I spent 55-75 minutes ( 0800Hrs-0915Hrs in AM/ 1600Hrs-1715Hrs in PM, or other time indicated) of critical care time for the diagnoses, assessment, plan and interventions.  This time excludes time spent on separate procedures and teaching.

## 2022-02-08 NOTE — PROCEDURE NOTE - PROCEDURE DATE TIME, MLM
08-Feb-2022 14:57
21-Jan-2022 15:16
27-Jan-2022 14:06
16-Jan-2022 00:54
19-Jan-2022 14:06
16-Jan-2022
19-Jan-2022 10:40
28-Jan-2022 17:51

## 2022-02-08 NOTE — PROGRESS NOTE ADULT - ASSESSMENT
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   1/12 Enterobacter and Proteus bacteremia.   1/15 Duodenal perforation s/p OR washout, repair. Related to bacteremia? Stress ulcer? H pylori IgG negative.   1/19 Respiratory failure, mucous plugging but newly positive COVID PCR on 1/19,  2/1 s/p remdesivir 1/19 --> 1/23.   1/22 C diff diarrhea   1/23 Abdominal surgical site inflammation - resolved, no pus, doubt few Candida significant.   Continued respiratory failure, intermittent fevers, now with a cold left leg.   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  2/3 vascular advises Left AKA  2/4 increased wbc,  low grade fever  CXR on my read with increased haziness right base suggestive of effusion    increased wbc may be related to pneumonia  or limb ischemia - given improved resp status, I favor limb ischemia  2/7 fever    Antimicrobial course:   Cefepime 1/13-17, Zosyn 1/17-21, Meropenem 1/21-24, 26-->2/2  Flagyl IV 1/15-17, 1/28-   Fluconazole 1/19-23   Vancomycin IV 1/22-24  po Vanco  1/23-->  Ceftriaxone 2/7--> 2/8  Bactrim 2/8-->    Suggest  Continue PO Vanc 125mg QID 1/23 -->  ADD Bactrim 250 mg IVSS every 6hours to cover Stenotrophomonas    discussed with ICU team
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
## 2022-02-09 NOTE — PROGRESS NOTE ADULT - SUBJECTIVE AND OBJECTIVE BOX
Follow Up:  fever    Interval History/ROS:  uncomfortable, frustrated by inability to speak,  daughter at bedside    Allergies  No Known Allergies    ANTIMICROBIALS:  trimethoprim / sulfamethoxazole IVPB 250 every 6 hours  vancomycin    Solution 125 every 6 hours      OTHER MEDS:  MEDICATIONS  (STANDING):  acetaminophen    Suspension .. 650 every 6 hours  acetylcysteine 10%  Inhalation 4 every 6 hours  ALBUTerol    90 MICROgram(s) HFA Inhaler 2 every 6 hours PRN  ALBUTerol    90 MICROgram(s) HFA Inhaler 2 every 6 hours  ALBUTerol    90 MICROgram(s) HFA Inhaler 2 every 6 hours PRN  ALBUTerol   0.5% 2.5 every 6 hours  apixaban 5 every 12 hours  budesonide 160 MICROgram(s)/formoterol 4.5 MICROgram(s) Inhaler 2 two times a day  digoxin     Tablet 125 daily  insulin glargine Injectable (LANTUS) 30 every morning  insulin lispro (ADMELOG) corrective regimen sliding scale  every 6 hours  metoprolol tartrate 37.5 every 8 hours  midodrine 10 <User Schedule>  pantoprazole  Injectable 40 every 12 hours  QUEtiapine 50 daily  QUEtiapine 125 at bedtime      Vital Signs Last 24 Hrs  T(C): 37.3 (09 Feb 2022 12:00), Max: 38.2 (08 Feb 2022 16:00)  T(F): 99.2 (09 Feb 2022 12:00), Max: 100.8 (08 Feb 2022 16:00)  HR: 98 (09 Feb 2022 12:00) (73 - 128)  BP: 108/55 (09 Feb 2022 12:00) (63/41 - 126/61)  BP(mean): 67 (09 Feb 2022 12:00) (46 - 81)  RR: 25 (09 Feb 2022 12:00) (16 - 32)  SpO2: 98% (09 Feb 2022 12:00) (94% - 100%)    PHYSICAL EXAM:  General:  NAD, Non-toxic  Neurology: left sided weakness  Respiratory: Clear to auscultation bilaterally  CV: RRR, S1S2, no murmurs, rubs or gallops  Abdominal: Soft, Non-tender, non-distended, normal bowel sounds  Extremities: No edema, dressing over right medial knee  - clean and dry  Line Sites: Clear  Skin: No rash                        7.3    20.53 )-----------( 528      ( 09 Feb 2022 04:45 )             23.6   WBC Count: 20.53 (02-09 @ 04:45)  WBC Count: 21.46 (02-08 @ 11:25)  WBC Count: 17.07 (02-08 @ 02:35)  WBC Count: 17.42 (02-07 @ 02:20)  WBC Count: 18.41 (02-06 @ 01:17)  WBC Count: 21.99 (02-05 @ 04:15)    02-09    142  |  104  |  38<H>  ----------------------------<  197<H>  4.2   |  24  |  0.83    Ca    7.9<L>      09 Feb 2022 04:45  Phos  3.7     02-09  Mg     2.20     02-09    MICROBIOLOGY:  Clean Catch Clean Catch (Midstream)  02-07-22   No growth  --  --      .Blood Blood-Peripheral  02-07-22   No growth to date.  --  --    .Sputum Sputum  02-07-22   Moderate Stenotrophomonas maltophilia  Normal Respiratory Vijaya present  --  Stenotrophomonas maltophilia  (02.07.22 @ 12:08)   - Levofloxacin: S <=0.5   - Trimethoprim/Sulfamethoxazole: S <=0.5/9.5   - Ceftazidime: S 8       .Sputum Sputum  01-28-22   Numerous Stenotrophomonas maltophilia  Normal Respiratory Vijaya absent  --  Stenotrophomonas maltophilia      .Abscess Midline incision  01-23-22   Few Candida albicans "Susceptibilities not performed"  --  --      .Blood Blood-Arterial  01-21-22   No Growth Final  --  --      BAL sputum  01-19-22   No growth at 1 week.  --  --      .Sputum Sputum  01-19-22   No growth at 1 week.  --  --      Combi-Cath bronchial lavage  01-19-22   Normal Respiratory Vijaya present  --  --      .Sputum Sputum  01-19-22   Normal Respiratory Vijaya present  --    Few polymorphonuclear leukocytes per low power field  No Squamous epithelial cells per low power field  No organisms seen      .Blood Blood-Venous  01-15-22   No Growth Final  --  --      .Blood Blood-Venous  01-14-22   No Growth Final  --  --      .Blood Blood-Peripheral  01-14-22   No Growth Final  --  --      .Blood Blood-Peripheral  01-13-22   Growth in anaerobic bottle: Proteus mirabilis  See previous culture  [id number]  --    Growth in anaerobic bottle: Gram Negative Rods      Clean Catch Clean Catch (Midstream)  01-12-22   No growth  --  --      .Blood Blood-Peripheral  01-12-22   Growth in aerobic and anaerobic bottles: Proteus mirabilis  See previous culture [id number]  Growth in aerobic bottle: Enterobacter cloacae complex  --  Enterobacter cloacae complex      .Blood Blood-Peripheral  01-12-22   Growth in aerobic and anaerobic bottles: Proteus mirabilis  Growth in anaerobic bottle: Enterobacter cloacae complex    RADIOLOGY:  < from: Xray Chest 1 View- PORTABLE-Routine (Xray Chest 1 View- PORTABLE-Routine in AM.) (02.09.22 @ 00:44) >  IMPRESSION:  Follow-up bilateral effusions with tracheostomy tube.    < end of copied text >  < from: CT Head No Cont (02.07.22 @ 00:59) >  INTERPRETATION:  Clinical history follow-up hemorrhage    Multiple axial sections were performed from base skull to vertex without   contrast enhancement. Coronal and sagittal destructions were performed as   well    This exam is compared with prior head CT performed on January 24, 2022.    Previously noted parenchymal hemorrhage involving the right basal ganglia   region continues to demonstrate expected expected evolutionary changes.   This hemorrhage demonstrates low-attenuation when compared prior exam.   Residual area of low-attenuation is again identified.    Parenchymal volume loss and chronic microvascular ischemic changes are   again seen.    Evaluation of the osseous structures with the appropriate window appears   normal    Air-fluid level involving the right sphenoid sinus is again seen.    Both mastoid and middle ear regions appear clear.    IMPRESSION: Evolving area of parenchymal hemorrhage as described above.    < end of copied text >      Stephan Vizcarra MD; Division of Infectious Disease; Pager: [phone]; nights and weekends: [phone]

## 2022-02-09 NOTE — PROGRESS NOTE ADULT - SUBJECTIVE AND OBJECTIVE BOX
GENERAL SURGERY PROGRESS NOTE   ___________________________________________________________________    MALDONADO BOB | [id number] | 76y Male | [facility] | LOS 27d    Attending: Tian Del Real    ___________________________________________________________________    CC: Patient is a 76y old  Male who presents with a chief complaint of Fall (2022 01:26)      SUBJECTIVE:   Patient seen today during morning rounds at bedside and found to be without acute distress. Denies chest pain, fever, severe pain, or SOB.     Overnight: Unremarkable    Allegies:  NKDA    OBJECTIVE:  Vitals:    T(C): 37 (22 @ 04:00), Max: 38.6 (22 @ 08:00)  HR: 102 (22 @ 04:17) (73 - 128)  BP: 113/54 (22 @ 04:00) (63/41 - 126/61)  RR: 24 (22 @ 04:00) (16 - 32)  SpO2: 100% (22 @ 04:17) (96% - 100%)  Mode: AC/ CMV (Assist Control/ Continuous Mandatory Ventilation)  RR (machine): 14  TV (machine): 420  FiO2: 40  PEEP: 5  ITime: 0.84  MAP: 8  PIP: 20      OUT:    Indwelling Catheter - Urethral (mL): 2165 mL    Rectal Tube (mL): 50 mL  Total OUT: 2215 mL      OUT:    Indwelling Catheter - Urethral (mL): 760 mL    Rectal Tube (mL): 50 mL  Total OUT: 810 mL        Physical Exam:   General: intubated   Respiratory: unlabored breathing, clear respiration  Gastrointestinal: Abdomen soft, distended superior midline incision with minimal SS strikethrough   Extremities:  No edema, no calf tenderness  Skin: no cyanosis or rash observed      Medications:  apixaban 5 Oral every 12 hours  meropenem  IVPB     meropenem  IVPB 1000 IV Intermittent every 8 hours  trimethoprim / sulfamethoxazole IVPB 250 IV Intermittent every 6 hours  vancomycin    Solution 125 Enteral Tube every 6 hours    acetaminophen    Suspension .. 650 milliGRAM(s) Oral every 6 hours  acetylcysteine 10%  Inhalation 4 milliLiter(s) Inhalation every 6 hours  ALBUTerol    90 MICROgram(s) HFA Inhaler 2 Puff(s) Inhalation every 6 hours  ALBUTerol   0.5% 2.5 milliGRAM(s) Nebulizer every 6 hours  budesonide 160 MICROgram(s)/formoterol 4.5 MICROgram(s) Inhaler 2 Puff(s) Inhalation two times a day  digoxin     Tablet 125 MICROGram(s) Oral daily  metoprolol tartrate 37.5 milliGRAM(s) Oral every 8 hours  metoprolol tartrate 25 milliGRAM(s) Oral once  midodrine 10 milliGRAM(s) Oral <User Schedule>  pantoprazole  Injectable 40 milliGRAM(s) IV Push every 12 hours  QUEtiapine 100 milliGRAM(s) Oral at bedtime  QUEtiapine 50 milliGRAM(s) Oral daily        Laboratory:  WBC: 20.53 H&H: 7.3/23.6 Plt: 528  WBC: 21.46 H&H: 7.3/24.1 Plt: 509    Chemistry:                               Phos: 3.9 M.10  143  |  105  |  27  ----------------------------<  111  4.4   |  24  |  0.78        ,                              Phos: 3.3 M.20  145  |  108  |  27  ----------------------------<  107  4.2   |  26  |  0.73          PTT 31.3 PT/INR 13.4/1.17          Reviewed laboratory and imaging     GENERAL SURGERY PROGRESS NOTE   ___________________________________________________________________    MALDONADO BOB | [id number] | 76y Male | [facility] | LOS 27d    Attending: Tian Del Real    ___________________________________________________________________    CC: Patient is a 76y old  Male who presents with a chief complaint of Fall (2022 01:26)      SUBJECTIVE:   Patient seen today during morning rounds at bedside and found to be without acute distress. Denies chest pain, fever, severe pain, or SOB.     Overnight:   -persistent rapid afib: increased metoprolol from 25mg to 37.5mg TID  -given 1L bolus LR    -persistently febrile throughout the day: cultures pending. Started meropenem for broad-spectrum coverage    Allegies:  NKDA    OBJECTIVE:  Vitals:    T(C): 37 (22 @ 04:00), Max: 38.6 (22 @ 08:00)  HR: 102 (22 @ 04:17) (73 - 128)  BP: 113/54 (22 @ 04:00) (63/41 - 126/61)  RR: 24 (22 @ 04:00) (16 - 32)  SpO2: 100% (22 @ 04:17) (96% - 100%)  Mode: AC/ CMV (Assist Control/ Continuous Mandatory Ventilation)  RR (machine): 14  TV (machine): 420  FiO2: 40  PEEP: 5  ITime: 0.84  MAP: 8  PIP: 20      OUT:    Indwelling Catheter - Urethral (mL): 2165 mL    Rectal Tube (mL): 50 mL  Total OUT: 2215 mL      OUT:    Indwelling Catheter - Urethral (mL): 760 mL    Rectal Tube (mL): 50 mL  Total OUT: 810 mL        Physical Exam:   General: intubated   Respiratory: unlabored breathing, clear respiration  Gastrointestinal: Abdomen soft, distended superior midline incision with minimal SS strikethrough   Extremities:  No edema, no calf tenderness  Skin: no cyanosis or rash observed      Medications:  apixaban 5 Oral every 12 hours  meropenem  IVPB     meropenem  IVPB 1000 IV Intermittent every 8 hours  trimethoprim / sulfamethoxazole IVPB 250 IV Intermittent every 6 hours  vancomycin    Solution 125 Enteral Tube every 6 hours    acetaminophen    Suspension .. 650 milliGRAM(s) Oral every 6 hours  acetylcysteine 10%  Inhalation 4 milliLiter(s) Inhalation every 6 hours  ALBUTerol    90 MICROgram(s) HFA Inhaler 2 Puff(s) Inhalation every 6 hours  ALBUTerol   0.5% 2.5 milliGRAM(s) Nebulizer every 6 hours  budesonide 160 MICROgram(s)/formoterol 4.5 MICROgram(s) Inhaler 2 Puff(s) Inhalation two times a day  digoxin     Tablet 125 MICROGram(s) Oral daily  metoprolol tartrate 37.5 milliGRAM(s) Oral every 8 hours  metoprolol tartrate 25 milliGRAM(s) Oral once  midodrine 10 milliGRAM(s) Oral <User Schedule>  pantoprazole  Injectable 40 milliGRAM(s) IV Push every 12 hours  QUEtiapine 100 milliGRAM(s) Oral at bedtime  QUEtiapine 50 milliGRAM(s) Oral daily        Laboratory:  WBC: 20.53 H&H: 7.3/23.6 Plt: 528  WBC: 21.46 H&H: 7.3/24.1 Plt: 509    Chemistry:                               Phos: 3.9 M.10  143  |  105  |  27  ----------------------------<  111  4.4   |  24  |  0.78        ,                              Phos: 3.3 M.20  145  |  108  |  27  ----------------------------<  107  4.2   |  26  |  0.73          PTT 31.3 PT/INR 13.4/1.17          Reviewed laboratory and imaging

## 2022-02-09 NOTE — PROGRESS NOTE ADULT - ASSESSMENT
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   1/12 Enterobacter and Proteus bacteremia.   1/15 Duodenal perforation s/p OR washout, repair. Related to bacteremia? Stress ulcer? H pylori IgG negative.   1/19 Respiratory failure, mucous plugging but newly positive COVID PCR on 1/19,  2/1 s/p remdesivir 1/19 --> 1/23.   1/22 C diff diarrhea   1/23 Abdominal surgical site inflammation - resolved, no pus, doubt few Candida significant.   Continued respiratory failure, intermittent fevers, now with a cold left leg.   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  2/3 vascular advises Left AKA  2/4 increased wbc,  low grade fever  CXR on my read with increased haziness right base suggestive of effusion    increased wbc may be related to pneumonia  or limb ischemia - given improved resp status, I favor limb ischemia  2/7 fever    Fever moderating  Leukoctosis persists  RIght sphenoid sinusitis - likely secondary to Stenotrophomonas  Cdiff  Right medical skin wound  - s/p therapeutic debridement of eschar 2/8  gangrene right foot    Antimicrobial course:   Cefepime 1/13-17, Zosyn 1/17-21, Meropenem 1/21-24, 26-->2/2  Flagyl IV 1/15-17, 1/28-   Fluconazole 1/19-23   Vancomycin IV 1/22-24  po Vanco  1/23-->  Ceftriaxone 2/7--> 2/8  Bactrim 2/8-->    Suggest  Continue PO Vanc 125mg QID 1/23 -->  ADD Bactrim 250 mg IVSS every 6hours to cover Stenotrophomonas  discontinue Meropenem    discussed with daughter at bedside  discussed with ICU team
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
## 2022-02-09 NOTE — PROGRESS NOTE ADULT - ATTENDING COMMENTS
Patient overnight no fevers. Patient remains vented unable to be placed on trach collar  N mentating at baseline, agitated, will increase nighttime seroquel  resp on ventilator, worsening cxr, will perform pulmonary toilet, and out of bed to chair to allow for deep breathing, plan for trach collar, ipv  cv hemodynamically stable, decrease midodrine  gi npo, tube feeds, continued diarrhea will d/c flexiseal causing agitation  gu/renal monitor uop hypovolemic required fluid bolus overnight  heme started on therapeutic ac for afib  id on bactrim d/c meropenem per id recommendations  endo no changes    The patient is a critical care patient with life threatening hemodynamic and metabolic instability in SICU.  I have personally interviewed when possible and examined the patient, reviewed data and laboratory tests/x-rays and all pertinent electronic images.  I was physically present for the key portions of the evaluation and management (E/M) service provided.   The SICU team has a constant risk benefit analyzes discussion with the primary team, all consultants, House Staff and PA's on all decisions.  These diagnoses are unrelated to the surgical procedure noted above.  I meet with family if needed to get further history, discuss the case and make care decisions for this patient who might not be able to participate.  Time involved in performance of separately billable procedures was not counted toward my critical care time. There is no overlap.  I spent 55-75 minutes ( 0800Hrs-0915Hrs in AM/ 1600Hrs-1715Hrs in PM, or other time indicated) of critical care time for the diagnoses, assessment, plan and interventions.  This time excludes time spent on separate procedures and teaching.
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
## 2022-02-10 NOTE — PROGRESS NOTE ADULT - SUBJECTIVE AND OBJECTIVE BOX
Follow Up:  leukocytosis, gangrene, right sphenoid sinusitis, wound infection, cidiff    Interval History/ROS:  resting    Allergies  No Known Allergies    ANTIMICROBIALS:  trimethoprim / sulfamethoxazole IVPB 250 every 6 hours  vancomycin    Solution 125 every 6 hours      OTHER MEDS:  MEDICATIONS  (STANDING):  acetaminophen    Suspension .. 650 every 6 hours  acetylcysteine 10%  Inhalation 4 every 6 hours  ALBUTerol    90 MICROgram(s) HFA Inhaler 2 every 6 hours PRN  ALBUTerol    90 MICROgram(s) HFA Inhaler 2 every 6 hours PRN  ALBUTerol    90 MICROgram(s) HFA Inhaler 2 every 6 hours  ALBUTerol   0.5% 2.5 every 6 hours  apixaban 5 every 12 hours  budesonide 160 MICROgram(s)/formoterol 4.5 MICROgram(s) Inhaler 2 two times a day  digoxin     Tablet 125 daily  insulin glargine Injectable (LANTUS) 30 every morning  insulin lispro (ADMELOG) corrective regimen sliding scale  every 6 hours  metoprolol tartrate 25 every 8 hours  midodrine 10 <User Schedule>  norepinephrine Infusion 0.05 <Continuous>  pantoprazole  Injectable 40 every 12 hours  QUEtiapine 125 at bedtime  QUEtiapine 25 daily      Vital Signs Last 24 Hrs  T(C): 36.3 (10 Feb 2022 12:00), Max: 37.2 (10 Feb 2022 00:00)  T(F): 97.4 (10 Feb 2022 12:00), Max: 98.9 (10 Feb 2022 00:00)  HR: 96 (10 Feb 2022 14:00) (87 - 117)  BP: 115/41 (10 Feb 2022 14:00) (88/39 - 118/77)  BP(mean): 59 (10 Feb 2022 14:00) (52 - 87)  RR: 27 (10 Feb 2022 14:00) (17 - 31)  SpO2: 98% (10 Feb 2022 14:00) (92% - 100%)    PHYSICAL EXAM:  General: WN/WD NAD, Non-toxic  Neurology: A&Ox3, nonfocal  Respiratory: Clear to auscultation bilaterally  CV: RRR, S1S2, no murmurs, rubs or gallops  Abdominal: Soft, Non-tender, non-distended, normal bowel sounds  Extremities: No edema, eschar has been removed from right medial knee - fibinous base of wound noted  cold left foot with mummification of toes  Line Sites: Clear  Skin: No rash                        6.6    21.37 )-----------( 473      ( 10 Feb 2022 03:26 )             22.1   WBC Count: 21.37 (02-10 @ 03:26)  WBC Count: 20.53 (02-09 @ 04:45)  WBC Count: 21.46 (02-08 @ 11:25)  WBC Count: 17.07 (02-08 @ 02:35)  WBC Count: 17.42 (02-07 @ 02:20)  WBC Count: 18.41 (02-06 @ 01:17)    02-10    140  |  102  |  43<H>  ----------------------------<  190<H>  4.2   |  24  |  1.04    Ca    7.9<L>      10 Feb 2022 03:26  Phos  4.6     02-10  Mg     2.30     02-10    TPro  6.0  /  Alb  2.4<L>  /  TBili  0.2  /  DBili  x   /  AST  215<H>  /  ALT  174<H>  /  AlkPhos  224<H>  02-10      MICROBIOLOGY:  .Abscess leg abscess  02-08-22   Few Proteus mirabilis  --  --      Clean Catch Clean Catch (Midstream)  02-07-22   No growth  --  --      .Blood Blood-Peripheral  02-07-22   No growth to date.  --  --      .Sputum Sputum  02-07-22   Moderate Stenotrophomonas maltophilia  Normal Respiratory Vijaya present  --  Stenotrophomonas maltophilia      .Sputum Sputum  01-28-22   Numerous Stenotrophomonas maltophilia  Normal Respiratory Vijaya absent  --  Stenotrophomonas maltophilia      .Abscess Midline incision  01-23-22   Few Candida albicans "Susceptibilities not performed"  --  --      .Blood Blood-Arterial  01-21-22   No Growth Final  --  --      BAL sputum  01-19-22   No growth at 1 week.  --  --      .Sputum Sputum  01-19-22   No growth at 1 week.  --  --      Combi-Cath bronchial lavage  01-19-22   Normal Respiratory Vijaya present  --  --      .Sputum Sputum  01-19-22   Normal Respiratory Vijaya present  --    Few polymorphonuclear leukocytes per low power field  No Squamous epithelial cells per low power field  No organisms seen      .Blood Blood-Venous  01-15-22   No Growth Final  --  --      .Blood Blood-Venous  01-14-22   No Growth Final  --  --      .Blood Blood-Peripheral  01-14-22   No Growth Final  --  --      .Blood Blood-Peripheral  01-13-22   Growth in anaerobic bottle: Proteus mirabilis  See previous culture  [id number]  --    Growth in anaerobic bottle: Gram Negative Rods      Clean Catch Clean Catch (Midstream)  01-12-22   No growth  --  --      .Blood Blood-Peripheral  01-12-22   Growth in aerobic and anaerobic bottles: Proteus mirabilis  See previous culture [id number]  Growth in aerobic bottle: Enterobacter cloacae complex  --  Enterobacter cloacae complex      .Blood Blood-Peripheral  01-12-22   Growth in aerobic and anaerobic bottles: Proteus mirabilis  Growth in anaerobic bottle: Enterobacter cloacae complex  See previous culture [id number]    RADIOLOGY:  < from: Xray Chest 1 View- PORTABLE-Routine (Xray Chest 1 View- PORTABLE-Routine in AM.) (02.09.22 @ 00:44) >  INTERPRETATION:    Tracheostomy tube in place. Bilateral layering effusions moderate to   large on the right and small to moderate on the left side unchanged from   the study of the day before. The heart is not enlarged and no focal   consolidations seen.      COMPARISON:  February 8      IMPRESSION:  Follow-up bilateral effusions with tracheostomy tube.    < end of copied text >      Stephan Vizcarra MD; Division of Infectious Disease; Pager: [phone]; nights and weekends: [phone]

## 2022-02-10 NOTE — PROGRESS NOTE ADULT - ASSESSMENT
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   1/12 Enterobacter and Proteus bacteremia.   1/15 Duodenal perforation s/p OR washout, repair. Related to bacteremia? Stress ulcer? H pylori IgG negative.   1/19 Respiratory failure, mucous plugging but newly positive COVID PCR on 1/19,  2/1 s/p remdesivir 1/19 --> 1/23.   1/22 C diff diarrhea   1/23 Abdominal surgical site inflammation - resolved, no pus, doubt few Candida significant.   Continued respiratory failure, intermittent fevers, now with a cold left leg.   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  2/3 vascular advises Left AKA  2/4 increased wbc,  low grade fever  CXR on my read with increased haziness right base suggestive of effusion    increased wbc may be related to pneumonia  or limb ischemia - given improved resp status, I favor limb ischemia  2/7 fever    Fever moderating  Leukoctosis persists  RIght sphenoid sinusitis - likely secondary to Stenotrophomonas  Cdiff  Right medical skin wound  - s/p therapeutic debridement of eschar 2/8 - growing Proteus (blood isolate was susceptible to Bactrim)  gangrene right foot    Antimicrobial course:   Cefepime 1/13-17, Zosyn 1/17-21, Meropenem 1/21-24, 26-->2/2  Flagyl IV 1/15-17, 1/28-   Fluconazole 1/19-23   Vancomycin IV 1/22-24  po Vanco  1/23-->  Ceftriaxone 2/7--> 2/8  Bactrim 2/8-->    Suggest  Continue PO Vanc 125mg QID 1/23 -->  Continue  Bactrim 250 mg IVSS every 6hours to cover Stenotrophomonas    discussed with daughter at bedside  discussed with ICU team: they are reaching out to vascular re LLE amputation

## 2022-02-10 NOTE — CHART NOTE - NSCHARTNOTESELECT_GEN_ALL_CORE
GI Fellow/Event Note
Neurosurgery/Event Note
Nutrition Follow Up/Nutrition Services
POCUS/Event Note
POCUS/Event Note
Event Note
GI Fellow/Event Note
NUTRITION FOLLOW/UP NOTE/Nutrition Services
Nutrition Follow Up/Nutrition Services
Nutrition Follow Up/Nutrition Services
POC/Event Note
POCUS

## 2022-02-10 NOTE — PROGRESS NOTE ADULT - SUBJECTIVE AND OBJECTIVE BOX
GENERAL SURGERY PROGRESS NOTE   ___________________________________________________________________    MALDONADO BOB | [id number] | 76y Male | [facility] 04 | LOS 28d    Attending: Tian Del Real    ___________________________________________________________________    CC: Patient is a 76y old  Male who presents with a chief complaint of Fall (10 Feb 2022 02:31)      SUBJECTIVE:   Patient seen today during morning rounds at bedside and found to be without acute distress. Denies chest pain, fever, severe pain, or SOB.     Overnight: Unremarkable    Allegies:  NKDA    OBJECTIVE:  Vitals:    T(C): 37.2 (02-10-22 @ 00:00), Max: 37.3 (22 @ 08:00)  HR: 114 (02-10-22 @ 04:09) (87 - 116)  BP: 107/54 (02-10-22 @ 01:00) (79/43 - 114/57)  RR: 20 (02-10-22 @ 01:00) (19 - 31)  SpO2: 99% (02-10-22 @ 04:09) (92% - 100%)  Mode: CPAP with PS  FiO2: 40  PEEP: 5  PS: 10  MAP: 9  PIP: 18      OUT:    Indwelling Catheter - Urethral (mL): 1550 mL    Rectal Tube (mL): 250 mL  Total OUT: 1800 mL      OUT:    Indwelling Catheter - Urethral (mL): 905 mL    Rectal Tube (mL): 300 mL  Total OUT: 1205 mL        Physical Exam:   General: intubated   Respiratory: unlabored breathing, clear respiration  Gastrointestinal: Abdomen soft, distended superior midline incision with minimal SS strikethrough   Extremities:  No edema, no calf tenderness  Skin: no cyanosis or rash observed    Medications:  apixaban 5 Oral every 12 hours  trimethoprim / sulfamethoxazole IVPB 250 IV Intermittent every 6 hours  vancomycin    Solution 125 Enteral Tube every 6 hours    acetaminophen    Suspension .. 650 milliGRAM(s) Oral every 6 hours  acetylcysteine 10%  Inhalation 4 milliLiter(s) Inhalation every 6 hours  ALBUTerol    90 MICROgram(s) HFA Inhaler 2 Puff(s) Inhalation every 6 hours  ALBUTerol   0.5% 2.5 milliGRAM(s) Nebulizer every 6 hours  budesonide 160 MICROgram(s)/formoterol 4.5 MICROgram(s) Inhaler 2 Puff(s) Inhalation two times a day  digoxin     Tablet 125 MICROGram(s) Oral daily  metoprolol tartrate 37.5 milliGRAM(s) Oral every 8 hours  midodrine 10 milliGRAM(s) Oral <User Schedule>  pantoprazole  Injectable 40 milliGRAM(s) IV Push every 12 hours  QUEtiapine 50 milliGRAM(s) Oral daily  QUEtiapine 125 milliGRAM(s) Oral at bedtime        Laboratory:  WBC: 21.37 H&H: 6.6/22.1 Plt: 473  WBC: 20.53 H&H: 7.3/23.6 Plt: 528    Chemistry:  02-10                             Phos: 4.6 M.30  140  |  102  |  43  ----------------------------<  190  4.2   |  24  |  1.04        ,                              Phos: 3.7 M.20  142  |  104  |  38  ----------------------------<  197  4.2   |  24  |  0.83          02-10   TPro 6.0 / Alb 2.4<L> / TBili 0.2 / DBili x  / AST/<H>/174<H> / AlkPhos 224<H>  PTT 29.0 PT/INR 17.5/1.57    ABG - ( 2022 05:01 )  pH, Arterial: 7.53  pH, Blood: x     /  pCO2: 31    /  pO2: 97    / HCO3: 26    / Base Excess: 3.0   /  SaO2: 98.4                  Reviewed laboratory and imaging
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
## 2022-02-10 NOTE — CHART NOTE - NSCHARTNOTEFT_GEN_A_CORE
Upon getting central venous access and arterial access, pt had another episode of melena, remained hypotensive while being resuscitated with blood, and appeared pale looking     Massive transfusion protocol was initiated. Pt code status was DNR    SICU attending recalled GI for a more definitive plan and discussed possible intervention    GI recommended CTA abdomen/pelvis to rule out active bleeding due to history of recent duodenal priscilla patch repair and PEG placement    If no active bleed identified for IR intervention, would consider GI intervention    Pt continued to hemorrhage, became asystole, no pulse was appreciated. Pt subsequently  at 21:41 Upon getting central venous access and arterial access, pt had another episode of melena, remained hypotensive while being resuscitated with blood, and appeared pale looking     Massive transfusion protocol was initiated. Pt code status was DNR    SICU attending recalled GI (Dr. Alexia Mclean) for a more definitive plan and discussed possible intervention    GI recommended CTA abdomen/pelvis to rule out active bleeding due to history of recent duodenal priscilla patch repair and PEG placement    If no active bleed identified for IR intervention, would consider GI intervention    Pt continued to hemorrhage, became asystole, no pulse was appreciated. Pt subsequently  at 21:41

## 2022-02-10 NOTE — PROGRESS NOTE ADULT - ATTENDING SUPERVISION STATEMENT
ACP
ACP
Resident
ACP/Resident
Resident
ACP
ACP/Resident
Fellow
Resident
ACP
ACP/Resident
Resident
ACP/Resident
Resident

## 2022-02-10 NOTE — CHART NOTE - NSCHARTNOTEFT_GEN_A_CORE
Note Type	Ultrasound    : Kristen Granado    INDICATION: Respiratory failure    PROCEDURE:  [X] LIMITED ECHO  [X] LIMITED CHEST      FINDINGS:  Lungs: Diffuse A-lines w/ scattered B-lines bilaterally. Lung sliding present. No pleural effusions.  Heart: Limited views. LV systolic function grossly normal. Unable to visualize IVC d/t patient agitation.     INTERPRETATION:  No pulmonary edema or pleural effusions. No pneumothorax. LV systolic function grossly normal.

## 2022-02-10 NOTE — PROGRESS NOTE ADULT - PROVIDER SPECIALTY LIST ADULT
Electrophysiology
Gastroenterology
Infectious Disease
Internal Medicine
SICU
Surgery
Vascular Surgery
Infectious Disease
SICU
Surgery
Vascular Surgery
Vascular Surgery
Infectious Disease
SICU
Surgery
Vascular Surgery
Wound Care
Infectious Disease
Neurology
SICU
Surgery
Vascular Surgery
Internal Medicine
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
## 2022-02-10 NOTE — PROGRESS NOTE ADULT - REASON FOR ADMISSION
Fall
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
## 2022-02-10 NOTE — CHART NOTE - NSCHARTNOTEFT_GEN_A_CORE
Patient known to GI. Re-consulted by SICU PA on this patient this evening for reports of drop in Hgb, an episode of melena in the day and waxing and waning BP.    Instructed him to adequately resuscitate the patient with blood and IVF, obtain stat CTA and consult IR/surgery pending findings and that GI would also review the CTA and patient's response and determine the utility/timing of any potential EGD.    Upon further review of the chart, patient's hgb was 6.6 around 3 AM from 7.3 and was given one unit of blood. No repeat Hgb obtained. 2 units of blood were then ordered in the evening.     Later, contacted by the patient's Attending. Who reported the patient was having active melena and soft BP and actively being transfused. Informed the Attending of the initial instructions given to his PA. CTA not obtained yet. Subsequently, I discussed with on-call GI Attending, Dr. Cross who gave the same recommendations and informed the SICU Attending as well.     Depending on CTA findings will determine whether safe for endoscopic intervention considering the potential to expand prior perforation or whether IR vs surgical intervention is indicated instead. Team agreed to plan and in the meantime, SICU will also touch base with the family to determine GOC.    - Stat CTA, consult IR/Surg pending findings  - Trend Hgb, Transfuse if Hgb < 7  - Maintain active T&S  - IV PPI 40 BID  - Consider holding AC if able  - Ensure adequate hydration   - Touch base with family regarding GOC  - Rest of care per primary    Will continue to follow.    Case discussed with Attending, Dr. Chadwick Cross.    Alexia Mclean MD  Gastroenterology/Hepatology Fellow, PGY-V    NON-URGENT CONSULTS:  Please email [email or web address]  [email or web address]  AT NIGHT AND ON WEEKENDS:  Contact on-call GI fellow via answering service ([phone]) from 5pm-8am and on weekends/holidays  MONDAY-FRIDAY 8AM-5PM:  Pager# [phone] ([facility])  GI Phone# [phone] ([facility]) Patient known to GI. Re-consulted by SICU JEANINE Gunderson on this patient this evening for reports of drop in Hgb, an episode of melena (reported 100 cc) in the day and waxing and waning BP.    Instructed Mr. Gunderson to adequately resuscitate the patient with blood and IVF, obtain stat CTA and consult IR/surgery pending findings and that GI would also review the CTA and patient's response and determine the utility/timing of any potential EGD.    Upon further review of the chart, patient's hgb was 6.6 around 3 AM from 7.3 and was given one unit of blood. No repeat Hgb obtained. 2 units of blood were then ordered in the evening.     Later, contacted by the patient's Attending. Who reported the patient was having active melena and soft BP and actively being transfused. Informed the Attending of the initial instructions given to Mr. Gunderson. CTA not obtained yet. Subsequently, I discussed with on-call GI Attending, Dr. Cross who gave the same recommendations and informed the SICU Attending as well.     Depending on CTA findings will determine whether safe for endoscopic intervention considering the potential to expand prior perforation or whether IR vs surgical intervention is indicated instead. Team agreed to plan and in the meantime, SICU will also touch base with the family to determine GOC.    - Stat CTA, consult IR/Surg pending findings  - Trend Hgb, Transfuse if Hgb < 7  - Maintain active T&S  - IV PPI 40 BID  - Consider holding AC if able  - Ensure adequate hydration   - Touch base with family regarding GOC  - Rest of care per primary    Will continue to follow.    Case discussed with Attending, Dr. Chadwick Cross.    Alexia Mclean MD  Gastroenterology/Hepatology Fellow, PGY-V    NON-URGENT CONSULTS:  Please email [email or web address]  AT NIGHT AND ON WEEKENDS:  Contact on-call GI fellow via answering service ([phone]) from 5pm-8am and on weekends/holidays  MONDAY-FRIDAY 8AM-5PM:  Pager# [phone] ([facility])  GI Phone# [phone] ([facility]) Patient known to GI. Re-consulted by SICU JEANINE Gunderson on this patient this evening for reports of drop in Hgb, an episode of melena (reported 100 cc) in the day and waxing and waning BP.    Instructed Mr. Gunderson to adequately resuscitate the patient with blood and IVF, obtain stat CTA and consult IR/surgery pending findings and that GI would also review the CTA and patient's response and determine the utility/timing of any potential EGD.    Upon further review of the chart, patient's hgb was 6.6 around 3 AM from 7.3 and was given one unit of blood. No repeat Hgb obtained. 2 units of blood were then ordered in the evening.     Later, contacted by the patient's Attending, Dr. Benjamin Stauffer. Who reported the patient was having active melena and soft BP and actively being transfused. Informed Dr. Stauffer of the initial instructions given to Mr. Gunderson. CTA not obtained yet. Subsequently, I discussed with on-call GI Attending, Dr. Cross who gave the same recommendations and informed the SICU Attending as well.     Depending on CTA findings will determine whether safe for endoscopic intervention considering the potential to expand prior perforation or whether IR vs surgical intervention is indicated instead. Dr. Stauffer agreed to plan and in the meantime, SICU will also touch base with the family to determine GOC.    - Stat CTA, consult IR/Surg pending findings  - Trend Hgb, Transfuse if Hgb < 7  - Maintain active T&S  - IV PPI 40 BID  - Consider holding AC if able  - Ensure adequate hydration   - Touch base with family regarding GOC  - Rest of care per primary    Will continue to follow.    Case discussed with Attending, Dr. Chadwick Cross.    Alexia Mclean MD  Gastroenterology/Hepatology Fellow, PGY-V    NON-URGENT CONSULTS:  Please email [email or web address]  AT NIGHT AND ON WEEKENDS:  Contact on-call GI fellow via answering service ([phone]) from 5pm-8am and on weekends/holidays  MONDAY-FRIDAY 8AM-5PM:  Pager# [phone] ([facility])  GI Phone# [phone] ([facility]) Patient known to GI. Re-consulted by SICU PA Byron Neptali who initially paged GI around 8pm 2/10/22 and after initial attempt to return this page (line busy/no answer), we were able to connect around 830pm on this patient for which he reported a drop in Hgb, an episode of melena (reported 100 cc) in the day and waxing and waning BP.    Instructed Mr. Gunderson to adequately resuscitate the patient with blood and IVF, obtain stat CTA and consult IR/surgery pending findings and that GI would also review the CTA and patient's response and determine the utility/timing of any potential EGD.    Upon further review of the chart, patient's hgb was 6.6 around 3 AM from 7.3 and was given one unit of blood. No repeat Hgb obtained. 2 units of blood were then ordered in the evening.     Later, contacted by the patient's Attending, Dr. Benjamin Stauffer. Who reported the patient was having active melena and soft BP and actively being transfused. Informed Dr. Stauffer of the initial instructions given to Mr. Gunderson. CTA not obtained yet. Subsequently, I discussed with on-call GI Attending, Dr. Cross who gave the same recommendations and informed the SICU Attending as well.     Depending on CTA findings will determine whether safe for endoscopic intervention considering the potential to expand prior perforation or whether IR vs surgical intervention is indicated instead. Dr. Stauffer agreed to plan and in the meantime, SICU will also touch base with the family to determine GOC.    - Stat CTA, consult IR/Surg pending findings  - Trend Hgb, Transfuse if Hgb < 7  - Maintain active T&S  - IV PPI 40 BID  - Consider holding AC if able  - Ensure adequate hydration   - Touch base with family regarding GOC  - Rest of care per primary      ***Note: contacted by patient's primary team around 10 pm 2/10 and informed that patient had . Dr. Cross informed. Per chart patient  at 9:41 pm    Alexia Mclean MD  Gastroenterology/Hepatology Fellow, PGY-V    NON-URGENT CONSULTS:  Please email [email or web address]  AT NIGHT AND ON WEEKENDS:  Contact on-call GI fellow via answering service ([phone]) from 5pm-8am and on weekends/holidays  MONDAY-FRIDAY 8AM-5PM:  Pager# [phone] ([facility])  GI Phone# [phone] ([facility])

## 2022-02-10 NOTE — PROGRESS NOTE ADULT - ATTENDING COMMENTS
Patient overnight no fevers. Patient remains vented unable to be placed on trach collar. Agitation improved, however somnolent this am. Patient septic, oliguria, with soft pressurs likely hypovolemic  N mentating at baseline, decrease am dosing of seroquel  resp on ventilator, worsening cxr, will perform pulmonary toilet, and out of bed to chair to allow for deep breathing, plan for trach collar, ipv  cv soft pressures, decreasing uop, soft pressure and slightly tachycardic likely hypovolemic will give volume challenge 500cc bolus and perform POCUS  gi npo, tube feeds, diarrhea improved  gu/renal monitor uop hypovolemic required fluid bolus overnight  heme started on therapeutic ac for afib , monitor hg after 1 unit transfusion  id on bactrim d/c meropenem per id recommendations  endo no changes    The patient is a critical care patient with life threatening hemodynamic and metabolic instability in SICU.  I have personally interviewed when possible and examined the patient, reviewed data and laboratory tests/x-rays and all pertinent electronic images.  I was physically present for the key portions of the evaluation and management (E/M) service provided.   The SICU team has a constant risk benefit analyzes discussion with the primary team, all consultants, House Staff and PA's on all decisions.  These diagnoses are unrelated to the surgical procedure noted above.  I meet with family if needed to get further history, discuss the case and make care decisions for this patient who might not be able to participate.  Time involved in performance of separately billable procedures was not counted toward my critical care time. There is no overlap.  I spent 55-75 minutes ( 0800Hrs-0915Hrs in AM/ 1600Hrs-1715Hrs in PM, or other time indicated) of critical care time for the diagnoses, assessment, plan and interventions.  This time excludes time spent on separate procedures and teaching.

## 2022-02-10 NOTE — CHART NOTE - NSCHARTNOTEFT_GEN_A_CORE
Called to bedside by nurse for patient's in hypotension    Bedside POCUS revealed limited cardiac windows but, appeared hyperdynamic     Ordered 1L bolus of lactated ringers and attempted to get further peripheral access     Upon chart review, pt's last H&H 6.6/22.1, 1u packed red blood cells were ordered    Upon getting peripheral access, pt had an episode of melena in his bed and remained hypotensive in the setting of resuscitation with IV fluids    SICU team called senior resident to bedside and SICU attending made aware    Plan was to get central venous access, arterial line access for closer BP monitoring, resuscitate with IV fluids until blood can be administered, consulting GI    Pt began being transfused with packed red blood cells    GI called for evaluation and possible intervention for pt in the setting of hypotension secondary to lower GI bleed while being resuscitated     SICU team told to resuscitate patient, the chart will be reviewed, and pt will be re-evaluated later on tonight Called to bedside by nurse for patient's in hypotension    Bedside POCUS revealed limited cardiac windows but, appeared hyperdynamic     Ordered 1L bolus of lactated ringers and attempted to get further peripheral access     Upon chart review, pt's last H&H 6.6/22.1, 1u packed red blood cells were ordered    Upon getting peripheral access, pt had an episode of melena in his bed and remained hypotensive in the setting of resuscitation with IV fluids    SICU team called senior resident to bedside and SICU attending made aware    Plan was to get central venous access, arterial line access for closer BP monitoring, resuscitate with IV fluids until blood can be administered, consulting GI    Pt began being transfused with packed red blood cells    GI called (Dr. Alexia Mclean) for evaluation and possible intervention for pt in the setting of hypotension secondary to lower GI bleed while being resuscitated     SICU team told to resuscitate patient, the chart will be reviewed, and pt will be re-evaluated later on tonight    SICU Attending made aware

## 2022-02-12 LAB
CULTURE RESULTS: SIGNIFICANT CHANGE UP
CULTURE RESULTS: SIGNIFICANT CHANGE UP
SPECIMEN SOURCE: SIGNIFICANT CHANGE UP
SPECIMEN SOURCE: SIGNIFICANT CHANGE UP

## 2022-02-13 LAB
CULTURE RESULTS: SIGNIFICANT CHANGE UP
ORGANISM # SPEC MICROSCOPIC CNT: SIGNIFICANT CHANGE UP
ORGANISM # SPEC MICROSCOPIC CNT: SIGNIFICANT CHANGE UP
SPECIMEN SOURCE: SIGNIFICANT CHANGE UP

## 2022-08-09 NOTE — DIETITIAN INITIAL EVALUATION ADULT. - HEIGHT FOR BMI (CENTIMETERS)
172.72 Dapsone Pregnancy And Lactation Text: This medication is Pregnancy Category C and is not considered safe during pregnancy or breast feeding.

## 2023-03-06 NOTE — SWALLOW BEDSIDE ASSESSMENT ADULT - SWALLOW EVAL: CURRENT DIET
prescription Faxed into     [id number] - [place], [place] - [address] [facility] [address]  [address] [address]  Phone: [phone] Fax: [phone]       Was the patient seen in the last year in this department? Yes     Does patient have an active prescription for medications requested? No     Received Request Via: Pharmacy   NPO, per MD order General (Pediatric)

## 2024-02-22 NOTE — PHYSICAL THERAPY INITIAL EVALUATION ADULT - RISK REDUCTION/PREVENTION, PT EVAL
Detail Level: Generalized
Detail Level: Detailed
Detail Level: Zone
Detail Level: Simple
risk factors
risk factors
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
## 2025-01-24 NOTE — PROGRESS NOTE ADULT - PROBLEM/PLAN-11
[facility] - Intensive Care [facility]  Urology  Progress Note    Patient Name: Telly Montoya  MRN: [id number]  Admission Date: 1/23/2025  Hospital Length of Stay: 0 days  Code Status: Full Code   Attending Provider: Flora Gillespie MD   Primary Care Physician: Archana Coronado Sloan Rivero MD  Urology  [facility] - Intensive [facility] ([place])   DISPLAY PLAN FREE TEXT

## (undated) DEVICE — DRSG TEGADERM 4X4.75"

## (undated) DEVICE — LABELS BLANK W PEN

## (undated) DEVICE — VENODYNE/SCD SLEEVE CALF MEDIUM

## (undated) DEVICE — DRSG TELFA 3 X 8

## (undated) DEVICE — GLV 7.5 PROTEXIS (WHITE)

## (undated) DEVICE — LIJ/LIA-ESU VALLEYLAB FORCETRIAD T2D28932EX: Type: DURABLE MEDICAL EQUIPMENT

## (undated) DEVICE — SUT VICRYL 2-0 18" TIES UNDYED

## (undated) DEVICE — DRAIN RESERVOIR FOR JACKSON PRATT 100CC CARDINAL

## (undated) DEVICE — ELCTR BOVIE TIP BLADE INSULATED 6.5" EDGE

## (undated) DEVICE — ELCTR GROUNDING PAD ADULT COVIDIEN

## (undated) DEVICE — PROTECTOR HEEL / ELBOW FLUFFY

## (undated) DEVICE — STAPLER SKIN MULTI DIRECTION W35

## (undated) DEVICE — DRAPE MAYO STAND 23"

## (undated) DEVICE — WARMING BLANKET FULL UNDERBODY

## (undated) DEVICE — SUT PDS II 1 48" TP-1

## (undated) DEVICE — FOLEY TRAY 16FR 5CC LF UMETER CLOSED

## (undated) DEVICE — SOL IRR POUR NS 0.9% 1500ML

## (undated) DEVICE — SUT VICRYL 0 18" TIES UNDYED

## (undated) DEVICE — DRAPE 3/4 SHEET 52X76"

## (undated) DEVICE — SUT SILK 2-0 18" FS

## (undated) DEVICE — SUT VICRYL 2-0 27" SH UNDYED

## (undated) DEVICE — POOLE SUCTION TIP

## (undated) DEVICE — DRAPE FLUID WARMER 44 X 66"

## (undated) DEVICE — CANISTER DISPOSABLE THIN WALL 3000CC

## (undated) DEVICE — CATH NG TL FILTER ABRAM 5/8X15"

## (undated) DEVICE — SUT PDS II 0 36" CTX

## (undated) DEVICE — SUT VICRYL 2-0 27" CT-1 UNDYED

## (undated) DEVICE — SOL IRR POUR H2O 1500ML

## (undated) DEVICE — DRAPE LAPAROTOMY W VELCRO CORD TABS

## (undated) DEVICE — DRAPE TOWEL BLUE 17" X 24"

## (undated) DEVICE — DRSG OPSITE 13.75 X 4"

## (undated) DEVICE — POSITIONER STRAP ARMBOARD VELCRO TS-30

## (undated) DEVICE — DRAPE FLUID WARMER 44 X 44"

## (undated) DEVICE — ELCTR BOVIE TIP BLADE INSULATED 2.75" EDGE

## (undated) DEVICE — DRAIN JACKSON PRATT 7MM FLAT FULL NO TROCAR

## (undated) DEVICE — LIGASURE IMPACT

## (undated) DEVICE — PACK MAJOR ABDOMINAL WITH LAP

## (undated) DEVICE — SUT VICRYL 3-0 18" SH UNDYED (POP-OFF)

## (undated) DEVICE — DRSG CURITY GAUZE SPONGE 4 X 4" 12-PLY